# Patient Record
Sex: FEMALE
[De-identification: names, ages, dates, MRNs, and addresses within clinical notes are randomized per-mention and may not be internally consistent; named-entity substitution may affect disease eponyms.]

---

## 2017-05-04 ENCOUNTER — HOSPITAL ENCOUNTER (INPATIENT)
Dept: HOSPITAL 42 - ED | Age: 62
LOS: 4 days | Discharge: HOME | DRG: 202 | End: 2017-05-08
Attending: HOSPITALIST | Admitting: INTERNAL MEDICINE
Payer: MEDICAID

## 2017-05-04 VITALS — BODY MASS INDEX: 27.4 KG/M2

## 2017-05-04 DIAGNOSIS — N39.0: ICD-10-CM

## 2017-05-04 DIAGNOSIS — Z63.72: ICD-10-CM

## 2017-05-04 DIAGNOSIS — F17.210: ICD-10-CM

## 2017-05-04 DIAGNOSIS — K52.9: ICD-10-CM

## 2017-05-04 DIAGNOSIS — E87.6: ICD-10-CM

## 2017-05-04 DIAGNOSIS — K70.11: Primary | ICD-10-CM

## 2017-05-04 DIAGNOSIS — R25.1: ICD-10-CM

## 2017-05-04 DIAGNOSIS — R63.4: ICD-10-CM

## 2017-05-04 DIAGNOSIS — F41.8: ICD-10-CM

## 2017-05-04 DIAGNOSIS — F10.20: ICD-10-CM

## 2017-05-04 DIAGNOSIS — D53.9: ICD-10-CM

## 2017-05-04 DIAGNOSIS — K64.9: ICD-10-CM

## 2017-05-04 LAB
ADD MANUAL DIFF?: NO
ALBUMIN/GLOB SERPL: 0.7 {RATIO} (ref 1.1–1.8)
ALP SERPL-CCNC: 467 U/L (ref 38–133)
ALT SERPL-CCNC: 123 U/L (ref 7–56)
APPEARANCE UR: (no result)
APTT BLD: 27.5 SECONDS (ref 23.7–30.8)
AST SERPL-CCNC: 178 U/L (ref 15–39)
BACTERIA #/AREA URNS HPF: (no result) /[HPF]
BASOPHILS # BLD AUTO: 0.07 K/MM3 (ref 0–2)
BASOPHILS NFR BLD: 0.7 % (ref 0–3)
BILIRUB SERPL-MCNC: 18 MG/DL (ref 0.2–1.3)
BILIRUB UR-MCNC: (no result) MG/DL
BUN SERPL-MCNC: 4 MG/DL (ref 7–21)
CALCIUM SERPL-MCNC: 8.6 MG/DL (ref 8.4–10.5)
CHLORIDE SERPL-SCNC: 100 MMOL/L (ref 98–107)
CO2 SERPL-SCNC: 29 MMOL/L (ref 21–33)
COLOR UR: YELLOW
EOSINOPHIL # BLD: 0.1 10*3/UL (ref 0–0.7)
EOSINOPHIL NFR BLD: 0.5 % (ref 1.5–5)
ERYTHROCYTE [DISTWIDTH] IN BLOOD BY AUTOMATED COUNT: 23.3 % (ref 11.5–14.5)
GLOBULIN SER-MCNC: 3.8 GM/DL
GLUCOSE SERPL-MCNC: 105 MG/DL (ref 70–110)
GLUCOSE UR STRIP-MCNC: NEGATIVE MG/DL
GRANULOCYTES # BLD: 6.53 10*3/UL (ref 1.4–6.5)
GRANULOCYTES NFR BLD: 67.4 % (ref 50–68)
HCT VFR BLD CALC: 30.1 % (ref 36–48)
INR PPP: 1.81 (ref 0.93–1.08)
KETONES UR STRIP-MCNC: NEGATIVE MG/DL
LEUKOCYTE ESTERASE UR-ACNC: (no result) LEU/UL
LIPASE SERPL-CCNC: 160 U/L (ref 23–300)
LYMPHOCYTES # BLD: 1.9 10*3/UL (ref 1.2–3.4)
LYMPHOCYTES NFR BLD AUTO: 19.5 % (ref 22–35)
MCH RBC QN AUTO: 34.8 PG (ref 25–35)
MCHC RBC AUTO-ENTMCNC: 34.2 G/DL (ref 31–37)
MCV RBC AUTO: 101.7 FL (ref 80–105)
MONOCYTES # BLD AUTO: 1.2 10*3/UL (ref 0.1–0.6)
MONOCYTES NFR BLD: 11.9 % (ref 1–6)
PH UR STRIP: 6.5 [PH] (ref 4.7–8)
PLATELET # BLD: 180 10^3/UL (ref 120–450)
PMV BLD AUTO: 9.3 FL (ref 7–11)
POTASSIUM SERPL-SCNC: 2.3 MMOL/L (ref 3.6–5)
PROT SERPL-MCNC: 6.6 G/DL (ref 5.8–8.3)
PROT UR STRIP-MCNC: NEGATIVE MG/DL
RBC # UR STRIP: (no result) /UL
SODIUM SERPL-SCNC: 137 MMOL/L (ref 132–148)
SP GR UR STRIP: 1.01 (ref 1–1.03)
UROBILINOGEN UR STRIP-ACNC: 1 E.U./DL
WBC # BLD AUTO: 9.7 10^3/UL (ref 4.5–11)

## 2017-05-04 NOTE — ED PDOC
Arrival/HPI





- General


Historian: Patient





- History of Present Illness


Time/Duration: 1 week


Context: Home





- General


Chief Complaint: Abdominal Pain


Time Seen by Provider: 17 18:39





- History of Present Illness


Narrative History of Present Illness (Text): 





17 19:00


This 60 yo female with pmh, alcohol abuse, presents to this ED c/o generalized 

swelling, trunk, b/l extremities, abdominal pain, and jaundice x 7 days.


Patient stated she stopped drinking 5 days ago.  Patient also noted rectal 

bleeding, which she describes as bright red blood.  Patient feels very tired, 

decreased urination, and decreased appetite. (Neftali Goyal)





Past Medical History





- Provider Review


Nursing Documentation Reviewed: Yes





- Infectious Disease


Hx of Infectious Diseases: None





- Tetanus Immunization


Tetanus Immunization: Unknown





- Reproductive


Menopause: No





- Cardiac


Hx Cardiac Disorders: No





- Pulmonary


Hx Respiratory Disorders: No





- Neurological


Hx Neurological Disorder: No





- HEENT


Hx HEENT Disorder: No





- Renal


Hx Renal Disorder: No





- Endocrine/Metabolic


Hx Endocrine Disorders: No





- Hematological/Oncological


Hx Blood Disorders: No





- Integumentary


Hx Dermatological Disorder: No





- Musculoskeletal/Rheumatological


Hx Falls: Yes





- Gastrointestinal


Hx Gastrointestinal Disorders: No





- Genitourinary/Gynecological


Hx Genitourinary Disorders: No





- Psychiatric


Hx Depression: Yes


Hx Substance Use: No





- Anesthesia


Hx Anesthesia:  (uto)





- Suicidal Assessment


Feels Threatened In Home Enviroment: No





Family/Social History





- Physician Review


Nursing Documentation Reviewed: Yes


Family/Social History: No Known Family HX


Smoking Status: Never Smoked


Hx Alcohol Use: No


Hx Substance Use: No


Hx Substance Use Treatment: No





Allergies/Home Meds


Allergies/Adverse Reactions: 


Allergies





naproxen Allergy (Verified 16 23:24)


 RASH











Review of Systems





- Review of Systems


Constitutional: Fatigue.  absent: Weight Change, Fevers


Eyes: Normal.  absent: Vision Changes, Photophobia


ENT: Normal


Respiratory: Normal.  absent: SOB, Cough


Cardiovascular: Normal.  absent: Chest Pain, Palpitations


Gastrointestinal: Abdominal Pain, Nausea, Other ((+) rectal bleeding).  absent: 

Diarrhea, Vomiting


Genitourinary Female: Normal.  absent: Dysuria, Frequency, Hematuria


Musculoskeletal: Normal


Skin: Normal.  absent: Rash


Neurological: Normal.  absent: Headache, Dizziness, Focal Weakness, Gait Changes


Endocrine: Normal.  absent: Diaphoresis, Polyuria


Hemo/Lymphatic: Normal


Psychiatric: Normal





Physical Exam


Temperature: Afebrile


Blood Pressure: Normal


Pulse: Regular


Respiratory Rate: Normal


Appearance: Positive for: Comfortable, Ill-Appearing, Unkept


Pain Distress: None


Mental Status: Positive for: Alert and Oriented X 3





- Systems Exam


Head: Present: Atraumatic, Normocephalic


Pupils: Present: PERRL


Extroacular Muscles: Present: EOMI


Conjunctiva: Present: Icteric


Ears: Present: Normal, NORMAL TM, Normal Canal.  No: Erythema, TM Bulging, Fluid

, TM Perf


Mouth: Present: Moist Mucous Membranes


Pharnyx: Present: Normal.  No: ERYTHEMA, EXUDATE, TONSILS ENLARGED


Nose (External): Present: Atraumatic


Neck: Present: Normal Range of Motion.  No: Meningeal Signs


Respiratory/Chest: Present: Clear to Auscultation, Good Air Exchange.  No: 

Respiratory Distress, Accessory Muscle Use, Wheezes, Rales, Retracting, Rhonchi


Cardiovascular: Present: Regular Rate and Rhythm, Normal S1, S2.  No: Murmurs


Abdomen: Present: Normal Bowel Sounds.  No: Tenderness, Distention, Peritoneal 

Signs, Rebound, Guarding


Back: Present: Normal Inspection.  No: CVA Tenderness


Upper Extremity: Present: Normal Inspection, Normal ROM, NORMAL PULSES, 

Neurovascularly Intact, Capillary Refill < 2s.  No: Cyanosis, Edema


Lower Extremity: Present: Normal Inspection, NORMAL PULSES, Normal ROM, 

Neurovascularly Intact, Capillary Refill < 2 s.  No: Edema, CALF TENDERNESS


Neurological: Present: GCS=15, CN II-XII Intact, Speech Normal


Skin: Present: Warm, Dry, Other (Jaundice).  No: Rashes


Psychiatric: Present: Alert, Oriented x 3, Normal Insight, Normal Concentration


Vital Signs











  Temp Pulse Resp BP Pulse Ox


 


 17 03:51   83  18  97/63 L  97


 


 17 00:30  98.2 F  89  20  102/65  98


 


 17 18:03  98.2 F  95 H  18  113/68  95














Medical Decision Making


Re-evaluation Time: 01:06


Reassessment Condition: Re-examined, Improving,but remains with symptoms





- Lab Interpretations


I have reviewed the lab results: Yes


Interpretation: Abnormal lab values





- EKG Interpretation


Interpreted by ED Physician: Yes (NSR @ 83 bpm.  No ST changes)


Type: 12 lead EKG


Comparison: No previous EKG avail.


ED Course and Treatment: 





I was available for consultation during PA evaluation. The chart was reviewed 

by me, and I agree with disposition. The documented history was done by the 

physician extender. The documented physical exam was done by the physician 

extender. The documented procedures were done by the physician extender.  (

Luis Garcia)








17 01:00


I spoke with Dr. French regarding patient symptoms, lab report, imaging result, 

physical exam demonstrates jaundice, and abdominal tenderness.  He agrees with 

plan. (Neftali Goyal)





- Lab Interpretations


Microbiology Results: 


Microbiology Results





17 23:19   Urine,Clean Catch   Urine Culture - Preliminary


                            Gram Negative Ruddy


                            Gram Positive Cocci








Lab Results: 








 17 19:48 





 17 19:48 





 Lab Results





17 22:00: Blood Type Confirm B POSITIVE


17 21:37: Urine Color Yellow, Urine Appearance Slight-cloudy, Urine pH 6.5

, Ur Specific Gravity 1.010, Urine Protein Negative, Urine Glucose (UA) Negative

, Urine Ketones Negative, Urine Blood Trace-intact H, Urine Nitrate Positive H, 

Urine Bilirubin Large H, Urine Urobilinogen 1.0 H, Ur Leukocyte Esterase Trace H

, Urine RBC 1 - 3, Urine WBC 1 - 3, Ur Epithelial Cells 1 - 3, Urine Bacteria 

Many


17 19:48: Ammonia 43 H


17 19:48: Blood Type B POSITIVE, Antibody Screen Negative, BBK History 

Checked No verified bt


17 19:48: Sodium 137, Potassium 2.3 L* D, Chloride 100, Carbon Dioxide 29

, Anion Gap 10, BUN 4 L, Creatinine 0.8, Est GFR (African Amer) > 60, Est GFR (

Non-Af Amer) > 60, Random Glucose 105, Calcium 8.6, Total Bilirubin 18.0 H, AST 

178 H,  H, Alkaline Phosphatase 467 H, NT-Pro-B Natriuret Pep 354, Total 

Protein 6.6, Albumin 2.7 L, Globulin 3.8, Albumin/Globulin Ratio 0.7 L, Lipase 

160


17 19:48: PT 19.5 H, INR 1.81 H, APTT 27.5


17 19:48: WBC 9.7  D, RBC 2.96 L, Hgb 10.3 L, Hct 30.1 L, .7, MCH 

34.8, MCHC 34.2, RDW 23.3 H, Plt Count 180, MPV 9.3, Gran % 67.4, Lymph % (Auto

) 19.5 L, Mono % (Auto) 11.9 H, Eos % (Auto) 0.5 L, Baso % (Auto) 0.7, Gran # 

6.53 H, Lymph # 1.9, Mono # 1.2 H, Eos # 0.1, Baso # 0.07











- RAD Interpretation


Narrative RAD Interpretations (Text): 





17 01:04


CXR:  [facility] Division of Radiology  


 [address] [place] [address]  


 Tel. no. [phone]   


 


 


Patient Name: KAREN RODRIGUEZ            Account #: [id number]  


Pt. Address:  [address] Rec #: [id number]  


                    [place] [address]            Ordering Dr: Neftali Goyal PA-C  


Pt Phone: [phone]                             Order Location: ED  


: 1955 Female Age: 61            Order #: [id number]                   

                   


             Accession #: [id number]  


Reason for exam: abdominal pain   


 


 


 


 


 


 CT Scan  


 


 


ABD   PELVIS IV CONTRAST ONLY                              Exam Date: 17  


 


This imaging exam was performed at [facility]  


EXAM:  


   CT Abdomen and Pelvis With Intravenous Contrast  


 


 CLINICAL HISTORY:  


   61 years old, female; Pain; Abdominal pain; Generalized  


 


 TECHNIQUE:  


   Axial computed tomography images of the abdomen and pelvis with intravenous 

  


 contrast.  This CT exam was performed using one or more of the following dose 

  


 reduction techniques:  automated exposure control, adjustment of the mA and/or

   


 kV according to patient size, and/or use of iterative reconstruction 

technique.  


   Coronal and sagittal reformatted images were created and reviewed.  


 


 CONTRAST:  


   96 mL of OMNI 350 administered intravenously.  


 


 EXAM DATE/TIME:  


   2017 6:55 PM  


 


 COMPARISON:  


   There are no prior studies for comparison.  


 


 FINDINGS:  


   Lower thorax:  Heart size is normal.  There is a small hiatal hernia.  There

   


 is atelectasis and scarring greatest in the left base.  


 


  ABDOMEN:  


   Liver:  There is fatty infiltration of the liver.  The liver is   


 enlarged.There is a 12.5 x 17.1 x 15.6 low attenuation lesion in the right 

lobe   


 of the liver.  


   Gallbladder and bile ducts:  Gallbladder is distended.  There is   


 pericholecystic fluid.  Common bile duct is prominent.  


   Pancreas:  Pancreas is mildly atrophic.  


   Spleen:  unremarkable  


   Adrenals:  unremarkable  


   Kidneys and ureters:  unremarkable  


   Stomach and bowel:  Stomach is almost empty.  Rotation is normal.  There is 

  


 mild duodenal and proximal jejunal wall and fold thickening.  Small bowel is   


 mildly distended with fluid.  There is mild wall and fold thickening 

throughout   


 the small bowel.  There is terminal ileal wall thickening.  Visualized portion

   


 the appendix is unremarkable.  There is ascending and hepatic flexure wall   


 thickening.  There is less extensive transverse and descending colon wall   


 thickening.  There is distal sigmoid and rectal wall thickening.  


   Appendix:  See above.  


 


  PELVIS:  


   Bladder:  Bladder is partially distended.  


   Reproductive:  Uterus and adnexal structures are unremarkable.  


 


  ABDOMEN and PELVIS:  


   Intraperitoneal space:  There is a small amount of fluid in the pelvis.    


 There is minimal fluid in Rios's pouch.  There is pericholecystic fluid.  

  


 There is no free air  


   Bones/joints:  There are degenerative changes in the osseus structures.  


   Soft tissues:  unremarkable  


   Vasculature:  Vascular structures are unremarkable.  


   Lymph nodes:  There is no pathologic adenopathy.  


 


 IMPRESSION:  Enterocolitis; enlarged fatty liver; low attenuation hepatic   


 lesion possibly hemangioma; small amount of ascites in the right upper 

quadrant   


 and right flank including gallbladder fossa  


 


 Additional findings as described above.  


 


                                                            Dictated By:  Tere Diaz MD, MD      


                                                            Dictated Date/Time:

  17  


                                                            Signed By:  Tere Diaz MD  


                                                            Date Signed: 31  


                                                Transcribed By: TAMELA  


                                                            Transcribe Date/Time

: 17  


DONNA/CRISTOFER (Neftali Goyal)


Radiology Orders: 








17 18:55


ABD & PELVIS IV CONTRAST ONLY [CT] Stat 





17 18:56


CHEST PORTABLE [RAD] Stat 














- Medication Orders


Current Medication Orders: 








Folic Acid (Folic Acid)  1 mg PO DAILY [facility]


Ceftriaxone Sodium (Rocephin 1 Gram Ivpb)  1 gm in 100 mls @ 100 mls/hr IVPB 

DAILY [facility]


   PRN Reason: Protocol


   Last Admin: 17 09:46  Dose: 100 mls/hr





Lorazepam (Ativan)  1 mg IVP Q2H PRN; Protocol


   PRN Reason: Anxiety


   Last Admin: 17 23:10  Dose: 1 mg





Multivitamins/Minerals (Therapeutic-M Tab)  1 tab PO DAILY [facility]


   Last Admin: 17 09:46  Dose: 1 tab





Pantoprazole Sodium (Protonix Inj)  40 mg IVP DAILY [facility]


   Last Admin: 17 09:46  Dose: 40 mg





Prednisone (Prednisone Tab)  20 mg PO BID [facility]


   Stop: 17 10:00


Thiamine HCl (Vitamin B1 Tab)  100 mg PO DAILY [facility]





Discontinued Medications





Cyanocobalamin (Vitamin B12 1000 Mcg/Ml Inj)  1,000 mcg IM ONCE ONE


   Stop: 17 08:01


   Last Admin: 17 10:11  Dose: 1,000 mcg





Sodium Chloride (Sodium Chloride 0.9%)  1,000 mls @ 1,000 mls/hr IV .Q1H STA


   Stop: 17 19:50


   Last Admin: 17 19:55  Dose: 1,000 mls/hr





Potassium Chloride (Potassium Chloride 10 Meq/100 Ml)  10 meq in 100 mls @ 100 

mls/hr IVPB Q2H [facility]


   Stop: 17 00:29


   Last Admin: 17 03:23  Dose: 100 mls/hr





Magnesium Sulfate 2 gm/ Sodium (Chloride)  104 mls @ 102 mls/hr IVPB ONCE ONE


   Stop: 17 22:30


   Last Admin: 17 22:57  Dose: 102 mls/hr





Metronidazole (Flagyl)  500 mg in 100 mls @ 100 mls/hr IVPB STAT STA


   PRN Reason: Protocol


   Stop: 17 01:36


   Last Admin: 17 03:09  Dose: 100 mls/hr





Ceftriaxone Sodium (Rocephin 1 Gram Ivpb)  1 gm in 100 mls @ 200 mls/hr IVPB 

STAT STA


   PRN Reason: Protocol


   Stop: 17 01:05


   Last Admin: 17 03:56  Dose: 200 mls/hr





Potassium Chloride 20 meq/ (Sodium Chloride)  1,010 mls @ 100 mls/hr IV .Q10H6M 

[facility]


   Last Admin: 17 06:36  Dose: 100 mls/hr





Sodium Chloride (Sodium Chloride 0.9%)  1,000 mls @ 100 mls/hr IV .Q10H [facility]


   Last Admin: 17 03:07  Dose: 100 mls/hr





Iohexol (Omnipaque 350 100 Ml) Confirm Administered Dose 350 mg .ROUTE .STK-MED 

ONE


   Stop: 17 22:00


Lactulose (Enulose)  20 gm PO ONCE STA


   Stop: 17 02:22


   Last Admin: 17 02:47  Dose: 20 gm





Morphine Sulfate (Morphine)  2 mg IVP STAT STA


   Stop: 17 02:22


   Last Admin: 17 02:36  Dose: 2 mg





Re-Assess: MAR Pain Assessment


 Document     17 03:36  RS  (Rec: 17 09:53  RS  [id number])


     Pain Reassessment


      Is this a pain reassessment?               Yes


     Sleep


      Is patient sleeping during reassessment?   Yes





Ondansetron HCl (Zofran Inj)  4 mg IVP STAT STA


   Stop: 17 18:56


   Last Admin: 17 19:55  Dose: 4 mg





Pantoprazole Sodium (Protonix Inj)  40 mg IVP STAT STA


   Stop: 17 18:57


   Last Admin: 17 19:56  Dose: 40 mg





Potassium Chloride (Potassium Chloride Oral Soln)  60 meq PO STAT STA


   Stop: 17 21:28


   Last Admin: 17 22:57  Dose: 60 meq





Potassium Chloride (Potassium Chloride Oral Soln)  40 meq PO Q4H [facility]


   Stop: 17 15:46


   Last Admin: 17 08:22  Dose: 40 meq





Prednisolone (Prednisolone Oral Soln)  40 mg PO DAILY [facility]


   Last Admin: 17 17:31  Dose: 40 mg





Prednisolone (Prednisolone Oral Soln)  20 mg PO BID [facility]


   Stop: 17 10:00


   Last Admin: 17 10:39  Dose: 20 mg











Disposition/Present on Arrival





- Present on Arrival


Any Indicators Present on Arrival: No


History of DVT/PE: No


History of Uncontrolled Diabetes: No


Urinary Catheter: No


History of Decub. Ulcer: No


History Surgical Site Infection Following: None





- Disposition


Have Diagnosis and Disposition been Completed?: Yes


Disposition Time: 01:16


Patient Plan: Admission





- Disposition


Diagnosis: 


 Rectal bleeding, Abnormal liver enzymes, Enterocolitis, Urinary tract infection

, Ascites, Intractable abdominal pain, Jaundice





Disposition: HOSPITALIZED


Patient Problems: 


 Current Active Problems











Problem Status Onset


 


Abnormal liver enzymes Acute  


 


Ascites Acute  


 


Enterocolitis Acute  


 


Intractable abdominal pain Acute  


 


Jaundice Acute  


 


Rectal bleeding Acute  


 


Urinary tract infection Acute  











Condition: STABLE

## 2017-05-05 LAB
ADD MANUAL DIFF?: NO
ALBUMIN/GLOB SERPL: 0.7 {RATIO} (ref 1.1–1.8)
ALP SERPL-CCNC: 383 U/L (ref 38–133)
ALT SERPL-CCNC: 98 U/L (ref 7–56)
AST SERPL-CCNC: 135 U/L (ref 15–39)
BASOPHILS # BLD AUTO: 0.11 K/MM3 (ref 0–2)
BASOPHILS NFR BLD: 1.2 % (ref 0–3)
BILIRUB SERPL-MCNC: 15.2 MG/DL (ref 0.2–1.3)
BUN SERPL-MCNC: 3 MG/DL (ref 7–21)
CALCIUM SERPL-MCNC: 7.7 MG/DL (ref 8.4–10.5)
CHLORIDE SERPL-SCNC: 109 MMOL/L (ref 98–107)
CO2 SERPL-SCNC: 24 MMOL/L (ref 21–33)
EOSINOPHIL # BLD: 0.1 10*3/UL (ref 0–0.7)
EOSINOPHIL NFR BLD: 0.6 % (ref 1.5–5)
ERYTHROCYTE [DISTWIDTH] IN BLOOD BY AUTOMATED COUNT: 24.2 % (ref 11.5–14.5)
GLOBULIN SER-MCNC: 3 GM/DL
GLUCOSE SERPL-MCNC: 86 MG/DL (ref 70–110)
GRANULOCYTES # BLD: 5.8 10*3/UL (ref 1.4–6.5)
GRANULOCYTES NFR BLD: 60.7 % (ref 50–68)
HCT VFR BLD CALC: 26.6 % (ref 36–48)
LYMPHOCYTES # BLD: 2.3 10*3/UL (ref 1.2–3.4)
LYMPHOCYTES NFR BLD AUTO: 23.8 % (ref 22–35)
MCH RBC QN AUTO: 34.1 PG (ref 25–35)
MCHC RBC AUTO-ENTMCNC: 33.1 G/DL (ref 31–37)
MCV RBC AUTO: 103.1 FL (ref 80–105)
MONOCYTES # BLD AUTO: 1.3 10*3/UL (ref 0.1–0.6)
MONOCYTES NFR BLD: 13.7 % (ref 1–6)
PLATELET # BLD: 201 10^3/UL (ref 120–450)
PMV BLD AUTO: 9.7 FL (ref 7–11)
POTASSIUM SERPL-SCNC: 4.1 MMOL/L (ref 3.6–5)
PROT SERPL-MCNC: 5.3 G/DL (ref 5.8–8.3)
SODIUM SERPL-SCNC: 141 MMOL/L (ref 132–148)
WBC # BLD AUTO: 9.6 10^3/UL (ref 4.5–11)

## 2017-05-05 RX ADMIN — METRONIDAZOLE STA MLS/HR: 500 INJECTION, SOLUTION INTRAVENOUS at 02:36

## 2017-05-05 RX ADMIN — POTASSIUM CHLORIDE SCH MEQ: 1.5 SOLUTION ORAL at 04:28

## 2017-05-05 RX ADMIN — CEFTRIAXONE SCH MLS/HR: 1 INJECTION, SOLUTION INTRAVENOUS at 10:10

## 2017-05-05 RX ADMIN — MULTIPLE VITAMINS W/ MINERALS TAB SCH TAB: TAB at 10:11

## 2017-05-05 RX ADMIN — METRONIDAZOLE STA MLS/HR: 500 INJECTION, SOLUTION INTRAVENOUS at 03:09

## 2017-05-05 RX ADMIN — CEFTRIAXONE STA MLS/HR: 1 INJECTION, SOLUTION INTRAVENOUS at 03:09

## 2017-05-05 RX ADMIN — CEFTRIAXONE STA MLS/HR: 1 INJECTION, SOLUTION INTRAVENOUS at 03:56

## 2017-05-05 RX ADMIN — POTASSIUM CHLORIDE SCH MEQ: 1.5 SOLUTION ORAL at 08:22

## 2017-05-05 NOTE — CON
DATE: 05/05/2017



HISTORY OF PRESENT ILLNESS:  Shortly, the patient is a 61-year-old  female with history of a
lcohol use disorder, history of being admitted to the psychiatric inpatient unit under this writer's 
service for anxiety which took place here in [place] in 12/2016.  The patient stayed in the hospital 
only for 2 days, was discharged under Dr. Fernandez's services.  The patient was admitted this time on th
e medical floor for evaluation of jaundice.  The patient has history of alcohol use disorder.  Psych 
consult was called for evaluation of possible depressive symptoms.  The patient was seen and examined
 today.  The patient said that she stopped drinking about a week ago and all of a sudden she became j
aundiced.  The patient said that is why she came to the hospital.  The patient said that she not foll
ow up with Dr. Fernadnez.  The patient said that she is not on any psychotropic medications at present mo
ment.  The patient has her episodes of depressive mood, but patient adamantly denied thoughts of harm
ing herself or others, denied intent or plan.  The patient denied feeling anxious, denied hearing voi
vera, denied seeing things, denied paranoid ideations.  The patient denied thoughts of harming herself
 or others.



VITAL SIGNS:.  Temperature 98.1, pulse is 86, blood pressure 108/64, respirations 18, oxygen saturati
on is 97.



MEDICATIONS:  Reviewed.  The patient is Rocephin, Ativan 1 mg IV push q. 2 hours as needed for anxiet
y, multivitamins, Protonix and sodium chloride.



LABORATORY DATA:  Reviewed.  Most recent was from today.  Hemoglobin and hematocrit 8.8 and 26.6 resp
ectively.  Coagulation is reviewed.  Chemistry reviewed.  AST and ALT elevated, but trending down.  P
otassium was low at 2.3.  Urinalysis showed blood, nitrite positive, large bilirubin and urobilinogen
 1.0, and leukocyte esterase is trace, bacteria is many.  Serology is negative.



PAST PSYCHIATRIC HISTORY:  Most recently admitted to psychiatric inpatient unit in 1/2016.  The patie
nt reported that she is not on any medications and is not seeing Dr. Fernandez for a while.



MENTAL STATUS EXAMINATION:  The patient appears to be older than her chronological age, appears to be
 very jaundiced.  Intermittent eye contact.  Speech was low volume, underproductive.  Mood described 
as, "I have my moment of depression."  Affect was constricted.  Thought process was coherent and goal
 directed.  Thought content:  The patient denied visual, auditory, tactile hallucinations.  Denied pa
ranoid ideations.  The patient denied thoughts of harming herself or others, denied intent or plan.  
Insight and judgment are fair.  Impulses are well controlled.



IMPRESSION:  Alcohol use disorder.  Rule out substance-induced mood disorder.  The patient has multip
le medical issues.  The patient has jaundice.  The patient has abdominal pain.



PLAN:  Continue current management.  Continue current medications.  The patient has a basic understan
ding of her liver problems, which are related to her chronic alcohol consumption but education patien
t about diagnosis and outcome from this problem.  The patient needs to be educated by the primary car
e team as well as GI team.  Meanwhile, the patient is on benzodiazepines in case if she would have wi
thdrawal symptoms.  The patient is to continue multivitamins, thiamine and folic acid.  The patient w
ill be followed by GI team, primary care team.  The patient has followup appointment, patient may [facility] followup appointment with Dr. Fernandez, her private psychiatrist.  Meanwhile, there is no agitatio
n or aggression.  The patient denied thoughts of harming herself or others.  Denied intent or plan.  
This writer will sign off from this case.  Should you have any questions, give me a call back.



Thank you very much for letting me participate in care of your patient.





__________________________________________

Alba Masters MD







cc:



DD: 05/05/2017 15:30:31  [id number]

TT: 05/05/2017 16:10:08

Confirmation # [id number]

Dictation # [id number]

jo

## 2017-05-05 NOTE — CP.PCM.HP
<LitoRamon - Last Filed: 05/05/17 06:28>





History of Present Illness





- History of Present Illness


History of Present Illness: 





Ramon Morse D.O. PGY-1, Internal Medicine Resident, Night Float Admission





CC: jaundice and abdominal pain for multiple days





61 year old female with a PMH alcoholism who [place] [facility] [place] 5[place]17 with 

complaints of abdominal pain and jaundice for the last few days. Patient states 

that she quit drinking a week ago and was previously drinking a bottle of vodka 

a day but not every day since February. Patient states that she noticed her 

skin started to become yellow, started to get abdominal pain, 6/10 in severity, 

non-radiating, generalized, dull in quality, not aggravated or alleviated by 

anything, and also accompanied by bleeding of her pre-existing hemorrhoids and 

one episode of epistaxis. Patient states that became concerned as she continued 

to get more and more yellow and decided to come in. Denies recent or distant 

travel to [place] or [place].





PMH: as above


PSH: denies


SH: smokes half a pack for multiple years, heavy drinker as above, denies drug 

use, currently unemployed


FH: denies


Meds: denies


Allergies: naproxen





Present on Admission





- Present on Admission


Any Indicators Present on Admission: No





Review of Systems





- Constitutional


Constitutional: absent: Anorexia, Fatigue, Headache





- EENT


Eyes: absent: Blurred Vision, Photophobia


Ears: absent: Decreased Hearing, Ear Discharge, Ear Pain


Nose/Mouth/Throat: absent: Epistaxis, Nasal Congestion, Nasal Discharge





- Cardiovascular


Cardiovascular: absent: Chest Pain, Leg Edema, Pedal Edema





- Respiratory


Respiratory: absent: Cough, Wheezing





- Gastrointestinal


Gastrointestinal: Abdominal Pain, Nausea.  absent: Constipation, Diarrhea, 

Vomiting





- Genitourinary


Genitourinary: absent: Dysuria, Hematuria





- Musculoskeletal


Musculoskeletal: absent: Abnormal Gait, Arthralgias, Atrophy





- Integumentary


Integumentary: Jaundice.  absent: Pruritus, Rash





- Neurological


Neurological: absent: Abnormal Gait, Abnormal Hearing, Tingling, Tremor





Past Patient History





- Infectious Disease


Hx of Infectious Diseases: None





- Tetanus Immunizations


Tetanus Immunization: Unknown





- Past Social History


Smoking Status: Never Smoked





- CARDIAC


Hx Cardiac Disorders: No





- PULMONARY


Hx Respiratory Disorders: No





- NEUROLOGICAL


Hx Neurological Disorder: No





- HEENT


Hx HEENT Problems: No





- RENAL


Hx Chronic Kidney Disease: No





- ENDOCRINE/METABOLIC


Hx Endocrine Disorders: No





- HEMATOLOGICAL/ONCOLOGICAL


Hx Blood Disorders: No





- INTEGUMENTARY


Hx Dermatological Problems: No





- MUSCULOSKELETAL/RHEUMATOLOGICAL


Hx Falls: Yes





- GASTROINTESTINAL


Hx Gastrointestinal Disorders: No





- GENITOURINARY/GYNECOLOGICAL


Hx Genitourinary Disorders: No





- PSYCHIATRIC


Hx Depression: Yes


Hx Substance Use: No





- SURGICAL HISTORY


Hx Surgeries:  (endrometriosis)





- ANESTHESIA


Hx Anesthesia:  (uto)





Meds


Allergies/Adverse Reactions: 


 Allergies











Allergy/AdvReac Type Severity Reaction Status Date / Time


 


naproxen Allergy  RASH Verified 12/19/16 23:24














Physical Exam





- Constitutional


Additional comments: 





well developed, well nourished, pleasant, very yellow female resting in bed in 

NAD





- Head Exam


Head Exam: ATRAUMATIC, NORMOCEPHALIC





- Eye Exam


Eye Exam: EOMI, PERRL, Scleral icterus.  absent: Conjunctival injection





- ENT Exam


ENT Exam: Mucous Membranes Moist, Normal Oropharynx





- Neck Exam


Neck exam: Positive for: Full Rom.  Negative for: Lymphadenopathy





- Respiratory Exam


Respiratory Exam: Clear to Auscultation Bilateral.  absent: Rales, Rhonchi, 

Wheezes





- Cardiovascular Exam


Cardiovascular Exam: RRR, +S1, +S2.  absent: Diastolic murmur, Gallop, Rubs, 

Systolic Murmur





- GI/Abdominal Exam


GI & Abdominal Exam: Normal Bowel Sounds, Soft, Tenderness (mild diffuse).  

absent: Distended





- Rectal Exam


Rectal Exam: Deferred





- Extremities Exam


Extremities exam: Positive for: normal capillary refill, pedal pulses present.  

Negative for: calf tenderness, pedal edema, tenderness





- Back Exam


Back exam: absent: paraspinal tenderness, vertebral tenderness





- Neurological Exam


Neurological exam: Alert, CN II-XII Intact, Oriented x3





- Skin


Skin Exam: Dry, Intact, Warm





Results





- Vital Signs


Recent Vital Signs: 





 Last Vital Signs











Temp  98.2 F   05/05/17 00:30


 


Pulse  83   05/05/17 03:51


 


Resp  18   05/05/17 03:51


 


BP  97/63 L  05/05/17 03:51


 


Pulse Ox  97   05/05/17 03:51














- Labs


Result Diagrams: 


 05/04/17 19:48





 05/04/17 19:48





Assessment & Plan





- Assessment and Plan (Free Text)


Assessment: 





61 year old female with a PMH alcoholism who presents with complaints of 

abdominal pain and jaundice for the last few days.


Plan: 





1. Jaundice with abdominal pain, hyperammonemia, transaminitis, and bleeding


Admitted to reg floor


Consulted GI Dr. Shanks


Ordered direct bili, hepatitis panel, ANAT w/reflex, mitrocondrial and smooth 

muscle ab


Given lactulose once


Abd CT reviewed by myself as well as official read, fatty liver with possible 

hemangioma


Ordered GB and hepatic US


NPO


NS @ 100





2. Hypokalemia


Replenished


Repeat CMP in the AM





3. Macrocytic anemia


Likely 2/2 nutritional deficiencies from alcoholism


Given b12 shot


Started MVI





4. Hx alcoholism


Unable to determine whether patient being honest about abuse


PRN ativan placed





DVT/GI ppx: SCDs/protonix





Patient was seen and examined at bedside and case was discussed at length with 

attending physician.








- Date & Time


Date: 05/05/17


Time: 00:30





<Lexus French - Last Filed: 05/05/17 19:46>





Results





- Vital Signs


Recent Vital Signs: 





 Last Vital Signs











Temp  98.2 F   05/05/17 16:00


 


Pulse  89   05/05/17 16:00


 


Resp  19   05/05/17 16:00


 


BP  119/78   05/05/17 16:00


 


Pulse Ox  96   05/05/17 16:00














- Labs


Result Diagrams: 


 05/05/17 07:30





 05/05/17 07:30


Labs: 





 Laboratory Results - last 24 hr











  05/05/17 05/05/17 05/05/17





  06:30 06:30 07:30


 


WBC   


 


RBC   


 


Hgb   


 


Hct   


 


MCV   


 


MCH   


 


MCHC   


 


RDW   


 


Plt Count   


 


MPV   


 


Gran %   


 


Lymph % (Auto)   


 


Mono % (Auto)   


 


Eos % (Auto)   


 


Baso % (Auto)   


 


Gran #   


 


Lymph #   


 


Mono #   


 


Eos #   


 


Baso #   


 


Sodium    141


 


Potassium    4.1


 


Chloride    109 H


 


Carbon Dioxide    24


 


Anion Gap    12


 


BUN    3 L


 


Creatinine    0.6


 


Est GFR ( Amer)    > 60


 


Est GFR (Non-Af Amer)    > 60


 


Random Glucose    86


 


Calcium    7.7 L


 


Total Bilirubin    15.2 H


 


Direct Bilirubin  13.2 H  


 


AST    135 H


 


ALT    98 H


 


Alkaline Phosphatase    383 H


 


Total Protein    5.3 L


 


Albumin    2.2 L


 


Globulin    3.0


 


Albumin/Globulin Ratio    0.7 L


 


Stool Occult Blood   


 


Hepatitis A IgM Ab   Negative 


 


Hep Bs Antigen   Negative 


 


Hep B Core IgM Ab   Negative 


 


Hepatitis C Antibody   Negative 














  05/05/17 05/05/17





  07:30 16:51


 


WBC  9.6 


 


RBC  2.58 L 


 


Hgb  8.8 L 


 


Hct  26.6 L 


 


MCV  103.1 


 


MCH  34.1 


 


MCHC  33.1 


 


RDW  24.2 H 


 


Plt Count  201 


 


MPV  9.7 


 


Gran %  60.7 


 


Lymph % (Auto)  23.8 


 


Mono % (Auto)  13.7 H 


 


Eos % (Auto)  0.6 L 


 


Baso % (Auto)  1.2 


 


Gran #  5.80 


 


Lymph #  2.3 


 


Mono #  1.3 H 


 


Eos #  0.1 


 


Baso #  0.11 


 


Sodium  


 


Potassium  


 


Chloride  


 


Carbon Dioxide  


 


Anion Gap  


 


BUN  


 


Creatinine  


 


Est GFR ( Amer)  


 


Est GFR (Non-Af Amer)  


 


Random Glucose  


 


Calcium  


 


Total Bilirubin  


 


Direct Bilirubin  


 


AST  


 


ALT  


 


Alkaline Phosphatase  


 


Total Protein  


 


Albumin  


 


Globulin  


 


Albumin/Globulin Ratio  


 


Stool Occult Blood   Negative


 


Hepatitis A IgM Ab  


 


Hep Bs Antigen  


 


Hep B Core IgM Ab  


 


Hepatitis C Antibody  














Attending/Attestation





- Attestation


I have personally seen and examined this patient.: Yes


I have fully participated in the care of the patient.: Yes


I have reviewed all pertinent clinical information: Yes


Notes (Text): 





05/05/17 19:46


Seen with medical resident in the ER.


Agree with history, physical examination, assessment and plan.

## 2017-05-05 NOTE — CON
DATE: 05/05/2017



Seen and examined at the bedside earlier today.



REQUEST FOR CONSULT:  Jaundice.



HISTORY OF PRESENT ILLNESS:  This is a 61-year-old female with a past medical 
history of anxiety and depression and alcoholism, came to the Emergency Room 
with complaints of jaundice and abdominal pain for the past few days.  The 
patient states that she quit drinking 2 weeks ago.  She drinks about a pint of 
vodka on and off since February.  She also complains of noticing bright red 
blood per rectum.  She states she does have history of hemorrhoids.  She states 
she did quit 7 years ago, but secondary to her personal problems she started 
drinking again.  Does complain of nausea.  No hematemesis.  Positive for chills 
and complains of diarrhea.  Denies any recent travel or any antibiotic or 
contributory foods.  Reports about a 35 pound weight loss in the past 5 months 
and loss of appetite.  On admission, the CT scan of abdomen and pelvis was done 
with IV contrast, which shows enlarged liver and a lesion in the right lobe of 
the liver and a distended gallbladder with pericholecystic fluid.  The patient 
states that she had endoscopy and colonoscopy about 5 years ago, does not 
recall any acute findings.



PAST MEDICAL HISTORY:  Alcoholism, depression, and anxiety.



PAST SURGICAL HISTORY:  Had a procedure done for endometriosis.



SOCIAL HISTORY:  Smokes half a pack a day for many years, heavily drinks alcohol
, the patient drinks a pint of vodka on and off.  She stopped drinking 2 weeks 
ago.  Denies any drug use.



FAMILY HISTORY:  Alcoholism in the family.



MEDICATIONS:  She is on Ritalin and Valium.



ALLERGIES:  NAPROXEN.



REVIEW OF SYSTEMS:  Reviewed with positive findings, see HPI.



VITAL SIGNS:  Temperature is 98.1, blood pressure 108/64, pulse rate is 86, 
respirations 18, 97 on room air.



BLOOD WORK:  WBC is 9.6, H and H is 8.8 and 26.6, platelets 201.  PT is 19.5, 
INR is 1.81, PTT 27.5.  Sodium is 141, K is 4.1, BUN is 3, creatinine is 0.6.  
Total bilirubin is 15.2, AST is 135, ALT 98, alkaline phosphatase, , 
albumin 2.2.  Urine shows trace of leukocyte esterase, large bilirubin.  
Hepatitis panel is negative.  CT scan of abdomen and pelvis with IV contrast 
shows enterocolitis, enlarged fatty liver, low attenuation hepatic lesion, 
possibly hemangioma, a small amount of ascites in the right upper quadrant and 
right flank, including gallbladder fossa.  She also had a chest x-ray.  
Gallbladder ultrasound shows no gallstones.  There is mild gallbladder wall 
thickening and pericholecystic fluid.  A sonographic Stringer sign is negative.  
CBD measures 7.0 mm, no stones, no dilatation.  Gallbladder wall thickening and 
edema could be related to systemic disease rather than any gallbladder 
pathology in the absence of gallstones and positive sonographic Stringer sign.



PHYSICAL EXAMINATION:

HEENT:  Sclerae are nonicteric.

NECK:  Supple.

CARDIAC:  S1, S2.

LUNGS:  Decreased breath sounds but good air entry, no rales or wheeze.

ABDOMEN:  With bowel sounds, softly distended with tenderness to mid abdomen.  
No rebound, guarding, or organomegaly.

EXTREMITIES:  Positive bilateral edema, no calf tenderness.

NEUROLOGIC:  Awake, alert, and oriented.  The patient is noted to have some 
mild hand tremors.



ASSESSMENT:  A 61-year-old female with a past medical history of alcoholism, 
came with abdominal pain and jaundice and elevated liver enzymes, likely 
alcoholic hepatitis.  The patient's MELD score is 23 and her discriminate 
factor is 50.6, which shows poor prognosis greater than 32 suggests poor 
prognosis and may benefit from steroid administration.



PLAN:  We will request stool studies.  Monitor H and H. The patient at one 
point may benefit from an elective outpatient colonoscopy.  Possible colitis on 
Rocephin.  Continue GI prophylaxis.  The patient is on Ativan and currently on 
liquid diet and IV fluids.  Monitor electrolytes.



Thank you for this consult and for allowing us to participate in the patient's 
care.  Will make further recommendations based upon patient's clinical course.  
The patient was seen and case discussed with Dr. Shanks.





__________________________________________

Kary LOPEZ







cc:   



DD: 05/05/2017 13:22:19  [id number]

TT: 05/05/2017 14:13:12

Confirmation # [id number]

Dictation # [id number]

jn

MTDD

## 2017-05-05 NOTE — CT
EXAM:

  CT Abdomen and Pelvis With Intravenous Contrast



CLINICAL HISTORY:

  61 years old, female; Pain; Abdominal pain; Generalized



TECHNIQUE:

  Axial computed tomography images of the abdomen and pelvis with intravenous 

contrast.  This CT exam was performed using one or more of the following dose 

reduction techniques:  automated exposure control, adjustment of the mA and/or 

kV according to patient size, and/or use of iterative reconstruction technique.

  Coronal and sagittal reformatted images were created and reviewed.



CONTRAST:

  96 mL of OMNI 350 administered intravenously.



EXAM DATE/TIME:

  5/4/2017 6:55 PM



COMPARISON:

  There are no prior studies for comparison.



FINDINGS:

  Lower thorax:  Heart size is normal.  There is a small hiatal hernia.  There 

is atelectasis and scarring greatest in the left base.



 ABDOMEN:

  Liver:  There is fatty infiltration of the liver.  The liver is 

enlarged.There is a 12.5 x 17.1 x 15.6 low attenuation lesion in the right lobe 

of the liver.

  Gallbladder and bile ducts:  Gallbladder is distended.  There is 

pericholecystic fluid.  Common bile duct is prominent.

  Pancreas:  Pancreas is mildly atrophic.

  Spleen:  unremarkable

  Adrenals:  unremarkable

  Kidneys and ureters:  unremarkable

  Stomach and bowel:  Stomach is almost empty.  Rotation is normal.  There is 

mild duodenal and proximal jejunal wall and fold thickening.  Small bowel is 

mildly distended with fluid.  There is mild wall and fold thickening throughout 

the small bowel.  There is terminal ileal wall thickening.  Visualized portion 

the appendix is unremarkable.  There is ascending and hepatic flexure wall 

thickening.  There is less extensive transverse and descending colon wall 

thickening.  There is distal sigmoid and rectal wall thickening.

  Appendix:  See above.



 PELVIS:

  Bladder:  Bladder is partially distended.

  Reproductive:  Uterus and adnexal structures are unremarkable.



 ABDOMEN and PELVIS:

  Intraperitoneal space:  There is a small amount of fluid in the pelvis.  

There is minimal fluid in Rios's pouch.  There is pericholecystic fluid.  

There is no free air

  Bones/joints:  There are degenerative changes in the osseus structures.

  Soft tissues:  unremarkable

  Vasculature:  Vascular structures are unremarkable.

  Lymph nodes:  There is no pathologic adenopathy.



IMPRESSION:  Enterocolitis; enlarged fatty liver; low attenuation hepatic 

lesion possibly hemangioma; small amount of ascites in the right upper quadrant 

and right flank including gallbladder fossa



Additional findings as described above.

## 2017-05-05 NOTE — CARD
--------------- APPROVED REPORT --------------





EKG Measurement

Heart [id number]

MS [id number]

[id number]

[id number]

[id number]



<Conclusion>

Normal sinus rhythm

Low voltage QRS

Nonspecific T wave abnormality

Abnormal ECG

## 2017-05-05 NOTE — RAD
HISTORY:

admission  



COMPARISON:

12/16/2016. 



FINDINGS:



LUNGS:

The lungs are clear.



PLEURA:

No significant pleural effusion identified, no pneumothorax apparent.



CARDIOVASCULAR:

Normal.



OSSEOUS STRUCTURES:

No significant abnormalities.



VISUALIZED UPPER ABDOMEN:

Normal.



OTHER FINDINGS:

None.



IMPRESSION:

No active pulmonary disease.

## 2017-05-05 NOTE — US
HISTORY:

Jaundice



COMPARISON:

CT abdomen and pelvis from 05/04/2017



TECHNIQUE:

Sonographic evaluation of the right upper quadrant of the abdomen.



FINDINGS:



LIVER:

The reversed is enlarged and measures 20 cm. There is diffuse 

increased echogenicity of the liver parenchyma.  There is a 1.6 x 1.3 

x 1.7 cm cyst in the right hepatic lobe. No solid mass. No 

intrahepatic bile duct dilatation. 



GALLBLADDER:

No gallstones. There is mild gallbladder wall thickening and 

pericholecystic cystic fluid/ edema. The sonographic Stringer's sign is 

negative. 



COMMON BILE DUCT:

Measures 7.0 mm.  No stones. No dilatation.



PANCREAS:

Unremarkable as visualized. No mass. No ductal dilatation.



RIGHT KIDNEY:

Measures 11.4 cm in length. Normal echogenicity. No calculus, mass, 

or hydronephrosis.



AORTA:

No aneurysmal dilatation.



IVC:

Unremarkable.



OTHER FINDINGS:

None .



IMPRESSION:

Mild hepatomegaly.  Diffuse increased echogenicity in the liver may 

reflect hepatic steatosis however parenchymal infectious/ 

inflammatory etiologies cannot be entirely excluded.  Clinical and 

laboratory correlation is advised. .



Gallbladder wall thickening/edema could be related to systemic 

disease rather than gallbladder pathology in the absence of 

gallstones and positive sonographic Stringer's sign.

## 2017-05-06 LAB
ADD MANUAL DIFF?: NO
ALBUMIN/GLOB SERPL: 0.7 {RATIO} (ref 1.1–1.8)
ALP SERPL-CCNC: 328 U/L (ref 38–133)
ALT SERPL-CCNC: 78 U/L (ref 7–56)
AST SERPL-CCNC: 108 U/L (ref 15–39)
BASOPHILS # BLD AUTO: 0.02 K/MM3 (ref 0–2)
BASOPHILS NFR BLD: 0.2 % (ref 0–3)
BILIRUB SERPL-MCNC: 12.4 MG/DL (ref 0.2–1.3)
BUN SERPL-MCNC: 2 MG/DL (ref 7–21)
CALCIUM SERPL-MCNC: 7.7 MG/DL (ref 8.4–10.5)
CHLORIDE SERPL-SCNC: 104 MMOL/L (ref 98–107)
CO2 SERPL-SCNC: 28 MMOL/L (ref 21–33)
EOSINOPHIL # BLD: 0 10*3/UL (ref 0–0.7)
EOSINOPHIL NFR BLD: 0 % (ref 1.5–5)
ERYTHROCYTE [DISTWIDTH] IN BLOOD BY AUTOMATED COUNT: 24.4 % (ref 11.5–14.5)
GLOBULIN SER-MCNC: 3.3 GM/DL
GLUCOSE SERPL-MCNC: 126 MG/DL (ref 70–110)
GRANULOCYTES # BLD: 5.82 10*3/UL (ref 1.4–6.5)
GRANULOCYTES NFR BLD: 67.2 % (ref 50–68)
HCT VFR BLD CALC: 25.2 % (ref 36–48)
INR PPP: 1.56 (ref 0.93–1.08)
LYMPHOCYTES # BLD: 1.8 10*3/UL (ref 1.2–3.4)
LYMPHOCYTES NFR BLD AUTO: 21.1 % (ref 22–35)
MCH RBC QN AUTO: 36.1 PG (ref 25–35)
MCHC RBC AUTO-ENTMCNC: 34.5 G/DL (ref 31–37)
MCV RBC AUTO: 104.6 FL (ref 80–105)
MONOCYTES # BLD AUTO: 1 10*3/UL (ref 0.1–0.6)
MONOCYTES NFR BLD: 11.5 % (ref 1–6)
PLATELET # BLD: 228 10^3/UL (ref 120–450)
PMV BLD AUTO: 9.2 FL (ref 7–11)
POTASSIUM SERPL-SCNC: 4 MMOL/L (ref 3.6–5)
PROT SERPL-MCNC: 5.7 G/DL (ref 5.8–8.3)
SODIUM SERPL-SCNC: 137 MMOL/L (ref 132–148)
WBC # BLD AUTO: 8.7 10^3/UL (ref 4.5–11)

## 2017-05-06 RX ADMIN — PREDNISOLONE SODIUM PHOSPHATE SCH MG: 15 SOLUTION ORAL at 19:26

## 2017-05-06 RX ADMIN — PREDNISOLONE SODIUM PHOSPHATE SCH MG: 15 SOLUTION ORAL at 10:39

## 2017-05-06 RX ADMIN — MULTIPLE VITAMINS W/ MINERALS TAB SCH TAB: TAB at 09:46

## 2017-05-06 RX ADMIN — PREDNISOLONE SODIUM PHOSPHATE SCH: 15 SOLUTION ORAL at 10:40

## 2017-05-06 RX ADMIN — CEFTRIAXONE SCH MLS/HR: 1 INJECTION, SOLUTION INTRAVENOUS at 09:46

## 2017-05-06 NOTE — CP.PCM.PN
<Betty Dodson - Last Filed: 05/06/17 12:53>





Subjective





- Date & Time of Evaluation


Date of Evaluation: 05/06/17


Time of Evaluation: 12:53





- Subjective


Subjective: 


Hospitalist Progress Note 





Patient seen and examined at bedside. There were no acute overnight events. She 

reports her nausea has improved, but still has diarrhea. She denies abdominal 

pain, CP, SOB, n/v/d, numbness/tingling, fever or chills. 





Objective





- Vital Signs/Intake and Output


Vital Signs (last 24 hours): 


 











Temp Pulse Resp BP Pulse Ox


 


 98.4 F   61   17   105/63   100 


 


 05/06/17 11:21  05/06/17 11:21  05/06/17 11:21  05/06/17 11:21  05/06/17 11:21








Intake and Output: 


 











 05/06/17 05/06/17





 06:59 18:59


 


Intake Total 1200 


 


Output Total 500 


 


Balance 700 














- Medications


Medications: 


 Current Medications





Sodium Chloride (Sodium Chloride 0.9%)  1,000 mls @ 100 mls/hr IV .Q10H [facility]


   Last Admin: 05/06/17 03:07 Dose:  100 mls/hr


Ceftriaxone Sodium (Rocephin 1 Gram Ivpb)  1 gm in 100 mls @ 100 mls/hr IVPB 

DAILY [facility]


   PRN Reason: Protocol


   Last Admin: 05/06/17 09:46 Dose:  100 mls/hr


Lorazepam (Ativan)  1 mg IVP Q2H PRN; Protocol


   PRN Reason: Anxiety


   Last Admin: 05/05/17 23:10 Dose:  1 mg


Multivitamins/Minerals (Therapeutic-M Tab)  1 tab PO DAILY [facility]


   Last Admin: 05/06/17 09:46 Dose:  1 tab


Pantoprazole Sodium (Protonix Inj)  40 mg IVP DAILY [facility]


   Last Admin: 05/06/17 09:46 Dose:  40 mg


Prednisone (Prednisone Tab)  20 mg PO BID [facility]


   Stop: 05/08/17 10:00











- Labs


Labs: 


 





 05/06/17 07:53 





 05/06/17 07:53 





 











PT  16.8 Seconds (9.9-11.8)  H  05/06/17  07:53    


 


INR  1.56  (0.93-1.08)  H  05/06/17  07:53    


 


APTT  27.5 Seconds (23.7-30.8)   05/04/17  19:48    














- Constitutional


Appears: No Acute Distress





- Head Exam


Head Exam: ATRAUMATIC, NORMAL INSPECTION, NORMOCEPHALIC





- Eye Exam


Eye Exam: PERRL, Scleral icterus


Pupil Exam: NORMAL ACCOMODATION, PERRL





- ENT Exam


ENT Exam: Mucous Membranes Moist





- Neck Exam


Neck Exam: Full ROM





- Respiratory Exam


Respiratory Exam: Clear to Ausculation Bilateral, NORMAL BREATHING PATTERN.  

absent: Rales, Rhonchi, Wheezes





- Cardiovascular Exam


Cardiovascular Exam: REGULAR RHYTHM, +S1, +S2.  absent: Gallop, Rubs, Murmur





- GI/Abdominal Exam


GI & Abdominal Exam: Soft, Hyperactive Bowel Sounds.  absent: Tenderness, Mass, 

Rebound





- Extremities Exam


Extremities Exam: Normal Inspection.  absent: Calf Tenderness, Pedal Edema





- Neurological Exam


Neurological Exam: Alert, Awake, CN II-XII Intact, Oriented x3





- Psychiatric Exam


Psychiatric exam: Normal Affect, Normal Mood





- Skin


Skin Exam: Dry, Intact, Warm.  absent: Normal Color


Additional comments: 





Jaundice 





Assessment and Plan





- Assessment and Plan (Free Text)


Assessment: 


This is a 61Y F with PMH of alcoholism admitted for jaundice secondary to 

alcoholic hepatitis 


Plan: 


1. Alcoholic Hepatitis 


- NS@100


- Continue to monitor LFTs and bilirubin and coags 


- GI consulted- recs appreciated


- Hep panel negative


- ANAT, mitochondrial and smooth muscle ab pending 


- CT abd/pelvis shower enterocolitis, fatty liver, hepatic lesion possible 

hemangioma and small amount of ascites


- Abd U/S showed mild hepatomegaly, possible hepatic steatosis and gallbladder 

wall edema and thickening most likely secondary to liver disease 


- GI consulted- recs appreciated  


- Continue Multivitamin and presnisone





2. Enterocolitis 


- seen on CT abd/pelvis


- continue Rocephin, Advance diet


- NS@100


- Stool studies pending





3. UTI


- Urine culture showed Gram - orquidea, sensitivities pending


- Continue Rocephin


- Pt asymptomatic 





4. Anemia


- Macrocytic


- Continue to monitor CBC


- Stool occult negative


- will transfuse if Hgb<7


- Continue multivitamin





5. Alcoholism 


- Ativan prn, Thiamine, folic acid, multivitamin


- CIWA protocol


- Psych consulted- recs appreciate





GI ppx: Protonix


DVT ppx: SCDs





Case seen, reviewed and discussed with attending


KACI Dodson PGY1








<Aníbal Vázquez - Last Filed: 05/06/17 15:02>





Objective





- Vital Signs/Intake and Output


Vital Signs (last 24 hours): 


 











Temp Pulse Resp BP Pulse Ox


 


 98.4 F   61   17   105/63   100 


 


 05/06/17 11:21  05/06/17 11:21  05/06/17 11:21  05/06/17 11:21  05/06/17 11:21








Intake and Output: 


 











 05/06/17 05/06/17





 06:59 18:59


 


Intake Total 1200 720


 


Output Total 500 


 


Balance 700 720














- Medications


Medications: 


 Current Medications





Folic Acid (Folic Acid)  1 mg PO DAILY [facility]


Sodium Chloride (Sodium Chloride 0.9%)  1,000 mls @ 100 mls/hr IV .Q10H [facility]


   Last Admin: 05/06/17 03:07 Dose:  100 mls/hr


Ceftriaxone Sodium (Rocephin 1 Gram Ivpb)  1 gm in 100 mls @ 100 mls/hr IVPB 

DAILY [facility]


   PRN Reason: Protocol


   Last Admin: 05/06/17 09:46 Dose:  100 mls/hr


Lorazepam (Ativan)  1 mg IVP Q2H PRN; Protocol


   PRN Reason: Anxiety


   Last Admin: 05/05/17 23:10 Dose:  1 mg


Multivitamins/Minerals (Therapeutic-M Tab)  1 tab PO DAILY [facility]


   Last Admin: 05/06/17 09:46 Dose:  1 tab


Pantoprazole Sodium (Protonix Inj)  40 mg IVP DAILY [facility]


   Last Admin: 05/06/17 09:46 Dose:  40 mg


Prednisone (Prednisone Tab)  20 mg PO BID [facility]


   Stop: 05/08/17 10:00


Thiamine HCl (Vitamin B1 Tab)  100 mg PO DAILY [facility]











- Labs


Labs: 


 





 05/06/17 07:53 





 05/06/17 07:53 





 











PT  16.8 Seconds (9.9-11.8)  H  05/06/17  07:53    


 


INR  1.56  (0.93-1.08)  H  05/06/17  07:53    


 


APTT  27.5 Seconds (23.7-30.8)   05/04/17  19:48    














Attending/Attestation





- Attestation


I have personally seen and examined this patient.: Yes


I have fully participated in the care of the patient.: Yes


I have reviewed all pertinent clinical information, including history, physical 

exam and plan: Yes


Notes (Text): 





05/06/17 14:58





Attending note;





Patient seen and examined with resident.


Patient is a 61-year-old female with a history of chronic alcohol abuse, 

anxiety depression is admitted with acute alcoholic hepatitis.


Currently on Steroids. GI evaluation appreciated.


Psychiatric evaluation appreciated.


Complete alcohol cessation is strongly advised.


Anemia; hemoglobin is stable. Stool for occult blood is negative.


Patient needs to follow-up with [facility] clinic upon discharge. 





The diagnosis and treatment plan discussed with her in detail.

## 2017-05-07 VITALS — OXYGEN SATURATION: 96 %

## 2017-05-07 LAB
ALBUMIN/GLOB SERPL: 0.7 {RATIO} (ref 1.1–1.8)
ALP SERPL-CCNC: 369 U/L (ref 38–133)
ALT SERPL-CCNC: 78 U/L (ref 7–56)
AST SERPL-CCNC: 126 U/L (ref 15–39)
BILIRUB SERPL-MCNC: 11.1 MG/DL (ref 0.2–1.3)
BUN SERPL-MCNC: 5 MG/DL (ref 7–21)
CALCIUM SERPL-MCNC: 8.4 MG/DL (ref 8.4–10.5)
CHLORIDE SERPL-SCNC: 103 MMOL/L (ref 98–107)
CO2 SERPL-SCNC: 26 MMOL/L (ref 21–33)
ERYTHROCYTE [DISTWIDTH] IN BLOOD BY AUTOMATED COUNT: 24.4 % (ref 11.5–14.5)
GLOBULIN SER-MCNC: 3.5 GM/DL
GLUCOSE SERPL-MCNC: 84 MG/DL (ref 70–110)
HCT VFR BLD CALC: 27 % (ref 36–48)
INR PPP: 1.4 (ref 0.93–1.08)
MCH RBC QN AUTO: 35.2 PG (ref 25–35)
MCHC RBC AUTO-ENTMCNC: 33 G/DL (ref 31–37)
MCV RBC AUTO: 106.7 FL (ref 80–105)
PLATELET # BLD: 285 10^3/UL (ref 120–450)
PMV BLD AUTO: 9 FL (ref 7–11)
POTASSIUM SERPL-SCNC: 3.3 MMOL/L (ref 3.6–5)
PROT SERPL-MCNC: 6 G/DL (ref 5.8–8.3)
SODIUM SERPL-SCNC: 137 MMOL/L (ref 132–148)
WBC # BLD AUTO: 10.1 10^3/UL (ref 4.5–11)

## 2017-05-07 RX ADMIN — PREDNISOLONE SODIUM PHOSPHATE SCH MG: 15 SOLUTION ORAL at 10:01

## 2017-05-07 RX ADMIN — PREDNISOLONE SODIUM PHOSPHATE SCH MG: 15 SOLUTION ORAL at 17:08

## 2017-05-07 RX ADMIN — CEFTRIAXONE SCH MLS/HR: 1 INJECTION, SOLUTION INTRAVENOUS at 09:56

## 2017-05-07 RX ADMIN — MULTIPLE VITAMINS W/ MINERALS TAB SCH TAB: TAB at 09:56

## 2017-05-07 NOTE — PN
DATE: 05/07/2017



SUBJECTIVE:  This patient was seen and evaluated earlier, discussed with Dr. Vázquez and also reina kendall's son who was at bedside.



PHYSICAL EXAMINATION:

VITAL SIGNS:  The patient was afebrile, blood pressure is 97/64, respirations 20, pulse is 90, O2 sat
uration 97%.

HEENT:  Atraumatic, jaundiced.  

NECK:  Supple.

HEART:  S1, S2 heard.

LUNGS:  Bilateral air entry present.

ABDOMEN:  Soft.  No mass, no tenderness.

EXTREMITIES:  No tenderness.  No edema.  

NEUROLOGIC:  Alert, oriented.  Moves all the extremities.  

SKIN:  Jaundiced.  



LABORATORY DATA:  Hemoglobin is 8.9, hematocrit 27, WBC is 10.1, platelets 285.  Chemistry shows tota
l bilirubin is 11.1, on the downward trend.  , ALT 78, alkaline phosphatase is 369.  Potassium
 3.3.



IMPRESSION:  This 61-year-old patient with alcoholic hepatitis, on prednisolone 40 mg daily in divide
d doses, has significant improvement in total bilirubin; it has come to 11.1.  LFTs also showing down
newsome trend.  The patient is clinically improving.  



PLAN:  

1.  Discussed with the patient's son at length at the request of the patient regarding the patient's 
condition.  The patient plans to follow up with primary physician and gastroenterologist of her [facility]
e after the time of discharge.  The importance of followup in the GI office been explained because if
 patient, in the case of alcoholic hepatitis, the patient when on steroid needs to be closely monitor
ed.   

2.  The patient's potassium is 3.3, being supplemented.  Would request a serum magnesium level in the
 a.m.  Will continue to closely follow up her care.



Thank you very much for allowing us to participate in the care of the patient.





__________________________________________

Trinity Shanks MD







cc:



DD: 05/07/2017 20:25:04  [id number]

TT: 05/07/2017 20:45:53

Confirmation # [id number]

Dictation # [id number]

mn

## 2017-05-07 NOTE — CP.PCM.PN
<Betty Dodson - Last Filed: 05/07/17 10:46>





Subjective





- Date & Time of Evaluation


Date of Evaluation: 05/07/17


Time of Evaluation: 10:46





- Subjective


Subjective: 


Hospitalist Progress Note





Patient seen and examined at bedside. There were no acute overnight events. 

Patient is sitting up in bed resting comfortably. She denies having any 

abdominal pain, but has diarrhea when she eats. She has had 2 episodes 

yesterday and they were described as watery and yellow. She denies n/v, CP, SOB

, numbness/tingling, fever or chills. 





Objective





- Vital Signs/Intake and Output


Vital Signs (last 24 hours): 


 











Temp Pulse Resp BP Pulse Ox


 


 98.1 F   90   20   97/64 L  97 


 


 05/06/17 16:19  05/06/17 16:19  05/06/17 16:19  05/06/17 16:19  05/06/17 16:19








Intake and Output: 


 











 05/07/17 05/07/17





 06:59 18:59


 


Intake Total 240 


 


Balance 240 














- Medications


Medications: 


 Current Medications





Folic Acid (Folic Acid)  1 mg PO DAILY [facility]


   Last Admin: 05/07/17 09:56 Dose:  1 mg


Ceftriaxone Sodium (Rocephin 1 Gram Ivpb)  1 gm in 100 mls @ 100 mls/hr IVPB 

DAILY [facility]


   PRN Reason: Protocol


   Last Admin: 05/07/17 09:56 Dose:  100 mls/hr


Lorazepam (Ativan)  1 mg IVP Q2H PRN; Protocol


   PRN Reason: Anxiety


   Last Admin: 05/06/17 19:38 Dose:  1 mg


Multivitamins/Minerals (Therapeutic-M Tab)  1 tab PO DAILY [facility]


   Last Admin: 05/07/17 09:56 Dose:  1 tab


Pantoprazole Sodium (Protonix Inj)  40 mg IVP DAILY [facility]


   Last Admin: 05/07/17 09:56 Dose:  40 mg


Prednisolone (Prednisolone Oral Soln)  20 mg PO BID [facility]


   Last Admin: 05/07/17 10:01 Dose:  20 mg


Thiamine HCl (Vitamin B1 Tab)  100 mg PO DAILY [facility]


   Last Admin: 05/07/17 09:56 Dose:  100 mg











- Labs


Labs: 


 





 05/07/17 07:00 





 05/07/17 07:00 





 











PT  15.1 Seconds (9.9-11.8)  H  05/07/17  07:00    


 


INR  1.40  (0.93-1.08)  H  05/07/17  07:00    


 


APTT  27.5 Seconds (23.7-30.8)   05/04/17  19:48    














- Constitutional


Appears: No Acute Distress





- Head Exam


Head Exam: ATRAUMATIC, NORMAL INSPECTION, NORMOCEPHALIC





- Eye Exam


Eye Exam: PERRL, Scleral icterus


Pupil Exam: NORMAL ACCOMODATION, PERRL





- ENT Exam


ENT Exam: Mucous Membranes Moist





- Neck Exam


Neck Exam: Full ROM





- Respiratory Exam


Respiratory Exam: Clear to Ausculation Bilateral, NORMAL BREATHING PATTERN.  

absent: Rales, Rhonchi, Wheezes





- Cardiovascular Exam


Cardiovascular Exam: REGULAR RHYTHM, +S1, +S2.  absent: Gallop, Rubs, Murmur





- GI/Abdominal Exam


GI & Abdominal Exam: Soft, Hyperactive Bowel Sounds.  absent: Rigid, Tenderness

, Mass, Rebound





- Extremities Exam


Extremities Exam: Normal Inspection.  absent: Calf Tenderness, Pedal Edema





- Neurological Exam


Neurological Exam: Alert, Awake, CN II-XII Intact, Oriented x3





- Psychiatric Exam


Psychiatric exam: Normal Affect, Normal Mood





- Skin


Skin Exam: Dry, Intact, Normal Color, Warm





Assessment and Plan





- Assessment and Plan (Free Text)


Assessment: 


This is a 61Y F with PMH of alcoholism admitted for jaundice secondary to 

alcoholic hepatitis.


Plan: 


1. Alcoholic Hepatitis 


- GI consulted- recs appreciated 


- Pt counseled on Alcohol cessation 


- Bili and LFTs trending down, continue to monitor 


- Continue Prednisolone 2-mg BID


- NS@100


- ANAT, mitochondrial and smooth muscle ab pending 





2. Enterocolitis 


- seen on CT abd/pelvis, please see report for full details 


- Diet advanced- tolerating


- GI consulted- recs appreciated


- Stool studies pending


- Continue Rocephin 





3. UTI


- Ux Positive for Group B Beta Hemolytic strep 


- Continue Rocephin





4. Anemia (Macrocytic)


- Hgb stable - continue to monitor 


- Continue Multivitamin and Folate


- Stool occult negative 





5. Alcoholism 


- Ativan prn


- Continue Folic acid, Multivitamin, Thiamine


- [facility] protocol


- Psych consulted- recs appreciate





GI ppx: Protonix


DVT ppx: SCDs





Dispo: Patient will clarify which insurance she will be using tomorrow with 

. She can follow up at [facility] upon d/c as well as PMD.





Case seen, reviewed and discussed with attending


KACI Dodson PGY1





<Aníbal Vázquez - Last Filed: 05/07/17 13:20>





Objective





- Vital Signs/Intake and Output


Vital Signs (last 24 hours): 


 











Temp Pulse Resp BP Pulse Ox


 


 98.3 F   80   18   132/80   95 


 


 05/07/17 11:09  05/07/17 11:09  05/07/17 11:09  05/07/17 11:09  05/07/17 11:09








Intake and Output: 


 











 05/07/17 05/07/17





 06:59 18:59


 


Intake Total 240 


 


Balance 240 














- Medications


Medications: 


 Current Medications





Folic Acid (Folic Acid)  1 mg PO DAILY [facility]


   Last Admin: 05/07/17 09:56 Dose:  1 mg


Ceftriaxone Sodium (Rocephin 1 Gram Ivpb)  1 gm in 100 mls @ 100 mls/hr IVPB 

DAILY [facility]


   PRN Reason: Protocol


   Last Admin: 05/07/17 09:56 Dose:  100 mls/hr


Lorazepam (Ativan)  1 mg IVP Q2H PRN; Protocol


   PRN Reason: Anxiety


   Last Admin: 05/06/17 19:38 Dose:  1 mg


Multivitamins/Minerals (Therapeutic-M Tab)  1 tab PO DAILY [facility]


   Last Admin: 05/07/17 09:56 Dose:  1 tab


Pantoprazole Sodium (Protonix Inj)  40 mg IVP DAILY [facility]


   Last Admin: 05/07/17 09:56 Dose:  40 mg


Prednisolone (Prednisolone Oral Soln)  20 mg PO BID [facility]


   Last Admin: 05/07/17 10:01 Dose:  20 mg


Thiamine HCl (Vitamin B1 Tab)  100 mg PO DAILY [facility]


   Last Admin: 05/07/17 09:56 Dose:  100 mg











- Labs


Labs: 


 





 05/07/17 07:00 





 05/07/17 07:00 





 











PT  15.1 Seconds (9.9-11.8)  H  05/07/17  07:00    


 


INR  1.40  (0.93-1.08)  H  05/07/17  07:00    


 


APTT  27.5 Seconds (23.7-30.8)   05/04/17  19:48    














Attending/Attestation





- Attestation


I have personally seen and examined this patient.: Yes


I have fully participated in the care of the patient.: Yes


I have reviewed all pertinent clinical information, including history, physical 

exam and plan: Yes


Notes (Text): 





05/07/17 13:18





Attending note;





Patient seen and examined with resident.


Patient is a 61-year-old female with a history of chronic alcohol abuse, 

anxiety depression is admitted with acute alcoholic hepatitis.


Currently on Steroids. Responding well to steroids. We will discharge home with 

tapering dose of steroids.


Needs follow-up LFT next week.


 GI evaluation appreciated.


Psychiatric evaluation appreciated.


Complete alcohol cessation is strongly advised.


Anemia; hemoglobin is stable. Stool for occult blood is negative.


Patient needs to follow-up with [facility] clinic upon discharge. 





The diagnosis discussed with patient's son in detail by the bedside with GI Dr. Gomez.





The diagnosis and treatment plan discussed with her in detail.

## 2017-05-08 VITALS
SYSTOLIC BLOOD PRESSURE: 123 MMHG | TEMPERATURE: 98 F | HEART RATE: 64 BPM | RESPIRATION RATE: 18 BRPM | DIASTOLIC BLOOD PRESSURE: 69 MMHG

## 2017-05-08 LAB
ALBUMIN/GLOB SERPL: 0.8 {RATIO} (ref 1.1–1.8)
ALP SERPL-CCNC: 316 U/L (ref 38–133)
ALT SERPL-CCNC: 70 U/L (ref 7–56)
AST SERPL-CCNC: 106 U/L (ref 15–39)
BILIRUB SERPL-MCNC: 8.8 MG/DL (ref 0.2–1.3)
BUN SERPL-MCNC: 7 MG/DL (ref 7–21)
CALCIUM SERPL-MCNC: 8.2 MG/DL (ref 8.4–10.5)
CHLORIDE SERPL-SCNC: 100 MMOL/L (ref 98–107)
CO2 SERPL-SCNC: 29 MMOL/L (ref 21–33)
ERYTHROCYTE [DISTWIDTH] IN BLOOD BY AUTOMATED COUNT: 23.9 % (ref 11.5–14.5)
GLOBULIN SER-MCNC: 3.2 GM/DL
GLUCOSE SERPL-MCNC: 110 MG/DL (ref 70–110)
HCT VFR BLD CALC: 26.8 % (ref 36–48)
INR PPP: 1.33 (ref 0.93–1.08)
MAGNESIUM SERPL-MCNC: 2.1 MG/DL (ref 1.7–2.2)
MCH RBC QN AUTO: 35.7 PG (ref 25–35)
MCHC RBC AUTO-ENTMCNC: 32.1 G/DL (ref 31–37)
MCV RBC AUTO: 111.2 FL (ref 80–105)
PLATELET # BLD: 324 10^3/UL (ref 120–450)
PMV BLD AUTO: 9.4 FL (ref 7–11)
POTASSIUM SERPL-SCNC: 3.6 MMOL/L (ref 3.6–5)
PROT SERPL-MCNC: 5.7 G/DL (ref 5.8–8.3)
SODIUM SERPL-SCNC: 135 MMOL/L (ref 132–148)
WBC # BLD AUTO: 9.5 10^3/UL (ref 4.5–11)

## 2017-05-08 RX ADMIN — CEFTRIAXONE SCH MLS/HR: 1 INJECTION, SOLUTION INTRAVENOUS at 10:38

## 2017-05-08 RX ADMIN — PREDNISOLONE SODIUM PHOSPHATE SCH MG: 15 SOLUTION ORAL at 10:38

## 2017-05-08 RX ADMIN — MULTIPLE VITAMINS W/ MINERALS TAB SCH TAB: TAB at 10:39

## 2017-05-08 NOTE — CP.PCM.DIS
<Edna Loaiza - Last Filed: 05/08/17 15:17>





Provider





- Provider


Date of Admission: 


05/05/17 01:05





Attending physician: 


Rehana Miguel MD





Primary care physician: 


NO PRIMARY CARE PROVIDER





Time Spent in preparation of Discharge (in minutes): 40





Hospital Course





- Lab Results


Lab Results: 


 Micro Results





05/05/17 07:15   Blood   Blood Culture - Preliminary


                            NO GROWTH AFTER 3 DAYS


05/05/17 07:00   Blood   Blood Culture - Preliminary


                            NO GROWTH AFTER 3 DAYS





 Most Recent Lab Values











WBC  9.5 10^3/ul (4.5-11.0)   05/08/17  07:00    


 


RBC  2.41 10^6/uL (3.5-6.1)  L  05/08/17  07:00    


 


Hgb  8.6 gm/dL (12.0-16.0)  L  05/08/17  07:00    


 


Hct  26.8 % (36.0-48.0)  L  05/08/17  07:00    


 


MCV  111.2 fL (80.0-105.0)  H  05/08/17  07:00    


 


MCH  35.7 pg (25.0-35.0)  H  05/08/17  07:00    


 


MCHC  32.1 g/dl (31.0-37.0)   05/08/17  07:00    


 


RDW  23.9 % (11.5-14.5)  H  05/08/17  07:00    


 


Plt Count  324 10^3/uL (120.0-450.0)   05/08/17  07:00    


 


MPV  9.4 fl (7.0-11.0)   05/08/17  07:00    


 


Gran %  67.2 % (50.0-68.0)   05/06/17  07:53    


 


Lymph % (Auto)  21.1 % (22.0-35.0)  L  05/06/17  07:53    


 


Mono % (Auto)  11.5 % (1.0-6.0)  H  05/06/17  07:53    


 


Eos % (Auto)  0.0 % (1.5-5.0)  L  05/06/17  07:53    


 


Baso % (Auto)  0.2 % (0.0-3.0)   05/06/17  07:53    


 


Gran #  5.82  (1.4-6.5)   05/06/17  07:53    


 


Lymph #  1.8  (1.2-3.4)   05/06/17  07:53    


 


Mono #  1.0  (0.1-0.6)  H  05/06/17  07:53    


 


Eos #  0.0  (0.0-0.7)   05/06/17  07:53    


 


Baso #  0.02 K/mm3 (0.0-2.0)   05/06/17  07:53    


 


PT  14.4 Seconds (9.9-11.8)  H  05/08/17  07:00    


 


INR  1.33  (0.93-1.08)  H  05/08/17  07:00    


 


APTT  27.5 Seconds (23.7-30.8)   05/04/17  19:48    


 


Sodium  135 mmol/L (132-148)   05/08/17  07:00    


 


Potassium  3.6 mmol/L (3.6-5.0)   05/08/17  07:00    


 


Chloride  100 mmol/L ()   05/08/17  07:00    


 


Carbon Dioxide  29 mmol/L (21-33)   05/08/17  07:00    


 


Anion Gap  10  (10-20)   05/08/17  07:00    


 


BUN  7 mg/dL (7-21)   05/08/17  07:00    


 


Creatinine  0.7 mg/dL (0.5-1.4)   05/08/17  07:00    


 


Est GFR ( Amer)  > 60   05/08/17  07:00    


 


Est GFR (Non-Af Amer)  > 60   05/08/17  07:00    


 


Random Glucose  110 mg/dL ()   05/08/17  07:00    


 


Calcium  8.2 mg/dL (8.4-10.5)  L  05/08/17  07:00    


 


Magnesium  2.1 mg/dL (1.7-2.2)   05/08/17  07:00    


 


Total Bilirubin  8.8 mg/dL (0.2-1.3)  H  05/08/17  07:00    


 


Direct Bilirubin  13.2 mg/dL (0.0-0.4)  H  05/05/17  06:30    


 


AST  106 U/L (15-39)  H  05/08/17  07:00    


 


ALT  70 U/L (7-56)  H  05/08/17  07:00    


 


Alkaline Phosphatase  316 U/L ()  H  05/08/17  07:00    


 


Ammonia  43 umol/L (9-33)  H  05/04/17  19:48    


 


NT-Pro-B Natriuret Pep  354 pg/mL (0-450)   05/04/17  19:48    


 


Total Protein  5.7 g/dL (5.8-8.3)  L  05/08/17  07:00    


 


Albumin  2.5 g/dL (3.0-4.8)  L  05/08/17  07:00    


 


Globulin  3.2 gm/dL  05/08/17  07:00    


 


Albumin/Globulin Ratio  0.8  (1.1-1.8)  L  05/08/17  07:00    


 


Lipase  160 U/L ()   05/04/17  19:48    


 


Urine Color  Yellow  (YELLOW)   05/04/17  21:37    


 


Urine Appearance  Slight-cloudy  (CLEAR)   05/04/17  21:37    


 


Urine pH  6.5  (4.7-8.0)   05/04/17  21:37    


 


Ur Specific Gravity  1.010  (1.005-1.035)   05/04/17  21:37    


 


Urine Protein  Negative mg/dL (<30 mg/dL)   05/04/17  21:37    


 


Urine Glucose (UA)  Negative mg/dL (NEGATIVE)   05/04/17  21:37    


 


Urine Ketones  Negative mg/dL (NEGATIVE)   05/04/17  21:37    


 


Urine Blood  Trace-intact  (NEGATIVE)  H  05/04/17  21:37    


 


Urine Nitrate  Positive  (NEGATIVE)  H  05/04/17  21:37    


 


Urine Bilirubin  Large  (NEGATIVE)  H  05/04/17  21:37    


 


Urine Urobilinogen  1.0 E.U./dL (<1 E.U./dL)  H  05/04/17  21:37    


 


Ur Leukocyte Esterase  Trace Danny/uL (NEGATIVE)  H  05/04/17  21:37    


 


Urine RBC  1 - 3 /hpf (0-2)   05/04/17  21:37    


 


Urine WBC  1 - 3 /hpf (0-6)   05/04/17  21:37    


 


Ur Epithelial Cells  1 - 3 /hpf (0-5)   05/04/17  21:37    


 


Urine Bacteria  Many  (NEG)   05/04/17  21:37    


 


Stool Occult Blood  Negative  (NEGATIVE)   05/05/17  16:51    


 


Hepatitis A IgM Ab  Negative  (NEGATIVE)   05/05/17  06:30    


 


Hep Bs Antigen  Negative  (NEGATIVE)   05/05/17  06:30    


 


Hep B Core IgM Ab  Negative  (NEGATIVE)   05/05/17  06:30    


 


Hepatitis C Antibody  Negative  (NEGATIVE)   05/05/17  06:30    


 


Blood Type  B POSITIVE   05/04/17  19:48    


 


Blood Type Confirm  B POSITIVE   05/04/17  22:00    


 


Antibody Screen  Negative   05/04/17  19:48    


 


BBK History Checked  No verified bt   05/04/17  19:48    














- Hospital Course


Hospital Course: 


61 year old female with a PMH alcoholism who presents to the ER with complaints 

of abdominal pain and jaundice for few days. Patient states that she quit 

drinking a week ago and was previously drinking a bottle of vodka a day but not 

every day since February. GI was consulted, discriminate factor was 50.6, MELD 

score fo 23. Patient was started on steroids. She was counseled on Alcohol 

cessation. Bilirubin and LFTs were monitored and trended down. Hepatitis panel 

was negative. ANAT, mitochondrial and smooth muscle ab were negative. 

gallbladder ultrasound showed mild hepatmegaly, gallbladder wall thickening in 

the absence of gallstones. Enterocolitis was seen on CT abd/pelvis, patient was 

started on antibiotics. Urine cultures were positive for e coli. Patient's 

blood work showed Macrocytic anemia. Hgb was stable, stool occult was negative. 

Patient was able to tolerate diet. She is doing well and is stable for 

discharge home. Discharge instructions are to follow up with primary care doctor

, Dr. Shepherd in 1 week and follow up with GI doctor, Dr. Severino. She received new 

prescriptions for  macrobid 100mg BID for 7 days, xanax 0.25 mg TID prn, and 

predisone 20 mg BID for 5 days, 10 mg BID for 5 days. Discharge diagnoses are 

alcoholic hepatitis, rectal bleeding, anxiety, hypokalemia.








Discharge Exam





- Head Exam


Head Exam: ATRAUMATIC, NORMAL INSPECTION, NORMOCEPHALIC





- Eye Exam


Eye Exam: EOMI, Scleral icterus





- ENT Exam


ENT Exam: Mucous Membranes Moist





- Respiratory Exam


Respiratory Exam: NORMAL BREATHING PATTERN, UNREMARKABLE.  absent: Decreased 

Breath Sounds, Rales, Rhonchi, Wheezes, Respiratory Distress





- Cardiovascular Exam


Cardiovascular Exam: REGULAR RHYTHM, +S1, +S2.  absent: Tachycardia, Systolic 

Murmur





- Extremities Exam


Extremities exam: normal inspection





- Neurological Exam


Neurological exam: Alert, Oriented x3





- Skin


Skin Exam: Dry, Intact, Warm


Additional comments: 





jaundice 





Discharge Plan





- Discharge Medications


Prescriptions: 


ALPRAZolam [Xanax] 0.25 mg PO TID #15 tab


Nitrofurantoin Macrocrystals [Macrobid] 100 mg PO Q12 #14 cap


Prednisone 10 mg PO BID #10 tablet


Prednisone 20 mg PO BID #10 tab





- Follow Up Plan


Condition: STABLE


Disposition: HOME/ ROUTINE


Instructions:  Rectal Bleeding (DC), Rectal Bleeding (GEN), Acute Abdominal 

Pain (DC), Acute Abdominal Pain (GEN), Jaundice (DC), Jaundice (GEN)


Additional Instructions: 


Patient is stable for discharged to home. 


Discharge instructions: 


- follow up with primary care doctor, Dr. Shepherd in 1 week. 


- Follow up with GI doctor, Dr. Severino 


- pateint will need anemia work up 





New medications prescribed: 


- macrobid 100mg BID for 7 days


- xanax 0.25 mg TID prn


- predisone 20 mg BID for 5 days, 10 mg BID for 5 days  





discharge diagnoses: 


- alcoholic hepatitis


- rectal bleeding 


- anxiety


- hypokalemia  


Referrals: 


PCP,JOSE [Primary Care Provider] - 





<Rehana Miguel - Last Filed: 05/08/17 15:50>





Provider





- Provider


Date of Admission: 


05/05/17 01:05





Attending physician: 


Rehana Miguel MD





Primary care physician: 


JOSE PRIMARY CARE PROVIDER








Hospital Course





- Lab Results


Lab Results: 


 Micro Results





05/05/17 07:15   Blood   Blood Culture - Preliminary


                            NO GROWTH AFTER 3 DAYS


05/05/17 07:00   Blood   Blood Culture - Preliminary


                            NO GROWTH AFTER 3 DAYS





 Most Recent Lab Values











WBC  9.5 10^3/ul (4.5-11.0)   05/08/17  07:00    


 


RBC  2.41 10^6/uL (3.5-6.1)  L  05/08/17  07:00    


 


Hgb  8.6 gm/dL (12.0-16.0)  L  05/08/17  07:00    


 


Hct  26.8 % (36.0-48.0)  L  05/08/17  07:00    


 


MCV  111.2 fL (80.0-105.0)  H  05/08/17  07:00    


 


MCH  35.7 pg (25.0-35.0)  H  05/08/17  07:00    


 


MCHC  32.1 g/dl (31.0-37.0)   05/08/17  07:00    


 


RDW  23.9 % (11.5-14.5)  H  05/08/17  07:00    


 


Plt Count  324 10^3/uL (120.0-450.0)   05/08/17  07:00    


 


MPV  9.4 fl (7.0-11.0)   05/08/17  07:00    


 


Gran %  67.2 % (50.0-68.0)   05/06/17  07:53    


 


Lymph % (Auto)  21.1 % (22.0-35.0)  L  05/06/17  07:53    


 


Mono % (Auto)  11.5 % (1.0-6.0)  H  05/06/17  07:53    


 


Eos % (Auto)  0.0 % (1.5-5.0)  L  05/06/17  07:53    


 


Baso % (Auto)  0.2 % (0.0-3.0)   05/06/17  07:53    


 


Gran #  5.82  (1.4-6.5)   05/06/17  07:53    


 


Lymph #  1.8  (1.2-3.4)   05/06/17  07:53    


 


Mono #  1.0  (0.1-0.6)  H  05/06/17  07:53    


 


Eos #  0.0  (0.0-0.7)   05/06/17  07:53    


 


Baso #  0.02 K/mm3 (0.0-2.0)   05/06/17  07:53    


 


PT  14.4 Seconds (9.9-11.8)  H  05/08/17  07:00    


 


INR  1.33  (0.93-1.08)  H  05/08/17  07:00    


 


APTT  27.5 Seconds (23.7-30.8)   05/04/17  19:48    


 


Sodium  135 mmol/L (132-148)   05/08/17  07:00    


 


Potassium  3.6 mmol/L (3.6-5.0)   05/08/17  07:00    


 


Chloride  100 mmol/L ()   05/08/17  07:00    


 


Carbon Dioxide  29 mmol/L (21-33)   05/08/17  07:00    


 


Anion Gap  10  (10-20)   05/08/17  07:00    


 


BUN  7 mg/dL (7-21)   05/08/17  07:00    


 


Creatinine  0.7 mg/dL (0.5-1.4)   05/08/17  07:00    


 


Est GFR ( Amer)  > 60   05/08/17  07:00    


 


Est GFR (Non-Af Amer)  > 60   05/08/17  07:00    


 


Random Glucose  110 mg/dL ()   05/08/17  07:00    


 


Calcium  8.2 mg/dL (8.4-10.5)  L  05/08/17  07:00    


 


Magnesium  2.1 mg/dL (1.7-2.2)   05/08/17  07:00    


 


Total Bilirubin  8.8 mg/dL (0.2-1.3)  H  05/08/17  07:00    


 


Direct Bilirubin  13.2 mg/dL (0.0-0.4)  H  05/05/17  06:30    


 


AST  106 U/L (15-39)  H  05/08/17  07:00    


 


ALT  70 U/L (7-56)  H  05/08/17  07:00    


 


Alkaline Phosphatase  316 U/L ()  H  05/08/17  07:00    


 


Ammonia  43 umol/L (9-33)  H  05/04/17  19:48    


 


NT-Pro-B Natriuret Pep  354 pg/mL (0-450)   05/04/17  19:48    


 


Total Protein  5.7 g/dL (5.8-8.3)  L  05/08/17  07:00    


 


Albumin  2.5 g/dL (3.0-4.8)  L  05/08/17  07:00    


 


Globulin  3.2 gm/dL  05/08/17  07:00    


 


Albumin/Globulin Ratio  0.8  (1.1-1.8)  L  05/08/17  07:00    


 


Lipase  160 U/L ()   05/04/17  19:48    


 


Urine Color  Yellow  (YELLOW)   05/04/17  21:37    


 


Urine Appearance  Slight-cloudy  (CLEAR)   05/04/17  21:37    


 


Urine pH  6.5  (4.7-8.0)   05/04/17  21:37    


 


Ur Specific Gravity  1.010  (1.005-1.035)   05/04/17  21:37    


 


Urine Protein  Negative mg/dL (<30 mg/dL)   05/04/17  21:37    


 


Urine Glucose (UA)  Negative mg/dL (NEGATIVE)   05/04/17  21:37    


 


Urine Ketones  Negative mg/dL (NEGATIVE)   05/04/17  21:37    


 


Urine Blood  Trace-intact  (NEGATIVE)  H  05/04/17  21:37    


 


Urine Nitrate  Positive  (NEGATIVE)  H  05/04/17  21:37    


 


Urine Bilirubin  Large  (NEGATIVE)  H  05/04/17  21:37    


 


Urine Urobilinogen  1.0 E.U./dL (<1 E.U./dL)  H  05/04/17  21:37    


 


Ur Leukocyte Esterase  Trace Danny/uL (NEGATIVE)  H  05/04/17  21:37    


 


Urine RBC  1 - 3 /hpf (0-2)   05/04/17  21:37    


 


Urine WBC  1 - 3 /hpf (0-6)   05/04/17  21:37    


 


Ur Epithelial Cells  1 - 3 /hpf (0-5)   05/04/17  21:37    


 


Urine Bacteria  Many  (NEG)   05/04/17  21:37    


 


Stool Occult Blood  Negative  (NEGATIVE)   05/05/17  16:51    


 


ANAT Screen  Negative  (Negative)   05/05/17  06:30    


 


ANAT Titer  TEST NOT PERFORMED   05/05/17  06:30    


 


ANAT Titer 2  TEST NOT PERFORMED   05/05/17  06:30    


 


ANAT Pattern  TEST NOT PERFORMED   05/05/17  06:30    


 


ANAT Pattern 2  TEST NOT PERFORMED   05/05/17  06:30    


 


Anti-Mitochondrial Ab  Negative  (Negative)   05/05/17  06:30    


 


Anti-Smooth Muscle Ab  Negative  (Negative)   05/05/17  06:30    


 


Hepatitis A IgM Ab  Negative  (NEGATIVE)   05/05/17  06:30    


 


Hep Bs Antigen  Negative  (NEGATIVE)   05/05/17  06:30    


 


Hep B Core IgM Ab  Negative  (NEGATIVE)   05/05/17  06:30    


 


Hepatitis C Antibody  Negative  (NEGATIVE)   05/05/17  06:30    


 


Blood Type  B POSITIVE   05/04/17  19:48    


 


Blood Type Confirm  B POSITIVE   05/04/17  22:00    


 


Antibody Screen  Negative   05/04/17  19:48    


 


BBK History Checked  No verified bt   05/04/17  19:48    














Attending/Attestation





- Attestation


I have personally seen and examined this patient.: Yes


I have fully participated in the care of the patient.: Yes


I have reviewed all pertinent clinical information, including history, physical 

exam and plan: Yes


Notes (Text): 


I have seen and examined patient with resident. Agree with the above note with 

the following additions/ exceptions: This is 61 year old female with history of 

chronic alcohol abuse, anxiety, depression who got admitted for evaluation of 

abdominal pain and jaundice and found to have acute alcoholic hepatitis. She is 

responding very well to steroids. Patient was explained in detail that she 

needs to follow up with PMD and gastroenterologist. She states that she is 

going to follow up with Dr Shepherd and Dr Gomes. She needs to have repeat LFT's 

within 1 week. Alcohol cessation counselling provided. Patient needs to follow 

up in [facility]. 





Dr Rehana Miguel

## 2017-05-08 NOTE — PN
DATE: 05/08/2017



Seen and examined at the bedside earlier today.  The patient does report feeling better.  No nausea, 
vomiting, or abdominal pain.  She is moving her bowels.  No diarrhea or bleeding per rectum.  Appears
 less jaundiced.



VITAL SIGNS:  Temperature is 98.0, blood pressure 123/69, pulse 64, respirations 18, 96 on room air.



LABORATORY DATA:  WBC is 9.5, H and H are 8.6 and 26.8, platelets of 324.  PT is 14.3, INR is 1.33.  
Chem:  Sodium 135, K is 3.6, BUN 7, creatinine 0.7, mag is 2.1.  Total bilirubin is 8.8, , ALT
 70, alkaline phosphatase is 316.



PHYSICAL EXAMINATION:

HEENT:  Sclerae are less icteric.

NECK:  Supple.

CARDIAC:  S1, S2.

LUNGS:  Clear.

ABDOMEN:  With bowel sounds, soft, nontender.

EXTREMITIES:  No edema.

NEUROLOGIC:  Awake, alert, and oriented, some mild tremors.

SKIN:  Jaundiced.  Looks a little better.



ASSESSMENT:  A 61-year-old female with alcoholic hepatitis with jaundice.  Her liver enzymes are impr
oving.  She also has a history of depression and anxiety.



PLAN:  Continue to monitor liver enzymes.  Discussed with the patient she would benefit from elective
 EGD/colonoscopy in view of bleeding per rectum on initial admission.  The patient states that she is
 going to find a primary care doctor in her plan and a GI in her insurance plan.  Also told patient s
he can follow up in GI clinic.  Avoid alcohol.  The patient will be discharged home today.  She is go
ing to be sent on prednisone 20 mg b.i.d. for 5 days, then 10 mg b.i.d. for 5 days.  She is going to 
follow up with her PCP.  The patient also with urinary tract infection; sent home on Macrobid.  The p
jhon was seen and case discussed with Dr. Shanks.





__________________________________________

Kary Monisha JESSICA







cc:



DD: 05/08/2017 15:06:54  [id number]

TT: 05/08/2017 18:03:37

Confirmation # [id number]

Dictation # [id number]

mn

## 2017-10-19 ENCOUNTER — HOSPITAL ENCOUNTER (INPATIENT)
Dept: HOSPITAL 42 - ED | Age: 62
LOS: 4 days | Discharge: LEFT BEFORE DISCHARGE | DRG: 749 | End: 2017-10-23
Attending: HOSPITALIST | Admitting: HOSPITALIST
Payer: MEDICAID

## 2017-10-19 VITALS — BODY MASS INDEX: 27.4 KG/M2

## 2017-10-19 DIAGNOSIS — R40.2412: ICD-10-CM

## 2017-10-19 DIAGNOSIS — F10.231: Primary | ICD-10-CM

## 2017-10-19 DIAGNOSIS — R00.0: ICD-10-CM

## 2017-10-19 DIAGNOSIS — N80.9: ICD-10-CM

## 2017-10-19 DIAGNOSIS — E83.42: ICD-10-CM

## 2017-10-19 DIAGNOSIS — F41.9: ICD-10-CM

## 2017-10-19 DIAGNOSIS — K70.10: ICD-10-CM
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
## 2017-10-19 DIAGNOSIS — E87.6: ICD-10-CM

## 2017-10-19 DIAGNOSIS — M54.9: ICD-10-CM
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
## 2017-10-19 DIAGNOSIS — F32.89: ICD-10-CM

## 2017-10-19 LAB
ALBUMIN/GLOB SERPL: 1.4 {RATIO} (ref 1.1–1.8)
ALP SERPL-CCNC: 81 U/L (ref 38–126)
ALT SERPL-CCNC: 28 U/L (ref 7–56)
APTT BLD: 26.2 SECONDS (ref 25.1–36.5)
AST SERPL-CCNC: 65 U/L (ref 14–36)
BASOPHILS # BLD AUTO: 0.02 K/MM3 (ref 0–2)
BASOPHILS NFR BLD: 0.2 % (ref 0–3)
BILIRUB SERPL-MCNC: 0.8 MG/DL (ref 0.2–1.3)
BUN SERPL-MCNC: 16 MG/DL (ref 7–21)
CALCIUM SERPL-MCNC: 8.2 MG/DL (ref 8.4–10.5)
CHLORIDE SERPL-SCNC: 110 MMOL/L (ref 98–107)
CO2 SERPL-SCNC: 13 MMOL/L (ref 21–33)
EOSINOPHIL # BLD: 0 10*3/UL (ref 0–0.7)
EOSINOPHIL NFR BLD: 0.1 % (ref 1.5–5)
ERYTHROCYTE [DISTWIDTH] IN BLOOD BY AUTOMATED COUNT: 15.1 % (ref 11.5–14.5)
GLOBULIN SER-MCNC: 3 GM/DL
GLUCOSE SERPL-MCNC: 80 MG/DL (ref 70–110)
GRANULOCYTES # BLD: 6.46 10*3/UL (ref 1.4–6.5)
GRANULOCYTES NFR BLD: 76.5 % (ref 50–68)
HCT VFR BLD CALC: 40.2 % (ref 36–48)
INR PPP: 1.15 (ref 0.93–1.08)
LYMPHOCYTES # BLD: 1.7 10*3/UL (ref 1.2–3.4)
LYMPHOCYTES NFR BLD AUTO: 19.7 % (ref 22–35)
MAGNESIUM SERPL-MCNC: 1.6 MG/DL (ref 1.7–2.2)
MCH RBC QN AUTO: 33.8 PG (ref 25–35)
MCHC RBC AUTO-ENTMCNC: 34.6 G/DL (ref 31–37)
MCV RBC AUTO: 97.8 FL (ref 80–105)
MONOCYTES # BLD AUTO: 0.3 10*3/UL (ref 0.1–0.6)
MONOCYTES NFR BLD: 3.5 % (ref 1–6)
PLATELET # BLD: 274 10^3/UL (ref 120–450)
PMV BLD AUTO: 8.9 FL (ref 7–11)
POTASSIUM SERPL-SCNC: 4.2 MMOL/L (ref 3.6–5)
PROT SERPL-MCNC: 7 G/DL (ref 5.8–8.3)
SODIUM SERPL-SCNC: 145 MMOL/L (ref 132–148)
TROPONIN I SERPL-MCNC: < 0.01 NG/ML
WBC # BLD AUTO: 8.5 10^3/UL (ref 4.5–11)

## 2017-10-19 RX ADMIN — OXYCODONE HYDROCHLORIDE AND ACETAMINOPHEN SCH TAB: 5; 325 TABLET ORAL at 13:09

## 2017-10-19 RX ADMIN — OXYCODONE HYDROCHLORIDE AND ACETAMINOPHEN SCH TAB: 5; 325 TABLET ORAL at 10:42

## 2017-10-19 RX ADMIN — OXYCODONE HYDROCHLORIDE AND ACETAMINOPHEN PRN TAB: 5; 325 TABLET ORAL at 23:17

## 2017-10-19 RX ADMIN — OXYCODONE HYDROCHLORIDE AND ACETAMINOPHEN PRN TAB: 5; 325 TABLET ORAL at 17:40

## 2017-10-19 NOTE — CT
EXAM:

  CT Head Without Intravenous Contrast



EXAM DATE/TIME:

  10/19/2017 4:48 AM



CLINICAL HISTORY:

  62 years old, female; Injury or trauma; Fall; Initial encounter; Concussion / 

head injury



TECHNIQUE:

  Axial computed tomography images of the head/brain without intravenous 

contrast.  All CT scans at this facility use one or more dose reduction 

techniques, viz.: automated exposure control; ma/kV adjustment per patient size 

(including targeted exams where dose is matched to indication; i.e. head); or 

iterative reconstruction technique.



COMPARISON:

  CT - HEAD W/O CONTRAST 2016-12-16 17:35



FINDINGS:

  Brain:  Mild cerebral atrophy.  No hemorrhage.  No significant white matter 

disease.

  Ventricles:  Unremarkable.  No ventriculomegaly.

  Bones/joints:  Unremarkable.  No acute fracture.

  Soft tissues:  Unremarkable.

  Sinuses:  Unremarkable as visualized.  No acute sinusitis.

  Mastoid air cells:  Unremarkable as visualized.  No mastoid effusion.



IMPRESSION:     

  No acute cerebral hemorrhage or edema.

## 2017-10-19 NOTE — CARD
--------------- APPROVED REPORT --------------





EKG Measurement

Heart [id number]

UT [id number]

[id number]

[id number]

[id number]



<Conclusion>

Sinus tachycardia

Nonspecific ST abnormality

Abnormal ECG

## 2017-10-19 NOTE — RAD
PROCEDURE:  Radiographs of the pelvis.



HISTORY:

fall



COMPARISON:

None.



FINDINGS:



BONES:

Pelvic Bones: Unremarkable.



Hips: Grossly unremarkable.



JOINTS:

Sacroiliac Joints: Unremarkable.



Pubic Symphysis: Unremarkable.



OTHER FINDINGS:

None.



IMPRESSION:

Unremarkable radiographs of the pelvis.

## 2017-10-19 NOTE — RAD
HISTORY:

fall  



COMPARISON:

Portable chest 05/04/2017. 



FINDINGS:



LUNGS:

No active pulmonary disease.



PLEURA:

No significant pleural effusion identified, no pneumothorax apparent.



CARDIOVASCULAR:

Normal.



OSSEOUS STRUCTURES:

No significant abnormalities.



VISUALIZED UPPER ABDOMEN:

Normal.



OTHER FINDINGS:

None.



IMPRESSION:

No interval acute cardiopulmonary disease appreciated.

## 2017-10-19 NOTE — RAD
PROCEDURE:  Radiographs of the Lumbar Spine.



HISTORY:

fall







COMPARISON:

No prior.



FINDINGS:



BONES:

Normal alignment. No listhesis. No fracture.



DISC SPACES:

Unremarkable.



OTHER FINDINGS:

None.



IMPRESSION:

Unremarkable radiographs of the lumbar spine.

## 2017-10-19 NOTE — RAD
PROCEDURE:  Left Wrist Radiographs.







HISTORY:

fall



COMPARISON:

None.



FINDINGS:



BONES:

Normal. No fracture. 



JOINTS:

Normal. No dislocation. 



SOFT TISSUES:

Normal. 



OTHER FINDINGS:

None.



IMPRESSION:

Normal left wrist radiographs.

## 2017-10-19 NOTE — ED PDOC
Arrival/HPI





- General


Chief Complaint: Back Pain


Time Seen by Provider: 10/19/17 04:12


Historian: Patient





- History of Present Illness


Narrative History of Present Illness (Text): 


10/19/17 04:35


A 62 year old female, whose past medical history includes alcohol abuse, 

presents to the emergency department complaining of lower back pain and left 

wrist pain s/p fall three days ago. Patient reports she was outside and stepped 

off the curve. Reports to taking Tylenol and lidocaine patches with no relief. 

Admits she last drank alcohol yesterday. Patient notes some palpitations but 

denies any other complaints at this time.





Time/Duration: < week


Symptom Onset: Sudden


Symptom Course: Unchanged


Activities at Onset: Rest


Context: Home





Past Medical History





- Provider Review


Nursing Documentation Reviewed: Yes





- Infectious Disease


Hx of Infectious Diseases: None





- Tetanus Immunization


Tetanus Immunization: Unknown





- Reproductive


Menopause: Yes





- Cardiac


Hx Cardiac Disorders: No





- Pulmonary


Hx Respiratory Disorders: No





- Neurological


Hx Neurological Disorder: No





- HEENT


Hx HEENT Disorder: No





- Renal


Hx Renal Disorder: No





- Endocrine/Metabolic


Hx Endocrine Disorders: No





- Hematological/Oncological


Hx Blood Disorders: No





- Integumentary


Hx Dermatological Disorder: No





- Musculoskeletal/Rheumatological


Hx Falls: Yes


Other/Comment: endometriosis





- Gastrointestinal


Hx Gastrointestinal Disorders: No





- Genitourinary/Gynecological


Hx Genitourinary Disorders: No





- Psychiatric


Hx Depression: Yes


Hx Substance Use: No





- Surgical History


Other/Comment: endo,etriosis x2





- Anesthesia


Hx Anesthesia:  (uto)





- Suicidal Assessment


Feels Threatened In Home Enviroment: No





Family/Social History





- Physician Review


Nursing Documentation Reviewed: Yes


Family/Social History: No Known Family HX


Smoking Status: Never Smoked


Hx Alcohol Use: Yes


Frequency of alcohol use: Socially


Hx Substance Use: No


Hx Substance Use Treatment: No





Allergies/Home Meds


Allergies/Adverse Reactions: 


Allergies





No Known Allergies Allergy (Verified 10/19/17 04:20)


 








Home Medications: 


 Home Meds











 Medication  Instructions  Recorded  Confirmed


 


diaZEpam [Valium] 5 mg PO PCHS PRN 10/19/17 10/19/17














Review of Systems





- Physician Review


All systems were reviewed & negative as marked: Yes





- Review of Systems


Cardiovascular: Palpitations


Musculoskeletal: Back Pain (lower ), Other (left wrist pain)





Physical Exam


Vital Signs Reviewed: Yes


Vital Signs











  Temp Pulse Resp BP Pulse Ox


 


 10/19/17 08:00  98.3 F  125 H  18  123/80  99


 


 10/19/17 07:40   116 H  20  120/69 


 


 10/19/17 05:34   112 H  16   99


 


 10/19/17 04:33  98.0 F  128 H  18  133/88  99











Appearance: Positive for: Well-Appearing, Non-Toxic, Comfortable


Pain Distress: None


Mental Status: Positive for: Alert and Oriented X 3





- Systems Exam


Head: Present: Atraumatic, Normocephalic


Pupils: Present: PERRL


Extroacular Muscles: Present: EOMI


Conjunctiva: Present: Normal


Mouth: Present: Moist Mucous Membranes


Neck: Present: Normal Range of Motion


Respiratory/Chest: Present: Clear to Auscultation, Good Air Exchange.  No: 

Respiratory Distress, Accessory Muscle Use


Cardiovascular: Present: Regular Rate and Rhythm, Normal S1, S2.  No: Murmurs


Abdomen: Present: Normal Bowel Sounds.  No: Tenderness, Distention, Peritoneal 

Signs


Back: Present: Other (lower back mild tenderness)


Upper Extremity: Present: Other (left wrist mild swelling and tenderness).  No: 

Cyanosis


Lower Extremity: Present: Normal Inspection.  No: Edema


Neurological: Present: GCS=15, CN II-XII Intact, Speech Normal


Skin: Present: Warm, Dry, Normal Color.  No: Rashes


Psychiatric: Present: Alert, Oriented x 3, Normal Insight, Normal Concentration





Medical Decision Making


ED Course and Treatment: 


10/19/17 04:33


Impression:


A 62 year old female with lower back pain and left wrist pain s/p fall.





Differential Diagnosis included but are not limited to: left wrist and lower 

back pain r/o fracture





Plan:


-- EKG


-- chest xray


-- Radiology of LS spine


-- Radiology of Pelvis


-- Radiology of left wrist


-- labs


-- Urinalysis


-- IV fluids, Toradol


-- Reassess and disposition





Prior Visits:


Notes and results from previous visits were reviewed. Patient was last seen in 

the emergency department on 5/5/17 for evaluation of generalized swelling, trunk

, bilateral extremities and abdominal pain. 





Progress Notes:


EKG:


Ordered, reviewed, and independently interpreted the EKG.


Rate :  125 BPM


Rhythm : sinus tachycardia


Interpretation : No ST/T wave changes





10/20/17 07:32


pt initially with signficant tremors tachycardia. ativan given with improvment, 

imaging neg. will admit for alcohol w/d





- Lab Interpretations


Lab Results: 








 10/19/17 04:30 





 10/19/17 05:30 





 Lab Results





10/19/17 05:30: Sodium 145, Potassium 4.2, Chloride 110 H, Carbon Dioxide 13 L, 

Anion Gap 26 H, BUN 16, Creatinine 0.8, Est GFR (African Amer) > 60, Est GFR (

Non-Af Amer) > 60, Random Glucose 80, Calcium 8.2 L, Magnesium 1.6 L, Total 

Bilirubin 0.8, AST 65 H, ALT 28, Alkaline Phosphatase 81, Lactate Dehydrogenase 

483, Total Creatine Kinase 66, Troponin I < 0.01, Total Protein 7.0, Albumin 4.1

, Globulin 3.0, Albumin/Globulin Ratio 1.4


10/19/17 04:30: Alcohol, Quantitative 167 H


10/19/17 04:30: PT 12.7 H, INR 1.15 H, APTT 26.2


10/19/17 04:30: WBC 8.5, RBC 4.11, Hgb 13.9, Hct 40.2, MCV 97.8, MCH 33.8, MCHC 

34.6, RDW 15.1 H, Plt Count 274, MPV 8.9, Gran % 76.5 H, Lymph % (Auto) 19.7 L, 

Mono % (Auto) 3.5, Eos % (Auto) 0.1 L, Baso % (Auto) 0.2, Gran # 6.46, Lymph # 

1.7, Mono # 0.3, Eos # 0.0, Baso # 0.02








I have reviewed the lab results: Yes





- RAD Interpretation


Radiology Orders: 








10/19/17 04:23


CHEST PORTABLE [RAD] Stat 


LS SPINE AP/LAT [RAD] Stat 


PELVIS ONE VIEW [RAD] Stat 


WRIST, LEFT 3 VIEWS [RAD] Stat 





10/19/17 04:48


HEAD W/O CONTRAST [CT] Stat 














- EKG Interpretation


Interpreted by ED Physician: Yes


Type: 12 lead EKG





- Medication Orders


Current Medication Orders: 








Folic Acid (Folic Acid)  1 mg PO DAILY [facility]


Folic Acid 1 mg/ Thiamine HCl 100 mg/ Multivitamins/Vitamin C 10 ml/ Dextrose  1

,011.2 mls @ 100 mls/hr IV .Q10H7M [facility]


   Last Admin: 10/20/17 01:00  Dose: 100 mls/hr





eMAR Start Stop


 Document     10/20/17 01:00    (Rec: 10/20/17 02:10  [facility]09)


     Intravenous Solution


      Start Date                                 10/20/17


      Start Time                                 01:00


      End Date                                   10/20/17





Lorazepam (Ativan)  1 mg IVP Q2H PRN; Protocol


   PRN Reason: Anxiety


   Last Admin: 10/20/17 04:45  Dose: 1 mg





IVP Administration


 Document     10/20/17 04:45    (Rec: 10/20/17 04:45  [place] [id number])


     Charges for Administration


      # of IVP Administrations                   1


Behavioural


 Document     10/20/17 04:45    (Rec: 10/20/17 04:45  [place] [id number])


     Maintenance


      Maintenance Dose                           No


     Nonmedicinal


      Nonmedicinal Interventions                 Therapeutic Communication


                                                 Activity


                                                 Give food/fluids


     Behavior


      Behavior for Medication:                   Anxiety


                                                 Continuous crying/screaming/


                                                 yelling


                                                 Continuous pacing/restlessness


                                                 Hallucinations/paranoid/


                                                 delusions/extreme fear


                                                 Insomnia


Re-Assess: Reassess Psych Meds


 Document     10/20/17 05:15    (Rec: 10/20/17 06:43  [place] [id number])


      Reassess Psych Med                         Ineffective-LIP notifed





Lorazepam (Ativan)  2 mg IVP Q6H PREETI


   PRN Reason: Protocol


Multivitamins/Minerals (Therapeutic-M Tab)  1 tab PO 0800 [facility]


Oxycodone/Acetaminophen (Percocet 5/325 Mg Tab)  1 tab PO Q6H PRN


   PRN Reason: Pain, severe (8-10)


   Stop: 10/22/17 08:01


   Last Admin: 10/19/17 23:17  Dose: 1 tab





MAR Pain Assessment


 Document     10/19/17 23:17    (Rec: 10/19/17 23:17  [facility]09)


     Pain Reassessment


      Is this a pain reassessment?               No


     Presence of Pain


      Presence of Pain                           Yes


Re-Assess: MAR Pain Assessment


 Document     10/20/17 00:17    (Rec: 10/20/17 01:59  [id number])


     Pain Reassessment


      Is this a pain reassessment?               Yes


     Presence of Pain


      Presence of Pain                           No





Pantoprazole Sodium (Protonix Ec Tab)  40 mg PO 0600 PREETI


   Last Admin: 10/20/17 05:12  Dose: 40 mg





Thiamine HCl (Vitamin B1 Tab)  100 mg PO DAILY [facility]





Discontinued Medications





Sodium Chloride (Sodium Chloride 0.9%)  1,000 mls @ 999 mls/hr IV .Q1H1M STA


   Stop: 10/19/17 05:24


   Last Admin: 10/19/17 04:41  Dose: 999 mls/hr





eMAR Start Stop


 Document     10/19/17 04:41  SC  (Rec: 10/19/17 04:41  SC  [id number])


     Intravenous Solution


      Start Date                                 10/19/17


      Start Time                                 04:41


      End Date                                   10/19/17


      End time                                   05:41


      Total Infusion Time                        60





Magnesium Sulfate 2 gm/ Sodium (Chloride)  104 mls @ 102 mls/hr IVPB ONCE ONE


   Stop: 10/19/17 08:20


   Last Admin: 10/19/17 07:58  Dose: 102 mls/hr





eMAR Start Stop


 Document     10/19/17 07:58  HENRIKA  (Rec: 10/19/17 07:58  PELON  [id number])


     Intravenous Solution


      Start Date                                 10/19/17


      Start Time                                 07:58





Multivitamins/Vitamin C 10 ml/Thiamine HCl 100 mg/ Folic Acid 1 mg/ Dextrose  1,

011.2 mls @ 1,000 mls/hr IV .Q1H1M ONE


   Stop: 10/19/17 08:21


   Last Admin: 10/19/17 12:59  Dose: 1,000 mls/hr





eMAR Start Stop


 Document     10/19/17 12:59  ML  (Rec: 10/19/17 12:59  ML  [id number])


     Intravenous Solution


      Start Date                                 10/19/17


      Start Time                                 12:59


      End Date                                   10/19/17


      End time                                   23:00


      Total Infusion Time                        601





Ketorolac Tromethamine (Toradol)  30 mg IVP STAT STA


   Stop: 10/19/17 04:25


   Last Admin: 10/19/17 04:42  Dose: 30 mg





MAR Pain Assessment


 Document     10/19/17 04:42  SC  (Rec: 10/19/17 04:42  [id number]


     Pain Reassessment


      Is this a pain reassessment?               No


     Sleep


      Is patient sleeping during reassessment?   No


     Presence of Pain


      Presence of Pain                           Yes


     Pain Scale Used


      Pain Scale Used                            Numeric


     Description


      Intensity of Pain at present               7


IVP Administration


 Document     10/19/17 04:42  SC  (Rec: 10/19/17 04:42  SC  [id number])


     Charges for Administration


      # of IVP Administrations                   1


Re-Assess: MAR Pain Assessment


 Document     10/19/17 05:42  FC  (Rec: 10/20/17 01:59  [address])


     Pain Reassessment


      Is this a pain reassessment?               Yes


     Presence of Pain


      Presence of Pain                           No





Lorazepam (Ativan)  1 mg IVP ONCE ONE


   PRN Reason: Protocol


   Stop: 10/19/17 04:42


   Last Admin: 10/19/17 04:55  Dose: 1 mg





IVP Administration


 Document     10/19/17 04:55  SC  (Rec: 10/19/17 04:55  SC  [id number])


     Charges for Administration


      # of IVP Administrations                   1


Re-Assess: Reassess Psych Meds


 Document     10/19/17 05:25  FC  (Rec: 10/20/17 01:59  [address])


      Reassess Psych Med                         Effective





Lorazepam (Ativan)  1 mg IVP Q6H PREETI


   PRN Reason: Protocol


   Last Admin: 10/20/17 02:12  Dose: 1 mg





IVP Administration


 Document     10/20/17 02:12  FC  (Rec: 10/20/17 02:13  [place] [id number])


     Charges for Administration


      # of IVP Administrations                   1


Behavioural


 Document     10/20/17 02:12  FC  (Rec: 10/20/17 02:13  [place] [id number])


     Maintenance


      Maintenance Dose                           Yes


     Behavior


      Behavior for Medication:                   Anxiety


                                                 Continuous pacing/restlessness


                                                 Insomnia


Re-Assess: Reassess Psych Meds


 Document     10/20/17 02:42  FC  (Rec: 10/20/17 03:54  FC  [address])


      Reassess Psych Med                         Effective





Lorazepam (Ativan)  2 mg IVP ONCE STA


   PRN Reason: Protocol


   Stop: 10/20/17 07:05


Morphine Sulfate (Morphine)  1 mg IVP STAT STA


   Stop: 10/19/17 07:50


   Last Admin: 10/19/17 08:09  Dose: 1 mg





MAR Pain Assessment


 Document     10/19/17 08:09  PELON  (Rec: 10/19/17 08:09  PELON  [id number])


     Pain Reassessment


      Is this a pain reassessment?               No


     Sleep


      Is patient sleeping during reassessment?   Yes


     Pain Scale Used


      Pain Scale Used                            Numeric


     Description


      Description                                Intermittent


      Intensity of Pain at present               5


IVP Administration


 Document     10/19/17 08:09  PELON  (Rec: 10/19/17 08:09  PELON  [id number])


     Charges for Administration


      # of IVP Administrations                   1


Re-Assess: MAR Pain Assessment


 Document     10/19/17 09:09  FC  (Rec: 10/20/17 01:59  FC  [id number])


     Pain Reassessment


      Is this a pain reassessment?               Yes


     Presence of Pain


      Presence of Pain                           No





Oxycodone/Acetaminophen (Percocet 5/325 Mg Tab)  1 tab PO Q6H PREETI


   Stop: 10/22/17 08:01


   Last Admin: 10/19/17 13:09  Dose: 1 tab





MAR Pain Assessment


 Document     10/19/17 13:09  ML  (Rec: 10/19/17 13:09  ML  [id number])


     Pain Reassessment


      Is this a pain reassessment?               No


     Presence of Pain


      Presence of Pain                           Yes


Re-Assess: MAR Pain Assessment


 Document     10/19/17 14:09  ML  (Rec: 10/19/17 14:20  ML  [id number])


     Pain Reassessment


      Is this a pain reassessment?               Yes


     Presence of Pain


      Presence of Pain                           No





Tetanus/Reduced Diphtheria/Acell Pertussis (Boostrix Vaccine Inj)  0.5 ml IM 

.ONCE ONE


   Stop: 10/19/17 05:43


   Last Admin: 10/19/17 06:20  Dose: 0.5 ml





MAR Immunization Data


 Document     10/19/17 06:20  SC  (Rec: 10/19/17 06:20  SC  [id number])


     Immunization Data


      Opt out of sending immunization data to    No


       respository?                              


      Suppress immunization data to other        No


       providers from registry?                  


      Vaccine Eligibility                        Yes


      Vaccine Information Sheet Given            Yes


      Informed Consent Given                     Yes


      Vaccine Lot Number                         [id number]


      Site Given                                 Left Deltoid


      Route                                      Intramuscular


      Immunization Units                         ml














- Scribe Statement


The provider has reviewed the documentation as recorded by the Matthewibcem Parsons





Provider Scribe Attestation:


All medical record entries made by the Scribcem were at my direction and 

personally dictated by me. I have reviewed the chart and agree that the record 

accurately reflects my personal performance of the history, physical exam, 

medical decision making, and the department course for this patient. I have 

also personally directed, reviewed, and agree with the discharge instructions 

and disposition.











Disposition/Present on Arrival





- Present on Arrival


Any Indicators Present on Arrival: No


History of DVT/PE: No


History of Uncontrolled Diabetes: No


Urinary Catheter: No


History of Decub. Ulcer: No


History Surgical Site Infection Following: None





- Disposition


Have Diagnosis and Disposition been Completed?: Yes


Diagnosis: 


 Alcohol withdrawal





Disposition: HOSPITALIZED


Disposition Time: 07:00


Condition: STABLE

## 2017-10-20 LAB
ALBUMIN/GLOB SERPL: 1.3 {RATIO} (ref 1.1–1.8)
ALP SERPL-CCNC: 90 U/L (ref 38–126)
ALT SERPL-CCNC: 24 U/L (ref 7–56)
AST SERPL-CCNC: 53 U/L (ref 14–36)
BILIRUB SERPL-MCNC: 1.3 MG/DL (ref 0.2–1.3)
BUN SERPL-MCNC: 6 MG/DL (ref 7–21)
CALCIUM SERPL-MCNC: 9.2 MG/DL (ref 8.4–10.5)
CHLORIDE SERPL-SCNC: 103 MMOL/L (ref 98–107)
CO2 SERPL-SCNC: 25 MMOL/L (ref 21–33)
ERYTHROCYTE [DISTWIDTH] IN BLOOD BY AUTOMATED COUNT: 15.3 % (ref 11.5–14.5)
GLOBULIN SER-MCNC: 3.2 GM/DL
GLUCOSE SERPL-MCNC: 106 MG/DL (ref 70–110)
HCT VFR BLD CALC: 33.3 % (ref 36–48)
MAGNESIUM SERPL-MCNC: 2.1 MG/DL (ref 1.7–2.2)
MCH RBC QN AUTO: 32.7 PG (ref 25–35)
MCHC RBC AUTO-ENTMCNC: 33.6 G/DL (ref 31–37)
MCV RBC AUTO: 97.4 FL (ref 80–105)
PLATELET # BLD: 164 10^3/UL (ref 120–450)
PMV BLD AUTO: 8.8 FL (ref 7–11)
POTASSIUM SERPL-SCNC: 3.5 MMOL/L (ref 3.6–5)
PROT SERPL-MCNC: 7.3 G/DL (ref 5.8–8.3)
SODIUM SERPL-SCNC: 138 MMOL/L (ref 132–148)
WBC # BLD AUTO: 4.9 10^3/UL (ref 4.5–11)

## 2017-10-20 RX ADMIN — FOLIC ACID SCH MLS/HR: 5 INJECTION, SOLUTION INTRAMUSCULAR; INTRAVENOUS; SUBCUTANEOUS at 01:00

## 2017-10-20 RX ADMIN — MULTIPLE VITAMINS W/ MINERALS TAB SCH TAB: TAB at 10:03

## 2017-10-20 RX ADMIN — OXYCODONE HYDROCHLORIDE AND ACETAMINOPHEN PRN TAB: 5; 325 TABLET ORAL at 21:18

## 2017-10-20 RX ADMIN — OXYCODONE HYDROCHLORIDE AND ACETAMINOPHEN PRN TAB: 5; 325 TABLET ORAL at 10:02

## 2017-10-20 RX ADMIN — PANTOPRAZOLE SODIUM SCH MG: 40 TABLET, DELAYED RELEASE ORAL at 05:12

## 2017-10-20 NOTE — CP.PCM.PN
<Jovany Chong - Last Filed: 10/20/17 11:07>





Subjective





- Date & Time of Evaluation


Date of Evaluation: 10/20/17


Time of Evaluation: 06:55





- Subjective


Subjective: 





IM Progress note: 





Pt seen and examined at bedside. Nurses reported that patient was agitated 

overnight requiring Ativan 1mg prn multiple times. Pt was anxious, agitated, 

getting out of bed, and had tremors. Denies any f/c, cp, palpitations, abd pain

, n/v/d.     





Objective





- Vital Signs/Intake and Output


Vital Signs (last 24 hours): 


 











Temp Pulse Resp BP Pulse Ox


 


 98.8 F   111 H  20   151/91 H  99 


 


 10/20/17 06:00  10/20/17 06:00  10/20/17 06:00  10/20/17 06:00  10/20/17 06:00








Intake and Output: 


 











 10/20/17 10/20/17





 06:59 18:59


 


Intake Total 1360 700


 


Balance 1360 700














- Medications


Medications: 


 Current Medications





Folic Acid (Folic Acid)  1 mg PO DAILY [facility]


   Last Admin: 10/20/17 10:02 Dose:  1 mg


Folic Acid 1 mg/ Thiamine HCl 100 mg/ Multivitamins/Vitamin C 10 ml/ Dextrose  1

,011.2 mls @ 100 mls/hr IV .Q10H7M [facility]


   Last Admin: 10/20/17 01:00 Dose:  100 mls/hr


Lorazepam (Ativan)  2 mg IVP Q6H PREETI


   PRN Reason: Protocol


   Last Admin: 10/20/17 09:52 Dose:  Not Given


Lorazepam (Ativan)  2 mg IVP Q2H PRN; Protocol


   PRN Reason: Anxiety


Multivitamins/Minerals (Therapeutic-M Tab)  1 tab PO 0800 [facility]


   Last Admin: 10/20/17 10:03 Dose:  1 tab


Oxycodone/Acetaminophen (Percocet 5/325 Mg Tab)  1 tab PO Q6H PRN


   PRN Reason: Pain, severe (8-10)


   Stop: 10/22/17 08:01


   Last Admin: 10/20/17 10:02 Dose:  1 tab


Pantoprazole Sodium (Protonix Ec Tab)  40 mg PO 0600 [facility]


   Last Admin: 10/20/17 05:12 Dose:  40 mg


Thiamine HCl (Vitamin B1 Tab)  100 mg PO DAILY [facility]


   Last Admin: 10/20/17 10:03 Dose:  100 mg











- Labs


Labs: 


 





 10/20/17 06:52 





 10/20/17 06:52 





 











PT  12.7 SECONDS (9.4-12.5)  H  10/19/17  04:30    


 


INR  1.15  (0.93-1.08)  H  10/19/17  04:30    


 


APTT  26.2 Seconds (25.1-36.5)   10/19/17  04:30    














- Constitutional


Appears: No Acute Distress





- Head Exam


Head Exam: ATRAUMATIC, NORMOCEPHALIC





- Eye Exam


Eye Exam: EOMI, PERRL





- ENT Exam


ENT Exam: Mucous Membranes Moist





- Respiratory Exam


Respiratory Exam: Clear to Ausculation Bilateral.  absent: Rales, Wheezes





- Cardiovascular Exam


Cardiovascular Exam: Tachycardia, +S1, +S2





- GI/Abdominal Exam


GI & Abdominal Exam: Soft.  absent: Distended, Tenderness





- Extremities Exam


Extremities Exam: absent: Calf Tenderness, Pedal Edema





- Neurological Exam


Neurological Exam: Alert, Awake, Oriented x3





- Psychiatric Exam


Psychiatric exam: Agitated, Anxious





- Skin


Skin Exam: Dry, Intact, Warm





Assessment and Plan





- Assessment and Plan (Free Text)


Assessment: 





62 F with pmh of alcoholic hepatitis, and ETOH abuse presents with L side back 

pain and ETOH withdrawal. 





1. ETOH withdrawal 


- Anxious, agitated, tremors, and tachycardic 


- Ativan increased to 2mg Q6H PREETI and Q2H PRN


- Banana bag 


- CIWA protocol


- Fall and seizure protocol 


- ETOH level of 167


- Head CT showed no acute intracranial pathology 


- EKG showed sinus tach 125bpm unspecific ST wave abnormality 


- Psych consulted for anxiety/ depression and ETOH withdrawals





2. L sided back pain


- Xray of lumbar, hip, and wrist negative 


- Pain control 





3. Hypokalemia 


- K of 3.5 


- Repleted with 40meq of KCl PO x1 





4. Hypomagnesium


- Resolved 





5. GI/DVT ppx 


- Protonix and SCDs 





Case and plan was reviewed and discussed in detail with Dr Vázquez. 





<Aníbal Vázquez - Last Filed: 10/20/17 13:40>





Objective





- Vital Signs/Intake and Output


Vital Signs (last 24 hours): 


 











Temp Pulse Resp BP Pulse Ox


 


 98.8 F   111 H  20   151/91 H  99 


 


 10/20/17 06:00  10/20/17 06:00  10/20/17 06:00  10/20/17 06:00  10/20/17 06:00








Intake and Output: 


 











 10/20/17 10/20/17





 06:59 18:59


 


Intake Total 1360 700


 


Balance 1360 700














- Medications


Medications: 


 Current Medications





Folic Acid (Folic Acid)  1 mg PO DAILY [facility]


   Last Admin: 10/20/17 10:02 Dose:  1 mg


Folic Acid 1 mg/ Thiamine HCl 100 mg/ Multivitamins/Vitamin C 10 ml/ Dextrose  1

,011.2 mls @ 100 mls/hr IV .Q10H7M [facility]


   Last Admin: 10/20/17 01:00 Dose:  100 mls/hr


Lorazepam (Ativan)  2 mg IVP Q6H PREETI


   PRN Reason: Protocol


   Last Admin: 10/20/17 09:52 Dose:  Not Given


Lorazepam (Ativan)  2 mg IVP Q2H PRN; Protocol


   PRN Reason: Anxiety


   Last Admin: 10/20/17 11:06 Dose:  2 mg


Multivitamins/Minerals (Therapeutic-M Tab)  1 tab PO 0800 [facility]


   Last Admin: 10/20/17 10:03 Dose:  1 tab


Oxycodone/Acetaminophen (Percocet 5/325 Mg Tab)  1 tab PO Q6H PRN


   PRN Reason: Pain, severe (8-10)


   Stop: 10/22/17 08:01


   Last Admin: 10/20/17 10:02 Dose:  1 tab


Pantoprazole Sodium (Protonix Ec Tab)  40 mg PO 0600 [facility]


   Last Admin: 10/20/17 05:12 Dose:  40 mg


Thiamine HCl (Vitamin B1 Tab)  100 mg PO DAILY [facility]


   Last Admin: 10/20/17 10:03 Dose:  100 mg











- Labs


Labs: 


 





 10/20/17 06:52 





 10/20/17 06:52 





 











PT  12.7 SECONDS (9.4-12.5)  H  10/19/17  04:30    


 


INR  1.15  (0.93-1.08)  H  10/19/17  04:30    


 


APTT  26.2 Seconds (25.1-36.5)   10/19/17  04:30    














Attending/Attestation





- Attestation


I have personally seen and examined this patient.: Yes


I have fully participated in the care of the patient.: Yes


I have reviewed all pertinent clinical information, including history, physical 

exam and plan: Yes


Notes (Text): 





10/20/17 13:39





attending note;





Patient seen and examined with resident.





Patient is a 62year-old female with a history of alcohol abuse, alcoholic 

hepatitis is admitted with alcohol withdrawal symptoms.


Started on IV banana bag, Ativan.


still agitated, restless and confused.


Fall precaution in place. Case discussed with nursing staff in detail.





Anxiety; psychiatric evaluation requested.








Patient had a fall 3 days ago. Multiple bruises noted. 


wrist x-ray, pelvis and lumbar x-rays negative. CT head is negative.





Complete alcohol cessation is strongly advised.





Anxiety;  Patient follows up with Dr. Fernandez.  psychiatric evaluation requested.





Upon discharge the patient will follow up with PMD DR. Akhtar.

## 2017-10-20 NOTE — CON
IDENTIFYING INFORMATION:  The patient is a 62-year-old  white

female with a long history of alcohol use disorder that includes alcoholic

hepatitis.



HISTORY OF PRESENT ILLNESS:  The patient reports that she fell and she is

experiencing pain on her lower back on the left side.



She reportedly had tripped on a curb and fell on her arm.



Her 2 children are angry with her and she has poor relationship and may not

be of talking with them.  She also has 2 grandchildren.



She is upset that they are angry with her because they themselves drink. 

She is also upset that they are not visiting her.



She reports several prior detoxifications and rehabilitations and is

presently under the care of a local psychiatrist, Dr. Jatinder Fernandez.



She admits to drinking approximately 1 pint of vodka daily.



She is presently being treated with Ativan p.r.n., folic acid, thiamine.



The patient is not presently psychotic, nor homicidal or suicidal, but as

noted is distressed at her family rejection of her for behaviors that she

indicates they themselves are guilty of.



I have offered the patient supportive therapy and the patient will be

continued to be monitored.







__________________________________________

Ramana Fiore MD/ PhD





DD:  10/20/2017 14:11:05

DT:  10/20/2017 14:58:07

Job # [id number]

## 2017-10-21 LAB
ALBUMIN/GLOB SERPL: 1.3 {RATIO} (ref 1.1–1.8)
ALP SERPL-CCNC: 85 U/L (ref 38–126)
ALT SERPL-CCNC: 24 U/L (ref 7–56)
AST SERPL-CCNC: 55 U/L (ref 14–36)
BASOPHILS # BLD AUTO: 0.02 K/MM3 (ref 0–2)
BASOPHILS NFR BLD: 0.4 % (ref 0–3)
BILIRUB SERPL-MCNC: 0.9 MG/DL (ref 0.2–1.3)
BUN SERPL-MCNC: 4 MG/DL (ref 7–21)
CALCIUM SERPL-MCNC: 9.4 MG/DL (ref 8.4–10.5)
CHLORIDE SERPL-SCNC: 104 MMOL/L (ref 98–107)
CO2 SERPL-SCNC: 26 MMOL/L (ref 21–33)
EOSINOPHIL # BLD: 0.2 10*3/UL (ref 0–0.7)
EOSINOPHIL NFR BLD: 3.9 % (ref 1.5–5)
ERYTHROCYTE [DISTWIDTH] IN BLOOD BY AUTOMATED COUNT: 15.3 % (ref 11.5–14.5)
GLOBULIN SER-MCNC: 3.5 GM/DL
GLUCOSE SERPL-MCNC: 110 MG/DL (ref 70–110)
GRANULOCYTES # BLD: 3.22 10*3/UL (ref 1.4–6.5)
GRANULOCYTES NFR BLD: 59.4 % (ref 50–68)
HCT VFR BLD CALC: 36.1 % (ref 36–48)
LYMPHOCYTES # BLD: 1.6 10*3/UL (ref 1.2–3.4)
LYMPHOCYTES NFR BLD AUTO: 28.7 % (ref 22–35)
MCH RBC QN AUTO: 32.4 PG (ref 25–35)
MCHC RBC AUTO-ENTMCNC: 33 G/DL (ref 31–37)
MCV RBC AUTO: 98.4 FL (ref 80–105)
MONOCYTES # BLD AUTO: 0.4 10*3/UL (ref 0.1–0.6)
MONOCYTES NFR BLD: 7.6 % (ref 1–6)
PLATELET # BLD: 171 10^3/UL (ref 120–450)
PMV BLD AUTO: 9.3 FL (ref 7–11)
POTASSIUM SERPL-SCNC: 3.2 MMOL/L (ref 3.6–5)
PROT SERPL-MCNC: 7.9 G/DL (ref 5.8–8.3)
SODIUM SERPL-SCNC: 141 MMOL/L (ref 132–148)
WBC # BLD AUTO: 5.4 10^3/UL (ref 4.5–11)

## 2017-10-21 RX ADMIN — OXYCODONE HYDROCHLORIDE AND ACETAMINOPHEN PRN TAB: 5; 325 TABLET ORAL at 02:18

## 2017-10-21 RX ADMIN — PANTOPRAZOLE SODIUM SCH MG: 40 TABLET, DELAYED RELEASE ORAL at 11:34

## 2017-10-21 RX ADMIN — OXYCODONE HYDROCHLORIDE AND ACETAMINOPHEN PRN TAB: 5; 325 TABLET ORAL at 22:21

## 2017-10-21 RX ADMIN — FOLIC ACID SCH MLS/HR: 5 INJECTION, SOLUTION INTRAMUSCULAR; INTRAVENOUS; SUBCUTANEOUS at 18:28

## 2017-10-21 RX ADMIN — MULTIPLE VITAMINS W/ MINERALS TAB SCH TAB: TAB at 11:34

## 2017-10-21 NOTE — CP.PCM.PN
<Radha Nina - Last Filed: 10/21/17 11:48>





Subjective





- Date & Time of Evaluation


Date of Evaluation: 10/21/17


Time of Evaluation: 11:39





- Subjective


Subjective: 





Hospitalist Service Progress Note:





Patient seen and examined at bedside. Per nursing, no acute events overnight. 

Patient is confused, anxious and agitated, states that she wants to leave 

because she has to take her mother to see the doctor today. Call was placed to 

patients mother, who understands patient needs to be monitored further. Denies 

headache, dizziness, cp, palpitations, sob, urinary symptoms, abdominal pain, 

auditory/visual hallucinations. 





Objective





- Vital Signs/Intake and Output


Vital Signs (last 24 hours): 


 











Temp Pulse Resp BP Pulse Ox


 


 98.1 F   118 H  20   128/76   99 


 


 10/21/17 10:19  10/21/17 10:19  10/21/17 10:19  10/21/17 10:19  10/21/17 10:19











- Medications


Medications: 


 Current Medications





Folic Acid (Folic Acid)  1 mg PO DAILY [facility]


   Last Admin: 10/20/17 10:02 Dose:  1 mg


Folic Acid 1 mg/ Thiamine HCl 100 mg/ Multivitamins/Vitamin C 10 ml/ Dextrose  1

,011.2 mls @ 100 mls/hr IV .Q10H7M [facility]


   Last Admin: 10/20/17 01:00 Dose:  100 mls/hr


Lorazepam (Ativan)  2 mg IVP Q6H PREETI


   PRN Reason: Protocol


   Last Admin: 10/21/17 09:30 Dose:  2 mg


Lorazepam (Ativan)  2 mg IVP Q2H PRN; Protocol


   PRN Reason: Anxiety


   Last Admin: 10/21/17 04:59 Dose:  2 mg


Multivitamins/Minerals (Therapeutic-M Tab)  1 tab PO 0800 [facility]


   Last Admin: 10/20/17 10:03 Dose:  1 tab


Oxycodone/Acetaminophen (Percocet 5/325 Mg Tab)  1 tab PO Q6H PRN


   PRN Reason: Pain, severe (8-10)


   Stop: 10/22/17 08:01


   Last Admin: 10/21/17 02:18 Dose:  1 tab


Pantoprazole Sodium (Protonix Ec Tab)  40 mg PO 0600 [facility]


   Last Admin: 10/20/17 05:12 Dose:  40 mg


Potassium Chloride (K-Dur 20 Meq Er Tab)  40 meq PO ONCE ONE


   Stop: 10/21/17 11:46


Thiamine HCl (Vitamin B1 Tab)  100 mg PO TID PRE[facility]


Ziprasidone (Geodon Inj)  15 mg IM Q12 PRN; Protocol


   PRN Reason: Agitation


   Last Admin: 10/21/17 02:12 Dose:  15 mg











- Labs


Labs: 


 





 10/21/17 07:30 





 10/21/17 07:30 





 











PT  12.7 SECONDS (9.4-12.5)  H  10/19/17  04:30    


 


INR  1.15  (0.93-1.08)  H  10/19/17  04:30    


 


APTT  26.2 Seconds (25.1-36.5)   10/19/17  04:30    














- Constitutional


Appears: No Acute Distress





- Head Exam


Head Exam: ATRAUMATIC, NORMAL INSPECTION





- Eye Exam


Eye Exam: EOMI, Normal appearance


Pupil Exam: NORMAL ACCOMODATION





- ENT Exam


ENT Exam: Mucous Membranes Moist





- Neck Exam


Neck Exam: Full ROM





- Respiratory Exam


Respiratory Exam: Clear to Ausculation Bilateral, NORMAL BREATHING PATTERN.  

absent: Rales, Rhonchi, Wheezes





- Cardiovascular Exam


Cardiovascular Exam: REGULAR RHYTHM, +S1, +S2





- GI/Abdominal Exam


GI & Abdominal Exam: Soft, Normal Bowel Sounds.  absent: Guarding, Rigid, 

Tenderness





- Extremities Exam


Extremities Exam: Normal Inspection





- Back Exam


Back Exam: NORMAL INSPECTION





- Neurological Exam


Neurological Exam: Alert, Awake





- Psychiatric Exam


Psychiatric exam: Normal Affect, Normal Mood





- Skin


Skin Exam: Dry, Normal Color, Warm





Assessment and Plan





- Assessment and Plan (Free Text)


Assessment: 





62 F with pmh of alcoholic hepatitis, and ETOH abuse presents with L side back 

pain and ETOH withdrawal. 





1. Alcohol abuse/ETOH withdrawal 


- Anxious, agitated, tremors


- STAT dose of Geodon 30mg was give; posey vest odered


- Ativan 2mg Q6H PREETI and Q2H PRN


- Geodon 15mg Q12H prn agitation


- Folic acid, thiamine, multivitamins 


- CIWA protocol


- Fall and seizure protocol 


- ETOH level of 167 on admission 


- Head CT showed no acute intracranial pathology 


- EKG showed sinus tach 125bpm unspecific ST wave abnormality 


- Psych consulted for anxiety/ depression and ETOH withdrawals





2. L sided back pain


- Xray of lumbar, hip, and wrist negative 


- Pain control prn





3. Hypokalemia 


- K of 3.2


- Repleted with 40meq of KCl PO x1 





4. Hypomagnesium


- Resolved 





5. GI/DVT ppx 


- Protonix and SCDs 





Case and plan was reviewed and discussed in detail with Dr Vázquez. 





<Aníbal Vázquez - Last Filed: 10/21/17 16:50>





Objective





- Vital Signs/Intake and Output


Vital Signs (last 24 hours): 


 











Temp Pulse Resp BP Pulse Ox


 


 98.1 F   118 H  20   128/76   99 


 


 10/21/17 10:19  10/21/17 10:19  10/21/17 10:19  10/21/17 10:19  10/21/17 10:19








Intake and Output: 


 











 10/21/17 10/21/17





 06:59 18:59


 


Intake Total  600


 


Balance  600














- Medications


Medications: 


 Current Medications





Folic Acid (Folic Acid)  1 mg PO DAILY [facility]


   Last Admin: 10/21/17 11:33 Dose:  1 mg


Folic Acid 1 mg/ Thiamine HCl 100 mg/ Multivitamins/Vitamin C 10 ml/ Dextrose  1

,011.2 mls @ 100 mls/hr IV .Q10H7M [facility]


   Last Admin: 10/20/17 01:00 Dose:  100 mls/hr


Lorazepam (Ativan)  2 mg IVP Q6H PREETI


   PRN Reason: Protocol


   Last Admin: 10/21/17 13:55 Dose:  2 mg


Lorazepam (Ativan)  2 mg IVP Q2H PRN; Protocol


   PRN Reason: Anxiety


   Last Admin: 10/21/17 04:59 Dose:  2 mg


Multivitamins/Minerals (Therapeutic-M Tab)  1 tab PO 0800 [facility]


   Last Admin: 10/21/17 11:34 Dose:  1 tab


Oxycodone/Acetaminophen (Percocet 5/325 Mg Tab)  1 tab PO Q6H PRN


   PRN Reason: Pain, severe (8-10)


   Stop: 10/22/17 08:01


   Last Admin: 10/21/17 02:18 Dose:  1 tab


Pantoprazole Sodium (Protonix Ec Tab)  40 mg PO 0600 [facility]


   Last Admin: 10/21/17 11:34 Dose:  40 mg


Thiamine HCl (Vitamin B1 Tab)  100 mg PO TID [facility]


   Last Admin: 10/21/17 13:01 Dose:  100 mg


Ziprasidone (Geodon Inj)  15 mg IM Q12 PRN; Protocol


   PRN Reason: Agitation


   Last Admin: 10/21/17 02:12 Dose:  15 mg











- Labs


Labs: 


 





 10/21/17 07:30 





 10/21/17 07:30 





 











PT  12.7 SECONDS (9.4-12.5)  H  10/19/17  04:30    


 


INR  1.15  (0.93-1.08)  H  10/19/17  04:30    


 


APTT  26.2 Seconds (25.1-36.5)   10/19/17  04:30    














Attending/Attestation





- Attestation


I have personally seen and examined this patient.: Yes


I have fully participated in the care of the patient.: Yes


I have reviewed all pertinent clinical information, including history, physical 

exam and plan: Yes


Notes (Text): 





10/21/17 16:47





attending note;





Patient seen and examined with resident.





Patient is a 62year-old female with a history of alcohol abuse, alcoholic 

hepatitis is admitted with alcohol withdrawal symptoms.


still agitated, restless and confused.


placed on vest Posey. IM Geodon given.





Anxiety; psychiatric evaluation appreciated.





Complete alcohol cessation is strongly advised.





diagnosis discussed with patient's mother in detail.





 evaluation requested for discharge planning.





Upon discharge the patient will follow up with PMD DR. Akhtar.

## 2017-10-22 LAB
ALBUMIN/GLOB SERPL: 1.3 {RATIO} (ref 1.1–1.8)
ALP SERPL-CCNC: 75 U/L (ref 38–126)
ALT SERPL-CCNC: 20 U/L (ref 7–56)
AST SERPL-CCNC: 46 U/L (ref 14–36)
BASOPHILS # BLD AUTO: 0.02 K/MM3 (ref 0–2)
BASOPHILS NFR BLD: 0.3 % (ref 0–3)
BILIRUB SERPL-MCNC: 0.6 MG/DL (ref 0.2–1.3)
BUN SERPL-MCNC: 6 MG/DL (ref 7–21)
CALCIUM SERPL-MCNC: 9.5 MG/DL (ref 8.4–10.5)
CHLORIDE SERPL-SCNC: 103 MMOL/L (ref 98–107)
CO2 SERPL-SCNC: 26 MMOL/L (ref 21–33)
EOSINOPHIL # BLD: 0.3 10*3/UL (ref 0–0.7)
EOSINOPHIL NFR BLD: 5.4 % (ref 1.5–5)
ERYTHROCYTE [DISTWIDTH] IN BLOOD BY AUTOMATED COUNT: 15.7 % (ref 11.5–14.5)
GLOBULIN SER-MCNC: 3 GM/DL
GLUCOSE SERPL-MCNC: 103 MG/DL (ref 70–110)
GRANULOCYTES # BLD: 3.04 10*3/UL (ref 1.4–6.5)
GRANULOCYTES NFR BLD: 50.3 % (ref 50–68)
HCT VFR BLD CALC: 34.2 % (ref 36–48)
LYMPHOCYTES # BLD: 2.1 10*3/UL (ref 1.2–3.4)
LYMPHOCYTES NFR BLD AUTO: 34.3 % (ref 22–35)
MAGNESIUM SERPL-MCNC: 2 MG/DL (ref 1.7–2.2)
MCH RBC QN AUTO: 32.7 PG (ref 25–35)
MCHC RBC AUTO-ENTMCNC: 32.7 G/DL (ref 31–37)
MCV RBC AUTO: 100 FL (ref 80–105)
MONOCYTES # BLD AUTO: 0.6 10*3/UL (ref 0.1–0.6)
MONOCYTES NFR BLD: 9.7 % (ref 1–6)
PHOSPHATE SERPL-MCNC: 3.1 MG/DL (ref 2.5–4.5)
PLATELET # BLD: 179 10^3/UL (ref 120–450)
PMV BLD AUTO: 9.7 FL (ref 7–11)
POTASSIUM SERPL-SCNC: 3.6 MMOL/L (ref 3.6–5)
PROT SERPL-MCNC: 6.9 G/DL (ref 5.8–8.3)
SODIUM SERPL-SCNC: 137 MMOL/L (ref 132–148)
WBC # BLD AUTO: 6.1 10^3/UL (ref 4.5–11)

## 2017-10-22 RX ADMIN — OXYCODONE HYDROCHLORIDE AND ACETAMINOPHEN PRN TAB: 5; 325 TABLET ORAL at 05:13

## 2017-10-22 RX ADMIN — PANTOPRAZOLE SODIUM SCH MG: 40 TABLET, DELAYED RELEASE ORAL at 05:13

## 2017-10-22 RX ADMIN — MULTIPLE VITAMINS W/ MINERALS TAB SCH TAB: TAB at 09:33

## 2017-10-22 RX ADMIN — FOLIC ACID SCH MLS/HR: 5 INJECTION, SOLUTION INTRAMUSCULAR; INTRAVENOUS; SUBCUTANEOUS at 03:07

## 2017-10-22 NOTE — CP.PCM.PN
<Radha Nina - Last Filed: 10/22/17 11:10>





Subjective





- Date & Time of Evaluation


Date of Evaluation: 10/22/17


Time of Evaluation: 10:55





- Subjective


Subjective: 





Hospitalist Service Progress Note:





Patient seen and examined at bedside. Per nursing no acute events overnight. 

Patient with posey vest on, eating breakfast. States that she feels better 

today. Less anxious and agitated. Denies headaches, dizziness, cp, palpitations

, sob, abdominal pain, urinary symptoms. 





Objective





- Vital Signs/Intake and Output


Vital Signs (last 24 hours): 


 











Temp Pulse Resp BP Pulse Ox


 


 98.4 F   111 H  20   125/29 L  96 


 


 10/22/17 08:27  10/22/17 08:27  10/22/17 08:27  10/22/17 08:27  10/22/17 08:27








Intake and Output: 


 











 10/22/17 10/22/17





 06:59 18:59


 


Intake Total  600


 


Balance  600














- Medications


Medications: 


 Current Medications





Folic Acid (Folic Acid)  [facility]ILY [facility]


   Last Admin: 10/22/17 09:33 Dose:  1 mg


Folic Acid 1 mg/ Thiamine HCl 100 mg/ Multivitamins/Vitamin C 10 ml/ Dextrose  1

,011.2 mls @ 100 mls/hr IV .Q10H7M [facility]


   Last Admin: 10/22/17 03:07 Dose:  100 mls/hr


Lorazepam (Ativan)  2 mg IVP Q6H PREETI


   PRN Reason: Protocol


   Last Admin: 10/22/17 07:12 Dose:  2 mg


Lorazepam (Ativan)  2 mg IVP Q2H PRN; Protocol


   PRN Reason: Anxiety


   Last Admin: 10/21/17 04:59 Dose:  2 mg


Multivitamins/Minerals (Therapeutic-M Tab)  1 tab PO 0800 [facility]


   Last Admin: 10/22/17 09:33 Dose:  1 tab


Pantoprazole Sodium (Protonix Ec Tab)  40 mg PO 0600 [facility]


   Last Admin: 10/22/17 05:13 Dose:  40 mg


Thiamine HCl (Vitamin B1 Tab)  100 mg PO TID [facility]


   Last Admin: 10/22/17 09:34 Dose:  100 mg


Ziprasidone (Geodon Inj)  15 mg IM Q12 PRN; Protocol


   PRN Reason: Agitation


   Last Admin: 10/21/17 18:51 Dose:  15 mg











- Labs


Labs: 


 





 10/22/17 06:00 





 10/22/17 06:00 





 











PT  12.7 SECONDS (9.4-12.5)  H  10/19/17  04:30    


 


INR  1.15  (0.93-1.08)  H  10/19/17  04:30    


 


APTT  26.2 Seconds (25.1-36.5)   10/19/17  04:30    














- Constitutional


Appears: Non-toxic, No Acute Distress





- Head Exam


Head Exam: ATRAUMATIC, NORMAL INSPECTION





- Eye Exam


Eye Exam: EOMI, Normal appearance


Pupil Exam: NORMAL ACCOMODATION





- ENT Exam


ENT Exam: Mucous Membranes Moist





- Neck Exam


Neck Exam: Full ROM





- Respiratory Exam


Respiratory Exam: Clear to Ausculation Bilateral, NORMAL BREATHING PATTERN.  

absent: Rales, Rhonchi, Wheezes





- Cardiovascular Exam


Cardiovascular Exam: REGULAR RHYTHM, +S1, +S2





- GI/Abdominal Exam


GI & Abdominal Exam: Soft.  absent: Guarding, Rigid, Tenderness, Rebound





- Extremities Exam


Extremities Exam: Normal Inspection.  absent: Calf Tenderness





- Back Exam


Back Exam: NORMAL INSPECTION





- Neurological Exam


Neurological Exam: Alert, Awake, Oriented x3





- Psychiatric Exam


Psychiatric exam: Anxious, Normal Affect, Normal Mood





- Skin


Skin Exam: Dry, Normal Color, Warm





Assessment and Plan





- Assessment and Plan (Free Text)


Assessment: 





62 F with pmh of alcoholic hepatitis, and ETOH abuse presents with L side back 

pain and ETOH withdrawal. 





Plan: 





1. Alcohol abuse/ETOH withdrawal 


- Still Anxious, less agitated this morning


- Continue posey vest


- Continue Ativan 2mg Q6H PREETI and Q2H PRN


- Geodon 15mg Q12H prn agitation


- Folic acid, thiamine, multivitamins 


- CIWA protocol; Fall and seizure precautions


- ETOH level of 167 on admission 


- Head CT showed no acute intracranial pathology 


- EKG showed sinus tach 125bpm unspecific ST wave abnormality 


- Psych consulted for anxiety/ depression and ETOH withdrawals





2. L sided back pain


- Xray of lumbar, hip, and wrist negative 


- Pain control prn





3. Transaminitis


- Improving, continue to monitor





4. Hypokalemia 


- Potassium within normal limits


- Continue to monitor





5. Hypomagnesium


- Resolved 





GI/DVT ppx 


- Protonix and SCDs 





<Rangasamy,Ajantha - Last Filed: 10/22/17 12:07>





Objective





- Vital Signs/Intake and Output


Vital Signs (last 24 hours): 


 











Temp Pulse Resp BP Pulse Ox


 


 98.4 F   111 H  20   125/29 L  96 


 


 10/22/17 08:27  10/22/17 08:27  10/22/17 08:27  10/22/17 08:27  10/22/17 08:27








Intake and Output: 


 











 10/22/17 10/22/17





 06:59 18:59


 


Intake Total  600


 


Balance  600














- Medications


Medications: 


 Current Medications





Folic Acid (Folic Acid)  1 mg PO DAILY [facility]


   Last Admin: 10/22/17 09:33 Dose:  1 mg


Folic Acid 1 mg/ Thiamine HCl 100 mg/ Multivitamins/Vitamin C 10 ml/ Dextrose  1

,011.2 mls @ 100 mls/hr IV .Q10H7M [facility]


   Last Admin: 10/22/17 03:07 Dose:  100 mls/hr


Lorazepam (Ativan)  2 mg IVP Q6H PREETI


   PRN Reason: Protocol


   Last Admin: 10/22/17 07:12 Dose:  2 mg


Lorazepam (Ativan)  2 mg IVP Q2H PRN; Protocol


   PRN Reason: Anxiety


   Last Admin: 10/21/17 04:59 Dose:  2 mg


Multivitamins/Minerals (Therapeutic-M Tab)  1 tab PO 0800 [facility]


   Last Admin: 10/22/17 09:33 Dose:  1 tab


Pantoprazole Sodium (Protonix Ec Tab)  40 mg PO 0600 [facility]


   Last Admin: 10/22/17 05:13 Dose:  40 mg


Thiamine HCl (Vitamin B1 Tab)  100 mg PO TID [facility]


   Last Admin: 10/22/17 09:34 Dose:  100 mg


Ziprasidone (Geodon Inj)  15 mg IM Q12 PRN; Protocol


   PRN Reason: Agitation


   Last Admin: 10/21/17 18:51 Dose:  15 mg











- Labs


Labs: 


 





 10/22/17 06:00 





 10/22/17 06:00 





 











PT  12.7 SECONDS (9.4-12.5)  H  10/19/17  04:30    


 


INR  1.15  (0.93-1.08)  H  10/19/17  04:30    


 


APTT  26.2 Seconds (25.1-36.5)   10/19/17  04:30    














Attending/Attestation





- Attestation


I have personally seen and examined this patient.: Yes


I have fully participated in the care of the patient.: Yes


I have reviewed all pertinent clinical information, including history, physical 

exam and plan: Yes


Notes (Text): 





10/22/17 12:06





attending note;





Patient seen and examined with resident.





Patient is a 62year-old female with a history of alcohol abuse, alcoholic 

hepatitis is admitted with alcohol withdrawal symptoms.


still agitated, restless and confused.


placed on vest Posey. continue IV Ativan.


On IV Geodon when necessary.


Anxiety; psychiatric evaluation appreciated.





Complete alcohol cessation is strongly advised. continue IV banana bag with 

multivitamin, thiamine, folic acid.





diagnosis discussed with patient's mother in detail.





 evaluation requested for discharge planning.





Upon discharge the patient will follow up with PMD DR. Akhtar.

## 2017-10-23 VITALS
TEMPERATURE: 98.3 F | HEART RATE: 104 BPM | RESPIRATION RATE: 22 BRPM | SYSTOLIC BLOOD PRESSURE: 129 MMHG | DIASTOLIC BLOOD PRESSURE: 80 MMHG | OXYGEN SATURATION: 99 %

## 2017-10-23 LAB
ALBUMIN/GLOB SERPL: 1.3 {RATIO} (ref 1.1–1.8)
ALP SERPL-CCNC: 70 U/L (ref 38–126)
ALT SERPL-CCNC: 23 U/L (ref 7–56)
AST SERPL-CCNC: 38 U/L (ref 14–36)
BASOPHILS # BLD AUTO: 0.02 K/MM3 (ref 0–2)
BASOPHILS NFR BLD: 0.4 % (ref 0–3)
BILIRUB SERPL-MCNC: 0.7 MG/DL (ref 0.2–1.3)
BUN SERPL-MCNC: 6 MG/DL (ref 7–21)
CALCIUM SERPL-MCNC: 9.6 MG/DL (ref 8.4–10.5)
CHLORIDE SERPL-SCNC: 104 MMOL/L (ref 98–107)
CO2 SERPL-SCNC: 25 MMOL/L (ref 21–33)
EOSINOPHIL # BLD: 0.2 10*3/UL (ref 0–0.7)
EOSINOPHIL NFR BLD: 4.6 % (ref 1.5–5)
ERYTHROCYTE [DISTWIDTH] IN BLOOD BY AUTOMATED COUNT: 15.8 % (ref 11.5–14.5)
GLOBULIN SER-MCNC: 3.2 GM/DL
GLUCOSE SERPL-MCNC: 106 MG/DL (ref 70–110)
GRANULOCYTES # BLD: 2.74 10*3/UL (ref 1.4–6.5)
GRANULOCYTES NFR BLD: 52.8 % (ref 50–68)
HCT VFR BLD CALC: 34.8 % (ref 36–48)
LYMPHOCYTES # BLD: 1.6 10*3/UL (ref 1.2–3.4)
LYMPHOCYTES NFR BLD AUTO: 31.4 % (ref 22–35)
MAGNESIUM SERPL-MCNC: 1.8 MG/DL (ref 1.7–2.2)
MCH RBC QN AUTO: 33 PG (ref 25–35)
MCHC RBC AUTO-ENTMCNC: 33.6 G/DL (ref 31–37)
MCV RBC AUTO: 98 FL (ref 80–105)
MONOCYTES # BLD AUTO: 0.6 10*3/UL (ref 0.1–0.6)
MONOCYTES NFR BLD: 10.8 % (ref 1–6)
PHOSPHATE SERPL-MCNC: 3.5 MG/DL (ref 2.5–4.5)
PLATELET # BLD: 192 10^3/UL (ref 120–450)
PMV BLD AUTO: 9.4 FL (ref 7–11)
POTASSIUM SERPL-SCNC: 3.5 MMOL/L (ref 3.6–5)
PROT SERPL-MCNC: 7.3 G/DL (ref 5.8–8.3)
SODIUM SERPL-SCNC: 139 MMOL/L (ref 132–148)
WBC # BLD AUTO: 5.2 10^3/UL (ref 4.5–11)

## 2017-10-23 RX ADMIN — MULTIPLE VITAMINS W/ MINERALS TAB SCH TAB: TAB at 09:34

## 2017-10-23 RX ADMIN — PANTOPRAZOLE SODIUM SCH MG: 40 TABLET, DELAYED RELEASE ORAL at 07:44

## 2017-10-23 NOTE — CON
DATE:  10/22/2017



HISTORY OF PRESENT ILLNESS:  The patient is a 62-year-old white female with

long history of severe alcohol use disorder, who is being treated on

medical floor for alcohol withdrawal delirium.  Psychiatrist is following

up due to the patient's confusion and agitation on the unit and followup

was requested for the patient today as the patient reported that she would

like to be discharged.  I met with the patient at bedside this morning and

she is alert and oriented x3.  She is aware of current circumstances of her

admission and indicated that she was "feeling better than I have in the

longtime."  The patient indicated that she wanted to be discharged because

her mother lives alone and she is 90 years old and she want to checkup on

her.  The patient indicates that she is not in any discomfort at this time.

She is not hallucinating.  She is not delusional.  She would just wants to

get back "her normal routine."  The patient did not indicate any

significant insight into the dangers of her continued alcohol use.  Though

she did indicate awareness that untreated alcohol withdrawal can make

seizures and death.  The patient denies any history of withdrawal seizures

in the past.



The patient does not appear to be hallucinating and she is responding to

questions appropriately and relevantly and her delirium symptoms seems to

be improving.  She is not suicidal.  She is not homicidal.



PHYSICAL EXAMINATION

VITAL SIGNS:  Vital signs at 8:27 a.m. were temperature 98.4, pulse 111,

blood pressure 125/29, and respirations 20.



LABORATORY DATA:  Labs were reviewed by provider.



MEDICATIONS:  Relevant psychiatric medications include Geodon 50 mg IM q.

12 hours as well as Ativan 2 mg IV q. 6 hours scheduled as well as 2 mg IV

q. 2 hours p.r.n.



IMPRESSION:  Alcohol use disorder severe, alcohol withdrawal delirium.



RECOMMENDATIONS:  At this time psychiatrically, the patient does present as

lucid and able to understand the ramifications of leaving against medical

advice.  She does present with a rational reason for wanting to leave

against medical advice and she wants to follow up on her 90-year-old mother

who lives alone.  The patient is alert and oriented.  She is not suicidal. 

She is not homicidal.  My only concern is that she was clearly delirious

just a few hours ago and notes from yesterday at 4:40 p.m. indicated that

she was yelling, cursing, actively hallucinating, combative, and tried to

get out of restrains hence she required IM Geodon to be given.  While

psychiatrically she seems to be lucid, medically she might still be

delirious and because of that uncertainty this Psychiatrist cannot confirm

capacity to leave against medical advise since the delirium is waxing and

waning.  The patient should have at least 24 hours of lucidity and sustain

an improved orientation and behavior prior to consideration for capacity to

leave AMA.  At this time, it has not been 24 hours and this provider cannot

establish capacity to a certainty.













Psychiatry will continue to follow up with the patient and ensure that her

mental status remains clear and if it does then this provider has no

problem with establishing capacity if the patient remains against

consistently improved and lucid by tomorrow.





__________________________________________

Shola No MD



DD:  10/22/2017 10:31:08

DT:  10/22/2017 11:17:45

Job # [id number]

## 2017-10-23 NOTE — CP.PCM.DIS
<Nathanael Hearn - Last Filed: 10/23/17 13:34>





Provider





- Provider


Date of Admission: 


10/19/17 06:35





Attending physician: 


Rehana Mgiuel MD





Time Spent in preparation of Discharge (in minutes): 45





Hospital Course





- Lab Results


Lab Results: 


 Most Recent Lab Values











WBC  5.2 10^3/ul (4.5-11.0)   10/23/17  06:30    


 


RBC  3.55 10^6/uL (3.5-6.1)   10/23/17  06:30    


 


Hgb  11.7 g/dL (12.0-16.0)  L  10/23/17  06:30    


 


Hct  34.8 % (36.0-48.0)  L  10/23/17  06:30    


 


MCV  98.0 fl (80.0-105.0)   10/23/17  06:30    


 


MCH  33.0 pg (25.0-35.0)   10/23/17  06:30    


 


MCHC  33.6 g/dl (31.0-37.0)   10/23/17  06:30    


 


RDW  15.8 % (11.5-14.5)  H  10/23/17  06:30    


 


Plt Count  192 10^3/uL (120.0-450.0)   10/23/17  06:30    


 


MPV  9.4 fl (7.0-11.0)   10/23/17  06:30    


 


Gran %  52.8 % (50.0-68.0)   10/23/17  06:30    


 


Lymph % (Auto)  31.4 % (22.0-35.0)   10/23/17  06:30    


 


Mono % (Auto)  10.8 % (1.0-6.0)  H  10/23/17  06:30    


 


Eos % (Auto)  4.6 % (1.5-5.0)   10/23/17  06:30    


 


Baso % (Auto)  0.4 % (0.0-3.0)   10/23/17  06:30    


 


Gran #  2.74  (1.4-6.5)   10/23/17  06:30    


 


Lymph #  1.6  (1.2-3.4)   10/23/17  06:30    


 


Mono #  0.6  (0.1-0.6)   10/23/17  06:30    


 


Eos #  0.2  (0.0-0.7)   10/23/17  06:30    


 


Baso #  0.02 K/mm3 (0.0-2.0)   10/23/17  06:30    


 


PT  12.7 SECONDS (9.4-12.5)  H  10/19/17  04:30    


 


INR  1.15  (0.93-1.08)  H  10/19/17  04:30    


 


APTT  26.2 Seconds (25.1-36.5)   10/19/17  04:30    


 


Sodium  139 mmol/L (132-148)   10/23/17  06:30    


 


Potassium  3.5 mmol/L (3.6-5.0)  L  10/23/17  06:30    


 


Chloride  104 mmol/L ()   10/23/17  06:30    


 


Carbon Dioxide  25 mmol/L (21-33)   10/23/17  06:30    


 


Anion Gap  14  (10-20)   10/23/17  06:30    


 


BUN  6 mg/dL (7-21)  L  10/23/17  06:30    


 


Creatinine  0.6 mg/dL (0.7-1.2)  L  10/23/17  06:30    


 


Est GFR ( Amer)  > 60   10/23/17  06:30    


 


Est GFR (Non-Af Amer)  > 60   10/23/17  06:30    


 


Random Glucose  106 mg/dL ()   10/23/17  06:30    


 


Calcium  9.6 mg/dL (8.4-10.5)   10/23/17  06:30    


 


Phosphorus  3.5 mg/dL (2.5-4.5)   10/23/17  06:30    


 


Magnesium  1.8 mg/dL (1.7-2.2)   10/23/17  06:30    


 


Total Bilirubin  0.7 mg/dL (0.2-1.3)   10/23/17  06:30    


 


AST  38 U/L (14-36)  H  10/23/17  06:30    


 


ALT  23 U/L (7-56)   10/23/17  06:30    


 


Alkaline Phosphatase  70 U/L ()   10/23/17  06:30    


 


Lactate Dehydrogenase  483 U/L (333-699)   10/19/17  05:30    


 


Total Creatine Kinase  66 U/L ()   10/19/17  05:30    


 


Troponin I  < 0.01 ng/mL  10/19/17  05:30    


 


Total Protein  7.3 g/dL (5.8-8.3)   10/23/17  06:30    


 


Albumin  4.0 g/dL (3.0-4.8)   10/23/17  06:30    


 


Globulin  3.2 gm/dL  10/23/17  06:30    


 


Albumin/Globulin Ratio  1.3  (1.1-1.8)   10/23/17  06:30    


 


Alcohol, Quantitative  167 mg/dL (0-10)  H  10/19/17  04:30    














- Hospital Course


Hospital Course: 





Ms. Lino is a 63 yo F with pmh of alcoholic hepatitis and ETOH abuse who 

presented with L side back pain and ETOH intoxication. Pt stated that she 

tripped over a curb and fell on her L side and arm. She denied LOC or head 

trauma. Head CT showed no acute intracranial pathology. Xray of lumbar, hip, 

and wrist negative. CIWA protocol was initiated for ETOH withdrawal. 





On floors, patient continued to improve. this morning, the patient offered no 

complaints and stated that she needed to leave due to a doctor's appointment 

that she had to make. it was explained to her that she needed to be re-

evaluated by psych before d/c. Per last psych note from >24hrs ago, the patient 

was improving but needed to be monitored for another day to make sure she was 

still progressing. Her behavior improved and the patient is alert and oriented 

with capacity to make decisions. She signed out AMA against medical advice 

despite discussion with medical team about risks/benefits. 





- Date & Time of H&P


Date of H&P: 10/19/17


Time of H&P: 13:19





Discharge Exam





- Additional Findings


Additional findings: 





- Constitutional


Appears: Well, No Acute Distress





- Head Exam


Head Exam: ATRAUMATIC, NORMAL INSPECTION, NORMOCEPHALIC





- Eye Exam


Eye Exam: EOMI, Normal appearance, PERRL





- ENT Exam


ENT Exam: Mucous Membranes Moist, Normal Exam





- Neck Exam


Neck Exam: Normal Inspection





- Respiratory Exam


Respiratory Exam: Clear to Ausculation Bilateral, NORMAL BREATHING PATTERN.  

absent: Rales, Rhonchi, Wheezes, Respiratory Distress





- Cardiovascular Exam


Cardiovascular Exam: Tachycardia, REGULAR RHYTHM.  absent: Murmur





- GI/Abdominal Exam


GI & Abdominal Exam: Soft, Normal Bowel Sounds.  absent: Bruit, Distended, 

Tenderness





- Extremities Exam


Extremities Exam: Full ROM, Normal Inspection





- Back Exam


Back Exam: NORMAL INSPECTION





- Neurological Exam


Neurological Exam: Alert, Awake, Oriented x3





- Psychiatric Exam


Psychiatric exam: Normal Affect, Normal Mood





- Skin


Skin Exam: Normal Color, Warm





Discharge Plan





- Follow Up Plan


Condition: FAIR


Disposition: AGAINST MEDICAL ADVICE





<Rehana Miguel - Last Filed: 10/23/17 15:06>





Provider





- Provider


Date of Admission: 


10/19/17 06:35





Attending physician: 


Rehana Miguel MD








[facility] Course





- Lab Results


Lab Results: 


 Most Recent Lab Values











WBC  5.2 10^3/ul (4.5-11.0)   10/23/17  06:30    


 


RBC  3.55 10^6/uL (3.5-6.1)   10/23/17  06:30    


 


Hgb  11.7 g/dL (12.0-16.0)  L  10/23/17  06:30    


 


Hct  34.8 % (36.0-48.0)  L  10/23/17  06:30    


 


MCV  98.0 fl (80.0-105.0)   10/23/17  06:30    


 


MCH  33.0 pg (25.0-35.0)   10/23/17  06:30    


 


MCHC  33.6 g/dl (31.0-37.0)   10/23/17  06:30    


 


RDW  15.8 % (11.5-14.5)  H  10/23/17  06:30    


 


Plt Count  192 10^3/uL (120.0-450.0)   10/23/17  06:30    


 


MPV  9.4 fl (7.0-11.0)   10/23/17  06:30    


 


Gran %  52.8 % (50.0-68.0)   10/23/17  06:30    


 


Lymph % (Auto)  31.4 % (22.0-35.0)   10/23/17  06:30    


 


Mono % (Auto)  10.8 % (1.0-6.0)  H  10/23/17  06:30    


 


Eos % (Auto)  4.6 % (1.5-5.0)   10/23/17  06:30    


 


Baso % (Auto)  0.4 % (0.0-3.0)   10/23/17  06:30    


 


Gran #  2.74  (1.4-6.5)   10/23/17  06:30    


 


Lymph #  1.6  (1.2-3.4)   10/23/17  06:30    


 


Mono #  0.6  (0.1-0.6)   10/23/17  06:30    


 


Eos #  0.2  (0.0-0.7)   10/23/17  06:30    


 


Baso #  0.02 K/mm3 (0.0-2.0)   10/23/17  06:30    


 


PT  12.7 SECONDS (9.4-12.5)  H  10/19/17  04:30    


 


INR  1.15  (0.93-1.08)  H  10/19/17  04:30    


 


APTT  26.2 Seconds (25.1-36.5)   10/19/17  04:30    


 


Sodium  139 mmol/L (132-148)   10/23/17  06:30    


 


Potassium  3.5 mmol/L (3.6-5.0)  L  10/23/17  06:30    


 


Chloride  104 mmol/L ()   10/23/17  06:30    


 


Carbon Dioxide  25 mmol/L (21-33)   10/23/17  06:30    


 


Anion Gap  14  (10-20)   10/23/17  06:30    


 


BUN  6 mg/dL (7-21)  L  10/23/17  06:30    


 


Creatinine  0.6 mg/dL (0.7-1.2)  L  10/23/17  06:30    


 


Est GFR ( Amer)  > 60   10/23/17  06:30    


 


Est GFR (Non-Af Amer)  > 60   10/23/17  06:30    


 


Random Glucose  106 mg/dL ()   10/23/17  06:30    


 


Calcium  9.6 mg/dL (8.4-10.5)   10/23/17  06:30    


 


Phosphorus  3.5 mg/dL (2.5-4.5)   10/23/17  06:30    


 


Magnesium  1.8 mg/dL (1.7-2.2)   10/23/17  06:30    


 


Total Bilirubin  0.7 mg/dL (0.2-1.3)   10/23/17  06:30    


 


AST  38 U/L (14-36)  H  10/23/17  06:30    


 


ALT  23 U/L (7-56)   10/23/17  06:30    


 


Alkaline Phosphatase  70 U/L ()   10/23/17  06:30    


 


Lactate Dehydrogenase  483 U/L (333-699)   10/19/17  05:30    


 


Total Creatine Kinase  66 U/L ()   10/19/17  05:30    


 


Troponin I  < 0.01 ng/mL  10/19/17  05:30    


 


Total Protein  7.3 g/dL (5.8-8.3)   10/23/17  06:30    


 


Albumin  4.0 g/dL (3.0-4.8)   10/23/17  06:30    


 


Globulin  3.2 gm/dL  10/23/17  06:30    


 


Albumin/Globulin Ratio  1.3  (1.1-1.8)   10/23/17  06:30    


 


Alcohol, Quantitative  167 mg/dL (0-10)  H  10/19/17  04:30    














Attending/Attestation





- Attestation


I have personally seen and examined this patient.: Yes


I have fully participated in the care of the patient.: Yes


I have reviewed all pertinent clinical information, including history, physical 

exam and plan: Yes


Notes (Text): 








I have seen and examined the patient at bedside. Agree with the above note with 

the following additions/ exceptions: Briefly this is 62 year old female with a 

history of alcohol abuse, alcoholic hepatitis who was admitted with alcohol 

withdrawal symptoms. Patient appears very calm and is alert, oriented x3. Vest 

Coffey has been off. Patient is anxiously requesting to leave today and states 

that she has a list of things to do today. Patient lives with her mom who is 

sitting at the bedside. Patient has an appointment today with psychiatrist Dr Fernandez which she does not want to miss. Patient was explained that its better 

for her to stay as she was given total of 10 mg of ativan today and this should 

be tapered slowly. Alcohol cessation counselling was also provided. Also 

discussed with Dr Fiore who agreed that patient can leave AMA. Patient 

understands all the risks of leaving AMA. Upon discharge the patient will 

follow up with PMD DR. Akhtar.


Dr Rehana Miguel

## 2017-10-23 NOTE — CP.PCM.PN
Addendum entered and electronically signed by Nathanael Hearn DO  10/23/17 13:33

: 





patient signed out AMA despite being told that she needed to wait for psych eval





Original Note:








<Nathanael Hearn - Last Filed: 10/23/17 13:33>





Subjective





- Date & Time of Evaluation


Date of Evaluation: 10/23/17


Time of Evaluation: 09:45





- Subjective


Subjective: 





patient was seen and examined at bedside. denies n/v/d/constipation, fevers/

chills, chest pain, shortness of breath, withdrawal symptoms. pt states that 

she was agitated over the weekend because she wanted to leave to make sure her 

mother was alright at home. patient continues to state that she would like to 

leave because she has doctor's appointments that she cannot miss because of 

insurance issues because she doesn't have meds at home. 





Objective





- Vital Signs/Intake and Output


Vital Signs (last 24 hours): 


 











Temp Pulse Resp BP Pulse Ox


 


 98.3 F   104 H  22   129/80   99 


 


 10/23/17 08:51  10/23/17 08:51  10/23/17 08:51  10/23/17 08:51  10/23/17 08:51











- Medications


Medications: 


 Current Medications





Folic Acid (Folic Acid)  1 mg PO DAILY [facility]


   Last Admin: 10/23/17 09:34 Dose:  1 mg


Ibuprofen (Motrin Tab)  600 mg PO Q6H PRN


   PRN Reason: Pain, moderate (4-7)


Lorazepam (Ativan)  2 mg IVP Q6H PREETI


   PRN Reason: Protocol


   Last Admin: 10/23/17 07:22 Dose:  2 mg


Lorazepam (Ativan)  2 mg IVP Q2H PRN; Protocol


   PRN Reason: Anxiety


   Last Admin: 10/21/17 04:59 Dose:  2 mg


Multivitamins/Minerals (Therapeutic-M Tab)  1 tab PO 0800 [facility]


   Last Admin: 10/23/17 09:34 Dose:  1 tab


Oxycodone/Acetaminophen (Percocet 5/325 Mg Tab)  1 tab PO Q6H PRN


   PRN Reason: Pain, severe (8-10)


   Stop: 10/25/17 15:23


   Last Admin: 10/22/17 15:32 Dose:  1 tab


Pantoprazole Sodium (Protonix Ec Tab)  40 mg PO 0600 [facility]


   Last Admin: 10/23/17 07:44 Dose:  40 mg


Thiamine HCl (Vitamin B1 Tab)  100 mg PO TID PREETI


   Last Admin: 10/23/17 09:34 Dose:  100 mg


Ziprasidone (Geodon Inj)  15 mg IM Q12 PRN; Protocol


   PRN Reason: Agitation


   Last Admin: 10/21/17 18:51 Dose:  15 mg











- Labs


Labs: 


 





 10/23/17 06:30 





 10/23/17 06:30 





 











PT  12.7 SECONDS (9.4-12.5)  H  10/19/17  04:30    


 


INR  1.15  (0.93-1.08)  H  10/19/17  04:30    


 


APTT  26.2 Seconds (25.1-36.5)   10/19/17  04:30    














- Constitutional


Appears: Well, No Acute Distress





- Head Exam


Head Exam: ATRAUMATIC, NORMAL INSPECTION, NORMOCEPHALIC





- Eye Exam


Eye Exam: EOMI, Normal appearance, PERRL





- ENT Exam


ENT Exam: Mucous Membranes Moist, Normal Exam





- Neck Exam


Neck Exam: Normal Inspection





- Respiratory Exam


Respiratory Exam: Clear to Ausculation Bilateral, NORMAL BREATHING PATTERN.  

absent: Rales, Rhonchi, Wheezes, Respiratory Distress





- Cardiovascular Exam


Cardiovascular Exam: Tachycardia, REGULAR RHYTHM.  absent: Murmur





- GI/Abdominal Exam


GI & Abdominal Exam: Soft, Normal Bowel Sounds.  absent: Bruit, Distended, 

Tenderness





- Extremities Exam


Extremities Exam: Full ROM, Normal Inspection





- Back Exam


Back Exam: NORMAL INSPECTION





- Neurological Exam


Neurological Exam: Alert, Awake, Oriented x3





- Psychiatric Exam


Psychiatric exam: Normal Affect, Normal Mood





- Skin


Skin Exam: Normal Color, Warm





Assessment and Plan





- Assessment and Plan (Free Text)


Assessment: 





63yo F with pmh of alcoholic hepatitis and ETOH abuse presents with L side back 

pain and ETOH withdrawal


Plan: 





1. Alcohol abuse/ETOH withdrawal 


- less agitate this morning


- d/c posey vest


- decrease Ativan PREETI due to PRNs not being given


- d/c Geodon PRN because not needed


- Folic acid, thiamine, multivitamins 


- [facility] protocol; Fall and seizure precautions


- Psych consulted for anxiety/ depression and ETOH withdrawals





2. L sided back pain


- Xray of lumbar, hip, and wrist negative 


- Pain control prn





3. Transaminitis


- Improving, continue to monitor





4. Hypokalemia 


- Potassium within normal limits


- Continue to monitor





5. Hypomagnesium


- Resolved 





GI/DVT ppx 


Protonix/SCDs 





Diet: HHD





Patient was seen, examined and discussed with attending, Dr. Lamont Hearn PGY1





<Rehana Miguel - Last Filed: 10/23/17 15:03>





Objective





- Vital Signs/Intake and Output


Vital Signs (last 24 hours): 


 











Temp Pulse Resp BP Pulse Ox


 


 98.3 F   104 H  22   129/80   99 


 


 10/23/17 08:51  10/23/17 08:51  10/23/17 08:51  10/23/17 08:51  10/23/17 08:51











- Labs


Labs: 


 





 10/23/17 06:30 





 10/23/17 06:30 





 











PT  12.7 SECONDS (9.4-12.5)  H  10/19/17  04:30    


 


INR  1.15  (0.93-1.08)  H  10/19/17  04:30    


 


APTT  26.2 Seconds (25.1-36.5)   10/19/17  04:30    














Attending/Attestation





- Attestation


I have personally seen and examined this patient.: Yes


I have fully participated in the care of the patient.: Yes


I have reviewed all pertinent clinical information, including history, physical 

exam and plan: Yes


Notes (Text): 


I have seen and examined the patient at bedside. Agree with the above note with 

the following additions/ exceptions: Briefly this is 62 year old female with a 

history of alcohol abuse, alcoholic hepatitis who was admitted with alcohol 

withdrawal symptoms. Patient appears very calm and is alert, oriented x3. Vest 

Pk has been off. Patient is anxiously requesting to leave today and states 

that she has a list of things to do today. Patient lives with her mom who is 

sitting at the bedside. Patient has an appointment today with psychiatrist Dr Fernandez which she does not want to miss. Patient was explained that its better 

for her to stay as she was given total of 10 mg of ativan today and this should 

be tapered slowly. Alcohol cessation counselling was also provided. Also 

discussed with Dr Fiore who agreed that patient can leave AMA. Patient 

understands all the risks of leaving AMA. Upon discharge the patient will 

follow up with PMD DR. Akhtar.


Dr Rehana Miguel

## 2018-01-24 ENCOUNTER — HOSPITAL ENCOUNTER (INPATIENT)
Dept: HOSPITAL 42 - ED | Age: 63
LOS: 3 days | Discharge: HOME | DRG: 897 | End: 2018-01-27
Attending: INTERNAL MEDICINE | Admitting: INTERNAL MEDICINE
Payer: MEDICAID

## 2018-01-24 VITALS — BODY MASS INDEX: 22.3 KG/M2

## 2018-01-24 DIAGNOSIS — F10.231: Primary | ICD-10-CM

## 2018-01-24 DIAGNOSIS — F41.9: ICD-10-CM

## 2018-01-24 DIAGNOSIS — F19.14: ICD-10-CM

## 2018-01-24 DIAGNOSIS — F32.89: ICD-10-CM

## 2018-01-24 DIAGNOSIS — R40.2412: ICD-10-CM

## 2018-01-24 LAB
ALBUMIN SERPL-MCNC: 4.7 G/DL (ref 3–4.8)
ALBUMIN SERPL-MCNC: 4.9 G/DL (ref 3–4.8)
ALBUMIN/GLOB SERPL: 1.2 {RATIO} (ref 1.1–1.8)
ALBUMIN/GLOB SERPL: 1.3 {RATIO} (ref 1.1–1.8)
ALT SERPL-CCNC: 28 U/L (ref 7–56)
ALT SERPL-CCNC: 31 U/L (ref 7–56)
APTT BLD: 24.6 SECONDS (ref 25.1–36.5)
AST SERPL-CCNC: 84 U/L (ref 14–36)
AST SERPL-CCNC: 97 U/L (ref 14–36)
BASOPHILS # BLD AUTO: 0.06 K/MM3 (ref 0–2)
BASOPHILS NFR BLD: 0.8 % (ref 0–3)
BUN SERPL-MCNC: 13 MG/DL (ref 7–21)
BUN SERPL-MCNC: 14 MG/DL (ref 7–21)
CALCIUM SERPL-MCNC: 10.3 MG/DL (ref 8.4–10.5)
CALCIUM SERPL-MCNC: 10.3 MG/DL (ref 8.4–10.5)
EOSINOPHIL # BLD: 0.1 10*3/UL (ref 0–0.7)
EOSINOPHIL NFR BLD: 1.3 % (ref 1.5–5)
ERYTHROCYTE [DISTWIDTH] IN BLOOD BY AUTOMATED COUNT: 21.3 % (ref 11.5–14.5)
ERYTHROCYTE [DISTWIDTH] IN BLOOD BY AUTOMATED COUNT: 21.4 % (ref 11.5–14.5)
GFR NON-AFRICAN AMERICAN: > 60
GFR NON-AFRICAN AMERICAN: > 60
GRANULOCYTES # BLD: 4.58 10*3/UL (ref 1.4–6.5)
GRANULOCYTES NFR BLD: 60.7 % (ref 50–68)
HGB BLD-MCNC: 12.9 G/DL (ref 12–16)
HGB BLD-MCNC: 13.1 G/DL (ref 12–16)
INR PPP: 1.03 (ref 0.93–1.08)
LYMPHOCYTES # BLD: 2.2 10*3/UL (ref 1.2–3.4)
LYMPHOCYTES NFR BLD AUTO: 28.8 % (ref 22–35)
MCH RBC QN AUTO: 34.5 PG (ref 25–35)
MCH RBC QN AUTO: 34.9 PG (ref 25–35)
MCHC RBC AUTO-ENTMCNC: 34.6 G/DL (ref 31–37)
MCHC RBC AUTO-ENTMCNC: 34.9 G/DL (ref 31–37)
MCV RBC AUTO: 100 FL (ref 80–105)
MCV RBC AUTO: 99.7 FL (ref 80–105)
MONOCYTES # BLD AUTO: 0.6 10*3/UL (ref 0.1–0.6)
MONOCYTES NFR BLD: 8.4 % (ref 1–6)
PLATELET # BLD: 179 10^3/UL (ref 120–450)
PLATELET # BLD: 181 10^3/UL (ref 120–450)
PMV BLD AUTO: 8.8 FL (ref 7–11)
PMV BLD AUTO: 9.1 FL (ref 7–11)
PROTHROMBIN TIME: 11.7 SECONDS (ref 9.4–12.5)
RBC # BLD AUTO: 3.74 10^6/UL (ref 3.5–6.1)
RBC # BLD AUTO: 3.75 10^6/UL (ref 3.5–6.1)
TROPONIN I SERPL-MCNC: < 0.01 NG/ML
TROPONIN I SERPL-MCNC: < 0.01 NG/ML
WBC # BLD AUTO: 6.5 10^3/UL (ref 4.5–11)
WBC # BLD AUTO: 7.5 10^3/UL (ref 4.5–11)

## 2018-01-24 RX ADMIN — MULTIPLE VITAMINS W/ MINERALS TAB SCH TAB: TAB at 10:51

## 2018-01-24 NOTE — CT
EXAM:

  CT Head Without Intravenous  Contrast



CLINICAL HISTORY:

  62 years old, female; Signs and symptoms; Numbness / parasthesia; Additional 

info: Left arm numbness



TECHNIQUE:

  Axial computed tomography images of the head/brain without intravenous 

contrast.  All CT scans at this facility use one or more dose reduction 

techniques, viz.: automated exposure control; ma/kV adjustment per patient size 

(including targeted exams where dose is matched to indication; i.e. head); or 

iterative reconstruction technique.  346 images are submitted.

  Coronal and sagittal reformatted images were created and reviewed.

  Axial reformatted images were created and reviewed.



COMPARISON:

  CT - HEAD W/O CONTRAST 2017-10-19 05:51



FINDINGS:

  Brain:  Cerebral and cerebellar volume loss.  Patchy hypodensity is seen in 

the periventricular and subcortical white matter.  No hemorrhage.

  Ventricles:  Unremarkable.  No ventriculomegaly.

  Bones/joints:  Unremarkable.  No acute fracture.

  Soft tissues:  Unremarkable.

  Sinuses:  Unremarkable.  No acute sinusitis.

  Mastoid air cells:  Unremarkable.  No mastoid effusion.



IMPRESSION:     

  No evidence of an acute intracranial hemorrhage, midline shift or mass effect 

is identified.

## 2018-01-24 NOTE — ED PDOC
Arrival/HPI





- General


Chief Complaint: Weakness/Neurological Deficit


Time Seen by Provider: 01/24/18 00:32


Historian: Patient





- History of Present Illness


Narrative History of Present Illness (Text): 


01/24/18 00:45


Zuleyma Lino is a 62 year old female, whose past medical history includes 

alcohol abuse, alcoholic hepatitis, and depression, who presents to the 

Emergency department complaining of tremors and left arm numbness for the past 

4 days. Patient states she drinks alcohol regularly. Patient also reports she 

feel very nervous. Patient denies any fever, chills, chest pain, shortness of 

breath, nausea, vomiting, diarrhea, urinary symptoms, back pain, neck pain, 

headache, dizziness, or any other complaints.








PMD: [facility]


Time/Duration: < week (4 days)


Symptom Onset: Gradual


Symptom Course: Unchanged


Activities at Onset: Light


Context: Home





Past Medical History





- Provider Review


Nursing Documentation Reviewed: Yes





- Infectious Disease


Hx of Infectious Diseases: None





- Tetanus Immunization


Tetanus Immunization: Unknown





- Cardiac


Hx Cardiac Disorders: No





- Pulmonary


Hx Respiratory Disorders: No





- Neurological


Hx Neurological Disorder: No





- HEENT


Hx HEENT Disorder: No





- Renal


Hx Renal Disorder: No





- Endocrine/Metabolic


Hx Endocrine Disorders: No





- Hematological/Oncological


Hx Blood Disorders: No





- Integumentary


Hx Dermatological Disorder: No





- Musculoskeletal/Rheumatological


Hx Falls: Yes


Other/Comment: endometriosis





- Gastrointestinal


Hx Gastrointestinal Disorders: No





- Genitourinary/Gynecological


Hx Genitourinary Disorders: No





- Psychiatric


Hx Depression: Yes


Hx Substance Use: No





- Surgical History


Other/Comment: endo,etriosis x2





- Anesthesia


Hx Anesthesia:  (uto)





- Suicidal Assessment


Feels Threatened In Home Enviroment: No





Family/Social History





- Physician Review


Nursing Documentation Reviewed: Yes


Family/Social History: Unknown Family HX


Smoking Status: Never Smoked


Hx Alcohol Use: Yes


Hx Substance Use: No


Hx Substance Use Treatment: No





Allergies/Home Meds


Allergies/Adverse Reactions: 


Allergies





No Known Allergies Allergy (Verified 01/24/18 00:35)


 








Home Medications: 


 Home Meds











 Medication  Instructions  Recorded  Confirmed


 


No Known Home Med  01/24/18 01/24/18














Review of Systems





- Physician Review


All systems were reviewed & negative as marked: Yes





- Review of Systems


Constitutional: Normal.  absent: Fevers


Eyes: Normal


ENT: Normal


Respiratory: Normal.  absent: SOB, Cough


Cardiovascular: Normal.  absent: Chest Pain


Gastrointestinal: Normal.  absent: Abdominal Pain, Diarrhea, Nausea, Vomiting


Genitourinary Female: Normal.  absent: Dysuria, Frequency, Hematuria, Urine 

Output Changes


Musculoskeletal: Normal


Skin: Normal


Neurological: Other (+tremors, +left arm numbness).  absent: Headache, Dizziness


Endocrine: Normal


Hemo/Lymphatic: Normal


Psychiatric: Normal





Physical Exam


Vital Signs Reviewed: Yes


Vital Signs











  Temp Pulse Resp BP Pulse Ox


 


 01/24/18 02:23  98.6 F  95 H  16  133/87  94 L


 


 01/24/18 00:33  98.5 F  97 H  18  144/85  97











Temperature: Afebrile


Blood Pressure: Normal


Pulse: Regular


Respiratory Rate: Normal


Appearance: Positive for: Well-Appearing, Non-Toxic, Comfortable


Pain Distress: None


Mental Status: Positive for: Alert and Oriented X 3





- Systems Exam


Head: Present: Atraumatic, Normocephalic


Pupils: Present: PERRL


Extroacular Muscles: Present: EOMI


Conjunctiva: Present: Normal


Mouth: Present: Moist Mucous Membranes


Neck: Present: Normal Range of Motion.  No: Meningeal Signs, MIDLINE TENDERNESS

, Paraspinal Tenderness


Respiratory/Chest: Present: Clear to Auscultation, Good Air Exchange.  No: 

Respiratory Distress, Accessory Muscle Use


Cardiovascular: Present: Regular Rate and Rhythm, Normal S1, S2.  No: Murmurs


Abdomen: Present: Normal Bowel Sounds.  No: Tenderness, Distention, Peritoneal 

Signs


Back: Present: Normal Inspection.  No: CVA Tenderness, Midline Tenderness, 

Paraspinal Tenderness


Upper Extremity: Present: Normal Inspection, Normal ROM, NORMAL PULSES, 

Neurovascularly Intact, Capillary Refill < 2s.  No: Cyanosis, Edema, Tenderness

, Swelling, Erythema, Temperature Abnormalties, Deformity


Lower Extremity: Present: Normal Inspection.  No: Edema


Neurological: Present: GCS=15, CN II-XII Intact, Speech Normal, Motor Func 

Grossly Intact, Normal Sensory Function, Normal Cerebellar Funct, Memory Normal


Skin: Present: Warm, Dry, Normal Color.  No: Rashes


Psychiatric: Present: Alert, Oriented x 3, Normal Insight, Normal Concentration





Medical Decision Making


ED Course and Treatment: 


01/24/18 00:45


Impression:


62 year old female complaining of tremors and left arm numbness x4 days.





Differential Diagnosis included but are not limited to:  alcohol withdrawal





Plan:


-- CT Head w/o contrast


-- EKG


-- Chest X-ray


-- Labs, cardiac enzymes, alcohol level


-- Reassess and disposition





Prior Visits:


Notes and results from previous visits were reviewed. 


On 10/19/2017, pt was seen in the Emergency department for lower back pain, 

left wrist pain, and palptations, Pt was admitted to the hospital for further 

evaluation of alcohol withdrawal.





Progress Notes:


01/24/18 01:14


Reviewed EKG, NSR at 95 bpm. No ST-segment elevations or depressions, no T-wave 

inversions, normal intervals.





01/24/18 02:20


Chest X-ray reviewed, shows no acute processes.





01/24/18 02:57


Case discussed with Dr. Rodas and medical resident, who are aware and agree 

with plan. Pt will be admitted to Telemetry for alcohol withdrawal under the 

hospitalist service. 


01/24/18 03:18





CT Head:


Brain: Cerebral and cerebellar volume loss. Patchy hypodensity is seen in the 

periventricular and


subcortical white matter. No hemorrhage.


Ventricles: Unremarkable. No ventriculomegaly.


Bones/joints: Unremarkable. No acute fracture.


Soft tissues: Unremarkable.


Sinuses: Unremarkable. No acute sinusitis.


Mastoid air cells: Unremarkable. No mastoid effusion.


IMPRESSION:


No evidence of an acute intracranial hemorrhage, midline shift or mass effect 

is identified.








- Lab Interpretations


Lab Results: 








 01/24/18 01:10 





 01/24/18 01:10 





 Lab Results





01/24/18 01:40: Alcohol, Quantitative 30 H


01/24/18 01:10: WBC 6.5  D, RBC 3.74, Hgb 12.9, Hct 37.3, MCV 99.7, MCH 34.5, 

MCHC 34.6, RDW 21.3 H, Plt Count 179, MPV 8.8


01/24/18 01:10: Sodium 139, Potassium 4.3, Chloride 104, Carbon Dioxide 18 L, 

Anion Gap 21 H, BUN 13, Creatinine 0.5 L, Est GFR ( Amer) > 60, Est GFR (

Non-Af Amer) > 60, Random Glucose 89, Calcium 10.3, Total Bilirubin 0.7, AST 84 

H D, ALT 31, Alkaline Phosphatase 91, Lactate Dehydrogenase 606, Total Creatine 

Kinase 76, Troponin I < 0.01, Total Protein 8.3, Albumin 4.7, Globulin 3.6, 

Albumin/Globulin Ratio 1.3








I have reviewed the lab results: Yes





- RAD Interpretation


Radiology Orders: 








01/24/18 00:48


CHEST PORTABLE [RAD] Stat 





01/24/18 00:49


HEAD W/O CONTRAST [CT] Stat 











: ED Physician, Radiologist





- EKG Interpretation


Interpreted by ED Physician: Yes


Type: 12 lead EKG





- Medication Orders


Current Medication Orders: 








Folic Acid 1 mg/ Thiamine HCl 100 mg/ Multivitamins/Vitamin C 10 ml/ Dextrose  1

,011.2 mls @ 100 mls/hr IV .Q10H7M PREETI


   Last Admin: 01/24/18 03:05  Dose: 100 mls/hr





eMAR Start Stop


 Document     01/24/18 03:05  AB  (Rec: 01/24/18 03:05  AB  [facility]-[id number])


     Intravenous Solution


      Start Date                                 01/24/18


      Start Time                                 03:05








Discontinued Medications





Chlordiazepoxide (Librium)  50 mg PO STAT STA


   Stop: 01/24/18 02:22


   Last Admin: 01/24/18 02:45  Dose: 50 mg











NIHSS Scale ([place])


Time Performed: 00:35





- How Severe is the Stoke


  ** Baseline


Level of Consciousness: 0=Alert


LOC to Questions: 0=Both comments correct


LOC to commands: 0=Obeys both correctly


Best Gaze: 0=Normal


Visual: 0=No visual loss


Facial: 0=Normal


Motor Arm - Left: 0=No drift


Motor Arm - Right: 0=No drift


Motor Leg - Left: 0=No drift


Motor Leg - Right: 0=No drift


Limb Ataxia: 0=Absent


Sensory: 0=Normal


Best Language: 0=No aphasia


Dysarthia: 0=Normal articulation


Extinction & Inattention (Neglect): 0=Normal, no object


Score: 0


Risk Level: No Stroke Risk





- Scribe Statement


The provider has reviewed the documentation as recorded by the Scribcem Benson





Provider Scribe Attestation:


All medical record entries made by the Scribe were at my direction and 

personally dictated by me. I have reviewed the chart and agree that the record 

accurately reflects my personal performance of the history, physical exam, 

medical decision making, and the department course for this patient. I have 

also personally directed, reviewed, and agree with the discharge instructions 

and disposition.








Disposition/Present on Arrival





- Present on Arrival


Any Indicators Present on Arrival: No


History of DVT/PE: No


History of Uncontrolled Diabetes: No


Urinary Catheter: No


History of Decub. Ulcer: No


History Surgical Site Infection Following: None





- Disposition


Have Diagnosis and Disposition been Completed?: Yes


Diagnosis: 


 Alcohol withdrawal syndrome





Disposition: HOSPITALIZED


Disposition Time: 03:16


Patient Plan: Admission


Patient Problems: 


 Current Active Problems











Problem Status Onset


 


Alcohol withdrawal syndrome Acute  











Condition: STABLE

## 2018-01-24 NOTE — CARD
--------------- APPROVED REPORT --------------





EKG Measurement

Heart [id number]

AL [id number]

[id number]

[id number]

[id number]



<Conclusion>

Normal sinus rhythm

Normal ECG

## 2018-01-24 NOTE — RAD
HISTORY:

medical clearance  



COMPARISON:

Comparison chest 10/19/2017 



FINDINGS:



LUNGS:

No active pulmonary disease.



PLEURA:

No significant pleural effusion identified, no pneumothorax apparent.



CARDIOVASCULAR:

Normal.



OSSEOUS STRUCTURES:

No significant abnormalities.



VISUALIZED UPPER ABDOMEN:

Normal.



OTHER FINDINGS:

None.



IMPRESSION:

No active disease.

## 2018-01-25 LAB
ALBUMIN SERPL-MCNC: 4.3 G/DL (ref 3–4.8)
ALBUMIN/GLOB SERPL: 1.3 {RATIO} (ref 1.1–1.8)
ALT SERPL-CCNC: 30 U/L (ref 7–56)
AST SERPL-CCNC: 58 U/L (ref 14–36)
BASOPHILS # BLD AUTO: 0.04 K/MM3 (ref 0–2)
BASOPHILS NFR BLD: 0.6 % (ref 0–3)
BUN SERPL-MCNC: 10 MG/DL (ref 7–21)
CALCIUM SERPL-MCNC: 9.8 MG/DL (ref 8.4–10.5)
EOSINOPHIL # BLD: 0.1 10*3/UL (ref 0–0.7)
EOSINOPHIL NFR BLD: 1 % (ref 1.5–5)
ERYTHROCYTE [DISTWIDTH] IN BLOOD BY AUTOMATED COUNT: 20.4 % (ref 11.5–14.5)
GFR NON-AFRICAN AMERICAN: > 60
GRANULOCYTES # BLD: 4.27 10*3/UL (ref 1.4–6.5)
GRANULOCYTES NFR BLD: 60.9 % (ref 50–68)
HGB BLD-MCNC: 12.9 G/DL (ref 12–16)
LYMPHOCYTES # BLD: 1.9 10*3/UL (ref 1.2–3.4)
LYMPHOCYTES NFR BLD AUTO: 27.6 % (ref 22–35)
MCH RBC QN AUTO: 35.2 PG (ref 25–35)
MCHC RBC AUTO-ENTMCNC: 33.8 G/DL (ref 31–37)
MCV RBC AUTO: 104.4 FL (ref 80–105)
MONOCYTES # BLD AUTO: 0.7 10*3/UL (ref 0.1–0.6)
MONOCYTES NFR BLD: 9.9 % (ref 1–6)
PLATELET # BLD: 186 10^3/UL (ref 120–450)
PMV BLD AUTO: 9.8 FL (ref 7–11)
RBC # BLD AUTO: 3.66 10^6/UL (ref 3.5–6.1)
WBC # BLD AUTO: 7 10^3/UL (ref 4.5–11)

## 2018-01-25 RX ADMIN — MULTIPLE VITAMINS W/ MINERALS TAB SCH TAB: TAB at 08:15

## 2018-01-25 RX ADMIN — PANTOPRAZOLE SODIUM SCH MG: 40 TABLET, DELAYED RELEASE ORAL at 08:15

## 2018-01-25 NOTE — CP.PCM.PN
<Thomas Arzate - Last Filed: 01/25/18 17:53>





Subjective





- Date & Time of Evaluation


Date of Evaluation: 01/25/18


Time of Evaluation: 06:00





- Subjective


Subjective: 


Patient seen and examined bedside. Patient had tremors overnight was given 

ativan. Also had abdominal pain that resolved. She stated she slept well no 

complaints at this time.








Objective





- Vital Signs/Intake and Output


Vital Signs (last 24 hours): 


 











Temp Pulse Resp BP Pulse Ox


 


 97.4 F L  78   18   144/96 H  95 


 


 01/25/18 12:00  01/25/18 12:00  01/25/18 12:00  01/25/18 12:00  01/25/18 05:00








Intake and Output: 


 











 01/25/18 01/25/18





 06:59 18:59


 


Intake Total 1300 


 


Balance 1300 














- Medications


Medications: 


 Current Medications





Folic Acid (Folic Acid)  1 mg PO DAILY [facility]


   Last Admin: 01/25/18 10:04 Dose:  1 mg


Heparin Sodium (Porcine) (Heparin)  5,000 units SC Q12 PREETI


   PRN Reason: Protocol


   Last Admin: 01/25/18 10:03 Dose:  5,000 units


Sodium Chloride (Sodium Chloride 0.9%)  1,000 mls @ 100 mls/hr IV .Q10H PREETI


   Last Admin: 01/25/18 13:11 Dose:  100 mls/hr


Lorazepam (Ativan)  1 mg IVP Q1H PRN; Protocol


   PRN Reason: Symptoms of alcohol withdrawl


   Last Admin: 01/25/18 16:35 Dose:  1 mg


Lorazepam (Ativan)  0.5 mg IVP Q8H PREETI


   PRN Reason: Protocol


   Last Admin: 01/25/18 13:07 Dose:  0.5 mg


Multivitamins/Minerals (Therapeutic-M Tab)  1 tab PO 0800 [facility]


   Last Admin: 01/25/18 08:15 Dose:  1 tab


Ondansetron HCl (Zofran Inj)  4 mg IVP Q6H PRN


   PRN Reason: Nausea/Vomiting


   Last Admin: 01/25/18 16:36 Dose:  4 mg


Pantoprazole Sodium (Protonix Ec Tab)  40 mg PO ACB [facility]


   Last Admin: 01/25/18 08:15 Dose:  40 mg


Thiamine HCl (Vitamin B1 Tab)  100 mg PO DAILY [facility]


   Last Admin: 01/25/18 10:04 Dose:  100 mg











- Labs


Labs: 


 





 01/25/18 05:10 





 01/25/18 05:10 





 











PT  11.7 SECONDS (9.4-12.5)   01/24/18  04:54    


 


INR  1.03  (0.93-1.08)   01/24/18  04:54    


 


APTT  29.5 Seconds (25.1-36.5)   01/24/18  18:10    














- Constitutional


Appears: Non-toxic, No Acute Distress





- Head Exam


Head Exam: ATRAUMATIC, NORMAL INSPECTION, NORMOCEPHALIC





- Eye Exam


Eye Exam: Normal appearance





- ENT Exam


ENT Exam: Mucous Membranes Moist





- Respiratory Exam


Respiratory Exam: Clear to Ausculation Bilateral, NORMAL BREATHING PATTERN





- Cardiovascular Exam


Cardiovascular Exam: REGULAR RHYTHM, +S1, +S2





- Extremities Exam


Additional comments: 


slight tremors in arms 








- Neurological Exam


Neurological Exam: Alert, Awake, Oriented x3





- Psychiatric Exam


Psychiatric exam: Anxious





Assessment and Plan





- Assessment and Plan (Free Text)


Assessment: 


Patient is a 62 year old female with a past medical history significant for 

alcohol abuse who presents to [facility] ED with complaints of alcohol withdrawal due 

to having a relapse.





Plan: 


Alcohol Withdrawal


-CIWA


-Ativan 0.5 mg q8h PREETI


-Ativan 1 mg q1h PRN


-Consider librium or geodon if above does not suffice


-Aspiration,Seizure, High risk fall precautions


-Psychiatry consult


-Multivitamin,Thiamine,Folic acid


-CT head negative for any acute changes 


-continue to monitor for signs of withdrawal 





Complaint of left arm numbness


-Troponins ordered x3, negative 


-EKG reviewed; NSR





Depression


-Psychiatry consulted





DVT/GI prophylaxis: Heparin/Protonix











<Abbey Paz - Last Filed: 01/27/18 13:41>





Objective





- Vital Signs/Intake and Output


Vital Signs (last 24 hours): 


 











Temp Pulse Resp BP Pulse Ox


 


 98.3 F   98 H  18   136/82   97 


 


 01/26/18 04:59  01/26/18 04:59  01/26/18 04:59  01/26/18 04:59  01/26/18 04:59











- Labs


Labs: 


 





 01/27/18 06:30 





 01/27/18 06:30 





 











PT  11.7 SECONDS (9.4-12.5)   01/24/18  04:54    


 


INR  1.03  (0.93-1.08)   01/24/18  04:54    


 


APTT  29.5 Seconds (25.1-36.5)   01/24/18  18:10    














Attending/Attestation





- Attestation


I have personally seen and examined this patient.: Yes


I have fully participated in the care of the patient.: Yes


I have reviewed all pertinent clinical information, including history, physical 

exam and plan: Yes


Notes (Text): 





01/27/18 13:37


Patient was seen and examined with medical resident. Agreed with assessment and 

plan.





62 year old female  with PMH of  significant for alcohol abuse was admitted 

with  alcohol withdrawal .





Continue CIWA Protocoal.





Issue of ongoing alcohol abuse was discussed in detail with patient.





Management plan was discussed in detail with patient. Education was provided.

## 2018-01-26 VITALS
DIASTOLIC BLOOD PRESSURE: 82 MMHG | TEMPERATURE: 98.3 F | SYSTOLIC BLOOD PRESSURE: 136 MMHG | OXYGEN SATURATION: 97 % | RESPIRATION RATE: 18 BRPM

## 2018-01-26 VITALS — HEART RATE: 98 BPM

## 2018-01-26 LAB
ALBUMIN SERPL-MCNC: 4.5 G/DL (ref 3–4.8)
ALBUMIN/GLOB SERPL: 1.3 {RATIO} (ref 1.1–1.8)
ALT SERPL-CCNC: 28 U/L (ref 7–56)
AST SERPL-CCNC: 60 U/L (ref 14–36)
BASOPHILS # BLD AUTO: 0.03 K/MM3 (ref 0–2)
BASOPHILS NFR BLD: 0.4 % (ref 0–3)
BUN SERPL-MCNC: 7 MG/DL (ref 7–21)
CALCIUM SERPL-MCNC: 9.6 MG/DL (ref 8.4–10.5)
EOSINOPHIL # BLD: 0.1 10*3/UL (ref 0–0.7)
EOSINOPHIL NFR BLD: 1 % (ref 1.5–5)
ERYTHROCYTE [DISTWIDTH] IN BLOOD BY AUTOMATED COUNT: 20.2 % (ref 11.5–14.5)
GFR NON-AFRICAN AMERICAN: > 60
GRANULOCYTES # BLD: 4.98 10*3/UL (ref 1.4–6.5)
GRANULOCYTES NFR BLD: 65.1 % (ref 50–68)
HGB BLD-MCNC: 11.6 G/DL (ref 12–16)
LYMPHOCYTES # BLD: 1.8 10*3/UL (ref 1.2–3.4)
LYMPHOCYTES NFR BLD AUTO: 24.1 % (ref 22–35)
MCH RBC QN AUTO: 34.2 PG (ref 25–35)
MCHC RBC AUTO-ENTMCNC: 32.4 G/DL (ref 31–37)
MCV RBC AUTO: 105.6 FL (ref 80–105)
MONOCYTES # BLD AUTO: 0.7 10*3/UL (ref 0.1–0.6)
MONOCYTES NFR BLD: 9.4 % (ref 1–6)
PLATELET # BLD: 166 10^3/UL (ref 120–450)
PMV BLD AUTO: 8.9 FL (ref 7–11)
RBC # BLD AUTO: 3.39 10^6/UL (ref 3.5–6.1)
WBC # BLD AUTO: 7.7 10^3/UL (ref 4.5–11)

## 2018-01-26 RX ADMIN — MULTIPLE VITAMINS W/ MINERALS TAB SCH TAB: TAB at 08:02

## 2018-01-26 RX ADMIN — PANTOPRAZOLE SODIUM SCH MG: 40 TABLET, DELAYED RELEASE ORAL at 08:02

## 2018-01-26 NOTE — CON
DATE:  01/25/2018



She is being seen today for an evaluation.



PRESENTATION:  The patient is a 63-year-old  female who was seen

at bedside.  She was admitted to [facility] on 01/24/2018.  She

presented to the Emergency Room complaining of tremors and left arm

numbness for the past four days.  She drinks alcohol regularly and

complains of anxiety.  Consult was called with concerns about withdrawal

and depression.  The patient indicates that her present problems started

after she ran out of medication from her psychiatrist Dr. Fernandez some months

ago.  She did not have money to visit him and buy the prescriptions.  She

has been on Ritalin and Valium, but has been off of these for at least 4

months at this point in time.  She indicates, when she does not have her

medications, she starts to drink, so she has been drinking for

approximately that amount of time.  The patient has been in the hospital

several times for alcohol withdrawal, shaking.  She is shaky today.  She

lives alone 3.5 room apartment.  She has applied for disability.  She

indicates she has a lot of family stress and she is full care of her

90-year-old mother who is in a senior citizens home.  The patient was

psychiatrically hospitalized one time here at [facility] for

two days on 12/19/2016.  She indicates that she hated it and would never

want to return.  She has been in several detoxes; [facility] program once.  Indicates that she can go for periods without

drinking, but then once she drinks, she really drinks for a long period of

time.  Medically, she indicates she is healthy.  Her PMD is a doctor from

the [facility].  In terms of alcohol, she started drinking

alcohol when she was 17 years old.  She did not drink while she was

pregnant with either of her pregnancies.  She indicates it was not as much

of a problem when her children were small.  When she get stressed or

without her meds, she does start to drink.  Prior to admission, she was

drinking vodka a half to one pint daily.  She was vomiting daily as well. 

The patient denies any past or current legal history.  She denies use of

any other substances other than alcohol.  The patient's psychiatric family

history is significant for her mother being depressed.  The patient grew up

in [place], [place].  Her mom was a single parent.  Her dad did not

spend much time with the family and abandoned them early on.  She is the

eldest of three siblings.  She talks to her one sister occasionally, but

never to her brother.  She indicates, she had a very difficult childhood. 

She did well in school.  However, they were forced to live in very poor

places, sometimes there were mice or cockroaches and it really was very

difficult according to the patient.  Her mom worked in a hospital, but

there was never enough money and sometimes not enough food.  The patient

was unable to have friends over due to the condition of the house, but she

was allowed to go out and socialize.  She dropped out her sophomore year

due to pregnancy and had her son and he and his wife have one grandson. 

She also has a daughter and three grandchildren from her.  She worked in a

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  She has had a

lot of different jobs and always tried to work to make ends meet.  She does

not have any social support at all.  She was encouraged to try to make some

as her isolation is adding to her difficulties with her alcoholism.



PHYSICAL EXAMINATION:

VITAL SIGNS:  The patient's current vital signs include a temperature of

98.2, pulse rate of 83, blood pressure of 135/79, respiratory rate of 18,

and an O2 sat of 95%.



As stated earlier she continues shaky.  However, there are no distortions

in her sensorium.  Her EKG was noted to be normal.



MENTAL STATUS EXAM:  The patient is alert and oriented x3.  Her eye contact

is good.  Her behavior is pleasant and cooperative.  Her speech rate and

volume are within normal limits.  Mood is blunted.  Affect is constricted. 

Thoughts are goal directed.  She denies being suicidal or homicidal. 

Denies the presence of hallucinations, delusions, or paranoia.  Her

concentration and focus are impaired.  Her memory both short and long term

is good.  Her appetite is poor due to ongoing stomach distress from her

alcoholism and her sleep is not normalized as well.



DIAGNOSTIC IMPRESSION:  Depressive disorder, unspecified; alcohol use

disorder severe, chronic ongoing.



PLAN:  The patient denies being suicidal or homicidal, appears in no

eminent danger of hurting herself or others.  She does not want to attend a

rehab.  She is being detoxed while she is in the hospital at this point and

she is comfortable, but her hands are quite shaky.  The patient indicates

that her insurance currently has lapsed and she is in the process of

getting it reestablished but that will be factor for her in following up. 

She was given outpatient referrals for clinics in the [facility] as well

as Dr. Curly Dickinson.  The patient additionally was urged to attend AA, in

particular woman's group as she needs to engage in order to make change.  I

additionally, encouraged her to attend churches.  The patient had

verbalized the desire to do this as churches can be a great source of

support as well.  The patient is in some degree of denial regarding her

alcoholism.  However, she does appear to make the connection between

loneliness, isolation and drinking; and she does at this time appear

willing to try to make the necessary changes.  She has been given adequate

referrals.  She does not want any rehabilitation for her alcoholism.  We

will sign off on this patient.



Thank you for the consult.





__________________________________________

VICTOR HUGO High





DD:  01/25/2018 13:01:07

DT:  01/25/2018 19:42:06

Job # [id number]

## 2018-01-26 NOTE — CP.PCM.PN
<Thomas Arzate - Last Filed: 01/26/18 14:12>





Subjective





- Date & Time of Evaluation


Date of Evaluation: 01/26/18


Time of Evaluation: 06:00





- Subjective


Subjective: 


Patient seen and evaluated bedside. patient wanted to go home and was 

questioning why the nurse had told her she was confused. Code grey was called 

because patient was confused and tried to leave around 5 am. Patient denied any 

complaints. 








Objective





- Vital Signs/Intake and Output


Vital Signs (last 24 hours): 


 











Temp Pulse Resp BP Pulse Ox


 


 98.3 F   98 H  18   136/82   97 


 


 01/26/18 04:59  01/26/18 04:59  01/26/18 04:59  01/26/18 04:59  01/26/18 04:59








Intake and Output: 


 











 01/26/18 01/26/18





 06:59 18:59


 


Intake Total 300 


 


Balance 300 














- Medications


Medications: 


 Current Medications





Folic Acid (Folic Acid)  1 mg PO DAILY [facility]


   Last Admin: 01/26/18 09:59 Dose:  1 mg


Heparin Sodium (Porcine) (Heparin)  5,000 units SC Q12 [facility]


   PRN Reason: Protocol


   Last Admin: 01/26/18 09:59 Dose:  5,000 units


Sodium Chloride (Sodium Chloride 0.9%)  1,000 mls @ 100 mls/hr IV .Q10H [facility]


   Last Admin: 01/26/18 02:14 Dose:  Not Given


Multivitamins/Minerals (Therapeutic-M Tab)  1 tab PO 0800 [facility]


   Last Admin: 01/26/18 08:02 Dose:  1 tab


Ondansetron HCl (Zofran Inj)  4 mg IVP Q6H PRN


   PRN Reason: Nausea/Vomiting


   Last Admin: 01/25/18 16:36 Dose:  4 mg


Pantoprazole Sodium (Protonix Ec Tab)  40 mg PO ACB [facility]


   Last Admin: 01/26/18 08:02 Dose:  40 mg


Thiamine HCl (Vitamin B1 Tab)  100 mg PO DAILY [facility]


   Last Admin: 01/26/18 09:59 Dose:  100 mg











- Labs


Labs: 


 





 01/26/18 06:45 





 01/26/18 06:45 





 











PT  11.7 SECONDS (9.4-12.5)   01/24/18  04:54    


 


INR  1.03  (0.93-1.08)   01/24/18  04:54    


 


APTT  29.5 Seconds (25.1-36.5)   01/24/18  18:10    














- Constitutional


Appears: Non-toxic, No Acute Distress





- Head Exam


Head Exam: ATRAUMATIC, NORMAL INSPECTION, NORMOCEPHALIC





- Eye Exam


Eye Exam: Normal appearance





- ENT Exam


ENT Exam: Mucous Membranes Moist





- Respiratory Exam


Respiratory Exam: Clear to Ausculation Bilateral, NORMAL BREATHING PATTERN





- Cardiovascular Exam


Cardiovascular Exam: REGULAR RHYTHM





- GI/Abdominal Exam


GI & Abdominal Exam: Soft





- Neurological Exam


Neurological Exam: Alert, Awake, Oriented x3





Assessment and Plan





- Assessment and Plan (Free Text)


Plan: 


Alcohol Withdrawal


-CIWA


-Ativan DC


-Aspiration,Seizure, High risk fall precautions


-Psychiatry consult once again 


-Multivitamin,Thiamine,Folic acid


-CT head negative for any acute changes 


-continue to monitor for signs of withdrawal 





Complaint of left arm numbness


-Troponins ordered x3, negative 


-EKG reviewed; NSR





Depression


-Psychiatry consulted





DVT/GI prophylaxis: Heparin/Protonix








<Abbey Paz - Last Filed: 01/27/18 13:47>





Objective





- Vital Signs/Intake and Output


Vital Signs (last 24 hours): 


 











Temp Pulse Resp BP Pulse Ox


 


 98.3 F   98 H  18   136/82   97 


 


 01/26/18 04:59  01/26/18 04:59  01/26/18 04:59  01/26/18 04:59  01/26/18 04:59











- Labs


Labs: 


 





 01/27/18 06:30 





 01/27/18 06:30 





 











PT  11.7 SECONDS (9.4-12.5)   01/24/18  04:54    


 


INR  1.03  (0.93-1.08)   01/24/18  04:54    


 


APTT  29.5 Seconds (25.1-36.5)   01/24/18  18:10    














Attending/Attestation





- Attestation


I have personally seen and examined this patient.: Yes


I have fully participated in the care of the patient.: Yes


I have reviewed all pertinent clinical information, including history, physical 

exam and plan: Yes


Notes (Text): 





01/27/18 13:42


Patient was seen and examined with medical resident. Agreed with assessment and 

plan.





Patient was found to be confused early morning, but alert,awake and oriented 

now.


There are no sign of alcohol withdrawal.


We will discontinue benzodiazepam.Case was discussed with Psychiatry.Patient 

was started on Hydroxyzine.


We will monitor patient overnight, if remain stable , can be discharged home.





Management plan was discussed in detail with patient. Education was provided.

## 2018-01-26 NOTE — CP.PCM.PCO
Addendum


Addendum: 





01/26/18 15:37


 contacted this writer, let her know that pt was very confused on ativan

, code Grey was called at am


meds discussed


pt might benefit from vistaril 25mg po tid prn for anxiety


stat dose was given


psychiatry team signed off yesterday


pt was found to be not depressed, denied thoughts of harming self or others, 

pose no imminent danger to self or others


if pt will tolerate meds well pt could be discharged, follow up information 

provided already, pt was not wiling to go to rehab, not willing to have 

treatment for anxiety.





will endorse to  to follow up

## 2018-01-27 LAB
ALBUMIN SERPL-MCNC: 4.3 G/DL (ref 3–4.8)
ALBUMIN/GLOB SERPL: 1.2 {RATIO} (ref 1.1–1.8)
ALT SERPL-CCNC: 37 U/L (ref 7–56)
AST SERPL-CCNC: 57 U/L (ref 14–36)
BASOPHILS # BLD AUTO: 0.03 K/MM3 (ref 0–2)
BASOPHILS NFR BLD: 0.4 % (ref 0–3)
BUN SERPL-MCNC: 6 MG/DL (ref 7–21)
CALCIUM SERPL-MCNC: 9.6 MG/DL (ref 8.4–10.5)
EOSINOPHIL # BLD: 0.2 10*3/UL (ref 0–0.7)
EOSINOPHIL NFR BLD: 2.5 % (ref 1.5–5)
ERYTHROCYTE [DISTWIDTH] IN BLOOD BY AUTOMATED COUNT: 19.5 % (ref 11.5–14.5)
GFR NON-AFRICAN AMERICAN: > 60
GRANULOCYTES # BLD: 4.95 10*3/UL (ref 1.4–6.5)
GRANULOCYTES NFR BLD: 58.6 % (ref 50–68)
HGB BLD-MCNC: 11.8 G/DL (ref 12–16)
LYMPHOCYTES # BLD: 2.4 10*3/UL (ref 1.2–3.4)
LYMPHOCYTES NFR BLD AUTO: 28.7 % (ref 22–35)
MCH RBC QN AUTO: 34.5 PG (ref 25–35)
MCHC RBC AUTO-ENTMCNC: 32.8 G/DL (ref 31–37)
MCV RBC AUTO: 105.3 FL (ref 80–105)
MONOCYTES # BLD AUTO: 0.8 10*3/UL (ref 0.1–0.6)
MONOCYTES NFR BLD: 9.8 % (ref 1–6)
PLATELET # BLD: 161 10^3/UL (ref 120–450)
PMV BLD AUTO: 9 FL (ref 7–11)
RBC # BLD AUTO: 3.42 10^6/UL (ref 3.5–6.1)
WBC # BLD AUTO: 8.4 10^3/UL (ref 4.5–11)

## 2018-01-27 RX ADMIN — MULTIPLE VITAMINS W/ MINERALS TAB SCH TAB: TAB at 08:45

## 2018-01-27 RX ADMIN — PANTOPRAZOLE SODIUM SCH MG: 40 TABLET, DELAYED RELEASE ORAL at 06:56

## 2018-01-29 NOTE — CON
DATE:  01/27/2018



HISTORY OF PRESENT ILLNESS:  The patient is a 62-year-old  female

with a history of chronic severe alcohol use disorder as well as depressive

disorder and likely substance abuse mood disorder.  She has been seen by

Psychiatry because of confusion and anxiety as well as history of

depression.  Psychiatry initially signed off on the patient, however, the

patient became yesterday confused, anxious, disoriented, and loud demanding

to go home yesterday.  Code gray was called as a result and psychiatry was

to be consulted.  I reviewed Dr. Masters's note as well as [facility]'s notes as well, and met with the patient at bedside.  The patient

is appearing to be much more oriented this morning.  Her focus is good. 

Her eye contact is good.  She is aware that she is at [facility], it is 01/2018.  The patient reports that her anxiety is up and

down.  She feels stressed because of multiple stressors in her life such as

_____ she has no job, her car is not working, financial problems are an

issue, feels overwhelmed at times, however, does not want to die, does not

have any thoughts of committing suicide.  Denies any thoughts of harming

anybody else.  The patient is aware that she has alcohol problem and this

is not a good coping skill for her current stressors and does not want to

go to rehab.  She does not appear to be hallucinating.  Her responses are

_____ and relevant to questioning.  Paranoia or delusions were not elicited

during my interview with her this morning.  Her insight and judgment

appears to be fair, and she is in good control at this time.



Vital signs reviewed as well as labs.



RELEVANT PSYCHIATRIC MEDICATIONS:  Include _____ 25 mg p.o. q.8 p.r.n.

anxiety, which the patient received one dose last night and one dose this

morning.  Ativan was discontinued as this can _____.



IMPRESSION:  Alcohol use disorder, severe; likely substance induced-mood

disorder, depressive disorder, not otherwise specified; rule out major

depressive disorder, mild to moderate _____.



_____ delirium secondary to alcohol withdrawal.  She does not want

psychiatric intervention at this time, requiring inpatient psychiatric

stabilization.  She is not actively suicidal or homicidal.  She is not

actively hallucinating, but _____ stable, however, medically she cannot

rule out that the patient will have another _____ this evening and the

possibility of ongoing delirium and instability in this respect.  She has

been monitored carefully by medicine.  Psychiatry will sign off again. 

Please reconsult if there are any acute changes in the patient's

presentation.







__________________________________________

Shola No MD





DD:  01/27/2018 10:58:53

DT:  01/27/2018 11:55:56

Job # [id number]

## 2018-04-19 ENCOUNTER — HOSPITAL ENCOUNTER (OUTPATIENT)
Dept: HOSPITAL 42 - ED | Age: 63
Setting detail: OBSERVATION
LOS: 1 days | Discharge: HOME | End: 2018-04-20
Attending: INTERNAL MEDICINE | Admitting: INTERNAL MEDICINE
Payer: MEDICAID

## 2018-04-19 VITALS — BODY MASS INDEX: 28.3 KG/M2

## 2018-04-19 DIAGNOSIS — K70.10: ICD-10-CM

## 2018-04-19 DIAGNOSIS — Z91.14: ICD-10-CM

## 2018-04-19 DIAGNOSIS — F31.81: ICD-10-CM

## 2018-04-19 DIAGNOSIS — M54.12: Primary | ICD-10-CM

## 2018-04-19 DIAGNOSIS — F41.9: ICD-10-CM

## 2018-04-19 DIAGNOSIS — F10.239: ICD-10-CM

## 2018-04-19 DIAGNOSIS — M79.602: ICD-10-CM

## 2018-04-19 LAB
ALBUMIN SERPL-MCNC: 4.8 G/DL (ref 3–4.8)
ALBUMIN/GLOB SERPL: 1.3 {RATIO} (ref 1.1–1.8)
ALT SERPL-CCNC: 33 U/L (ref 7–56)
AST SERPL-CCNC: 50 U/L (ref 14–36)
BASOPHILS # BLD AUTO: 0.04 K/MM3 (ref 0–2)
BASOPHILS NFR BLD: 0.5 % (ref 0–3)
BUN SERPL-MCNC: 13 MG/DL (ref 7–21)
CALCIUM SERPL-MCNC: 10 MG/DL (ref 8.4–10.5)
EOSINOPHIL # BLD: 0.1 10*3/UL (ref 0–0.7)
EOSINOPHIL NFR BLD: 0.9 % (ref 1.5–5)
ERYTHROCYTE [DISTWIDTH] IN BLOOD BY AUTOMATED COUNT: 15.2 % (ref 11.5–14.5)
FOLATE SERPL-MCNC: 8.8 NG/ML
GFR NON-AFRICAN AMERICAN: > 60
GRANULOCYTES # BLD: 5.5 10*3/UL (ref 1.4–6.5)
GRANULOCYTES NFR BLD: 71.9 % (ref 50–68)
HGB BLD-MCNC: 14.3 G/DL (ref 12–16)
LYMPHOCYTES # BLD: 1.6 10*3/UL (ref 1.2–3.4)
LYMPHOCYTES NFR BLD AUTO: 20.9 % (ref 22–35)
MCH RBC QN AUTO: 31.8 PG (ref 25–35)
MCHC RBC AUTO-ENTMCNC: 34.2 G/DL (ref 31–37)
MCV RBC AUTO: 92.9 FL (ref 80–105)
MONOCYTES # BLD AUTO: 0.4 10*3/UL (ref 0.1–0.6)
MONOCYTES NFR BLD: 5.8 % (ref 1–6)
PLATELET # BLD: 271 10^3/UL (ref 120–450)
PMV BLD AUTO: 9 FL (ref 7–11)
RBC # BLD AUTO: 4.5 10^6/UL (ref 3.5–6.1)
T4 FREE SERPL-MCNC: 0.84 NG/DL (ref 0.78–2.19)
TROPONIN I SERPL-MCNC: < 0.01 NG/ML
TROPONIN I SERPL-MCNC: < 0.01 NG/ML
VIT B12 SERPL-MCNC: 389 PG/ML (ref 239–931)
WBC # BLD AUTO: 7.7 10^3/UL (ref 4.5–11)

## 2018-04-19 PROCEDURE — 80320 DRUG SCREEN QUANTALCOHOLS: CPT

## 2018-04-19 PROCEDURE — 72125 CT NECK SPINE W/O DYE: CPT

## 2018-04-19 PROCEDURE — 84439 ASSAY OF FREE THYROXINE: CPT

## 2018-04-19 PROCEDURE — 85610 PROTHROMBIN TIME: CPT

## 2018-04-19 PROCEDURE — 84100 ASSAY OF PHOSPHORUS: CPT

## 2018-04-19 PROCEDURE — 82550 ASSAY OF CK (CPK): CPT

## 2018-04-19 PROCEDURE — 80353 DRUG SCREENING COCAINE: CPT

## 2018-04-19 PROCEDURE — 97530 THERAPEUTIC ACTIVITIES: CPT

## 2018-04-19 PROCEDURE — 80053 COMPREHEN METABOLIC PANEL: CPT

## 2018-04-19 PROCEDURE — 83735 ASSAY OF MAGNESIUM: CPT

## 2018-04-19 PROCEDURE — 96372 THER/PROPH/DIAG INJ SC/IM: CPT

## 2018-04-19 PROCEDURE — 80358 DRUG SCREENING METHADONE: CPT

## 2018-04-19 PROCEDURE — 97162 PT EVAL MOD COMPLEX 30 MIN: CPT

## 2018-04-19 PROCEDURE — 71046 X-RAY EXAM CHEST 2 VIEWS: CPT

## 2018-04-19 PROCEDURE — 93005 ELECTROCARDIOGRAM TRACING: CPT

## 2018-04-19 PROCEDURE — 96374 THER/PROPH/DIAG INJ IV PUSH: CPT

## 2018-04-19 PROCEDURE — 80346 BENZODIAZEPINES1-12: CPT

## 2018-04-19 PROCEDURE — 82607 VITAMIN B-12: CPT

## 2018-04-19 PROCEDURE — 82746 ASSAY OF FOLIC ACID SERUM: CPT

## 2018-04-19 PROCEDURE — 84443 ASSAY THYROID STIM HORMONE: CPT

## 2018-04-19 PROCEDURE — 85651 RBC SED RATE NONAUTOMATED: CPT

## 2018-04-19 PROCEDURE — 97116 GAIT TRAINING THERAPY: CPT

## 2018-04-19 PROCEDURE — 96376 TX/PRO/DX INJ SAME DRUG ADON: CPT

## 2018-04-19 PROCEDURE — 86140 C-REACTIVE PROTEIN: CPT

## 2018-04-19 PROCEDURE — 85025 COMPLETE CBC W/AUTO DIFF WBC: CPT

## 2018-04-19 PROCEDURE — 80324 DRUG SCREEN AMPHETAMINES 1/2: CPT

## 2018-04-19 PROCEDURE — 83615 LACTATE (LD) (LDH) ENZYME: CPT

## 2018-04-19 PROCEDURE — 99285 EMERGENCY DEPT VISIT HI MDM: CPT

## 2018-04-19 PROCEDURE — 84484 ASSAY OF TROPONIN QUANT: CPT

## 2018-04-19 PROCEDURE — 36415 COLL VENOUS BLD VENIPUNCTURE: CPT

## 2018-04-19 PROCEDURE — 80345 DRUG SCREENING BARBITURATES: CPT

## 2018-04-19 PROCEDURE — 96375 TX/PRO/DX INJ NEW DRUG ADDON: CPT

## 2018-04-19 PROCEDURE — 80361 OPIATES 1 OR MORE: CPT

## 2018-04-19 PROCEDURE — 83992 ASSAY FOR PHENCYCLIDINE: CPT

## 2018-04-19 PROCEDURE — 72141 MRI NECK SPINE W/O DYE: CPT

## 2018-04-19 PROCEDURE — 80349 CANNABINOIDS NATURAL: CPT

## 2018-04-19 NOTE — CP.PCM.HP
<Nelson Dykes - Last Filed: 04/19/18 14:27>





History of Present Illness





- History of Present Illness


History of Present Illness: 





CC: Left neck/arm pain, anxiety





HPI: Patient is a 62 year old female with past medical history of alcohol abuse

, endometriosis, chronic left sided neck pain and paraesthesias and history of 

anxiety who presents with 2 month history of left sided neck pain and 

progressively worsening left upper extremity parasthesia. Patient indicates 

that she has had progressively worsening numbness of her left upper extremity 

with recent development of numbness in her left hand at her pinky, ring and 

middle finger. Patient is unable to determine timeline for development of 

symptoms. She indicates her feeling of her left arm is blunted. Patient reports 

she has been prescribed meloxicam and Tylenol in the outpatient setting without 

resolution of her symptoms. Patient reports moving her arm does not help with 

the symptoms. Patient reports palpitations this am due to anxiety. She denies 

chest pain, abdominal pain, cough, nausea, vomiting, fever, chills. She reports 

being able to preform ADL without assistance. 





Past medical history: alcohol abuse, endometriosis, alcoholic hepatitis   


Past surgical history: laparoscopic surgery for endometriosis x 3


Medications:Valium


Allergies: NKDA 


Social history :Tobacco: Denies, ETOH: note review shows 1 pint of vodka daily 

normally, reports last drink 1/2 pint of vodka yesterday, ID: Denies


- Patient curretly lives alone


PMD: No longer seeing Ash Shepherd, was scheduled to see new physician Hermilo Andino today 





Present on Admission





- Present on Admission


Any Indicators Present on Admission: No





Review of Systems





- Review of Systems


All systems: reviewed and no additional remarkable complaints except (as 

mentioned in HPI)





Past Patient History





- Infectious Disease


Hx of Infectious Diseases: None





- Tetanus Immunizations


Tetanus Immunization: Unknown





- Past Social History


Smoking Status: Never Smoked


Alcohol: < 2 Drinks/Day


Home Situation {Lives}: Alone





- CARDIAC


Hx Cardiac Disorders: No





- PULMONARY


Hx Respiratory Disorders: No





- NEUROLOGICAL


Hx Neurological Disorder: No





- HEENT


Hx HEENT Problems: No





- RENAL


Hx Chronic Kidney Disease: No





- ENDOCRINE/METABOLIC


Hx Endocrine Disorders: No





- HEMATOLOGICAL/ONCOLOGICAL


Hx Blood Disorders: No





- INTEGUMENTARY


Hx Dermatological Problems: Yes (multiple bruising from fall)





- MUSCULOSKELETAL/RHEUMATOLOGICAL


Hx Musculoskeletal Disorders: Yes (lumbar radiculotpathy,)


Hx Falls: Yes


Other/Comment: endometriosis





- GASTROINTESTINAL


Hx Gastrointestinal Disorders: Yes (alcoholic liver)


Other/Comment: h/o rectal bleed





- GENITOURINARY/GYNECOLOGICAL


Hx Genitourinary Disorders: No


Hx Urinary Tract Infection: Yes





- PSYCHIATRIC


Hx Psychophysiologic Disorder: Yes


Hx Bipolar Disorder: Yes


Hx Depression: Yes


Hx Substance Use: No





- SURGICAL HISTORY


Other/Comment: endometriosis x2





- ANESTHESIA


Hx Anesthesia: No


Hx Anesthesia Reactions: No


Hx Malignant Hyperthermia: No





Meds


Allergies/Adverse Reactions: 


 Allergies











Allergy/AdvReac Type Severity Reaction Status Date / Time


 


No Known Allergies Allergy   Verified 04/19/18 11:38














Physical Exam





- Constitutional


Appears: Older Than Stated Age





- Head Exam


Head Exam: ATRAUMATIC, NORMAL INSPECTION, NORMOCEPHALIC





- Eye Exam


Eye Exam: EOMI, PERRL





- Neck Exam


Neck exam: Positive for: Tenderness (left sided, C3-C7)





- Respiratory Exam


Respiratory Exam: Clear to Auscultation Bilateral, NORMAL BREATHING PATTERN.  

absent: Wheezes





- Cardiovascular Exam


Cardiovascular Exam: REGULAR RHYTHM, +S1, +S2





- GI/Abdominal Exam


GI & Abdominal Exam: Normal Bowel Sounds, Soft.  absent: Firm, Guarding





- Extremities Exam


Extremities exam: Positive for: normal inspection.  Negative for: calf 

tenderness, pedal edema





- Neurological Exam


Neurological exam: Alert, Oriented x3


Additional comments: 





Motor and sensory grossly intact


Strength intact


Apple to follow commands, moves all four extremities past midline 





- Psychiatric Exam


Psychiatric exam: Anxious





- Skin


Skin Exam: Dry, Warm





Results





- Vital Signs


Recent Vital Signs: 





 Last Vital Signs











Temp  98 F   04/19/18 10:55


 


Pulse  88   04/19/18 10:55


 


Resp  18   04/19/18 10:55


 


BP  136/81   04/19/18 10:55


 


Pulse Ox  99   04/19/18 10:55














- Labs


Result Diagrams: 


 04/19/18 08:00





 04/19/18 08:00





Assessment & Plan





- Assessment and Plan (Free Text)


Assessment: 





62 F with pmh of alcoholic hepatitis and ETOH abuse who presents with L sided 

neck pain with radiation into left upper extremiity and arm paraesthesias. 

Patient is noted to have heightened anxiety on admission. Patient will be 

admitted for further medical evaluation and management.  








Plan: 





Left Upper extremity numbness/weakness


Details: 


- Head CT: 


- Cervical spine CT: 


Plan: 


- Neck MRI, f/u results


- Consider neurosurgical consultation


- PT eval and treat


- Gabapentin





ETOH withdrawal


Details: 


- History of prior alcohol withdrawal admissions


- Last drink yesterday 1/2 pint of vodka


- Reports drinks every day


Plan:


- CIWA


- Asp/Seizure/fall precautions


- Ativan 1mg Q6H


- Consider adding more ativan or librium for increased sxs


- Monior clinically





Anxiety


Details: 


- History of anxiety


- Previous psych admissions


- Moderate to severe anxiety at time of interview


Plan:


- Psych consulted for severe anxiety





DVT ppx: Lovenox


GI ppx: Pepcid





Case and plan discussed with attending





- Date & Time


Date: 04/19/18


Time: 14:21





<Abbey Paz - Last Filed: 04/20/18 16:13>





Results





- Vital Signs


Recent Vital Signs: 





 Last Vital Signs











Temp  98 F   04/20/18 12:00


 


Pulse  101 H  04/20/18 14:00


 


Resp  20   04/20/18 12:00


 


BP  109/62   04/20/18 12:00


 


Pulse Ox  97   04/20/18 05:52














- Labs


Result Diagrams: 


 04/20/18 05:30





 04/20/18 05:30


Labs: 





 Laboratory Results - last 24 hr











  04/19/18 04/19/18 04/20/18





  13:20 20:02 05:30


 


WBC    6.4


 


RBC    4.25


 


Hgb    13.3


 


Hct    39.4


 


MCV    92.7


 


MCH    31.3


 


MCHC    33.8


 


RDW    15.4 H


 


Plt Count    244


 


MPV    9.1


 


Gran %    51.9


 


Lymph % (Auto)    36.4 H


 


Mono % (Auto)    6.8 H


 


Eos % (Auto)    4.4


 


Baso % (Auto)    0.5


 


Gran #    3.31


 


Lymph # (Auto)    2.3


 


Mono # (Auto)    0.4


 


Eos # (Auto)    0.3


 


Baso # (Auto)    0.03


 


PT   


 


INR   


 


Sodium   


 


Potassium   


 


Chloride   


 


Carbon Dioxide   


 


Anion Gap   


 


BUN   


 


Creatinine   


 


Est GFR ( Amer)   


 


Est GFR (Non-Af Amer)   


 


Random Glucose   


 


Calcium   


 


Total Bilirubin   


 


AST   


 


ALT   


 


Alkaline Phosphatase   


 


Troponin I   < 0.01 


 


Total Protein   


 


Albumin   


 


Globulin   


 


Albumin/Globulin Ratio   


 


Vitamin B12  389  


 


Folate  8.8  


 


Urine Opiates Screen   


 


Urine Methadone Screen   


 


Ur Barbiturates Screen   


 


Ur Phencyclidine Scrn   


 


Ur Amphetamines Screen   


 


U Benzodiazepines Scrn   


 


U Oth Cocaine Metabols   


 


U Cannabinoids Screen   














  04/20/18 04/20/18 04/20/18





  05:30 05:30 07:42


 


WBC   


 


RBC   


 


Hgb   


 


Hct   


 


MCV   


 


MCH   


 


MCHC   


 


RDW   


 


Plt Count   


 


MPV   


 


Gran %   


 


Lymph % (Auto)   


 


Mono % (Auto)   


 


Eos % (Auto)   


 


Baso % (Auto)   


 


Gran #   


 


Lymph # (Auto)   


 


Mono # (Auto)   


 


Eos # (Auto)   


 


Baso # (Auto)   


 


PT  12.6 H  


 


INR  1.09 H  


 


Sodium   141 


 


Potassium   4.2 


 


Chloride   106 


 


Carbon Dioxide   22 


 


Anion Gap   17 


 


BUN   17 


 


Creatinine   0.7 


 


Est GFR ( Amer)   > 60 


 


Est GFR (Non-Af Amer)   > 60 


 


Random Glucose   100 


 


Calcium   9.7 


 


Total Bilirubin   1.0 


 


AST   36  D 


 


ALT   23 


 


Alkaline Phosphatase   80 


 


Troponin I   


 


Total Protein   7.6 


 


Albumin   4.2 


 


Globulin   3.3 


 


Albumin/Globulin Ratio   1.3 


 


Vitamin B12   


 


Folate   


 


Urine Opiates Screen    Positive H


 


Urine Methadone Screen    Negative


 


Ur Barbiturates Screen    Negative


 


Ur Phencyclidine Scrn    Negative


 


Ur Amphetamines Screen    Negative


 


U Benzodiazepines Scrn    Positive


 


U Oth Cocaine Metabols    Negative


 


U Cannabinoids Screen    Negative














Attending/Attestation





- Attestation


I have personally seen and examined this patient.: Yes


I have fully participated in the care of the patient.: Yes


I have reviewed all pertinent clinical information: Yes


Notes (Text): 





04/20/18 16:08


Medical record note made by the resident after discussion with my direction and 

input after the patient was personally seen and examined by me. I have reviewed 

the chart and agree that the record accurately reflects by personal performance 

of the history, physical exam, data review, and medical decision-making, in the 

course for the patient. I have also personally directed the plan of care.





62 year old female  with PMH of  significant for alcohol abuse, anxiety, 

chronic smoking and non compliance with medication is admitted with history of 

worsening neck pain associated with parethesis of left arm getting worse over 

few weeks.CT scan of neck showed degenrative disease and mild c4-5 

stenosis.Patient has normal power in both upper  and lower extremities.She is 

giving history of palpitation and is very anxious.She has ran out of her 

hydroxyzine.She will be admitted to hospital , will monitor for alcohol 

withdrawal.We will start patient on Gabapentin and will get MRI of neck.We will 

also get serial troponin.


 





Management plan was discussed in detail with patient. Education was provided.

## 2018-04-19 NOTE — CT
PROCEDURE:  CT Cervical Spine without contrast



HISTORY:

atrumatic L neck/arm pain x 2 months; numb/tinglin



COMPARISON:

Cervical spine series 40309.



TECHNIQUE:

Axial computed tomography images were obtained of the cervical spine 

without the use of intravenous contrast. Coronal and sagittal 

reformatted images were created and reviewed.



Radiation dose: 



Total exam DLP = 489.61 mGy-cm.



This CT exam was performed using one or more of the following dose 

reduction techniques: Automated exposure control, adjustment of the 

mA and/or kV according to patient size, and/or use of iterative 

reconstruction technique.



FINDINGS:



VERTEBRAE:

Normal alignment appreciate without spondylolisthesis or definitive 

interval fracture throughout. Multilevel cervical spondylosis appears 

slightly increased sparing only C2-3. Diffuse facet joint arthropathy 

is appreciated throughout the cervical spine. C1 to degenerative 

changes are identified with the craniocervical junction intact. The 

upper thoracic spine is limited in degenerative change as imaged, 

also without obvious fracture. No destructive bony lesion appreciable.



DISCS/SPINAL CANAL/NEURAL FORAMINA:

There borderline bilateral C3-4 neural foraminal stenoses on 

degenerative basis with central canal widely patent. Mild-to-moderate 

left and borderline right degenerative neural foraminal stenosis seen 

at C4-5. A disc osteophyte complex inverse the ventral thecal sac 

with borderline central stenosis present.



At C5-6 an additional disc osteophyte complex is appreciated 

resulting in a borderline central stenosis. Mild degenerative neural 

foraminal stenoses are appreciated bilaterally.



Mild bilateral degenerative neural foraminal stenoses are identified 

at C6-7 with central canal widely patent and C7-T1 is unremarkable. 



PARASPINAL SOFT TISSUES:

Unremarkable. 



OTHER FINDINGS:

None.



IMPRESSION:

1. No interval fracture or spondylolisthesis identified. 



2. Multilevel degenerative spondylosis and facet arthropathy is 

appreciated without severe central stenosis. Borderline central 

stenoses are degenerative at C4-5 and C5-6.  Multilevel 

mild-to-moderate neural foraminal stenoses are also noted on a 

degenerative basis as per above.

## 2018-04-19 NOTE — ED PDOC
Arrival/HPI





- General


Chief Complaint: Palpitations


Time Seen by Provider: 04/19/18 07:30


Historian: Patient, EMS





- History of Present Illness


Narrative History of Present Illness (Text): 





04/19/18 07:40


pt p/w + left arm/neck/shoulder region pain persistently over the last 2months; 

pt states her left 3 digits on her left hand are now numb/tingling; pt states 

no trauma; pt states looking/turning neck to the left causes more pain; pt 

states pain is unbearable >15/10; pt states she has not slept in > 1 week due 

to her pain; pt had seen her PCP twice and without any relief; pt was 

prescribed maloxicam? by her PCP (DR FITZGERALD) but it has not helped; pt states 

over the last 2 days she has had palpitations, and worsening left sided chest 

pain with mild sob; pt became concern the pain maybe related to her heart and 

she decided to call 911 to be evaluated in the ED; pt states no fever/chills/

sweats, no abd pain, no n/v, no gross/focal weakness, no urinary/bowel changes, 

no fall/trauma/sick contact, no travel


pt is here for further eval


pt's without other complaints





PCP: currently switching to [facility], appoint at 830am


pt is right hand dominate


pt last drink was 2 days ago (0.5 pint of vodka)


pt lives alone











Time/Duration: > month, Other (2 days of left chest pain)


Symptom Onset: Gradual


Symptom Course: Unchanged, Worsening


Quality: Pressure, Throbbing


Severity Level: 10, Severe


Activities at Onset: Rest


Context: Exertion, Home





Past Medical History





- Provider Review


Nursing Documentation Reviewed: Yes





- Travel History


Have you recently traveled outside [place] w/in the past 3 mons?: No





- Past History


Past History: No Previous





- Infectious Disease


Hx of Infectious Diseases: None





- Tetanus Immunization


Tetanus Immunization: Unknown





- Reproductive


Menopause: Yes


Currently Pregnant: No





- Cardiac


Hx Cardiac Disorders: No





- Pulmonary


Hx Respiratory Disorders: No





- Neurological


Hx Neurological Disorder: No





- HEENT


Hx HEENT Disorder: No





- Renal


Hx Renal Disorder: No





- Endocrine/Metabolic


Hx Endocrine Disorders: No





- Hematological/Oncological


Hx Blood Disorders: No





- Integumentary


Hx Dermatological Disorder: Yes (multiple bruising from fall)





- Musculoskeletal/Rheumatological


Hx Musculoskeletal Disorders: Yes (lumbar radiculotpathy,)


Hx Falls: Yes


Other/Comment: endometriosis





- Gastrointestinal


Hx Gastrointestinal Disorders: Yes (alcoholic liver)


Other/Comment: h/o rectal bleed





- Genitourinary/Gynecological


Hx Genitourinary Disorders: No


Hx Urinary Tract Infection: Yes





- Psychiatric


Hx Psychophysiologic Disorder: Yes


Hx Bipolar Disorder: Yes


Hx Depression: Yes


Hx Substance Use: No





- Surgical History


Other/Comment: endometriosis x2





- Anesthesia


Hx Anesthesia: No


Hx Anesthesia Reactions: No


Hx Malignant Hyperthermia: No





- Suicidal Assessment


Feels Threatened In Home Enviroment: No





Family/Social History





- Physician Review


Nursing Documentation Reviewed: Yes


Family/Social History: No Known Family HX


Smoking Status: Never Smoked


Hx Alcohol Use: Yes (binge drinks. last drank 1-23-17)


Hx Substance Use: No


Hx Substance Use Treatment: No





Allergies/Home Meds


Allergies/Adverse Reactions: 


Allergies





No Known Allergies Allergy (Verified 01/24/18 11:58)


 








Home Medications: 


 Home Meds











 Medication  Instructions  Recorded  Confirmed


 


diaZEpam [Valium] 5 mg PO HS PRN 04/19/18 04/19/18














Review of Systems





- Review of Systems


Constitutional: Normal


Eyes: Normal


ENT: Normal


Respiratory: SOB


Cardiovascular: Chest Pain, Palpitations


Gastrointestinal: Normal


Genitourinary Female: Normal


Musculoskeletal: Neck Pain, Other (left arm pain/numbness/tingling)


Skin: Normal


Neurological: Normal, Other (left sided neck pain)


Endocrine: Normal


Hemo/Lymphatic: Normal


Psychiatric: Normal





Physical Exam


Vital Signs Reviewed: Yes


Vital Signs











  Temp Pulse Resp BP Pulse Ox


 


 04/19/18 06:11  98.2 F  107 H  18  152/77 H  100











Temperature: Afebrile


Blood Pressure: Hypertensive


Pulse: Tachycardic


Respiratory Rate: Normal


Appearance: Positive for: Well-Appearing, Non-Toxic, Uncomfortable, Other (

uncomfortable, resting in bed, alert/awake, GCS = 15, oriented x 3, cooperative

, moderate distress due to pain)


Pain Distress: Moderate


Mental Status: Positive for: Alert and Oriented X 3





- Systems Exam


Head: Present: Atraumatic, Normocephalic


Pupils: Present: PERRL, Other (visual field intact b/l, no nystagmus, no 

photophobia, sclera anicteric)


Extroacular Muscles: Present: EOMI


Conjunctiva: Present: Normal


Ears: Present: Normal


Mouth: Present: Moist Mucous Membranes, Other (fair dentitions, no drooling/

stridor, no exudate/lesions, uvula/tongue are midline)


Pharnyx: Present: Normal


Nose (External): Present: Atraumatic


Nose (Internal): Present: Normal Inspection


Neck: Present: Trachea Midline, Other (decr ROM with extension; turning to left 

causes pain; no midline tenderness, no nuchal rigidity, no meningeal signs, no 

step off).  No: Meningeal Signs, MIDLINE TENDERNESS


Respiratory/Chest: Present: Clear to Auscultation, Good Air Exchange, Other (

CTA b/l, no w/r/r, no accessory muscle use noted, no tachypenia).  No: 

Respiratory Distress, Accessory Muscle Use


Cardiovascular: Present: Regular Rate and Rhythm, Normal S1, S2.  No: Murmurs, 

Tachycardic


Abdomen: Present: Normal Bowel Sounds, Other (well nourished female, no focal 

tenderness, no hernandez's sign, no mcburney's point tenderness, no masses/rebound/

guarding/rigidity)


Back: Present: Normal Inspection.  No: CVA Tenderness, Midline Tenderness


Upper Extremity: Present: Normal Inspection, NORMAL PULSES, Neurovascularly 

Intact, Capillary Refill < 2s, Other (decr ROM to left arm due to left arm pain

, strength 5/5 grossly intact in all limbs, neurovasc intact b/l, no gross 

deformities noted; +2/2 reflex b/l).  No: Edema


Lower Extremity: Present: Normal Inspection, NORMAL PULSES, Normal ROM, 

Neurovascularly Intact, Capillary Refill < 2 s


Neurological: Present: GCS=15, CN II-XII Intact, Speech Normal, Other (CNII-XII 

WNL, no facial asymmetries, no slurr speech, oriented x 3, NIH stroke scale ~ 0)


Skin: Present: Warm, Normal Color, Other (cap refill < 1sec, no ulcerations, no 

petechiae, no rashes)


Psychiatric: Present: Alert, Oriented x 3





Medical Decision Making


ED Course and Treatment: 





04/19/18 07:51


Impression: left arm/neck painl; left chest pain


i have consider all the differential diagnosis regarding pt's chief medical 

complaints/clinical findings, including but are not limited to: likely 

worsening cervical radiculopathy; r/o ACS





A/P: left arm/neck pain, left chest pain


- labs


- iv


- xray


- ct


- acs eval


- supportive care


- observe/reevaluation





04/19/18 10:43


pt felt improvement initially, pain is down to 8/10, but now pain is coming back


pt states her chest pain is improved, no more chest pain currently





vital signs improved





04/19/18 10:53


paging hospitalists





04/19/18 11:02


hospitalists contacted, made aware, agrees with admission/tele/observation





04/19/18 11:24


pt is made aware of her medical results


agrees with admission/observation


Re-evaluation Time: 10:30


Reassessment Condition: Re-examined, Improving,but remains with symptoms





- Lab Interpretations


Lab Results: 








 04/19/18 08:00 





 04/19/18 08:00 





 Lab Results





04/19/18 08:00: Sodium 142, Potassium 4.4, Chloride 104, Carbon Dioxide 25, 

Anion Gap 17, BUN 13, Creatinine 0.7, Est GFR (African Amer) > 60, Est GFR (Non-

Af Amer) > 60, Random Glucose 106, Calcium 10.0, Magnesium 2.1, Total Bilirubin 

0.6, AST 50 H, ALT 33, Alkaline Phosphatase 101, Lactate Dehydrogenase 544, 

Total Creatine Kinase 48, Troponin I < 0.01, Total Protein 8.5 H, Albumin 4.8, 

Globulin 3.7, Albumin/Globulin Ratio 1.3


04/19/18 08:00: WBC 7.7, RBC 4.50, Hgb 14.3  D, Hct 41.8, MCV 92.9  D, MCH 31.8

, MCHC 34.2, RDW 15.2 H, Plt Count 271, MPV 9.0, Gran % 71.9 H, Lymph % (Auto) 

20.9 L, Mono % (Auto) 5.8, Eos % (Auto) 0.9 L, Baso % (Auto) 0.5, Gran # 5.50, 

Lymph # (Auto) 1.6, Mono # (Auto) 0.4, Eos # (Auto) 0.1, Baso # (Auto) 0.04, 

ESR 12








I have reviewed the lab results: Yes


Interpretation: All labs normal





- RAD Interpretation


Narrative RAD Interpretations (Text): 





04/19/18 10:00


Cervical Spine CT: Creator : Cameron Santana MD


COMPARISON: Cervical spine series [id number].


FINDINGS:


VERTEBRAE: Normal alignment appreciate without spondylolisthesis or definitive 

interval fracture throughout. Multilevel cervical spondylosis appears slightly 

increased sparing only C2-3. Diffuse facet joint arthropathy is appreciated 

throughout the cervical spine. C1 to degenerative changes are identified with 

the craniocervical junction intact. The upper thoracic spine is limited in 

degenerative change as imaged, also without obvious fracture. No destructive 

bony lesion appreciable.


DISCS/SPINAL CANAL/NEURAL FORAMINA: There borderline bilateral C3-4 neural 

foraminal stenoses on degenerative basis with central canal widely patent. Mild-

to-moderate left and borderline right degenerative neural foraminal stenosis 

seen at C4-5. A disc osteophyte complex inverse the ventral thecal sac with 

borderline central stenosis present. At C5-6 an additional disc osteophyte 

complex is appreciated resulting in a borderline central stenosis. Mild 

degenerative neural foraminal stenoses are appreciated bilaterally. Mild 

bilateral degenerative neural foraminal stenoses are identified at C6-7 with 

central canal widely patent and C7-T1 is unremarkable. 


PARASPINAL SOFT TISSUES: Unremarkable. 


OTHER FINDINGS: None.


IMPRESSION:


1. No interval fracture or spondylolisthesis identified. 


2. Multilevel degenerative spondylosis and facet arthropathy is appreciated 

without severe central stenosis. Borderline central stenoses are degenerative 

at C4-5 and C5-6.  Multilevel mild-to-moderate neural foraminal stenoses are 

also noted on a degenerative basis as per above.








04/19/18 10:00


Chest X-ray: Creator : Sharmin Vasquez MD


COMPARISON: 01/24/2018.


FINDINGS:


LINES AND TUBES: None. 


LUNG AND PLEURA: The lungs are well inflated and clear. 


HEART AND MEDIASTINUM: The heart is not enlarged. The hilar and mediastinal 

contours are within normal limits.


SKELETAL STRUCTURES: The bony structures are within normal limits for the 

patient's age.


VISUALIZED UPPER ABDOMEN: Normal.


OTHER FINDINGS: None.


IMPRESSION: No active pulmonary disease.

















Radiology Orders: 








04/19/18 07:42


CHEST TWO VIEWS (PA/LAT) [RAD] Stat 





04/19/18 07:43


CERVICAL SPINE W/O CONTRAST [CT] Stat 











: Radiologist





- EKG Interpretation


EKG Interpretation (Text): 





04/19/18 07:57


NSR at 95 bpm, normal axis, no ectopy, diffuse low voltage inf leads, inverted 

T in leads III, V1-2, no st changes, BORDERLINE EKG; unchanged compare with old 

ekg 1/2018


Interpreted by ED Physician: Yes


Type: 12 lead EKG


Comparison: Similar to previous EKG





- Medication Orders


Current Medication Orders: 








Gabapentin (Neurontin)  300 mg PO STAT PREETI


   PRN Reason: Protocol


   Last Admin: 04/19/18 08:07  Dose: 300 mg





Sodium Chloride (Sodium Chloride 0.9%)  1,000 mls @ 100 mls/hr IV .Q10H PREETI


   Last Admin: 04/19/18 08:05  Dose: 100 mls/hr





eMAR Start Stop


 Document     04/19/18 08:05  PELON  (Rec: 04/19/18 08:05  [id number]


     Intravenous Solution


      Start Date                                 04/19/18


      Start Time                                 08:05








Discontinued Medications





Aspirin (Aspirin)  325 mg PO STAT STA


   Stop: 04/19/18 07:43


   Last Admin: 04/19/18 08:07  Dose: 325 mg





Diazepam (Valium)  5 mg PO ONCE ONE


   PRN Reason: Protocol


   Stop: 04/19/18 07:45


   Last Admin: 04/19/18 08:07  Dose: 5 mg





Ketorolac Tromethamine (Toradol)  15 mg IVP STAT STA


   Stop: 04/19/18 07:45


   Last Admin: 04/19/18 08:06  Dose: 15 mg





MAR Pain Assessment


 Document     04/19/18 08:06  PELON  (Rec: 04/19/18 08:06  [id number]


     Pain Reassessment


      Is this a pain reassessment?               No


     Sleep


      Is patient sleeping during reassessment?   No


     Presence of Pain


      Presence of Pain                           Yes


IVP Administration


 Document     04/19/18 08:06  PELON  (Rec: 04/19/18 08:06  [facility]  [id number])


     Charges for Administration


      # of IVP Administrations                   1





Morphine Sulfate (Morphine)  4 mg IVP STAT STA


   Stop: 04/19/18 07:43


   Last Admin: 04/19/18 08:05  Dose: 4 mg





MAR Pain Assessment


 Document     04/19/18 08:05  PELON  (Rec: 04/19/18 08:06  [facility]  [id number])


     Pain Reassessment


      Is this a pain reassessment?               No


     Sleep


      Is patient sleeping during reassessment?   No


     Presence of Pain


      Presence of Pain                           Yes


     Description


      Description                                Intermittent


      Intensity of Pain at present               7


IVP Administration


 Document     04/19/18 08:05  PELON  (Rec: 04/19/18 08:06  PELON  [id number])


     Charges for Administration


      # of IVP Administrations                   1





Morphine Sulfate (Morphine)  4 mg IVP STAT STA


   Stop: 04/19/18 10:38











Disposition/Present on Arrival





- Present on Arrival


Any Indicators Present on Arrival: No


History of DVT/PE: No


History of Uncontrolled Diabetes: No


Urinary Catheter: No


History of Decub. Ulcer: No


History Surgical Site Infection Following: None





- Disposition


Have Diagnosis and Disposition been Completed?: Yes


Diagnosis: 


 Chest pain with low risk of acute coronary syndrome, Cervical radiculopathy, 

Left arm pain, Elevated blood pressure reading





Disposition: HOSPITALIZED


Disposition Time: 11:05


Patient Plan: Admission, Observation


Patient Problems: 


 Current Active Problems











Problem Status Onset


 


Cervical radiculopathy Acute  


 


Chest pain with low risk of acute coronary syndrome Acute  


 


Left arm pain Acute  











Condition: STABLE


Discharge Instructions (ExitCare):  Chest Pain (ED), Hypertension (ED)


Referrals: 


[facility] Rosina Worthy, [Primary Care Provider] - Follow up with primary


Forms:  [employer] (English)

## 2018-04-19 NOTE — RAD
HISTORY:



COMPARISON:

01/24/2018.



TECHNIQUE:

Chest PA and lateral



FINDINGS:



LINES AND TUBES:

None. 



LUNG AND PLEURA:

The lungs are well inflated and clear. 



HEART AND MEDIASTINUM:

The heart is not enlarged. The hilar and mediastinal contours are 

within normal limits.



SKELETAL STRUCTURES:

The bony structures are within normal limits for the patient's age.



VISUALIZED UPPER ABDOMEN:

Normal.



OTHER FINDINGS:

None.



IMPRESSION:

No active pulmonary disease.

## 2018-04-19 NOTE — CARD
--------------- APPROVED REPORT --------------





EKG Measurement

Heart [id number]

[id number]

[id number]

[id number]

[id number]



<Conclusion>

Normal sinus rhythm

Normal ECG

## 2018-04-20 VITALS — SYSTOLIC BLOOD PRESSURE: 109 MMHG | DIASTOLIC BLOOD PRESSURE: 62 MMHG | TEMPERATURE: 98 F

## 2018-04-20 VITALS — HEART RATE: 101 BPM

## 2018-04-20 VITALS — RESPIRATION RATE: 20 BRPM | OXYGEN SATURATION: 97 %

## 2018-04-20 LAB
ALBUMIN SERPL-MCNC: 4.2 G/DL (ref 3–4.8)
ALBUMIN/GLOB SERPL: 1.3 {RATIO} (ref 1.1–1.8)
ALT SERPL-CCNC: 23 U/L (ref 7–56)
AST SERPL-CCNC: 36 U/L (ref 14–36)
BASOPHILS # BLD AUTO: 0.03 K/MM3 (ref 0–2)
BASOPHILS NFR BLD: 0.5 % (ref 0–3)
BUN SERPL-MCNC: 17 MG/DL (ref 7–21)
CALCIUM SERPL-MCNC: 9.7 MG/DL (ref 8.4–10.5)
EOSINOPHIL # BLD: 0.3 10*3/UL (ref 0–0.7)
EOSINOPHIL NFR BLD: 4.4 % (ref 1.5–5)
ERYTHROCYTE [DISTWIDTH] IN BLOOD BY AUTOMATED COUNT: 15.4 % (ref 11.5–14.5)
GFR NON-AFRICAN AMERICAN: > 60
GRANULOCYTES # BLD: 3.31 10*3/UL (ref 1.4–6.5)
GRANULOCYTES NFR BLD: 51.9 % (ref 50–68)
HGB BLD-MCNC: 13.3 G/DL (ref 12–16)
INR PPP: 1.09 (ref 0.93–1.08)
LYMPHOCYTES # BLD: 2.3 10*3/UL (ref 1.2–3.4)
LYMPHOCYTES NFR BLD AUTO: 36.4 % (ref 22–35)
MCH RBC QN AUTO: 31.3 PG (ref 25–35)
MCHC RBC AUTO-ENTMCNC: 33.8 G/DL (ref 31–37)
MCV RBC AUTO: 92.7 FL (ref 80–105)
MONOCYTES # BLD AUTO: 0.4 10*3/UL (ref 0.1–0.6)
MONOCYTES NFR BLD: 6.8 % (ref 1–6)
PLATELET # BLD: 244 10^3/UL (ref 120–450)
PMV BLD AUTO: 9.1 FL (ref 7–11)
PROTHROMBIN TIME: 12.6 SECONDS (ref 9.4–12.5)
RBC # BLD AUTO: 4.25 10^6/UL (ref 3.5–6.1)
WBC # BLD AUTO: 6.4 10^3/UL (ref 4.5–11)

## 2018-04-20 NOTE — CP.PCM.DIS
<Earnest Dykesophe - Last Filed: 04/20/18 14:54>





Provider





- Provider


Date of Admission: 


04/19/18 11:25





Attending physician: 


Abbey Paz MD





Primary care physician: 


Abbey Paz MD





Consults: 





Psych: Dr. Masters


Time Spent in preparation of Discharge (in minutes): 25





Diagnosis





- Discharge Diagnosis


(1) Cervical radiculopathy


Status: Chronic   





(2) Left arm pain


Status: Chronic   





(3) Alcohol withdrawal syndrome


Status: Acute   





Hospital Course





- Lab Results


Lab Results: 


 Most Recent Lab Values











WBC  6.4 10^3/ul (4.5-11.0)   04/20/18  05:30    


 


RBC  4.25 10^6/uL (3.5-6.1)   04/20/18  05:30    


 


Hgb  13.3 g/dL (12.0-16.0)   04/20/18  05:30    


 


Hct  39.4 % (36.0-48.0)   04/20/18  05:30    


 


MCV  92.7 fl (80.0-105.0)   04/20/18  05:30    


 


MCH  31.3 pg (25.0-35.0)   04/20/18  05:30    


 


MCHC  33.8 g/dl (31.0-37.0)   04/20/18  05:30    


 


RDW  15.4 % (11.5-14.5)  H  04/20/18  05:30    


 


Plt Count  244 10^3/uL (120.0-450.0)   04/20/18  05:30    


 


MPV  9.1 fl (7.0-11.0)   04/20/18  05:30    


 


Gran %  51.9 % (50.0-68.0)   04/20/18  05:30    


 


Lymph % (Auto)  36.4 % (22.0-35.0)  H  04/20/18  05:30    


 


Mono % (Auto)  6.8 % (1.0-6.0)  H  04/20/18  05:30    


 


Eos % (Auto)  4.4 % (1.5-5.0)   04/20/18  05:30    


 


Baso % (Auto)  0.5 % (0.0-3.0)   04/20/18  05:30    


 


Gran #  3.31  (1.4-6.5)   04/20/18  05:30    


 


Lymph # (Auto)  2.3  (1.2-3.4)   04/20/18  05:30    


 


Mono # (Auto)  0.4  (0.1-0.6)   04/20/18  05:30    


 


Eos # (Auto)  0.3  (0.0-0.7)   04/20/18  05:30    


 


Baso # (Auto)  0.03 K/mm3 (0.0-2.0)   04/20/18  05:30    


 


ESR  12 mm/hr (0.0-20.0)   04/19/18  08:00    


 


PT  12.6 SECONDS (9.4-12.5)  H  04/20/18  05:30    


 


INR  1.09  (0.93-1.08)  H  04/20/18  05:30    


 


Sodium  141 mmol/L (132-148)   04/20/18  05:30    


 


Potassium  4.2 mmol/L (3.6-5.0)   04/20/18  05:30    


 


Chloride  106 mmol/L ()   04/20/18  05:30    


 


Carbon Dioxide  22 mmol/L (21-33)   04/20/18  05:30    


 


Anion Gap  17  (10-20)   04/20/18  05:30    


 


BUN  17 mg/dL (7-21)   04/20/18  05:30    


 


Creatinine  0.7 mg/dl (0.7-1.2)   04/20/18  05:30    


 


Est GFR ( Amer)  > 60   04/20/18  05:30    


 


Est GFR (Non-Af Amer)  > 60   04/20/18  05:30    


 


Random Glucose  100 mg/dL ()   04/20/18  05:30    


 


Calcium  9.7 mg/dL (8.4-10.5)   04/20/18  05:30    


 


Phosphorus  3.1 mg/dL (2.5-4.5)   04/19/18  13:20    


 


Magnesium  1.9 mg/dL (1.7-2.2)   04/19/18  13:20    


 


Total Bilirubin  1.0 mg/dL (0.2-1.3)   04/20/18  05:30    


 


AST  36 U/L (14-36)  D 04/20/18  05:30    


 


ALT  23 U/L (7-56)   04/20/18  05:30    


 


Alkaline Phosphatase  80 U/L ()   04/20/18  05:30    


 


Lactate Dehydrogenase  544 U/L (333-699)   04/19/18  08:00    


 


Total Creatine Kinase  48 U/L ()   04/19/18  08:00    


 


Troponin I  < 0.01 ng/mL  04/19/18  20:02    


 


C-React Prot High Sens  0.81 mg/L (1.00-3.00)  L  04/19/18  08:00    


 


Total Protein  7.6 g/dL (5.8-8.3)   04/20/18  05:30    


 


Albumin  4.2 g/dL (3.0-4.8)   04/20/18  05:30    


 


Globulin  3.3 gm/dL  04/20/18  05:30    


 


Albumin/Globulin Ratio  1.3  (1.1-1.8)   04/20/18  05:30    


 


Vitamin B12  389 pg/mL (239-931)   04/19/18  13:20    


 


Folate  8.8 ng/mL  04/19/18  13:20    


 


Free T4  0.84 ng/dL (0.78-2.19)   04/19/18  13:20    


 


TSH 3rd Generation  2.59 mIU/mL (0.46-4.68)   04/19/18  13:20    


 


Urine Opiates Screen  Positive  (NEGATIVE)  H  04/20/18  07:42    


 


Urine Methadone Screen  Negative  (NEGATIVE)   04/20/18  07:42    


 


Ur Barbiturates Screen  Negative  (NEGATIVE)   04/20/18  07:42    


 


Ur Phencyclidine Scrn  Negative  (NEGATIVE)   04/20/18  07:42    


 


Ur Amphetamines Screen  Negative  (NEGATIVE)   04/20/18  07:42    


 


U Benzodiazepines Scrn  Positive  (NEGATIVE)   04/20/18  07:42    


 


U Oth Cocaine Metabols  Negative  (NEGATIVE)   04/20/18  07:42    


 


U Cannabinoids Screen  Negative  (NEGATIVE)   04/20/18  07:42    


 


Alcohol, Quantitative  < 10 mg/dL (0-10)   04/19/18  08:00    














- Hospital Course


Hospital Course: 





Patient is a 62 year old female with past medical history of alcohol abuse, 

endometriosis, chronic left sided neck pain and paraesthesias and history of 

anxiety who presented with 2 month history of left sided neck pain and 

progressively worsening left upper extremity parasthesia. Patient also 

presented with anxiety related to medical problems in the setting of chronic 

history. Patient had CT scan of neck showing mild cervical stenosis. Patient 

was admitted to the hospital and evaluated with MRI of her neck which showed 

mild central spinal stenosis. Patient given 5 day supply of gabapentin and 

vistiril with instructions to follow up outpatient with PMD.  Dishcharge 

insturctions, medication reconciliation and appropriate return for symptoms 

were discussed and patient was agreeable and in understanding. At time of 

discharge patient was hemodynamically stable and neurologically stable. 





- Date & Time of H&P


Date of H&P: 04/19/18


Time of H&P: 13:42





Discharge Exam





- Head Exam


Head Exam: ATRAUMATIC, NORMAL INSPECTION, NORMOCEPHALIC





- Eye Exam


Eye Exam: EOMI, PERRL





- Neck Exam


Additional comments: 





limited range of motion turning to left





- Respiratory Exam


Respiratory Exam: Clear to PA & Lateral, UNREMARKABLE.  absent: Wheezes





- Cardiovascular Exam


Cardiovascular Exam: REGULAR RHYTHM, +S1, +S2





- GI/Abdominal Exam


GI & Abdominal Exam: Normal Bowel Sounds, Soft.  absent: Tenderness





- Extremities Exam


Extremities exam: normal inspection





- Back Exam


Back exam: NORMAL INSPECTION





- Neurological Exam


Neurological exam: Alert, Normal Gait, Oriented x3





- Psychiatric Exam


Psychiatric exam: Anxious, Normal Mood





- Skin


Skin Exam: Dry, Warm





Discharge Plan





- Discharge Medications


Prescriptions: 


Gabapentin [Neurontin] 100 mg PO BID 7 Days #14 cap


Gabapentin [Neurontin] 300 mg PO HS 7 Days  cap


hydrOXYzine Pamoate [Vistaril] 25 mg PO Q8 PRN #15 cap


 PRN Reason: Anxiety





- Follow Up Plan


Condition: STABLE


Disposition: HOME/ ROUTINE


Instructions:  Heart Healthy Diet, Chest Pain (DC), Palpitations (DC)


Additional Instructions: 


Follow up with primary care physician in 1 week. 


Take medications as prescribed to you. 


Referrals: 


Abbey Paz MD [Primary Care Provider] - 





<Abbey Paz - Last Filed: 04/20/18 16:20>





Provider





- Provider


Date of Admission: 


04/19/18 11:25





Attending physician: 


Abbey Paz MD





Primary care physician: 


Abbey Paz MD








Hospital Course





- Lab Results


Lab Results: 


 Most Recent Lab Values











WBC  6.4 10^3/ul (4.5-11.0)   04/20/18  05:30    


 


RBC  4.25 10^6/uL (3.5-6.1)   04/20/18  05:30    


 


Hgb  13.3 g/dL (12.0-16.0)   04/20/18  05:30    


 


Hct  39.4 % (36.0-48.0)   04/20/18  05:30    


 


MCV  92.7 fl (80.0-105.0)   04/20/18  05:30    


 


MCH  31.3 pg (25.0-35.0)   04/20/18  05:30    


 


MCHC  33.8 g/dl (31.0-37.0)   04/20/18  05:30    


 


RDW  15.4 % (11.5-14.5)  H  04/20/18  05:30    


 


Plt Count  244 10^3/uL (120.0-450.0)   04/20/18  05:30    


 


MPV  9.1 fl (7.0-11.0)   04/20/18  05:30    


 


Gran %  51.9 % (50.0-68.0)   04/20/18  05:30    


 


Lymph % (Auto)  36.4 % (22.0-35.0)  H  04/20/18  05:30    


 


Mono % (Auto)  6.8 % (1.0-6.0)  H  04/20/18  05:30    


 


Eos % (Auto)  4.4 % (1.5-5.0)   04/20/18  05:30    


 


Baso % (Auto)  0.5 % (0.0-3.0)   04/20/18  05:30    


 


Gran #  3.31  (1.4-6.5)   04/20/18  05:30    


 


Lymph # (Auto)  2.3  (1.2-3.4)   04/20/18  05:30    


 


Mono # (Auto)  0.4  (0.1-0.6)   04/20/18  05:30    


 


Eos # (Auto)  0.3  (0.0-0.7)   04/20/18  05:30    


 


Baso # (Auto)  0.03 K/mm3 (0.0-2.0)   04/20/18  05:30    


 


ESR  12 mm/hr (0.0-20.0)   04/19/18  08:00    


 


PT  12.6 SECONDS (9.4-12.5)  H  04/20/18  05:30    


 


INR  1.09  (0.93-1.08)  H  04/20/18  05:30    


 


Sodium  141 mmol/L (132-148)   04/20/18  05:30    


 


Potassium  4.2 mmol/L (3.6-5.0)   04/20/18  05:30    


 


Chloride  106 mmol/L ()   04/20/18  05:30    


 


Carbon Dioxide  22 mmol/L (21-33)   04/20/18  05:30    


 


Anion Gap  17  (10-20)   04/20/18  05:30    


 


BUN  17 mg/dL (7-21)   04/20/18  05:30    


 


Creatinine  0.7 mg/dl (0.7-1.2)   04/20/18  05:30    


 


Est GFR ( Amer)  > 60   04/20/18  05:30    


 


Est GFR (Non-Af Amer)  > 60   04/20/18  05:30    


 


Random Glucose  100 mg/dL ()   04/20/18  05:30    


 


Calcium  9.7 mg/dL (8.4-10.5)   04/20/18  05:30    


 


Phosphorus  3.1 mg/dL (2.5-4.5)   04/19/18  13:20    


 


Magnesium  1.9 mg/dL (1.7-2.2)   04/19/18  13:20    


 


Total Bilirubin  1.0 mg/dL (0.2-1.3)   04/20/18  05:30    


 


AST  36 U/L (14-36)  D 04/20/18  05:30    


 


ALT  23 U/L (7-56)   04/20/18  05:30    


 


Alkaline Phosphatase  80 U/L ()   04/20/18  05:30    


 


Lactate Dehydrogenase  544 U/L (333-699)   04/19/18  08:00    


 


Total Creatine Kinase  48 U/L ()   04/19/18  08:00    


 


Troponin I  < 0.01 ng/mL  04/19/18  20:02    


 


C-React Prot High Sens  0.81 mg/L (1.00-3.00)  L  04/19/18  08:00    


 


Total Protein  7.6 g/dL (5.8-8.3)   04/20/18  05:30    


 


Albumin  4.2 g/dL (3.0-4.8)   04/20/18  05:30    


 


Globulin  3.3 gm/dL  04/20/18  05:30    


 


Albumin/Globulin Ratio  1.3  (1.1-1.8)   04/20/18  05:30    


 


Vitamin B12  389 pg/mL (239-931)   04/19/18  13:20    


 


Folate  8.8 ng/mL  04/19/18  13:20    


 


Free T4  0.84 ng/dL (0.78-2.19)   04/19/18  13:20    


 


TSH 3rd Generation  2.59 mIU/mL (0.46-4.68)   04/19/18  13:20    


 


Urine Opiates Screen  Positive  (NEGATIVE)  H  04/20/18  07:42    


 


Urine Methadone Screen  Negative  (NEGATIVE)   04/20/18  07:42    


 


Ur Barbiturates Screen  Negative  (NEGATIVE)   04/20/18  07:42    


 


Ur Phencyclidine Scrn  Negative  (NEGATIVE)   04/20/18  07:42    


 


Ur Amphetamines Screen  Negative  (NEGATIVE)   04/20/18  07:42    


 


U Benzodiazepines Scrn  Positive  (NEGATIVE)   04/20/18  07:42    


 


U Oth Cocaine Metabols  Negative  (NEGATIVE)   04/20/18  07:42    


 


U Cannabinoids Screen  Negative  (NEGATIVE)   04/20/18  07:42    


 


Alcohol, Quantitative  < 10 mg/dL (0-10)   04/19/18  08:00    














Attending/Attestation





- Attestation


I have personally seen and examined this patient.: Yes


I have fully participated in the care of the patient.: Yes


I have reviewed all pertinent clinical information, including history, physical 

exam and plan: Yes


Notes (Text): 





04/20/18 16:15


Medical record note made by the resident after discussion with my direction and 

input after the patient was personally seen and examined by me. I have reviewed 

the chart and agree that the record accurately reflects by personal performance 

of the history, physical exam, data review, and medical decision-making, in the 

course for the patient. I have also personally directed the plan of care.





62 year old female  with PMH of  significant for alcohol abuse, anxiety, 

chronic smoking and non compliance with medication was admitted with history of 

worsening neck pain associated with parethesis of left arm getting worse over 

few weeks.CT scan of neck showed degenerative disease and mild c4-5 

stenosis.Patient has normal power in both upper  and lower extremities.MRI of 

neck showed degenerative spine disease and mild s4-5 stenosis.Her symptoms are 

better with Gabapentin.She is ambulatory and does not has any focal 

deficit.There is no sign of alcohol withdrawal.Telemetry was unremarkable.


She has been advised to stop drinking.She is planning to follow up with 

 next week.She will also follow up with Neurology as out patient.





The need of compliance with medication and follow up with PCP and Neurology was 

discussed in detail.





 





Management plan was discussed in detail with patient. Education was provided.

## 2018-04-20 NOTE — MRI
PROCEDURE:  MR CERVICAL SPINE WITHOUT CONTRAST



HISTORY:

Left radiculopathy 



COMPARISON:

None available. 



TECHNIQUE:

Multiecho multiplanar sequences were performed through the cervical 

spine without the use of intravenous contrast.



FINDINGS:

There is normal alignment of the cervical vertebral bodies.  There is 

normal cervical lordosis.  There is no acute fracture or 

spondylolisthesis. The craniocervical junction is normal.  There is 

mild degenerative osteoarthrosis at the atlantoaxial joint.  There 

are mild degenerative endplate marrow changes at C5-6 and C6-7.  

Otherwise, bone marrow signal is within normal limits. There is a 

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 



The cervical cord is normal in contour, caliber and has normal 

intrinsic signal. 



There is mild fatty atrophy of the paraspinous muscles. No 

prevertebral soft tissue thickening. 



C2-C3:

No disc herniation, spinal canal stenosis or neural foraminal 

narrowing. 



C3-C4:

Central disc extrusion with approximately 10 mm extrudes in of disc 

fragment which indents the ventral thecal sac with mild spinal canal 

stenosis. Also noted is superimposed left foraminal disc protrusion 

which likely impinges on the exiting C4 nerve root. There is mild 

asymmetric uncovertebral joint hypertrophy and mild bilateral facet 

arthropathy which contributes to mild right and moderate left neural 

foraminal narrowing 



C4-C5:

Broad-based disc osteophyte complex and mild spinal canal stenosis.  

Mild bilateral facet arthropathy contribute to moderate right and 

severe left neural foraminal narrowing. 



C5-C6:

Disc osteophyte complex with central disc protrusion indents the 

ventral thecal sac with mild spinal canal stenosis. Mild bilateral 

facet arthropathy contribute to moderate right and severe left neural 

foraminal narrowing 



C6-C7:

Central disc protrusion indents the thecal sac with mild spinal canal 

stenosis.  Mild bilateral facet arthropathy with mild neural 

foraminal narrowing, worse on the left. 



C7-T1:

No  disc herniation, spinal canal stenosis or neural foraminal 

narrowing. 



OTHER FINDINGS:

None. 



IMPRESSION:

1. Multilevel degenerative disc disease superimposed on a 

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 

worse at C3-4 with a central disc extrusion and mild spinal canal 

stenosis as well as left foraminal disc protrusion with impingement 

on the exiting left C4 nerve root.



2. Additional comments as described above.

## 2018-04-23 NOTE — CON
DATE:  04/20/2018



HISTORY OF PRESENT ILLNESS:  Patient is a  62-year-old white female

with a psychiatric history notable for severe alcohol use disorder,

anxiety, bipolar II disorder, one psychiatric medication for one day in

12/2016, no suicide attempts, and currently psychiatric treatment with Dr. Fernandez, and being prescribed Valium at bedtime, who presented to the ER and

was subsequently medically hospitalized with a 2-month history of

left-sided neck pain and progressively worsening left upper extremity

paresthesia.  Psychiatry was called because of patient's psychiatric

history and to evaluate for anxiety.



I reviewed recent notes and met with patient at bedside.  Patient is quite

familiar to me from my followup consultation with her on 01/27/2018.  At

this time, patient reports that she is not feeling well due to her medical

issues.  She admits that she has been drinking approximately 2 large beers

daily, her last drink was a couple of days ago.  Patient has a history of

binge drinking and she can go many days without drinking; however, when she

does drink, she drinks quite excessively.  Presently, she reports chronic

stressors of her finances, inability to get her car because she has about

$500 of back parking tickets that she cannot pay off, and a stressful

relationship with her son and her daughter as well as her drinking problem.

Patient is not suicidal.  She is hopeful about the future.  Patient reports

that presently regarding her anxiety, she feels that Ativan is actually

working much better than the Valium prescribed by Dr. Fernandez and she does

not want to change this medication.  Sleep is still up and down and

consistent with her prior evaluations.  Presently, affect shows some range

and some reactivity though it is constricted.  Patient's thought process is

coherent, and she is alert and oriented x3.  She is well aware of the

current circumstances.  Patient denies having any hallucination and she

denies having any complex _____ and she does not have any thoughts to harm

others.  She came _____ fair control on the unit, compliant with

medications and recommendations.  Patient reports that she is future

oriented and plans to follow up with Dr. Fernandez as well as her primary care

doctor, Dr. Peralta when she is medically stabilized.  The patient is not

interested in psychiatric inpatient stabilization, indicates that she had

negative experience in her prior hospitalization in 12/2016.



Vital signs and laboratory work were reviewed by this provider.  Opioid

screen was positive and BAL was less than 10 on 04/19/2017, which was

yesterday.



MEDICATIONS:  Relative psychiatric medications include Ativan 1 mg every 6

p.r.n., which patient received one dose this morning and one dose yesterday

evening.



PSYCHIATRIC HISTORY:  As noted, patient is in treatment with Dr. Fernandez. 

Last followup less than a month ago.  She prescribed Valium only at night

and she does not take it every single day to help her sleep.  This has

stopped working recently, and presently patient feels Ativan is much more

effective.  Patient also reports having a history of taking Ritalin;

however, this was stopped as they can exacerbate current medical issues. 

Patient reports no history of suicide attempts, and one hospitalization at

[facility] from 12/20/2016 to 12/21/2016, and she was

discharged _____ at that time, with a diagnosis of bipolar II disorder and

severe alcohol use disorder.



SOCIAL HISTORY:  Patient was born and raised in [place].  She was

; however, she is  at this time.  She lives by herself.  She

is not employed.  She has two children, a son and a daughter, and 4

grandchildren.  However, she has some discord with them recently and feels

they have not been appreciative or involved in her life as much as she

likes them to be.  Patient denies having any drug problems; however, has a

history of drinking excessively, specifically binge drinking and has been

drinking since she _____ rehab in the past.  Denies any history of

withdrawal seizures.  Patient denies any drug use.



IMPRESSION:  Severe alcohol use disorder, likely substance-induced mood

disorder and substance-induced anxiety disorder, rule out generalized

anxiety disorder.



Would continue Ativan 1 mg every 6 p.r.n. as this is helping with her

anxiety.  Patient should continue to follow with Dr. Fernandez for psychiatric

management when she is medically stabilized.  Patient is not interested in

psychiatric hospitalization and she does not meet criteria for involuntary

transfer.  It does appear that most of her issues are related to her

alcoholism and patient would benefit from rehab; however, she is not

interested in this option at this time.  Psychiatry will sign off at this

time.  Medical team should monitor the patient's vitals in case she has

been minimizing her alcohol use prior to admission and ensure that patient

does not go through alcohol withdrawal as she has previous medical

admissions to this hospital.





__________________________________________

Shola No MD



DD:  04/20/2018 11:26:26

DT:  04/20/2018 14:25:26

Job # [id number]

## 2018-06-29 ENCOUNTER — HOSPITAL ENCOUNTER (EMERGENCY)
Dept: HOSPITAL 42 - ED | Age: 63
Discharge: HOME | End: 2018-06-29
Payer: MEDICAID

## 2018-06-29 VITALS — DIASTOLIC BLOOD PRESSURE: 89 MMHG | SYSTOLIC BLOOD PRESSURE: 139 MMHG | HEART RATE: 88 BPM

## 2018-06-29 VITALS — BODY MASS INDEX: 23.1 KG/M2

## 2018-06-29 VITALS — OXYGEN SATURATION: 100 % | RESPIRATION RATE: 18 BRPM | TEMPERATURE: 98 F

## 2018-06-29 DIAGNOSIS — S00.83XA: ICD-10-CM

## 2018-06-29 DIAGNOSIS — M54.12: Primary | ICD-10-CM
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
## 2018-06-29 NOTE — CT
PROCEDURE:  CT Cervical Spine without contrast



HISTORY:

chronic neck pain



COMPARISON:

None available.



TECHNIQUE:

Axial computed tomography images were obtained of the cervical spine 

without the use of intravenous contrast. Coronal and sagittal 

reformatted images were created and reviewed.



Radiation dose: 



Total exam DLP =  mGy-cm.



This CT exam was performed using one or more of the following dose 

reduction techniques: Automated exposure control, adjustment of the 

mA and/or kV according to patient size, and/or use of iterative 

reconstruction technique.



FINDINGS:



VERTEBRAE:

No fracture. Normal alignment. No destructive bony lesion.



DISCS/SPINAL CANAL/NEURAL FORAMINA:

C3-4 in diffuse disc bulge moderate spinal stenosis.  Disc space 

narrowing endplate spurs at of tubal facet arthropathy. C4-5 through 

C6-7 mild space narrowing, and spondylosis. 



PARASPINAL SOFT TISSUES:

Unremarkable. 



OTHER FINDINGS:

None.



IMPRESSION:

No fracture.

## 2018-06-29 NOTE — ED PDOC
Arrival/HPI





- General


Chief Complaint: Back Pain


Time Seen by Provider: 06/29/18 00:27


Historian: Patient





- History of Present Illness


Narrative History of Present Illness (Text): 





06/29/18 02:37


Patient is a 62 year old female whose past medical history includes chronic 

neck pain and anxiety, who presents to the Emergency department complaining of 

worsening chronic neck pain.  Patient has a long standing history of neck pain 

and has had an MRI in the past which she states showed multiple degenerative 

disc disease. She also mentions having radiculopathy.  Patient reports that her 

neck pain is radiating down her left upper extremity. She denies any new 

weakness, incontinence, or difficulty when walking. She denies any recent fall 

or injury to her neck, but admits that she did have a "box falling on face" and 

has bruising on the left side of her face. Patient denies taking anticoagulants 

and has a long history of anxiety.  She alleges having an allergy to Motrin and 

NSAIDs.  Of note she has chronic sensory deficits in hands but denies any 

weakness.





Symptom Onset: Other (chronic)


Symptom Course: Worsening


Context: Home





Past Medical History





- Provider Review


Nursing Documentation Reviewed: Yes





- Past History


Past History: No Previous





- Infectious Disease


Hx of Infectious Diseases: None





- Tetanus Immunization


Tetanus Immunization: Unknown





- Cardiac


Hx Cardiac Disorders: No





- Pulmonary


Hx Respiratory Disorders: No





- Neurological


Hx Neurological Disorder: No





- HEENT


Hx HEENT Disorder: No





- Renal


Hx Renal Disorder: No





- Endocrine/Metabolic


Hx Endocrine Disorders: No





- Hematological/Oncological


Hx Blood Disorders: No





- Integumentary


Hx Dermatological Disorder: Yes (multiple bruising from fall)





- Musculoskeletal/Rheumatological


Hx Musculoskeletal Disorders: Yes (lumbar radiculotpathy,)


Hx Falls: Yes


Other/Comment: endometriosis





- Gastrointestinal


Hx Gastrointestinal Disorders: Yes (alcoholic liver)


Other/Comment: h/o rectal bleed





- Genitourinary/Gynecological


Hx Genitourinary Disorders: No


Hx Urinary Tract Infection: Yes





- Psychiatric


Hx Psychophysiologic Disorder: Yes


Hx Bipolar Disorder: Yes


Hx Depression: Yes


Hx Substance Use: No





- Surgical History


Other/Comment: endometriosis x2





- Anesthesia


Hx Anesthesia: No


Hx Anesthesia Reactions: No


Hx Malignant Hyperthermia: No





- Suicidal Assessment


Feels Threatened In Home Enviroment: No





Family/Social History





- Physician Review


Nursing Documentation Reviewed: Yes


Family/Social History: No Known Family HX


Smoking Status: Never Smoked


Hx Alcohol Use: Yes (etoh abuse,Last drink was 2 d ago 2 glasses of vodka.Binge 

drinks.)


Frequency of alcohol use: Socially


Hx Substance Use: No


Hx Substance Use Treatment: No





Allergies/Home Meds


Allergies/Adverse Reactions: 


Allergies





No Known Allergies Allergy (Verified 04/19/18 11:38)


 








Home Medications: 


 Home Meds











 Medication  Instructions  Recorded  Confirmed


 


LORazepam [Ativan] 1 mg PO PRN PRN 06/29/18 06/29/18


 


Methylphenidate HCl [Ritalin] 20 mg PO TID PRN 06/29/18 06/29/18














Review of Systems





- Physician Review


All systems were reviewed & negative as marked: Yes





- Review of Systems


Genitourinary Female: absent: Other (incontinence)


Musculoskeletal: Neck Pain


Neurological: absent: Focal Weakness





Physical Exam


Vital Signs Reviewed: Yes


Vital Signs











  Temp Pulse Resp BP Pulse Ox


 


 06/29/18 04:03   88  18  139/89  100


 


 06/29/18 02:51  98 F  90  18  145/83  100


 


 06/29/18 00:18  97.9 F  97 H  19  151/89 H  97











Temperature: Afebrile


Blood Pressure: Hypertensive


Pulse: Regular


Respiratory Rate: Normal


Appearance: Positive for: Well-Appearing


Mental Status: Positive for: Alert and Oriented X 3





- Systems Exam


Head: Present: Atraumatic, Normocephalic


Pupils: Present: PERRL


Extroacular Muscles: Present: EOMI


Conjunctiva: Present: Normal


Mouth: Present: Moist Mucous Membranes


Neck: Present: Normal Range of Motion, Paraspinal Tenderness (diffuse tenderness

)


Respiratory/Chest: Present: Clear to Auscultation, Good Air Exchange.  No: 

Respiratory Distress, Accessory Muscle Use


Cardiovascular: Present: Regular Rate and Rhythm, Normal S1, S2.  No: Murmurs


Abdomen: No: Tenderness, Distention, Peritoneal Signs


Back: Present: Normal Inspection


Upper Extremity: Present: Normal Inspection.  No: Cyanosis, Edema


Lower Extremity: Present: Normal Inspection.  No: Edema


Neurological: Present: GCS=15, CN II-XII Intact, Speech Normal, Other (Good 

 strength in both hands; no focal or neurological deficit)


Skin: Present: Diaphoretic, Other (Ecchymosis on left orbit and chin).  No: 

Rashes


Psychiatric: Present: Alert, Oriented x 3, Normal Insight, Normal Concentration

, Anxious





Medical Decision Making


ED Course and Treatment: 


06/29/18 02:37


Impression:


Patient is a 62 year old female who presents to the Emergency department 

complaining of worsening chronic neck pain.





Differential Diagnosis included but are not limited to:  Orbital fracture vs. 

cervical strain vs. malingering vs. Radiculopathy





Plan:


--Cervical spine CT without contrast


--Head CT without contrast


--Maxillofacial CT without contrast


--Valium


--Percocet


-- Reassess and disposition





Prior Visits:


Notes and results from previous visits were reviewed. 


Progress Notes:





06/29/18


CT Cervical Spine Without Intravenous Contrast:


COMPARISON:


CT - CERVICAL SPINE W/O CONTRAST 2018-04-19 08:40


FINDINGS:


There is no evidence of acute fracture.


There is no evidence of malalignment or dislocation.


C3-4 diffuse disc bulge results in moderate spinal canal stenosis. Disc space 

narrowing, endplate


spurs, uncovertebral hypertrophy and facet arthropathy.


C4-5 to C6-7: Mild disc space narrowing, endplate spurs, uncovertebral 

hypertrophy and facet


arthropathy. The facet joints are intact.


The pharyngeal, hypopharyngeal, and laryngeal structures are unremarkable.


There is no evidence of lymphadenopathy.


The visualized portions of the lung apices are normal.


IMPRESSION:


No acute fracture or dislocation. Cervical spondylosis.


Dictated and Authenticated by: Heide Singleton MD





CT Maxillofacial Without Intravenous Contrast:


COMPARISON:


CT - MAXILLOFACIAL W/O CONTRAST 2016-12-16 17:39


FINDINGS:


Bones/joints: No acute fracture.


Soft tissues: Unremarkable.


Orbits: Left periorbital soft tissue swelling. No orbital floor fracture.


Sinuses: Unremarkable. No air-fluid levels.


IMPRESSION:


No fracture or dislocation.


Dictated and Authenticated by: Heide Singleton MD








CT Head Without Intravenous Contrast:


COMPARISON:


CT - HEAD W/O CONTRAST 2018-01-24 02:35


FINDINGS:


Brain: There is mild diffuse cerebral atrophy present, consistent with this 

patient's age. There is mild


diffuse heterogeneity of the white matter attenuation, consistent with chronic 

white matter ischemic


changes. No hemorrhage.


Ventricles: The ventricular system demonstrates mild diffuse compensatory 

enlargement.


Bones/joints: Unremarkable. No acute fracture.


Soft tissues: Unremarkable.


Sinuses: Unremarkable as visualized. No acute sinusitis.


Mastoid air cells: Unremarkable as visualized. No mastoid effusion.


IMPRESSION:


Age-related atrophy and chronic white matter ischemic changes, with no evidence 

of an acute intracranial abnormality.


Dictated and Authenticated by: Heide Singleton MD





06/29/18 03:51


Reevaluation:


On reevaluation the patient feels better and is in no acute distress. I have 

discussed the results and plan with the patient, who expresses understanding. 

Patient given the opportunity to ask question, all questions were answered and 

there is agreement with the plan to discharge the patient home.  Patient is 

stable for discharge. Patient was instructed to follow up with physician/clinic 

in 1-2 days or return if symptoms persist/worsen or new concerning symptoms 

arise.


 











- RAD Interpretation


Radiology Orders: 








06/29/18 00:54


CERVICAL SPINE W/O CONTRAST [CT] Stat 


HEAD W/O CONTRAST [CT] Stat 


MAXILLOFACIAL W/O CONTRAST [CT] Stat 











: Radiologist





- Medication Orders


Current Medication Orders: 











Discontinued Medications





Diazepam (Valium)  5 mg PO ONCE ONE


   PRN Reason: Protocol


   Stop: 06/29/18 01:09


   Last Admin: 06/29/18 01:13  Dose: 5 mg





Oxycodone/Acetaminophen (Percocet 5/325 Mg Tab)  1 tab PO STAT STA


   Stop: 06/29/18 01:09


   Last Admin: 06/29/18 01:12  Dose: 1 tab





MAR Pain Assessment


 Document     06/29/18 01:12  LA  (Rec: 06/29/18 01:13  MOUNIKA BRAND-PC)


     Pain Reassessment


      Is this a pain reassessment?               No


     Sleep


      Is patient sleeping during reassessment?   No


     Presence of Pain


      Presence of Pain                           Yes


     Pain Scale Used


      Pain Scale Used                            Numeric


     Location


      Left, Right or Bilateral                   Left


      Pain Location Body Site                    Neck


                                                 Arm


     Description


      Description                                Constant


      Intensity of Pain at present               10


      Pain Behavior                              Guarding


Re-Assess: MAR Pain Assessment


 Document     06/29/18 02:12  LA  (Rec: 06/29/18 02:53  MOUNIKA BRAND-PC)


     Pain Reassessment


      Is this a pain reassessment?               Yes


     Sleep


      Is patient sleeping during reassessment?   No


     Presence of Pain


      Presence of Pain                           Yes


     Pain Scale Used


      Pain Scale Used                            Numeric


     Location


      Left, Right or Bilateral                   Left


      Pain Location Body Site                    Neck


                                                 Arm


     Description


      Intensity of Pain at present               5





Oxycodone/Acetaminophen (Percocet 5/325 Mg Tab)  1 tab PO STAT STA


   Stop: 06/29/18 03:38


   Last Admin: 06/29/18 03:57  Dose: 1 tab





MAR Pain Assessment


 Document     06/29/18 03:57  RD  (Rec: 06/29/18 03:57  RD  [id number])


     Pain Reassessment


      Is this a pain reassessment?               No


     Sleep


      Is patient sleeping during reassessment?   No


     Presence of Pain


      Presence of Pain                           Yes














- Scribe Statement


The provider has reviewed the documentation as recorded by the Scribe


Nathanael Luu


Provider Scribe Attestation:


All medical record entries made by the Scribe were at my direction and 

personally dictated by me. I have reviewed the chart and agree that the record 

accurately reflects my personal performance of the history, physical exam, 

medical decision making, and the department course for this patient. I have 

also personally directed, reviewed, and agree with the discharge instructions 

and disposition. 





Disposition/Present on Arrival





- Present on Arrival


Any Indicators Present on Arrival: No


History of DVT/PE: No


History of Uncontrolled Diabetes: No


Urinary Catheter: No


History of Decub. Ulcer: No


History Surgical Site Infection Following: None





- Disposition


Have Diagnosis and Disposition been Completed?: Yes


Diagnosis: 


 Cervical radiculopathy, Facial contusion





Disposition: HOME/ ROUTINE


Disposition Time: 03:56


Patient Plan: Discharge


Condition: IMPROVED


Discharge Instructions (ExitCare):  [place], Radiculopathy


Referrals: 


Debby Adam NP [Primary Care Provider] - Follow up with primary


Forms:  [employer] (English)

## 2018-06-29 NOTE — CT
PROCEDURE:  CT MAXILLOFACIAL BONES WITHOUT CONTRAST



HISTORY:

trauma



COMPARISON:

None



TECHNIQUE:

Contiguous axial CT  images of the maxillofacial bones were obtained. 

Coronal and sagittal reformats were generated.



Radiation dose:



Total exam DLP =  mGy-cm.



This CT exam was performed using one or more of the following dose 

reduction techniques: Automated exposure control, adjustment of the 

mA and/or kV according to patient size, and/or use of iterative 

reconstruction technique.



FINDINGS:



NASAL BONES:

Unremarkable.



ORBITS:

Left periorbital soft tissue swelling.



PARANASAL SINUSES/ MASTOIDS:

Clear.



MAXILLA:

Unremarkable.



MANDIBLE/ TEMPOROMANDIBULAR JOINTS:

Unremarkable.



SKULL BASE:

Unremarkable.



TEMPORAL BONES:

Middle ears and mastoid grossly unremarkable.



OTHER FINDINGS:

None.



IMPRESSION:

No fracture.

## 2018-09-13 ENCOUNTER — HOSPITAL ENCOUNTER (EMERGENCY)
Dept: HOSPITAL 42 - ED | Age: 63
Discharge: HOME | End: 2018-09-13
Payer: MEDICAID

## 2018-09-13 VITALS — HEART RATE: 90 BPM | OXYGEN SATURATION: 98 %

## 2018-09-13 VITALS — SYSTOLIC BLOOD PRESSURE: 123 MMHG | RESPIRATION RATE: 18 BRPM | DIASTOLIC BLOOD PRESSURE: 84 MMHG

## 2018-09-13 VITALS — TEMPERATURE: 98.3 F

## 2018-09-13 VITALS — BODY MASS INDEX: 23.9 KG/M2
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
## 2018-09-13 DIAGNOSIS — T78.49XA: Primary | ICD-10-CM

## 2018-09-13 DIAGNOSIS — M54.2: ICD-10-CM

## 2018-09-13 PROCEDURE — 96374 THER/PROPH/DIAG INJ IV PUSH: CPT

## 2018-09-13 PROCEDURE — 99283 EMERGENCY DEPT VISIT LOW MDM: CPT

## 2018-09-13 PROCEDURE — 93005 ELECTROCARDIOGRAM TRACING: CPT

## 2018-09-13 PROCEDURE — 96375 TX/PRO/DX INJ NEW DRUG ADDON: CPT

## 2018-09-13 NOTE — ED PDOC
Arrival/HPI





- General


Chief Complaint: Allergic Reaction


Time Seen by Provider: 09/13/18 04:54


Historian: Patient





- History of Present Illness


Narrative History of Present Illness (Text): 


09/13/18 05:03


Zuleyma Lino is a 63 year old female, whose past medical history includes 

chronic left-sided neck pain secondary to degenerative disc disease, alcohol 

abuse, anxiety, and alcohol hepatitis, who presents to the Emergency department 

brought in by EMS complaining of possible allergic reaction tonight. Patient 

states she has been experiencing chronic left sided neck/shoulder pain tonight 

and was unable to sleep so she took t tablet of Flexeril. Patient states she 

then developed diffuse itching 15-20 minutes prior to arrival. Patient notes 

when she took Flexeril previously, her "tongue felt funny." Patient denies any 

wheezing, shortness of breath, nausea, vomiting, headache, dizziness, facial/

lip swelling, or any other complaints.


Symptom Onset: Gradual


Symptom Course: Unchanged


Activities at Onset: Light


Context: Home





Past Medical History





- Provider Review


Nursing Documentation Reviewed: Yes





- Past History


Past History: No Previous





- Infectious Disease


Hx of Infectious Diseases: None





- Tetanus Immunization


Tetanus Immunization: Unknown





- Cardiac


Hx Cardiac Disorders: No





- Pulmonary


Hx Respiratory Disorders: No





- Neurological


Hx Neurological Disorder: No





- HEENT


Hx HEENT Disorder: No





- Renal


Hx Renal Disorder: No





- Endocrine/Metabolic


Hx Endocrine Disorders: No





- Hematological/Oncological


Hx Blood Disorders: No





- Integumentary


Hx Dermatological Disorder: Yes (multiple bruising from fall)





- Musculoskeletal/Rheumatological


Hx Musculoskeletal Disorders: Yes (lumbar radiculotpathy,)


Hx Falls: Yes


Hx Spinal Stenosis: Yes


Other/Comment: endometriosis





- Gastrointestinal


Hx Gastrointestinal Disorders: Yes (alcoholic liver)


Other/Comment: h/o rectal bleed





- Genitourinary/Gynecological


Hx Genitourinary Disorders: No


Hx Urinary Tract Infection: Yes





- Psychiatric


Hx Psychophysiologic Disorder: Yes


Hx Bipolar Disorder: Yes


Hx Depression: Yes


Hx Substance Use: No





- Surgical History


Other/Comment: endometriosis x2





- Anesthesia


Hx Anesthesia: No


Hx Anesthesia Reactions: No


Hx Malignant Hyperthermia: No





- Suicidal Assessment


Feels Threatened In Home Enviroment: No





Family/Social History





- Physician Review


Nursing Documentation Reviewed: Yes


Family/Social History: Unknown Family HX


Smoking Status: Never Smoked


Hx Alcohol Use: Yes (etoh abuse,Last drink was 2 d ago 2 glasses of vodka.Binge 

drinks.)


Hx Substance Use: No


Hx Substance Use Treatment: No





Allergies/Home Meds


Allergies/Adverse Reactions: 


Allergies





ibuprofen Allergy (Verified 09/13/18 04:47)


 SHORTNESS OF BREATH











Review of Systems





- Physician Review


All systems were reviewed & negative as marked: Yes





- Review of Systems


Constitutional: Normal.  absent: Fevers


Eyes: Normal


ENT: Normal


Respiratory: Normal.  absent: SOB, Cough


Cardiovascular: Normal.  absent: Chest Pain


Gastrointestinal: Normal.  absent: Abdominal Pain, Diarrhea, Nausea, Vomiting


Genitourinary Female: Normal.  absent: Dysuria, Frequency, Hematuria, Urine 

Output Changes


Musculoskeletal: Neck Pain


Skin: Pruritis


Neurological: Normal.  absent: Headache, Dizziness


Endocrine: Normal


Hemo/Lymphatic: Normal


Psychiatric: Normal





Physical Exam


Vital Signs Reviewed: Yes


Vital Signs











  Temp Pulse Resp BP Pulse Ox


 


 09/13/18 04:59  98.3 F    


 


 09/13/18 04:50   127 H  18  123/84  97











Temperature: Afebrile


Blood Pressure: Normal


Pulse: Tachycardic


Respiratory Rate: Normal


Appearance: Positive for: Well-Appearing, Non-Toxic, Comfortable


Pain Distress: None


Mental Status: Positive for: Alert and Oriented X 3





- Systems Exam


Head: Present: Atraumatic, Normocephalic


Pupils: Present: PERRL


Extroacular Muscles: Present: EOMI


Conjunctiva: Present: Normal


Ears: Present: Normal, NORMAL TM, Normal Canal.  No: Erythema, TM Bulging, Fluid

, TM Perf


Mouth: Present: Moist Mucous Membranes


Pharnyx: Present: Normal.  No: ERYTHEMA, EXUDATE, TONSILS ENLARGED, 

Peritonsilar Swelling, Uvular Deviation, Muffled/Hoarse Voice, Strider, Soft 

Palate/Uvular Edema


Nose (External): Present: Atraumatic


Nose (Internal): Present: Normal Inspection


Neck: Present: Normal Range of Motion.  No: Meningeal Signs, MIDLINE TENDERNESS

, Paraspinal Tenderness


Respiratory/Chest: Present: Clear to Auscultation, Good Air Exchange.  No: 

Respiratory Distress, Accessory Muscle Use


Cardiovascular: Present: Regular Rate and Rhythm, Normal S1, S2.  No: Murmurs


Abdomen: No: Tenderness, Distention, Peritoneal Signs


Upper Extremity: Present: Normal Inspection.  No: Cyanosis, Edema


Lower Extremity: Present: Normal Inspection.  No: Edema


Neurological: Present: GCS=15, CN II-XII Intact, Speech Normal


Skin: Present: Warm, Dry, Normal Color.  No: Rashes


Psychiatric: Present: Alert, Oriented x 3, Normal Insight, Normal Concentration





Medical Decision Making


ED Course and Treatment: 


09/13/18 05:03


Impression:


63 year old female complaining of an allergic reaction.





Plan:


-- Benadryl


-- Pepcid


-- Solu-medrol


-- Reassess and disposition





Prior Visits:


Notes and results from previous visits were reviewed. 


On 06/29/18, pt was seen in the Emergency department for neck pain. Pt was 

discharged home.





Progress Notes:


Reviewed EKG, sinus tachycardia at 115 bpm. Non-specific ST/T wave changes.








- EKG Interpretation


Interpreted by ED Physician: Yes


Type: 12 lead EKG





- Medication Orders


Current Medication Orders: 








Oxycodone/Acetaminophen (Percocet 5/325 Mg Tab)  1 tab PO STAT STA


   Stop: 09/13/18 06:32





Discontinued Medications





Diphenhydramine HCl (Benadryl)  25 mg IVP STAT STA


   Stop: 09/13/18 05:01


   Last Admin: 09/13/18 05:13  Dose: 25 mg





IVP Administration


 Document     09/13/18 05:13  SS  (Rec: 09/13/18 05:13  SS  [id number])


     Charges for Administration


      # of IVP Administrations                   1





Famotidine (Pepcid)  20 mg IVP STAT STA


   Stop: 09/13/18 05:01


   Last Admin: 09/13/18 05:13  Dose: 20 mg





IVP Administration


 Document     09/13/18 05:13  SS  (Rec: 09/13/18 05:13  SS  [id number])


     Charges for Administration


      # of IVP Administrations                   1





Methylprednisolone (Solu-Medrol)  125 mg IVP STAT STA


   Stop: 09/13/18 05:01


   Last Admin: 09/13/18 05:13  Dose: 125 mg





IVP Administration


 Document     09/13/18 05:13  SS  (Rec: 09/13/18 05:13  SS  [id number])


     Charges for Administration


      # of IVP Administrations                   1














- Scribe Statement


The provider has reviewed the documentation as recorded by the Brenna Benson





Provider Scribe Attestation:


All medical record entries made by the Scribe were at my direction and 

personally dictated by me. I have reviewed the chart and agree that the record 

accurately reflects my personal performance of the history, physical exam, 

medical decision making, and the department course for this patient. I have 

also personally directed, reviewed, and agree with the discharge instructions 

and disposition.








Disposition/Present on Arrival





- Present on Arrival


Any Indicators Present on Arrival: No


History of DVT/PE: No


History of Uncontrolled Diabetes: No


Urinary Catheter: No


History of Decub. Ulcer: No


History Surgical Site Infection Following: None





- Disposition


Have Diagnosis and Disposition been Completed?: Yes


Diagnosis: 


 Allergic reaction





Disposition: HOME/ ROUTINE


Disposition Time: 06:32


Condition: FAIR


Discharge Instructions (ExitCare):  Drug Allergy


Prescriptions: 


predniSONE [predniSONE Tab] 20 mg PO TID #15 tab


hydrOXYzine Pamoate [Vistaril] 25 mg PO QID #12 cap


Referrals: 


Pooja Stock MD [Primary Care Provider] - Follow up with primary


Forms:  [facility] (English)

## 2018-09-13 NOTE — CARD
--------------- APPROVED REPORT --------------





Date of service: 09/13/2018



EKG Measurement

Heart [id number]

MS [id number]

[id number]

[id number]

[id number]



<Conclusion>

Sinus tachycardia

Low voltage QRS

Borderline ECG

## 2018-10-16 ENCOUNTER — HOSPITAL ENCOUNTER (EMERGENCY)
Dept: HOSPITAL 42 - ED | Age: 63
Discharge: HOME | End: 2018-10-16
Payer: MEDICAID

## 2018-10-16 VITALS — HEART RATE: 68 BPM | SYSTOLIC BLOOD PRESSURE: 124 MMHG | OXYGEN SATURATION: 100 % | DIASTOLIC BLOOD PRESSURE: 82 MMHG

## 2018-10-16 VITALS — RESPIRATION RATE: 18 BRPM

## 2018-10-16 VITALS — BODY MASS INDEX: 23.9 KG/M2

## 2018-10-16 VITALS — TEMPERATURE: 98.7 F

## 2018-10-16 DIAGNOSIS — M54.12: Primary | ICD-10-CM
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
## 2018-10-28 ENCOUNTER — HOSPITAL ENCOUNTER (INPATIENT)
Dept: HOSPITAL 42 - ED | Age: 63
LOS: 3 days | Discharge: HOME | DRG: 430 | End: 2018-10-31
Attending: PSYCHIATRY & NEUROLOGY | Admitting: PSYCHIATRY & NEUROLOGY
Payer: MEDICAID

## 2018-10-28 VITALS — BODY MASS INDEX: 23.3 KG/M2

## 2018-10-28 VITALS — RESPIRATION RATE: 20 BRPM

## 2018-10-28 VITALS — OXYGEN SATURATION: 97 %

## 2018-10-28 DIAGNOSIS — F15.959: ICD-10-CM

## 2018-10-28 DIAGNOSIS — F11.20: ICD-10-CM

## 2018-10-28 DIAGNOSIS — F10.10: ICD-10-CM

## 2018-10-28 DIAGNOSIS — F41.9: ICD-10-CM

## 2018-10-28 DIAGNOSIS — F20.9: ICD-10-CM

## 2018-10-28 DIAGNOSIS — F31.9: Primary | ICD-10-CM

## 2018-10-28 DIAGNOSIS — Z87.440: ICD-10-CM

## 2018-10-28 LAB
ALBUMIN SERPL-MCNC: 4.5 G/DL (ref 3–4.8)
ALBUMIN/GLOB SERPL: 1.2 {RATIO} (ref 1.1–1.8)
ALT SERPL-CCNC: 17 U/L (ref 7–56)
APAP SERPL-MCNC: < 10 UG/ML (ref 10–20)
APPEARANCE UR: CLEAR
AST SERPL-CCNC: 57 U/L (ref 14–36)
BACTERIA #/AREA URNS HPF: (no result) /[HPF]
BASOPHILS # BLD AUTO: 0.02 K/MM3 (ref 0–2)
BASOPHILS NFR BLD: 0.2 % (ref 0–3)
BILIRUB UR-MCNC: (no result) MG/DL
BUN SERPL-MCNC: 28 MG/DL (ref 7–21)
CALCIUM SERPL-MCNC: 10.1 MG/DL (ref 8.4–10.5)
COLOR UR: (no result)
EOSINOPHIL # BLD: 0.1 10*3/UL (ref 0–0.7)
EOSINOPHIL NFR BLD: 0.8 % (ref 1.5–5)
ERYTHROCYTE [DISTWIDTH] IN BLOOD BY AUTOMATED COUNT: 17 % (ref 11.5–14.5)
GFR NON-AFRICAN AMERICAN: > 60
GLUCOSE UR STRIP-MCNC: NEGATIVE MG/DL
GRANULOCYTES # BLD: 7.52 10*3/UL (ref 1.4–6.5)
GRANULOCYTES NFR BLD: 78.3 % (ref 50–68)
HGB BLD-MCNC: 12.4 G/DL (ref 12–16)
LEUKOCYTE ESTERASE UR-ACNC: NEGATIVE LEU/UL
LYMPHOCYTES # BLD: 1.4 10*3/UL (ref 1.2–3.4)
LYMPHOCYTES NFR BLD AUTO: 14.3 % (ref 22–35)
MCH RBC QN AUTO: 31.6 PG (ref 25–35)
MCHC RBC AUTO-ENTMCNC: 33.5 G/DL (ref 31–37)
MCV RBC AUTO: 94.1 FL (ref 80–105)
MONOCYTES # BLD AUTO: 0.6 10*3/UL (ref 0.1–0.6)
MONOCYTES NFR BLD: 6.4 % (ref 1–6)
PH UR STRIP: 6 [PH] (ref 4.7–8)
PLATELET # BLD: 226 10^3/UL (ref 120–450)
PMV BLD AUTO: 9.2 FL (ref 7–11)
PROT UR STRIP-MCNC: >=300 MG/DL
RBC # BLD AUTO: 3.93 10^6/UL (ref 3.5–6.1)
RBC # UR STRIP: (no result) /UL
SALICYLATES SERPL-MCNC: < 1 MG/DL (ref 2–20)
SP GR UR STRIP: >= 1.03 (ref 1–1.03)
T4 FREE SERPL-MCNC: 1.17 NG/DL (ref 0.78–2.19)
UROBILINOGEN UR STRIP-ACNC: 1 E.U./DL
WBC # BLD AUTO: 9.6 10^3/UL (ref 4.5–11)

## 2018-10-28 NOTE — PCM.BM
Addendum entered and electronically signed by Gretchen Chairez LSW  10/31/18 

15:21: 








Treatment Plan Review





- Problem


  ** Visual Hallucinations


Time Initiated: 11:59





  ** Auditory Hallucinations


Time Initiated: 11:59





  ** Altered Thought Process


Time Initiated: 11:59





  ** Medication Nonadherence


Time Initiated: 12:00





Original Note:








<Kvng Jeffries - Last Filed: 10/28/18 11:59>





Treatment Plan Problems





- Problems identified on initial assessmt


  ** Visual Hallucinations


Date Initiated: 10/28/18


Time Initiated: 11:59


Assessment reference: NA


Status: Active





  ** Auditory Hallucinations


Date Initiated: 10/28/18


Time Initiated: 11:59


Assessment reference: NA


Status: Active





  ** Altered Thought Process


Date Initiated: 10/28/18


Time Initiated: 11:59


Assessment reference: NA


Status: Active





  ** Medication Nonadherence


Date Initiated: 10/28/18


Time Initiated: 12:00


Assessment reference: NA


Status: Active





Treatment assets and liabiliti


Patient Assests: cooperative, resourceful, self-reliant, ADL independent, 

physically healthy, negotiates basic needs, financial stabiity, cognitively 

intact


Patient Liabilities: poor support system





- Milieu Protocol


Maintain good personal hygiene: daily Encourage regular showers, daily Remind 

patient to perform daily oral care, daily Assist patient to perform ADL's


Conduct patient checks and document Observation sheet: Q15 minutes


Maintain personal safety: every shift Educate patient to report safety concerns 

to staff, every shift Monitor environment for contraband/sharps


Medication safety: Monitor for expected outcome, potential side effects: every 

shift, Assess barriers to learning: every shift, Assess readiness for medication

education: every shift





Discharge/Continuing Care





- Education Needs


Education Needs: Patient Medication, Patient Diagnosis/Disease Process, Patient 

Coping Skills, Patient Community resources, Patient Activities of Daily Living, 

Patient Health Practices/Safety, Patient Aftercare Safety Plan





- Discharge


Discharge Criteria: Tolerates medication w/o severe side effects, Free of 

paranoid thoughts, Normal sleep pattern, Ability to care for self, Reduction of 

target symptoms





<Alba Masters - Last Filed: 10/29/18 08:20>





- Diagnosis


(1) Mood disorder


Status: Acute   


Interventions: 





10/29/18 08:21


Psychoeducation


Psychopharmacology/adjustment of medications as needed/ monitoring possible side

effects


Evaluate pt on daily basis


Compliance with medications and follow up appointments


Suicide and homicide risk assessment and prevention


Relapse prevention


Reduction of symptoms


Improve functional status


Family involvement


As outpatient: cognitive behavioral therapy








(2) Psychosis


Status: Acute   


Interventions: 





10/29/18 08:20


Psychoeducation/psychotherapy


Psychopharmacology/adjustment of medications as needed/ monitoring possible side

effects


Evaluate pt on daily basis


Compliance with medications and follow up appointments


Long acting medication if pt is noncompliant with pill form


Suicide and homicide risk assessment and prevention, coping strategies, safety 

plan


Relapse prevention


Reduction of symptoms


Improve functional status


Possible assertive community treatment


Cognitive behavioral therapy


Family involvement


Possible social skill training as outpatient








(3) Alcohol use disorder


Status: Acute   


Interventions: 





10/29/18 08:21


Monitoring withdrawal symptoms


Medical detoxification


Pharmacotherapy for alcohol/benzos/opioid dependence 


Maintaining sobriety


Relapse prevention


Possible rehabilitation


Motivational interviewing


12-step programs: AA meetings








<Мария Hoffman - Last Filed: 10/29/18 15:33>

## 2018-10-28 NOTE — ED PDOC
Physical Exam


Vital Signs Reviewed: Yes





Vital Signs











  Temp Pulse Resp BP Pulse Ox


 


 10/28/18 06:46  98.9 F  91 H  16  108/65  96


 


 10/28/18 03:00  98.7 F  82  19  132/62  100











Temperature: Afebrile


Blood Pressure: Normal


Pulse: Regular


Respiratory Rate: Normal


Appearance: Positive for: Well-Appearing, Non-Toxic, Comfortable


Pain Distress: None


Mental Status: Positive for: Alert and Oriented X 3





Medical Decision Making


ED Course and Treatment: 


10/28/18 07:00


Patient endorsed to me by Dr. Alvares. Currently pending psychiatric screening.





10/28/18 08:45


Patient wants to voluntarily be admitted.











- Lab Interpretations


Lab Results: 











                                 10/28/18 02:20 





                                 10/28/18 02:20 





                                   Lab Results





10/28/18 02:20: Urine Color Dark yellow, Urine Appearance Clear, Urine pH 6.0, 

Ur Specific Gravity >= 1.030, Urine Protein >=300 H, Urine Glucose (UA) 

Negative, Urine Ketones >=80, Urine Blood Moderate H, Urine Nitrate Negative, 

Urine Bilirubin Large H, Urine Urobilinogen 1.0 H, Ur Leukocyte Esterase 

Negative, Urine RBC 5 - 10, Urine WBC 10 - 15, Ur Epithelial Cells 1 - 3, Urine 

Bacteria Mod


10/28/18 02:20: Salicylates < 1 L, Acetaminophen < 10.0 L


10/28/18 02:20: Urine Opiates Screen Negative, Urine Methadone Screen Negative, 

Ur Barbiturates Screen Negative, Ur Phencyclidine Scrn Negative, Ur Amphetamines

Screen Negative, U Benzodiazepines Scrn Positive H, U Oth Cocaine Metabols 

Negative, U Cannabinoids Screen Negative


10/28/18 02:20: Free T4 1.17, TSH 3rd Generation 1.86, Alcohol, Quantitative < 

10


10/28/18 02:20: Sodium 136, Potassium 3.5 L, Chloride 99, Carbon Dioxide 24, 

Anion Gap 17, BUN 28 H, Creatinine 0.9, Est GFR (African Amer) > 60, Est GFR 

(Non-Af Amer) > 60, Random Glucose 113 H, Calcium 10.1, Total Bilirubin 1.6 H, 

AST 57 H D, ALT 17, Alkaline Phosphatase 84, Total Protein 8.2, Albumin 4.5, 

Globulin 3.7, Albumin/Globulin Ratio 1.2


10/28/18 02:20: WBC 9.6  D, RBC 3.93, Hgb 12.4, Hct 37.0, MCV 94.1, MCH 31.6, 

MCHC 33.5, RDW 17.0 H, Plt Count 226, MPV 9.2, Gran % 78.3 H, Lymph % (Auto) 

14.3 L, Mono % (Auto) 6.4 H, Eos % (Auto) 0.8 L, Baso % (Auto) 0.2, Gran # 7.52 

H, Lymph # (Auto) 1.4, Mono # (Auto) 0.6, Eos # (Auto) 0.1, Baso # (Auto) 0.02











- RAD Interpretation


Radiology Orders: 











10/28/18 01:59


CHEST PORTABLE [RAD] Stat 














- Scribe Statement


The provider has reviewed the documentation as recorded by the Matthewibcem Patton





Provider Scribe Attestation:


All medical record entries made by the Scribe were at my direction and persona

lly dictated by me. I have reviewed the chart and agree that the record 

accurately reflects my personal performance of the history, physical exam, 

medical decision making, and the department course for this patient. I have also

 personally directed, reviewed, and agree with the discharge instructions and 

disposition.








Disposition/Present on Arrival





- Present on Arrival


Any Indicators Present on Arrival: No


History of DVT/PE: No


History of Uncontrolled Diabetes: No


Urinary Catheter: No


History of Decub. Ulcer: No


History Surgical Site Infection Following: None





- Disposition


Have Diagnosis and Disposition been Completed?: Yes


Diagnosis: 


 Schizophrenia





Disposition: HOSPITALIZED


Disposition Time: 10:00


Condition: STABLE

## 2018-10-28 NOTE — CARD
--------------- APPROVED REPORT --------------





Date of service: 10/28/2018



EKG Measurement

Heart [id number]

RI [id number]

[id number]

[id number]

[id number]



<Conclusion>

Sinus tachycardia

RVCD

NSSTW changes

Prolonged QTc
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
## 2018-10-28 NOTE — RAD
Date of service: 



10/28/2018



HISTORY:

 psych r/o 



COMPARISON:

04/19/2018 



FINDINGS:



LUNGS:

The lungs are well inflated and clear.



PLEURA:

No pleural effusions or pneumothorax. 



CARDIOVASCULAR:

The heart is normal in size.  No aortic atherosclerotic calcification 

present. 



OSSEOUS STRUCTURES:

Within normal limits for the patient's age.



VISUALIZED UPPER ABDOMEN:

Normal.



OTHER FINDINGS:

None.



IMPRESSION:

No active pulmonary disease.

## 2018-10-29 LAB
ALBUMIN SERPL-MCNC: 4.3 G/DL (ref 3–4.8)
ALBUMIN/GLOB SERPL: 1.2 {RATIO} (ref 1.1–1.8)
ALT SERPL-CCNC: 16 U/L (ref 7–56)
AST SERPL-CCNC: 45 U/L (ref 14–36)
BASOPHILS # BLD AUTO: 0.03 K/MM3 (ref 0–2)
BASOPHILS NFR BLD: 0.4 % (ref 0–3)
BUN SERPL-MCNC: 20 MG/DL (ref 7–21)
CALCIUM SERPL-MCNC: 9.9 MG/DL (ref 8.4–10.5)
EOSINOPHIL # BLD: 0.1 10*3/UL (ref 0–0.7)
EOSINOPHIL NFR BLD: 1.9 % (ref 1.5–5)
ERYTHROCYTE [DISTWIDTH] IN BLOOD BY AUTOMATED COUNT: 17.1 % (ref 11.5–14.5)
GFR NON-AFRICAN AMERICAN: > 60
GRANULOCYTES # BLD: 4.98 10*3/UL (ref 1.4–6.5)
GRANULOCYTES NFR BLD: 69.1 % (ref 50–68)
HDLC SERPL-MCNC: 136 MG/DL (ref 29–60)
HGB BLD-MCNC: 13.9 G/DL (ref 12–16)
LDLC SERPL-MCNC: 125 MG/DL (ref 0–129)
LYMPHOCYTES # BLD: 1.5 10*3/UL (ref 1.2–3.4)
LYMPHOCYTES NFR BLD AUTO: 20.2 % (ref 22–35)
MCH RBC QN AUTO: 31.7 PG (ref 25–35)
MCHC RBC AUTO-ENTMCNC: 33 G/DL (ref 31–37)
MCV RBC AUTO: 96.1 FL (ref 80–105)
MONOCYTES # BLD AUTO: 0.6 10*3/UL (ref 0.1–0.6)
MONOCYTES NFR BLD: 8.4 % (ref 1–6)
PLATELET # BLD: 202 10^3/UL (ref 120–450)
PMV BLD AUTO: 8.7 FL (ref 7–11)
RBC # BLD AUTO: 4.38 10^6/UL (ref 3.5–6.1)
WBC # BLD AUTO: 7.2 10^3/UL (ref 4.5–11)
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
## 2018-10-30 RX ADMIN — BENZOCAINE PRN GM: 0.2 CREAM TOPICAL at 21:06

## 2018-10-30 NOTE — PCM.PYCHPN
Psychiatric Progress Note





- Psychiatric Progress Note


Patient seen today, length of contact: 30min


Patient Chief Complaint: 





"I feel better, I don't think that people are breaking into my house, but I know

that my son and my sister both against me"


Problems Identified/Issues Discussed: 


Suicide/ homicide prevention, past psychiatric h/o, current psychiatric 

symptoms, medical problems, risk/benefits and alternatives of medications, 

medications compliance, coping strategies, substance abuse h/o, relapse 

prevention, importance of follow up with psychiatrist and therapist, discharge 

plan. 





Medical Problems: 





see HPI


Diagnostic Results: 














                                 10/29/18 07:00 





                                 10/29/18 07:00 





                                   Lab Results





10/29/18 07:00: RPR Nonreactive


10/29/18 07:00: WBC 7.2  D, RBC 4.38, Hgb 13.9, Hct 42.1, MCV 96.1, MCH 31.7, M

CHC 33.0, RDW 17.1 H, Plt Count 202, MPV 8.7, Gran % 69.1 H, Lymph % (Auto) 20.2

L, Mono % (Auto) 8.4 H, Eos % (Auto) 1.9, Baso % (Auto) 0.4, Gran # 4.98, Lymph 

# (Auto) 1.5, Mono # (Auto) 0.6, Eos # (Auto) 0.1, Baso # (Auto) 0.03


10/29/18 07:00: Hemoglobin A1c 5.2


10/29/18 07:00: Sodium 140, Potassium 3.8, Chloride 102, Carbon Dioxide 28, 

Anion Gap 14, BUN 20, Creatinine 0.8, Est GFR (African Amer) > 60, Est GFR (Non-

Af Amer) > 60, Random Glucose 118 H, Calcium 9.9, Total Bilirubin 1.2, AST 45 H 

D, ALT 16, Alkaline Phosphatase 86, Total Protein 7.8, Albumin 4.3, Globulin 

3.5, Albumin/Globulin Ratio 1.2, Triglycerides 82, Cholesterol 276 H, LDL 

Cholesterol Direct 125, HDL Cholesterol 136 H


10/28/18 02:20: Urine Color Dark yellow, Urine Appearance Clear, Urine pH 6.0, 

Ur Specific Gravity >= 1.030, Urine Protein >=300 H, Urine Glucose (UA) 

Negative, Urine Ketones >=80, Urine Blood Moderate H, Urine Nitrate Negative, 

Urine Bilirubin Large H, Urine Urobilinogen 1.0 H, Ur Leukocyte Esterase 

Negative, Urine RBC 5 - 10, Urine WBC 10 - 15, Ur Epithelial Cells 1 - 3, Urine 

Bacteria Mod


10/28/18 02:20: Salicylates < 1 L, Acetaminophen < 10.0 L


10/28/18 02:20: Urine Opiates Screen Negative, Urine Methadone Screen Negative, 

Ur Barbiturates Screen Negative, Ur Phencyclidine Scrn Negative, Ur Amphetamines

Screen Negative, U Benzodiazepines Scrn Positive H, U Oth Cocaine Metabols 

Negative, U Cannabinoids Screen Negative


10/28/18 02:20: Free T4 1.17, TSH 3rd Generation 1.86, Alcohol, Quantitative < 

10


10/28/18 02:20: Sodium 136, Potassium 3.5 L, Chloride 99, Carbon Dioxide 24, 

Anion Gap 17, BUN 28 H, Creatinine 0.9, Est GFR (African Amer) > 60, Est GFR 

(Non-Af Amer) > 60, Random Glucose 113 H, Calcium 10.1, Total Bilirubin 1.6 H, 

AST 57 H D, ALT 17, Alkaline Phosphatase 84, Total Protein 8.2, Albumin 4.5, 

Globulin 3.7, Albumin/Globulin Ratio 1.2


10/28/18 02:20: WBC 9.6  D, RBC 3.93, Hgb 12.4, Hct 37.0, MCV 94.1, MCH 31.6, 

MCHC 33.5, RDW 17.0 H, Plt Count 226, MPV 9.2, Gran % 78.3 H, Lymph % (Auto) 

14.3 L, Mono % (Auto) 6.4 H, Eos % (Auto) 0.8 L, Baso % (Auto) 0.2, Gran # 7.52 

H, Lymph # (Auto) 1.4, Mono # (Auto) 0.6, Eos # (Auto) 0.1, Baso # (Auto) 0.02








Vital Signs











  Temp Pulse Resp BP Pulse Ox


 


 10/30/18 07:19  97.6 F  81  20  85/57 L 


 


 10/29/18 15:18   101 H   89/53 L 


 


 10/29/18 06:59  97.6 F  90  20  93/63 L 


 


 10/28/18 11:00    20  


 


 10/28/18 10:30  98.7 F  93 H  20  105/68  97


 


 10/28/18 10:25  98.2 F  94 H  18  108/75  100


 


 10/28/18 07:50   88  18  110/65  99


 


 10/28/18 06:46  98.9 F  91 H  16  108/65  96


 


 10/28/18 03:00  98.7 F  82  19  132/62  100











DSM 5 Symptoms Update: 


Shortly, pt is 64yo  Female with reported h/o depression, most likely 

pt has h/o bipolar spectrum disorder, h/o At least 2 psychiatric admissions here

in [place] "long time ago" as well as in 2016, h/o alcohol use disorder, h/o 

pain killers addiction, h/o detoxes and rehabs in the past, was seen by 

as outpatient therapy, denied h/o suicidal attempts. patient was admitted to the

psychiatric inpatient unit for evaluation and stabilization of psychotic 

symptoms, patient's symptoms to have visual hallucinations like people are 

breaking into her apartment, "people are harassing me, he female and 3 male 

broken into my apartment, I don't know how they got in, I called police, but 

they sneaked out, police said that they cannot do anything because people were 

not there", patient obviously presented to be disorganized, psychotic, requires 

further hospitalization and stabilization to the psychiatric inpatient unit.





Patient was seen next to the nursing station, presented with  some improvement 

of her psychotic symptoms.





Patient reported that she does not feel that people are breaking into her house,

patient said that she was not feeling well because she was sleep deprived by 

taking care of her mother who was in the hospital, patient reported that she 

feels better, at the same time patient presented to be paranoid towards her 

sister as well as son. 





As per his staff report patient is mildly disorganized but no agitation or 

aggression, patient is compliant with the medications, visible in the unit.





so far patient tolerated medications well, no side effects observed or reported,

aims 0, no EPS.





patient does not have any  alcohol withdrawals, we'll start tapering down 

Ativan.





impression:


Psychosis NOS


stimulant induced psychosis


Delirium


Bipolar disorder as per history


alcohol abuse as per history


Medication Change: Yes (ativan decreased)


Medical Record Reviewed: Yes


Consults ordered or reviewed: 





sidneyient was seenby medical team and the emergency room, was cleared





Mental Status Examination





- Cognitive Function


Orientation: Person


Memory: Impaired


Attention: Poor


Concentration: Poor


Association: Loose


Fund of Knowledge: WNL





- Mood


Mood: Depressed





- Affect


Affect: Flat





- Formal Thought Process


Formal Thought Process: Hallucinations (some improvement), Delusions (some 

improvement), Paranoia (some improvement), Loosening of associations, 

Circumstantial





- Suicidal Ideation


Suicidal Ideation: No





- Homicidal Ideation


Homicidal Ideation: No





Goal/Treatment Plan





- Goal/Treatment Plan


Need for Continued Stay: Remain at risks for inpatient hospitalization, Severe 

depression anxiety, Discharge may exacerbated symptoms, Severe functional 

impairment


Progress Toward Problem(s) and Goals/Treatment Plan: 


Milieu/structure/supportive therapy 


Medical consult 


meds confirmed


d/c stimulants


mental illness she Ativan was given because this writer is not sure if patient 

was drinking consistently or not


Multivitamins, folic acid, thiamine


Stimulants will be discontinued


Risperdal was started 0.5 mg at the nighttime as well as twice a day for 

psychosis


Neurontin  300mg po tid for mood stabilization as well as anxiety


Patient sister Joan [phone] called and left this writer a message, but 

pt refused to give consent for collaterals


as well as pt's son


 consultation for discharge plan and social issues 


Follow up on labs 


Will monitor closely 


Pt was educated about risk/benefits and alternatives of medications, coping 

strategies (safety plan, suicide prevention), relapse prevention, importance of 

follow up with psychiatrist and therapist, stay away from drugs/alcohol/smoking





Estimated Date of D/C: 11/02/18

## 2018-10-31 VITALS — SYSTOLIC BLOOD PRESSURE: 96 MMHG | HEART RATE: 82 BPM | TEMPERATURE: 97.5 F | DIASTOLIC BLOOD PRESSURE: 62 MMHG

## 2018-10-31 RX ADMIN — BENZOCAINE PRN GM: 0.2 CREAM TOPICAL at 09:24

## 2018-10-31 NOTE — [id number].PYCHDC
Mental Status Examination





- Mental Status Examination


Orientation: Person, Place, Situation, Time


Memory: Intact


Mood: Neutral


Affect: Broad (Mood-congruent)


Speech: Appropriate


Attention: WNL


Concentration: WNL


Association: WNL


Fund of Knowledge: WNL


Formal Thought Process: No Impairment


Description of patient's judgement and insight: 


Pt has improved insight into mental and medical illness, pt was compliant with 

medications and unit rules and regulations, pt was going to groups, was calm, 

cooperative, socially appropriate, no behavioral incidents, no agitation, no 

aggression.








Psychotic Thoughts and Behaviors: 





Pt denied v/a/t hallucinations, denied paranoid ideations, pt does not appear to

be psychotic, and thought process is goal directed. 





Suicidal Ideation: No


Current Homicidal Ideation?: No


Plan: 





pt adamantly denied thoughts of harming self or others denied intent or plan.








Discharge Summary





- Discharge Note


Reason for Hospitalization: 


patient was admitted to psychiatric inpatient unit for evaluation and 

stabilization of psychotic symptoms, inability to function, please see 

assessment for more detailed information.


Psychiatric History (includes Medical, Family, Personal Hx): history of mental 

illness, depression, alcohol abuse in the past


Laboratory Data: 














                                 10/29/18 07:00 





                                 10/29/18 07:00 





                                   Lab Results





10/29/18 07:00: RPR Nonreactive


10/29/18 07:00: WBC 7.2  D, RBC 4.38, Hgb 13.9, Hct 42.1, MCV 96.1, MCH 31.7, 

MCHC 33.0, RDW 17.1 H, Plt Count 202, MPV 8.7, Gran % 69.1 H, Lymph % (Auto) 

20.2 L, Mono % (Auto) 8.4 H, Eos % (Auto) 1.9, Baso % (Auto) 0.4, Gran # 4.98, 

Lymph # (Auto) 1.5, Mono # (Auto) 0.6, Eos # (Auto) 0.1, Baso # (Auto) 0.03


10/29/18 07:00: Hemoglobin A1c 5.2


10/29/18 07:00: Sodium 140, Potassium 3.8, Chloride 102, Carbon Dioxide 28, 

Anion Gap 14, BUN 20, Creatinine 0.8, Est GFR (African Amer) > 60, Est GFR (Non-

Af Amer) > 60, Random Glucose 118 H, Calcium 9.9, Total Bilirubin 1.2, AST 45 H 

D, ALT 16, Alkaline Phosphatase 86, Total Protein 7.8, Albumin 4.3, Globulin 

3.5, Albumin/Globulin Ratio 1.2, Triglycerides 82, Cholesterol 276 H, LDL 

Cholesterol Direct 125, HDL Cholesterol 136 H


10/28/18 02:20: Urine Color Dark yellow, Urine Appearance Clear, Urine pH 6.0, 

Ur Specific Gravity >= 1.030, Urine Protein >=300 H, Urine Glucose (UA) 

Negative, Urine Ketones >=80, Urine Blood Moderate H, Urine Nitrate Negative, 

Urine Bilirubin Large H, Urine Urobilinogen 1.0 H, Ur Leukocyte Esterase N

egative, Urine RBC 5 - 10, Urine WBC 10 - 15, Ur Epithelial Cells 1 - 3, Urine 

Bacteria Mod


10/28/18 02:20: Salicylates < 1 L, Acetaminophen < 10.0 L


10/28/18 02:20: Urine Opiates Screen Negative, Urine Methadone Screen Negative, 

Ur Barbiturates Screen Negative, Ur Phencyclidine Scrn Negative, Ur Amphetamines

Screen Negative, U Benzodiazepines Scrn Positive H, U Oth Cocaine Metabols 

Negative, U Cannabinoids Screen Negative


10/28/18 02:20: Free T4 1.17, TSH 3rd Generation 1.86, Alcohol, Quantitative < 

10


10/28/18 02:20: Sodium 136, Potassium 3.5 L, Chloride 99, Carbon Dioxide 24, 

Anion Gap 17, BUN 28 H, Creatinine 0.9, Est GFR (African Amer) > 60, Est GFR 

(Non-Af Amer) > 60, Random Glucose 113 H, Calcium 10.1, Total Bilirubin 1.6 H, 

AST 57 H D, ALT 17, Alkaline Phosphatase 84, Total Protein 8.2, Albumin 4.5, 

Globulin 3.7, Albumin/Globulin Ratio 1.2


10/28/18 02:20: WBC 9.6  D, RBC 3.93, Hgb 12.4, Hct 37.0, MCV 94.1, MCH 31.6, 

MCHC 33.5, RDW 17.0 H, Plt Count 226, MPV 9.2, Gran % 78.3 H, Lymph % (Auto) 

14.3 L, Mono % (Auto) 6.4 H, Eos % (Auto) 0.8 L, Baso % (Auto) 0.2, Gran # 7.52 

H, Lymph # (Auto) 1.4, Mono # (Auto) 0.6, Eos # (Auto) 0.1, Baso # (Auto) 0.02








Vital Signs











  Temp Pulse Resp BP Pulse Ox


 


 10/31/18 07:14  97.5 F L  82  20  96/62 L 


 


 10/30/18 16:00   94 H   118/83 


 


 10/30/18 07:19  97.6 F  81  20  85/57 L 


 


 10/29/18 15:18   101 H   89/53 L 


 


 10/29/18 06:59  97.6 F  90  20  93/63 L 


 


 10/28/18 11:00    20  


 


 10/28/18 10:30  98.7 F  93 H  20  105/68  97


 


 10/28/18 10:25  98.2 F  94 H  18  108/75  100


 


 10/28/18 07:50   88  18  110/65  99


 


 10/28/18 06:46  98.9 F  91 H  16  108/65  96


 


 10/28/18 03:00  98.7 F  82  19  132/62  100











Consultations:: List each consultation separately and include:  1. Reason for 

request.  2. Findings.  3. Follow-up


Consultations: 





patient was seen by medical team and the emergency room, was cleared


Summary of Hospital Course include:: 1. Description of specific treatment plan 

utilized for patients during their course of treatmen.  2. Summarize the time-

course for resolution of acute symptoms and/or regressed behaviors.  3. Describe

issues identified and worked on during hospitalization.  4. Describe medication 

utilized.  5. Describe medical problems identified and treated.  6. Reassessment

of suicide risk


Summary of Hospital Course: 


Shortly, pt is 62yo  Female with reported h/o depression, most likely 

pt has h/o bipolar spectrum disorder, h/o At least 2 psychiatric admissions here

in [place] "long time ago" as well as in 2016, h/o alcohol use disorder, h/o 

pain killers addiction, h/o detoxes and rehabs in the past, was seen by 

as outpatient therapy, denied h/o suicidal attempts. patient was admitted to the

psychiatric inpatient unit for evaluation and stabilization of psychotic 

symptoms, patient's symptoms to have visual hallucinations like people are 

breaking into her apartment, "people are harassing me, he female and 3 male 

broken into my apartment, I don't know how they got in, I called police, but 

they sneaked out, police said that they cannot do anything because people were 

not there", patient obviously presented to be disorganized, psychotic, required 

further hospitalization and stabilization to the psychiatric inpatient unit.





please see admission note for more detailed information.














                                 10/29/18 07:00 





                                 10/29/18 07:00 





                                   Lab Results





10/29/18 07:00: WBC 7.2  D, RBC 4.38, Hgb 13.9, Hct 42.1, MCV 96.1, MCH 31.7, 

MCHC 33.0, RDW 17.1 H, Plt Count 202, MPV 8.7, Gran % 69.1 H, Lymph % (Auto) 

20.2 L, Mono % (Auto) 8.4 H, Eos % (Auto) 1.9, Baso % (Auto) 0.4, Gran # 4.98, 

Lymph # (Auto) 1.5, Mono # (Auto) 0.6, Eos # (Auto) 0.1, Baso # (Auto) 0.03


10/29/18 07:00: Hemoglobin A1c 5.2


10/29/18 07:00: Sodium 140, Potassium 3.8, Chloride 102, Carbon Dioxide 28, 

Anion Gap 14, BUN 20, Creatinine 0.8, Est GFR (African Amer) > 60, Est GFR (Non-

Af Amer) > 60, Random Glucose 118 H, Calcium 9.9, Total Bilirubin 1.2, AST 45 H 

D, ALT 16, Alkaline Phosphatase 86, Total Protein 7.8, Albumin 4.3, Globulin 

3.5, Albumin/Globulin Ratio 1.2, Triglycerides 82, Cholesterol 276 H, LDL 

Cholesterol Direct 125, HDL Cholesterol 136 H


10/28/18 02:20: Urine Color Dark yellow, Urine Appearance Clear, Urine pH 6.0, 

Ur Specific Gravity >= 1.030, Urine Protein >=300 H, Urine Glucose (UA) 

Negative, Urine Ketones >=80, Urine Blood Moderate H, Urine Nitrate Negative, 

Urine Bilirubin Large H, Urine Urobilinogen 1.0 H, Ur Leukocyte Esterase 

Negative, Urine RBC 5 - 10, Urine WBC 10 - 15, Ur Epithelial Cells 1 - 3, Urine 

Bacteria Mod


10/28/18 02:20: Salicylates < 1 L, Acetaminophen < 10.0 L


10/28/18 02:20: Urine Opiates Screen Negative, Urine Methadone Screen Negative, 

Ur Barbiturates Screen Negative, Ur Phencyclidine Scrn Negative, Ur Amphetamines

Screen Negative, U Benzodiazepines Scrn Positive H, U Oth Cocaine Metabols 

Negative, U Cannabinoids Screen Negative


10/28/18 02:20: Free T4 1.17, TSH 3rd Generation 1.86, Alcohol, Quantitative < 

10


10/28/18 02:20: Sodium 136, Potassium 3.5 L, Chloride 99, Carbon Dioxide 24, 

Anion Gap 17, BUN 28 H, Creatinine 0.9, Est GFR (African Amer) > 60, Est GFR 

(Non-Af Amer) > 60, Random Glucose 113 H, Calcium 10.1, Total Bilirubin 1.6 H, 

AST 57 H D, ALT 17, Alkaline Phosphatase 84, Total Protein 8.2, Albumin 4.5, 

Globulin 3.7, Albumin/Globulin Ratio 1.2


10/28/18 02:20: WBC 9.6  D, RBC 3.93, Hgb 12.4, Hct 37.0, MCV 94.1, MCH 31.6, 

MCHC 33.5, RDW 17.0 H, Plt Count 226, MPV 9.2, Gran % 78.3 H, Lymph % (Auto) 

14.3 L, Mono % (Auto) 6.4 H, Eos % (Auto) 0.8 L, Baso % (Auto) 0.2, Gran # 7.52 

H, Lymph # (Auto) 1.4, Mono # (Auto) 0.6, Eos # (Auto) 0.1, Baso # (Auto) 0.02








Vital Signs











  Temp Pulse Resp BP Pulse Ox


 


 10/29/18 15:18   101 H   89/53 L 


 


 10/29/18 06:59  97.6 F  90  20  93/63 L 


 


 10/28/18 11:00    20  


 


 10/28/18 10:30  98.7 F  93 H  20  105/68  97


 


 10/28/18 10:25  98.2 F  94 H  18  108/75  100


 


 10/28/18 07:50   88  18  110/65  99


 


 10/28/18 06:46  98.9 F  91 H  16  108/65  96


 


 10/28/18 03:00  98.7 F  82  19  132/62  100








within the past 3 days patient improved significantly, psychosis subsided, 

patient was calm and corporative, socially appropriate, sleep improved, as per 

patient combination of sleep deprivation as well as being on stimulants made her

have hallucinations.





Patient was seen today at the treatment team meeting, patient presented very 

well, hygiene March improved, patient is not psychotic, patient thought process 

is coherent and goal-directed, patient reported that she has appointment with 

her primary care physician at the [facility], patient reported that

she was scheduled for injection for her chronic neck pain.





Patient was stabilized on the following medication:


Risperdal 1 mg twice a day for psychosis and mood stabilization


gabapentin 300 mg 3 times a day for more stabilization and anxiety


Multivitamins, thiamine, folic acid





patient was advised not to take any stimulants, patient agreed.





Over the course of this hospitalization pt was attending groups, pt also had 

medication management, had therapeutic milieu. 





patient did not give permission to talk to her sister or her son.








Overall pt improved significantly, pt's affect became brighter, pt was less 

depressed, has realistic future oriented plans, pt also does not appear to be 

psychotic, or anxious, pt was socially appropriate, no behavioral issues, pts 

insight improved as well and soon pt deemed to be ready for discharge. 





At the time of the discharge pt denied been depressed, denied thoughts of 

harming self or others, denied psychotic symptoms, and pt does not appeared to 

be psychotic, denied been anxious, pt is not in  imminent danger to self or 

others, pt will be f/u with  at [facility], information about 

follow up appointment, time and address provided to the pt, it is patient 

responsibility to follow up with outpatient clinic, PMD as well as specialists 

(see SW note for more detailed information).


In case pt will need to obtain results of studies pending at discharge pt was 

provided with contact information of Psychiatric Inpatient unit [phone] as

well as Medical Record Department [phone].


Naltrexone treatment not indicated at this time, pt denied drinking. 


pt denied smoking


pt was provided with prescriptions for all of medications (please see medication

reconciliation form)


Pt was educated about safety plan in case of worsening of  symptoms or in case 

of suicidal or homicidal ideation call 911 or go to the nearest ER, also was 

educated to take meds as prescribed and stay away from drugs, pt verbalized 

understanding.








- Diagnosis


(1) Mood disorder


Status: Chronic   Priority: Medium   





(2) Psychosis


Status: Acute   Priority: High   Comment: it was related to the sleep 

depravation, stimulants 


   





(3) Alcohol use disorder


Status: Suspected   





- Final Diagnosis (DSM 5)


Condition upon Discharge: IMPROVED


Disposition: HOME/ ROUTINE


Follow-up Treatment Plan: 


At the time of the discharge pt denied been depressed, denied thoughts of 

harming self or others, denied psychotic symptoms, and pt does not appeared to 

be psychotic, denied been anxious, pt is not in  imminent danger to self or 

others, pt will be f/u with  at [facility], information about 

follow up appointment, time and address provided to the pt, it is patient 

responsibility to follow up with outpatient clinic, PMD as well as specialists 

(see SW note for more detailed information).


In case pt will need to obtain results of studies pending at discharge pt was 

provided with contact information of Psychiatric Inpatient unit [phone] as

well as Medical Record Department [phone].


Naltrexone treatment not indicated at this time, pt denied drinking. 


pt denied smoking


pt was provided with prescriptions for all of medications (please see medication

reconciliation form)


Pt was educated about safety plan in case of worsening of  symptoms or in case 

of suicidal or homicidal ideation call 911 or go to the nearest ER, also was 

educated to take meds as prescribed and stay away from drugs, pt verbalized 

understanding.








Prescriptions/Medication Reconciliation: 


Folic Acid 1 mg PO DAILY #14 tab


Gabapentin [Neurontin] 300 mg PO TID #45 cap


risperiDONE [RisperDAL Tab] 1 mg PO AMHS #30 tab


Thiamine [Vitamin B1 Tab] 100 mg PO DAILY #14 tab





- Smoking Cessation


Smoking Cessation Medication prescribed: No


Reason for not providing: patient denied smoking





- Antipsychotic Medications


Pt discharged on 2 or more routine antipsychotic medications: No

## 2018-12-11 ENCOUNTER — HOSPITAL ENCOUNTER (INPATIENT)
Dept: HOSPITAL 42 - ED | Age: 63
LOS: 10 days | Discharge: PSYCHIATRIC OR SUBSTANCE USE FACILITY | DRG: 750 | End: 2018-12-21
Attending: FAMILY MEDICINE | Admitting: INTERNAL MEDICINE
Payer: MEDICAID

## 2018-12-11 VITALS — BODY MASS INDEX: 23.3 KG/M2

## 2018-12-11 DIAGNOSIS — I95.2: ICD-10-CM

## 2018-12-11 DIAGNOSIS — M48.8X2: ICD-10-CM
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
## 2018-12-11 DIAGNOSIS — M53.3: ICD-10-CM

## 2018-12-11 DIAGNOSIS — F10.231: Primary | ICD-10-CM

## 2018-12-11 DIAGNOSIS — K59.00: ICD-10-CM

## 2018-12-11 DIAGNOSIS — E87.6: ICD-10-CM

## 2018-12-11 DIAGNOSIS — F31.9: ICD-10-CM

## 2018-12-11 DIAGNOSIS — Z78.1: ICD-10-CM

## 2018-12-11 DIAGNOSIS — R29.6: ICD-10-CM

## 2018-12-11 DIAGNOSIS — M47.897: ICD-10-CM

## 2018-12-11 DIAGNOSIS — T42.75XA: ICD-10-CM

## 2018-12-11 DIAGNOSIS — R45.851: ICD-10-CM

## 2018-12-11 DIAGNOSIS — Z91.19: ICD-10-CM

## 2018-12-11 DIAGNOSIS — F41.9: ICD-10-CM

## 2018-12-11 DIAGNOSIS — N80.9: ICD-10-CM

## 2018-12-11 LAB
ALBUMIN SERPL-MCNC: 3.9 G/DL (ref 3–4.8)
ALBUMIN SERPL-MCNC: 4.3 G/DL (ref 3–4.8)
ALBUMIN/GLOB SERPL: 1.1 {RATIO} (ref 1.1–1.8)
ALBUMIN/GLOB SERPL: 1.2 {RATIO} (ref 1.1–1.8)
ALT SERPL-CCNC: 34 U/L (ref 7–56)
ALT SERPL-CCNC: 40 U/L (ref 7–56)
APPEARANCE UR: CLEAR
APTT BLD: 25.5 SECONDS (ref 25.1–36.5)
AST SERPL-CCNC: 79 U/L (ref 14–36)
AST SERPL-CCNC: 90 U/L (ref 14–36)
BACTERIA #/AREA URNS HPF: (no result) /[HPF]
BASOPHILS # BLD AUTO: 0.01 K/MM3 (ref 0–2)
BASOPHILS # BLD AUTO: 0.02 K/MM3 (ref 0–2)
BASOPHILS NFR BLD: 0.1 % (ref 0–3)
BASOPHILS NFR BLD: 0.4 % (ref 0–3)
BILIRUB UR-MCNC: NEGATIVE MG/DL
BUN SERPL-MCNC: 12 MG/DL (ref 7–21)
BUN SERPL-MCNC: 15 MG/DL (ref 7–21)
CALCIUM SERPL-MCNC: 10.2 MG/DL (ref 8.4–10.5)
CALCIUM SERPL-MCNC: 9.2 MG/DL (ref 8.4–10.5)
COLOR UR: YELLOW
EOSINOPHIL # BLD: 0.1 10*3/UL (ref 0–0.7)
EOSINOPHIL # BLD: 0.1 10*3/UL (ref 0–0.7)
EOSINOPHIL NFR BLD: 0.9 % (ref 1.5–5)
EOSINOPHIL NFR BLD: 1.8 % (ref 1.5–5)
ERYTHROCYTE [DISTWIDTH] IN BLOOD BY AUTOMATED COUNT: 15.6 % (ref 11.5–14.5)
ERYTHROCYTE [DISTWIDTH] IN BLOOD BY AUTOMATED COUNT: 15.7 % (ref 11.5–14.5)
GFR NON-AFRICAN AMERICAN: > 60
GFR NON-AFRICAN AMERICAN: > 60
GLUCOSE UR STRIP-MCNC: NEGATIVE MG/DL
GRANULOCYTES # BLD: 3.67 10*3/UL (ref 1.4–6.5)
GRANULOCYTES # BLD: 5.2 10*3/UL (ref 1.4–6.5)
GRANULOCYTES NFR BLD: 64.5 % (ref 50–68)
GRANULOCYTES NFR BLD: 70.1 % (ref 50–68)
HGB BLD-MCNC: 13.1 G/DL (ref 12–16)
HGB BLD-MCNC: 13.7 G/DL (ref 12–16)
INR PPP: 1.02
LEUKOCYTE ESTERASE UR-ACNC: NEGATIVE LEU/UL
LYMPHOCYTES # BLD: 1.6 10*3/UL (ref 1.2–3.4)
LYMPHOCYTES # BLD: 1.8 10*3/UL (ref 1.2–3.4)
LYMPHOCYTES NFR BLD AUTO: 23.7 % (ref 22–35)
LYMPHOCYTES NFR BLD AUTO: 27.8 % (ref 22–35)
MCH RBC QN AUTO: 31.2 PG (ref 25–35)
MCH RBC QN AUTO: 31.6 PG (ref 25–35)
MCHC RBC AUTO-ENTMCNC: 33.2 G/DL (ref 31–37)
MCHC RBC AUTO-ENTMCNC: 33.7 G/DL (ref 31–37)
MCV RBC AUTO: 93.8 FL (ref 80–105)
MCV RBC AUTO: 94 FL (ref 80–105)
MONOCYTES # BLD AUTO: 0.3 10*3/UL (ref 0.1–0.6)
MONOCYTES # BLD AUTO: 0.4 10*3/UL (ref 0.1–0.6)
MONOCYTES NFR BLD: 5.2 % (ref 1–6)
MONOCYTES NFR BLD: 5.5 % (ref 1–6)
PH UR STRIP: 6.5 [PH] (ref 4.7–8)
PLATELET # BLD: 118 10^3/UL (ref 120–450)
PLATELET # BLD: 141 10^3/UL (ref 120–450)
PMV BLD AUTO: 9.2 FL (ref 7–11)
PMV BLD AUTO: 9.6 FL (ref 7–11)
PROT UR STRIP-MCNC: (no result) MG/DL
PROTHROMBIN TIME: 11.6 SECONDS (ref 9.4–12.5)
RBC # BLD AUTO: 4.2 10^6/UL (ref 3.5–6.1)
RBC # BLD AUTO: 4.33 10^6/UL (ref 3.5–6.1)
RBC # UR STRIP: (no result) /UL
RBC #/AREA URNS HPF: (no result) /HPF (ref 0–2)
SP GR UR STRIP: 1.01 (ref 1–1.03)
TROPONIN I SERPL-MCNC: < 0.01 NG/ML
UROBILINOGEN UR STRIP-ACNC: 0.2 E.U./DL
WBC # BLD AUTO: 5.7 10^3/UL (ref 4.5–11)
WBC # BLD AUTO: 7.4 10^3/UL (ref 4.5–11)
WBC #/AREA URNS HPF: (no result) /HPF (ref 0–6)

## 2018-12-11 NOTE — ED PDOC
Arrival/HPI





- General


Historian: Patient





- History of Present Illness


Narrative History of Present Illness (Text): 





12/11/18 01:48


63-year-old female with a history of alcohol abuse presents today with facial 

pain headache and back pain status post fall. Patient states last night she fell

hitting her head/face on a cabinet. Patient states she found herself on the 

floor on her back. She believes she may have lost consciousness. She denies 

chest pain or shortness of breath at present time. She  denies neck pain. 

Patient denies abdominal pain. No nausea or vomiting. Patient's complaining of 

severe lower back pain and right-sided flank pain. She denies numbness weakness 

or tingling in the lower extremities. Patient denies bladder or bowel 

incontinence. Patient admits to drinking alcohol last night and states she has 

not had any alcohol since. Patient states she thought the pain would go away but

it seems to be worsening. Patient states the pain is worse with ambulation and 

the back. Patient states she is concerned that her nose may be broken. Patient 

unsure of her last tetanus shot. No other complaints


Time/Duration: Other (last night)





<Mirtha Curiel - Last Filed: 12/11/18 01:48>





<Yoan Alvares - Last Filed: 12/11/18 05:48>





- General


Chief Complaint: Trauma


Time Seen by Provider: 12/11/18 00:14





Past Medical History





- Provider Review


Nursing Documentation Reviewed: Yes





- Travel History


Have you recently traveled outside [place] w/in the past 3 mons?: No





- Past History


Past History: No Previous





- Infectious Disease


Hx of Infectious Diseases: None





- Tetanus Immunization


Tetanus Immunization: Unknown





- Cardiac


Hx Cardiac Disorders: No





- Pulmonary


Hx Respiratory Disorders: No





- Neurological


Hx Neurological Disorder: No





- HEENT


Hx HEENT Disorder: No





- Renal


Hx Renal Disorder: No





- Endocrine/Metabolic


Hx Endocrine Disorders: No





- Hematological/Oncological


Hx Blood Disorders: No





- Integumentary


Hx Dermatological Disorder: Yes (multiple bruising from fall)





- Musculoskeletal/Rheumatological


Hx Musculoskeletal Disorders: Yes (lumbar radiculotpathy,)


Hx Falls: Yes


Hx Spinal Stenosis: Yes


Other/Comment: endometriosis





- Gastrointestinal


Hx Gastrointestinal Disorders: Yes (alcoholic liver)


Other/Comment: h/o rectal bleed





- Genitourinary/Gynecological


Hx Genitourinary Disorders: No


Hx Urinary Tract Infection: Yes





- Psychiatric


Hx Psychophysiologic Disorder: Yes


Hx Bipolar Disorder: Yes


Hx Depression: Yes


Hx Substance Use: No





- Surgical History


Other/Comment: endometriosis x2





- Anesthesia


Hx Anesthesia: No


Hx Anesthesia Reactions: No


Hx Malignant Hyperthermia: No





- Suicidal Assessment


Feels Threatened In Home Enviroment: No





<Mirtha Curiel - Last Filed: 12/11/18 01:48>





Family/Social History





- Physician Review


Nursing Documentation Reviewed: Yes


Family/Social History: Unknown Family HX


Smoking Status: Never Smoked


Hx Alcohol Use: No


Frequency of alcohol use: Few days per week


Hx Substance Use: No


Hx Substance Use Treatment: No





<Mirtha Curiel - Last Filed: 12/11/18 01:48>





Allergies/Home Meds





<Mirtha Curiel - Last Filed: 12/11/18 01:48>





<Yoan Alvares - Last Filed: 12/11/18 05:48>


Allergies/Adverse Reactions: 


Allergies





cyclobenzaprine [From Flexeril] Allergy (Verified 10/28/18 23:15)


   ANAPHYLAXIS


ibuprofen Allergy (Verified 10/28/18 23:15)


   SHORTNESS OF BREATH











Review of Systems





- Review of Systems


Constitutional: absent: Fatigue, Fevers


Eyes: absent: Vision Changes, Photophobia, Eye Pain


ENT: Epistaxis, Sinus Congestion.  absent: Sore Throat


Respiratory: absent: SOB, Cough


Cardiovascular: absent: Chest Pain, Palpitations


Gastrointestinal: absent: Abdominal Pain, Constipation, Diarrhea, Nausea, 

Vomiting


Genitourinary Female: absent: Dysuria, Frequency, Hematuria


Musculoskeletal: Back Pain.  absent: Arthralgias, Neck Pain


Skin: absent: Rash, Pruritis


Neurological: Headache.  absent: Dizziness





<Mirtha Curiel - Last Filed: 12/11/18 01:48>





Physical Exam


Vital Signs Reviewed: Yes





Vital Signs











  Temp Pulse Resp BP Pulse Ox


 


 12/11/18 01:44   89  16  116/76  97


 


 12/11/18 00:26  97.5 F L  99 H  18  116/78  97











Temperature: Afebrile


Blood Pressure: Normal


Pulse: Regular


Respiratory Rate: Normal


Appearance: Positive for: Well-Appearing, Non-Toxic, Comfortable


Pain Distress: None


Mental Status: Positive for: Alert and Oriented X 3





- Systems Exam


Head: Present: Ecchymosis (+ ecchymosis noted to inferior orbit bilaterally; + 

tenderness to left and right inferior orbit.  abrasion noted to bridge of nose. 

)


Pupils: Present: PERRL


Extroacular Muscles: Present: EOMI


Conjunctiva: Present: Normal


Ears: Present: Normal, NORMAL TM


Mouth: Present: Moist Mucous Membranes, Normal Teeth


Pharnyx: Present: Normal


Nose (External): Present: Abrasion


Nose (Internal): Present: Clear Mucous.  No: No Active Bleeding, Septal Devia

tion, Septal Hematoma, Epistaxis


Neck: Present: Normal Range of Motion.  No: MIDLINE TENDERNESS, Paraspinal 

Tenderness


Respiratory/Chest: Present: Clear to Auscultation, Good Air Exchange.  No: 

Respiratory Distress, Accessory Muscle Use, Tender to Palpation


Cardiovascular: Present: Regular Rate and Rhythm, Normal S1, S2.  No: Murmurs


Abdomen: No: Tenderness, Distention, Rebound, Guarding


Back: Present: Midline Tenderness, Paraspinal Tenderness.  No: Normal Inspection

(+ ecchymosis noted to right flank), CVA Tenderness, Pain with Leg Raise


Upper Extremity: Present: Normal ROM


Lower Extremity: Present: Normal ROM


Neurological: Present: GCS=15, Speech Normal, Motor Func Grossly Intact, Normal 

Sensory Function


Skin: Present: Warm, Dry, Normal Color.  No: Rashes


Psychiatric: Present: Alert, Oriented x 3





<Mirtha Curiel - Last Filed: 12/11/18 01:48>





Vital Signs











  Temp Pulse Resp BP Pulse Ox


 


 12/11/18 01:44   89  16  116/76  97


 


 12/11/18 00:26  97.5 F L  99 H  18  116/78  97














<Yoan Alvares - Last Filed: 12/11/18 05:48>





Medical Decision Making


ED Course and Treatment: 





12/11/18 01:54


63yr old female with headache, facial pain, back pain s/p fall.  hx of etoh 

abuse. 





pt with slight tremors; slightly tachycardic; will give ativan IVP for possible 

etoh withdrawal as patient states last drink was last night. 








cbc wnl


cmp; elevated lfts


trop: 


etoh; 98





head ct


maxillofacial ct


abd/pelvis CT with IV contrast. 








12/11/18 01:55


case signed out to dr. alvares pending labs/ct/xray/urine and re-evaluation














- Lab Interpretations


Lab Results: 











                                 12/11/18 01:15 





                                 12/11/18 01:15 





                                   Lab Results





12/11/18 01:15: Alcohol, Quantitative 98 H


12/11/18 01:15: WBC 7.4, RBC 4.33, Hgb 13.7, Hct 40.7, MCV 94.0, MCH 31.6, MCHC 

33.7, RDW 15.6 H, Plt Count 141, MPV 9.6, Gran % 70.1 H, Lymph % (Auto) 23.7, 

Mono % (Auto) 5.2, Eos % (Auto) 0.9 L, Baso % (Auto) 0.1, Gran # 5.20, Lymph # 

(Auto) 1.8, Mono # (Auto) 0.4, Eos # (Auto) 0.1, Baso # (Auto) 0.01


12/11/18 01:15: Sodium 142, Potassium 3.2 L, Chloride 101, Carbon Dioxide 28, 

Anion Gap 15, BUN 15, Creatinine 0.7, Est GFR (African Amer) > 60, Est GFR (Non-

Af Amer) > 60, Random Glucose 100, Calcium 10.2, Total Bilirubin 0.5, AST 90 H D

, ALT 34, Alkaline Phosphatase 152 H D, Lactate Dehydrogenase 500, Total 

Creatine Kinase 61, Troponin I < 0.01, Total Protein 8.0, Albumin 4.3, Globulin 

3.7, Albumin/Globulin Ratio 1.1


12/11/18 01:15: PT 11.6, INR 1.02, APTT 25.5











- RAD Interpretation


Radiology Orders: 











12/11/18 01:03


HEAD W/O CONTRAST [CT] Stat 


MAXILLOFACIAL W/O CONTRAST [CT] Stat 





12/11/18 01:05


ABD & PELVIS IV CONTRAST ONLY [CT] Stat 


CERVICAL SPINE W/O CONTRAST [CT] Stat 














- Medication Orders


Current Medication Orders: 











Multivitamins/Vitamin C 10 ml/Thiamine HCl 100 mg/ Folic Acid 1 mg/ Sodium 

Chloride  1,011.2 mls @ 100 mls/hr IV .Q10H7M ONE


   Stop: 12/11/18 11:24





Discontinued Medications





Lorazepam (Ativan)  0.5 mg IVP ONCE ONE; Protocol


   Stop: 12/11/18 01:19


   Last Admin: 12/11/18 01:42  Dose: 0.5 mg





IVP Administration


 Document     12/11/18 01:42  JOL  (Rec: 12/11/18 01:42  JOL  [id number])


     Charges for Administration


      # of IVP Administrations                   1





Tetanus/Reduced Diphtheria/Acell Pertussis (Boostrix Vaccine Inj)  0.5 ml IM 

.ONCE ONE


   Stop: 12/11/18 01:08











<Mirtha Curiel - Last Filed: 12/11/18 01:48>


ED Course and Treatment: 








12/11/18 04:56


Spoke with Dr. French who accepts patient for admission for alcohol withdrawal.








- Lab Interpretations


Lab Results: 











                                 12/11/18 01:15 





                                 12/11/18 01:15 





                                   Lab Results





12/11/18 03:45: Urine Color Yellow, Urine Appearance Clear, Urine pH 6.5, Ur Sp

ecific Gravity 1.010, Urine Protein Trace H, Urine Glucose (UA) Negative, Urine 

Ketones 15 H, Urine Blood Trace-intact H, Urine Nitrate Negative, Urine 

Bilirubin Negative, Urine Urobilinogen 0.2, Ur Leukocyte Esterase Negative, 

Urine RBC 0 - 2, Urine WBC 0 - 2, Ur Epithelial Cells 1 - 3, Urine Bacteria None


12/11/18 01:15: Alcohol, Quantitative 98 H


12/11/18 01:15: WBC 7.4, RBC 4.33, Hgb 13.7, Hct 40.7, MCV 94.0, MCH 31.6, MCHC 

33.7, RDW 15.6 H, Plt Count 141, MPV 9.6, Gran % 70.1 H, Lymph % (Auto) 23.7, 

Mono % (Auto) 5.2, Eos % (Auto) 0.9 L, Baso % (Auto) 0.1, Gran # 5.20, Lymph # 

(Auto) 1.8, Mono # (Auto) 0.4, Eos # (Auto) 0.1, Baso # (Auto) 0.01


12/11/18 01:15: Sodium 142, Potassium 3.2 L, Chloride 101, Carbon Dioxide 28, 

Anion Gap 15, BUN 15, Creatinine 0.7, Est GFR (African Amer) > 60, Est GFR (Non-

Af Amer) > 60, Random Glucose 100, Calcium 10.2, Total Bilirubin 0.5, AST 90 H D

, ALT 34, Alkaline Phosphatase 152 H D, Lactate Dehydrogenase 500, Total 

Creatine Kinase 61, Troponin I < 0.01, Total Protein 8.0, Albumin 4.3, Globulin 

3.7, Albumin/Globulin Ratio 1.1


12/11/18 01:15: PT 11.6, INR 1.02, APTT 25.5











- RAD Interpretation


Narrative RAD Interpretations (Text): 





12/11/18 05:46


CT scan of the head.


CLINICAL HISTORY: Fall.


TECHNIQUE: Multiple axial CT images were obtained through the brain without IV 

contrast material.


COMPARISON: 6/29/18.


COMMENTS: 


There is normal configuration of sella turcica. There are no intra or extra-

axial collections. There is no mass effect or midline shift. There is no 

evidence of hematoma formation. No hydrocephalus is present. The ventricles are 

symmetrical. No abnormal calcifications are present. 


There is diffuse age-appropriate cerebellar and cerebral atrophy with propo

rtionally dilated ventricles and cortical sulci. 


There are bilateral periventricular and subcortical white matter hypolucencies 

compatible with mild chronic microvascular disease.


Otherwise, no significant focal abnormalities are seen either in the posterior 

fossa or supratentorial compartment.


IMPRESSION:


1. Age-appropriate cerebellar and cerebral atrophy.


2. Mild chronic microvascular disease.


3. No evidence of acute intracranial pathology. 





CT scan of the facial bones.


Indication: Fall, facial injury, nasal pain, left sided jaw


Technique: Axial CT scan images without contrast. Reformatted coronal and 

sagittal images.


Comparison: 6/29/2018.


Findings:


Normal bilateral orbital contents.  Normal bilateral medial and inferior orbital

 walls.  Normal bilateral maxillary bones.  Normal bilateral maxillary sinuses.


Normal bilateral frontozygomatic arches.  Normal bilateral zygomatic temporal 

arches.


Normal nasal bones.  Normal anterior nasal spine.


Anterior facial soft tissue contusions. 


There is no demonstrated fracture.


Normal visualized frontal, ethmoidal and sphenoid sinuses.


Impression:


No CT evidence of acute bone pathology.


Anterior facial soft tissue contusions.





CT scan of the cervical spine without contrast.


Indication: Trauma. Pain.


Technique: Axial CT scan images without contrast. Reformatted coronal and 

sagittal images.


Comparison: 6/29/2018.


Findings:


There are diffuse spondylotic changes. Findings are demonstrated by disc space 

narrowing, osteophyte formation and degenerative endplate changes. Facet joint 

arthropathy is noted. No fracture or dislocation is seen. No aggressive bone 

lesion is noted.


Impression:


Spondylosis.


Multilevel facet joint arthropathy.


No acute bone pathology.





CT SCAN OF THE ABDOMEN AND PELVIS WITH CONTRAST.


CLINICAL HISTORY: Right flank pain, ecchymosis.


TECHNIQUE: Multiple axial and coronal CT images were obtained through the 

abdomen and pelvis after administration of intravenous contrast material.


COMPARISON: 5/4/17.


COMMENTS:


2.7 cm right hepatic lobe simple cyst.


The liver is of decreased attenuation without mass or defect. There is no intra 

or extrahepatic biliary ductal dilatation. The spleen is normal. The gallbladder

 is within normal limits. The pancreas is of normal contour and attenuation 

characteristics. There is no evidence of adrenal mass.


Both kidneys demonstrate prompt and equal nephrograms. The kidneys are normal in

 size, shape and configuration. There is no evidence of renal or ureteral mass. 

No renal or ureteral calculi are identified. There is no hydroureter or 

hydronephrosis.


No evidence for appendicitis. There is no bowel wall thickening. No evidence for

 small or large bowel obstruction. There is no evidence of abdominal ascites or 

lymphadenopathy.


There is no evidence of intrinsic or extrinsic bladder mass. There is no pelvic 

ascites or lymphadenopathy.


Uncomplicated colonic diverticulosis.


Images of the lung bases show no evidence of pleural or parenchymal mass. There 

are no pleural effusions. The bony structures are free of lytic or blastic 

lesions.


IMPRESSION: 


No evidence of acute traumatic abdominal or pelvic pathology.





Radiology Orders: 











12/11/18 01:03


HEAD W/O CONTRAST [CT] Stat 


MAXILLOFACIAL W/O CONTRAST [CT] Stat 





12/11/18 01:05


ABD & PELVIS IV CONTRAST ONLY [CT] Stat 


CERVICAL SPINE W/O CONTRAST [CT] Stat 





12/11/18 01:55


CHEST ONE VIEW [RAD] Stat 











: Radiologist





- Medication Orders


Current Medication Orders: 











Multivitamins/Vitamin C 10 ml/Thiamine HCl 100 mg/ Folic Acid 1 mg/ Sodium 

Chloride  1,011.2 mls @ 100 mls/hr IV .Q10H7M ONE


   Stop: 12/11/18 11:24


   Last Admin: 12/11/18 02:06  Dose: 100 mls/hr





eMAR Start Stop


 Document     12/11/18 02:06  JOL  (Rec: 12/11/18 02:06  JOL  [id number])


     Intravenous Solution


      Start Date                                 12/11/18


      Start Time                                 02:06








Discontinued Medications





Lorazepam (Ativan)  0.5 mg IVP ONCE ONE; Protocol


   Stop: 12/11/18 01:19


   Last Admin: 12/11/18 01:42  Dose: 0.5 mg





IVP Administration


 Document     12/11/18 01:42  JOL  (Rec: 12/11/18 01:42  JOL  [id number])


     Charges for Administration


      # of IVP Administrations                   1





Lorazepam (Ativan)  2 mg IVP ONCE ONE; Protocol


   Stop: 12/11/18 03:49


   Last Admin: 12/11/18 04:03  Dose: 2 mg





IVP Administration


 Document     12/11/18 04:03  JOL  (Rec: 12/11/18 04:03  JOL  [id number])


     Charges for Administration


      # of IVP Administrations                   1





Tetanus/Reduced Diphtheria/Acell Pertussis (Boostrix Vaccine Inj)  0.5 ml IM 

.ONCE ONE


   Stop: 12/11/18 01:08











<Yoan Alvares - Last Filed: 12/11/18 05:48>





Disposition/Present on Arrival





- Present on Arrival


History of DVT/PE: No


History of Uncontrolled Diabetes: No


Urinary Catheter: No


History of Decub. Ulcer: No


History Surgical Site Infection Following: None





<Mirtha Curiel - Last Filed: 12/11/18 01:48>





- Present on Arrival


Any Indicators Present on Arrival: No





- Disposition


Have Diagnosis and Disposition been Completed?: Yes


Disposition Time: 04:40


Patient Plan: Admission





<Yoan Alvares - Last Filed: 12/11/18 05:48>





- Disposition


Diagnosis: 


 Alcohol withdrawal





Disposition: HOSPITALIZED


Condition: GUARDED

## 2018-12-11 NOTE — CP.PCM.HP
<Juan Wade - Last Filed: 12/11/18 06:53>





History of Present Illness





- History of Present Illness


History of Present Illness: 





Juan Wade Internal Medicine Resident- H&P on Behalf of Hospitalist Team


Subjective:


CC: Fall, Low back/Flank pain, 


HPI:


Patient is a 62 year old female with past medical history of alcohol abuse, 

endometriosis, chronic left sided neck pain, anxiety who presents to the 

emergency room for evaluation and treatment of a fall and low back/flank pain. 

Patient states that she was trying to move her television closer to her seat 

when the entire unit fell upon her. Admits to losing consciousness, trauma to 

the head, and waking up after an unknown amount of time. Admits to waking up and

experiencing a second mechanical fall in which she landed upon her right side. 

Denies head trauma and loss of consciousness during this second event. Denies 

associated dizziness, headache, visual/auditory changes, palpitations, and 

sudden weakness prior to the falls. Admits to experiencing 8/10 low back pain 

after the first fall. Pain is characterized as being dull in nature and 

nonradiating. Also admits to constipation, with last bowel movement 2 days ago. 

Further denies fever, chills, chest pain, shortness of breath, nausea, vomiting,

diarrhea, and urinary symptoms. 





12 Point ROS Negative Except as Indicated in HPI





Past medical history: alcohol abuse, endometriosis, alcoholic hepatitis   


Past surgical history: laparoscopic surgery for endometriosis x 3


Allergies: NKDA 


Social history: Admits to ETOH use- last drink was yesterday evening 1 pint of 

vodka, states she drinks 1 pint of vodka several times per year, denies tobacco 

use, denies illicit drug use


Medications: please see MAR


PMD: No longer seeing Ash Shepherd, was scheduled to see new physician Hermilo Andino today 





Physical Examination:


- Constitutional


Appears: Older Than Stated Age





- Head Exam


Head Exam: bilateral infraorbital ecchymosis 





- Eye Exam


Eye Exam: EOMI, PERRL





- Neck Exam


Neck exam: FROM, nontender to tough mid-spine/paraspinal, no step off sign





- Respiratory Exam


Respiratory Exam: Clear to Auscultation Bilateral, NORMAL BREATHING PATTERN.  

absent: Wheezes





- Cardiovascular Exam


Cardiovascular Exam: REGULAR RHYTHM, +S1, +S2





- GI/Abdominal Exam


GI & Abdominal Exam: Normal Bowel Sounds, Soft.  absent: Firm, Guarding





- Back Exam


Back Exam: No midline or paraspinal tenderness, no CVA tenderness bilaterally





- Extremities Exam


Extremities exam: Positive for: normal inspection.  Negative for: calf 

tenderness, pedal edema





- Neurological Exam


Neurological exam: Alert, Oriented x 3, answers questions appropriately, follows

commands, moves extremities past midline, CN II-XII intact bilaterally, muscle 

strength 5/5 throughout, sensation intact to touch throughout, no step off sign,

no mid-spine or paraspinal tenderness





- Psychiatric Exam


Psychiatric exam: Anxious





- Skin


Skin Exam: Dry, Warm, bilateral infraorbital ecchymosis. Ecchymosis on right low

back 








Assessment and Plan:


Patient is a 62 year old female with past medical history of alcohol abuse, 

endometriosis, chronic left sided neck pain, anxiety who was admitted to the 

emergency room for evaluation and treatment of a fall and low back/flank pain





Multiple Fall


- likely from ETOH abuse


- r/o cardiac source EKG ordered- to be completed at time of admission, 

troponins less than 0.01  x 1


- 12/11/18 Head CT without contrast- age appropriate cerebellar and cerebral 

atrophy, mild chronic microvascular disease, no acute intracranial pathology


- 12/11/18 Maxill[address] CT without contrast- no acute bone pathology, anterior 

facial soft tissue contusions


- 12/11/2018 Cervical Spine CT- no acute bone pathology, spondylosis, multilevel

facet joint arthropathy


- high risk fall precautions


- PT evaluation upon conclusion of ETOH withdrawl 





Back/Flank Pain


- 12/11/18 Abdomen/Pelvis CT IV Contrast- official read pending at time of 

admission 


- 12/11/18 Lumbrosacral xray- ordered and pending at time of admission





ETOH Withdrawl


- CIWA


- high risk fall precautions


- ativan 2 q6 jose


- ativan 1 q1 prn sxs of ETOH withdrawl


- supplement with thiamine, folate, and multivitamin


- IVF 1/2 NS @ 100cc/hr





Prophylaxis


- DVT- SCDs


- GI- not indicated





Patient case discussed with and plan approved by attending physician, Dr. French.













Present on Admission





- Present on Admission


Any Indicators Present on Admission: No





Past Patient History





- Infectious Disease


Hx of Infectious Diseases: None





- Tetanus Immunizations


Tetanus Immunization: Unknown





- Past Social History


Smoking Status: Never Smoked





- CARDIAC


Hx Cardiac Disorders: No





- PULMONARY


Hx Respiratory Disorders: No





- NEUROLOGICAL


Hx Neurological Disorder: No





- HEENT


Hx HEENT Problems: No





- RENAL


Hx Chronic Kidney Disease: No





- ENDOCRINE/METABOLIC


Hx Endocrine Disorders: No





- HEMATOLOGICAL/ONCOLOGICAL


Hx Blood Disorders: No





- INTEGUMENTARY


Hx Dermatological Problems: Yes (multiple bruising from fall)





- MUSCULOSKELETAL/RHEUMATOLOGICAL


Hx Musculoskeletal Disorders: Yes (lumbar radiculotpathy,)


Hx Falls: Yes


Hx Spinal Stenosis: Yes


Other/Comment: endometriosis





- GASTROINTESTINAL


Hx Gastrointestinal Disorders: Yes (alcoholic liver)


Other/Comment: h/o rectal bleed





- GENITOURINARY/GYNECOLOGICAL


Hx Genitourinary Disorders: No


Hx Urinary Tract Infection: Yes





- PSYCHIATRIC


Hx Psychophysiologic Disorder: Yes


Hx Bipolar Disorder: Yes


Hx Depression: Yes


Hx Substance Use: No





- SURGICAL HISTORY


Other/Comment: endometriosis x2





- ANESTHESIA


Hx Anesthesia: No


Hx Anesthesia Reactions: No


Hx Malignant Hyperthermia: No





Meds


Allergies/Adverse Reactions: 


                                    Allergies











Allergy/AdvReac Type Severity Reaction Status Date / Time


 


cyclobenzaprine Allergy  ANAPHYLAXIS Verified 10/28/18 23:15





[From Flexeril]     


 


ibuprofen Allergy  SHORTNESS Verified 10/28/18 23:15





   OF BREATH  














Results





- Vital Signs


Recent Vital Signs: 





                                Last Vital Signs











Temp  97.5 F L  12/11/18 00:26


 


Pulse  89   12/11/18 01:44


 


Resp  16   12/11/18 01:44


 


BP  116/76   12/11/18 01:44


 


Pulse Ox  97   12/11/18 01:44














- Labs


Result Diagrams: 


                                 12/11/18 05:40





                                 12/11/18 05:40


Labs: 





                         Laboratory Results - last 24 hr











  12/11/18 12/11/18 12/11/18





  01:15 01:15 01:15


 


WBC    7.4


 


RBC    4.33


 


Hgb    13.7


 


Hct    40.7


 


MCV    94.0


 


MCH    31.6


 


MCHC    33.7


 


RDW    15.6 H


 


Plt Count    141


 


MPV    9.6


 


Gran %    70.1 H


 


Lymph % (Auto)    23.7


 


Mono % (Auto)    5.2


 


Eos % (Auto)    0.9 L


 


Baso % (Auto)    0.1


 


Gran #    5.20


 


Lymph # (Auto)    1.8


 


Mono # (Auto)    0.4


 


Eos # (Auto)    0.1


 


Baso # (Auto)    0.01


 


PT  11.6  


 


INR  1.02  


 


APTT  25.5  


 


Sodium   142 


 


Potassium   3.2 L 


 


Chloride   101 


 


Carbon Dioxide   28 


 


Anion Gap   15 


 


BUN   15 


 


Creatinine   0.7 


 


Est GFR ( Amer)   > 60 


 


Est GFR (Non-Af Amer)   > 60 


 


Random Glucose   100 


 


Calcium   10.2 


 


Total Bilirubin   0.5 


 


AST   90 H D 


 


ALT   34 


 


Alkaline Phosphatase   152 H D 


 


Lactate Dehydrogenase   500 


 


Total Creatine Kinase   61 


 


Troponin I   < 0.01 


 


Total Protein   8.0 


 


Albumin   4.3 


 


Globulin   3.7 


 


Albumin/Globulin Ratio   1.1 


 


Urine Color   


 


Urine Appearance   


 


Urine pH   


 


Ur Specific Gravity   


 


Urine Protein   


 


Urine Glucose (UA)   


 


Urine Ketones   


 


Urine Blood   


 


Urine Nitrate   


 


Urine Bilirubin   


 


Urine Urobilinogen   


 


Ur Leukocyte Esterase   


 


Urine RBC   


 


Urine WBC   


 


Ur Epithelial Cells   


 


Urine Bacteria   


 


Alcohol, Quantitative   














  12/11/18 12/11/18





  01:15 03:45


 


WBC  


 


RBC  


 


Hgb  


 


Hct  


 


MCV  


 


MCH  


 


MCHC  


 


RDW  


 


Plt Count  


 


MPV  


 


Gran %  


 


Lymph % (Auto)  


 


Mono % (Auto)  


 


Eos % (Auto)  


 


Baso % (Auto)  


 


Gran #  


 


Lymph # (Auto)  


 


Mono # (Auto)  


 


Eos # (Auto)  


 


Baso # (Auto)  


 


PT  


 


INR  


 


APTT  


 


Sodium  


 


Potassium  


 


Chloride  


 


Carbon Dioxide  


 


Anion Gap  


 


BUN  


 


Creatinine  


 


Est GFR ( Amer)  


 


Est GFR (Non-Af Amer)  


 


Random Glucose  


 


Calcium  


 


Total Bilirubin  


 


AST  


 


ALT  


 


Alkaline Phosphatase  


 


Lactate Dehydrogenase  


 


Total Creatine Kinase  


 


Troponin I  


 


Total Protein  


 


Albumin  


 


Globulin  


 


Albumin/Globulin Ratio  


 


Urine Color   Yellow


 


Urine Appearance   Clear


 


Urine pH   6.5


 


Ur Specific Gravity   1.010


 


Urine Protein   Trace H


 


Urine Glucose (UA)   Negative


 


Urine Ketones   15 H


 


Urine Blood   Trace-intact H


 


Urine Nitrate   Negative


 


Urine Bilirubin   Negative


 


Urine Urobilinogen   0.2


 


Ur Leukocyte Esterase   Negative


 


Urine RBC   0 - 2


 


Urine WBC   0 - 2


 


Ur Epithelial Cells   1 - 3


 


Urine Bacteria   None


 


Alcohol, Quantitative  98 H 














<Lexus French - Last Filed: 12/14/18 02:05>





Results





- Vital Signs


Recent Vital Signs: 





                                Last Vital Signs











Temp  97.1 F L  12/14/18 00:01


 


Pulse  105 H  12/14/18 00:01


 


Resp  20   12/14/18 00:01


 


BP  106/73   12/14/18 00:01


 


Pulse Ox  95   12/14/18 00:01














- Labs


Result Diagrams: 


                                 12/13/18 06:15





                                 12/13/18 06:15


Labs: 





                         Laboratory Results - last 24 hr











  12/13/18 12/13/18





  06:15 06:15


 


WBC  7.8  D 


 


RBC  4.31 


 


Hgb  13.4 


 


Hct  40.8 


 


MCV  94.7 


 


MCH  31.1 


 


MCHC  32.8 


 


RDW  15.4 H 


 


Plt Count  114 L 


 


MPV  9.6 


 


Gran %  68.9 H 


 


Lymph % (Auto)  21.4 L 


 


Mono % (Auto)  8.3 H 


 


Eos % (Auto)  1.3 L 


 


Baso % (Auto)  0.1 


 


Gran #  5.37 


 


Lymph # (Auto)  1.7 


 


Mono # (Auto)  0.7 H 


 


Eos # (Auto)  0.1 


 


Baso # (Auto)  0.01 


 


Sodium   137


 


Potassium   3.4 L


 


Chloride   103


 


Carbon Dioxide   25


 


Anion Gap   12


 


BUN   9


 


Creatinine   0.6 L


 


Est GFR ( Amer)   > 60


 


Est GFR (Non-Af Amer)   > 60


 


Random Glucose   125 H


 


Calcium   10.2


 


Total Bilirubin   0.7


 


AST   48 H


 


ALT   28


 


Alkaline Phosphatase   103


 


Total Protein   7.2


 


Albumin   3.8


 


Globulin   3.4


 


Albumin/Globulin Ratio   1.1














Attending/Attestation





- Attestation


I have personally seen and examined this patient.: Yes


I have fully participated in the care of the patient.: Yes


I have reviewed all pertinent clinical information: Yes

## 2018-12-11 NOTE — CT
Date of service: 



12/11/2018



PROCEDURE:  CT Abdomen and Pelvis with contrast



HISTORY:

right flank pain, fall, ecchymosis to R flank



COMPARISON:

05/04/2017.  CT abdomen and pelvis



TECHNIQUE:

Intravenous contrast dose: 100 cc Omnipaque 350.



Radiation dose:



Total exam DLP = 472.04 mGy-cm.



This CT exam was performed using one or more of the following dose 

reduction techniques: Automated exposure control, adjustment of the 

mA and/or kV according to patient size, and/or use of iterative 

reconstruction technique.



FINDINGS:



LOWER THORAX:

Unremarkable. 



LIVER:

Solitary well-circumscribed mass right hepatic lobe 1.8 x 1.9 cm.  No 

significant interval change.  No new masses identified.  Underlying 

hepatic steatosis. 



GALLBLADDER AND BILE DUCTS:

Unremarkable. 



PANCREAS:

Unremarkable. No gross lesion or ductal dilatation.



SPLEEN:

Unremarkable. 



ADRENALS:

Unremarkable. No mass. 



KIDNEYS AND URETERS:

Unremarkable. No hydronephrosis. No solid mass. 



VASCULATURE:

Unremarkable. No aortic aneurysm. No atherosclerotic calcification or 

mural plaque present.



BOWEL:

Unremarkable. No obstruction. No gross mural thickening. 



APPENDIX:

Normal appendix. 



PERITONEUM:

Unremarkable. No free fluid. No free air. 



LYMPH NODES:

Unremarkable. No enlarged lymph nodes. 



BLADDER:

Unremarkable. 



REPRODUCTIVE:

Unremarkable. 



BONES:

No acute fracture. 



OTHER FINDINGS:

None.



IMPRESSION:

No acute findings related to/ accounting  for the clinical 

presentation.



Additional benign and/or incidental findings described above.



No significant interval change compared to the prior examination(s).



________________________________________________



Concordant results (preliminary interpretation) provided by [facility].



Procedure Completed: 03:21



Preliminary Report: Dictated and Authenticated: 05:15.



Final Interpretation: 10:55.

## 2018-12-11 NOTE — RAD
Date of service: 



12/11/2018



PROCEDURE:  Radiographs of the Lumbar Spine.



HISTORY:

back pain







COMPARISON:

No prior.



FINDINGS:



BONES:

Normal alignment. No listhesis. No fracture.



DISC SPACES:

Mild multilevel mid lumbar spondylosis.  No destructive bony lesion 

appreciable.



OTHER FINDINGS:

Incidental note is made of excreted iodinated contrast material into 

the urinary bladder and bilateral ureters.



IMPRESSION:

No fracture or spondylolisthesis.  Multilevel spondylosis is mild.

## 2018-12-11 NOTE — CT
Date of service: 



12/11/2018



PROCEDURE:  CT HEAD WITHOUT CONTRAST.



HISTORY:

fall/ head injury



COMPARISON:

06/29/2018



TECHNIQUE:

Axial computed tomography images were obtained through the head/brain 

without intravenous contrast.  



Supplemental Coronal and Sagittal projections created and reviewed.



Radiation dose:



Total exam DLP = 753.13 mGy-cm.



This CT exam was performed using one or more of the following dose 

reduction techniques: Automated exposure control, adjustment of the 

mA and/or kV according to patient size, and/or use of iterative 

reconstruction technique.



FINDINGS:



HEMORRHAGE:

No intracranial hemorrhage. 



BRAIN:

No mass effect or edema.  No atrophy or chronic microvascular 

ischemic changes.



VENTRICLES:

Unremarkable. No hydrocephalus. 



CALVARIUM:

Unremarkable.



PARANASAL SINUSES:

Unremarkable as visualized. No significant inflammatory changes.



MASTOID AIR CELLS:

Unremarkable as visualized. No inflammatory changes.



OTHER FINDINGS:

None.



IMPRESSION:

No acute intracranial abnormalities. No significant findings to 

account for the clinical presentation. No significant interval change 

compared to the prior examination(s).



________________________________________________



Concordant results (preliminary interpretation) provided by [facility].



Procedure Completed: 02:33.



Preliminary Report: Dictated and Authenticated: 03:59.



Final Interpretation: 09:28.

## 2018-12-11 NOTE — CT
Date of service: 



12/11/2018



PROCEDURE:  CT MAXILLOFACIAL BONES WITHOUT CONTRAST



HISTORY:

fall, facial injury. nasal pain, left sided jaw



COMPARISON:

None available.



TECHNIQUE:

Contiguous axial CT  images of the maxillofacial bones were obtained. 

Coronal and sagittal reformats were generated.



Radiation dose:



Total exam DLP = 745.23 mGy-cm.



This CT exam was performed using one or more of the following dose 

reduction techniques: Automated exposure control, adjustment of the 

mA and/or kV according to patient size, and/or use of iterative 

reconstruction technique.



FINDINGS:



NASAL BONES:

Unremarkable.



ORBITS:

Unremarkable.



PARANASAL SINUSES/ MASTOIDS:

Clear.



MAXILLA:

Unremarkable.



MANDIBLE/ TEMPOROMANDIBULAR JOINTS:

Unremarkable.



SKULL BASE:

Unremarkable.



TEMPORAL BONES:

Middle ears and mastoid grossly unremarkable.



OTHER FINDINGS:

Soft tissue swelling about the nose and orbits.



IMPRESSION:

Soft tissue swelling without acute articular or osseous abnormality.



________________________________________________



Concordant results (preliminary interpretation) provided by [facility].



Procedure Completed: 02:36.



Preliminary Report: Dictated and Authenticated: 04:10.



Final Interpretation: 10:46.

## 2018-12-11 NOTE — CT
Date of service: 



12/11/2018



PROCEDURE:  CT Cervical Spine without contrast



HISTORY:

neck pain



COMPARISON:

06/29/2018 CT cervical spine.



TECHNIQUE:

Axial computed tomography images were obtained of the cervical spine 

without the use of intravenous contrast. Coronal and sagittal 

reformatted images were created and reviewed.



Radiation dose:



Total exam DLP = 368.17 mGy-cm.



This CT exam was performed using one or more of the following dose 

reduction techniques: Automated exposure control, adjustment of the 

mA and/or kV according to patient size, and/or use of iterative 

reconstruction technique.



FINDINGS:



VERTEBRAE:

No fracture. Normal alignment. No destructive bony lesion.



DISCS/SPINAL CANAL/NEURAL FORAMINA:

No significant central canal stenosis.  Proliferative degenerative 

changes C4-5, C5-6, C6-7. this has resulted in extension into the 

neural foramen on the left at C4-5 and C5-6 and on the right 

primarily at C5-6. 



PARASPINAL SOFT TISSUES:

Unremarkable. 



OTHER FINDINGS:

None.



IMPRESSION:

Multilevel degenerative changes.



No acute findings.



________________________________________________



Concordant results (preliminary interpretation) provided by [facility].



Procedure Completed: 02:38.



Preliminary Report: Dictated and Authenticated: 04:01.



Final Interpretation: 10:50. December 11, 2018

## 2018-12-11 NOTE — CARD
--------------- APPROVED REPORT --------------





Date of service: 12/11/2018



EKG Measurement

Heart [id number]

UT [id number]

[id number]

[id number]

[id number]



<Conclusion>

Normal sinus rhythm

Normal ECG

## 2018-12-11 NOTE — RAD
Date of service: 



12/11/2018



HISTORY:

 fall 



COMPARISON:

None available.



FINDINGS:



LUNGS:

No active pulmonary disease.



PLEURA:

No significant pleural effusion identified, no pneumothorax apparent.



CARDIOVASCULAR:

No aortic atherosclerotic calcification present.



 Normal cardiac size. No pulmonary vascular congestion. 



OSSEOUS STRUCTURES:

No significant abnormalities.



VISUALIZED UPPER ABDOMEN:

Normal.



OTHER FINDINGS:

None.



IMPRESSION:

No interval acute cardiopulmonary disease appreciated.

## 2018-12-12 LAB
ALBUMIN SERPL-MCNC: 4 G/DL (ref 3–4.8)
ALBUMIN/GLOB SERPL: 1.1 {RATIO} (ref 1.1–1.8)
ALT SERPL-CCNC: 30 U/L (ref 7–56)
AST SERPL-CCNC: 59 U/L (ref 14–36)
BASOPHILS # BLD AUTO: 0.02 K/MM3 (ref 0–2)
BASOPHILS NFR BLD: 0.4 % (ref 0–3)
BUN SERPL-MCNC: 7 MG/DL (ref 7–21)
CALCIUM SERPL-MCNC: 10 MG/DL (ref 8.4–10.5)
EOSINOPHIL # BLD: 0.1 10*3/UL (ref 0–0.7)
EOSINOPHIL NFR BLD: 1.3 % (ref 1.5–5)
ERYTHROCYTE [DISTWIDTH] IN BLOOD BY AUTOMATED COUNT: 15.5 % (ref 11.5–14.5)
GFR NON-AFRICAN AMERICAN: > 60
GRANULOCYTES # BLD: 3.07 10*3/UL (ref 1.4–6.5)
GRANULOCYTES NFR BLD: 57.5 % (ref 50–68)
HGB BLD-MCNC: 13.1 G/DL (ref 12–16)
LYMPHOCYTES # BLD: 1.8 10*3/UL (ref 1.2–3.4)
LYMPHOCYTES NFR BLD AUTO: 33.1 % (ref 22–35)
MCH RBC QN AUTO: 31.3 PG (ref 25–35)
MCHC RBC AUTO-ENTMCNC: 33.3 G/DL (ref 31–37)
MCV RBC AUTO: 93.8 FL (ref 80–105)
MONOCYTES # BLD AUTO: 0.4 10*3/UL (ref 0.1–0.6)
MONOCYTES NFR BLD: 7.7 % (ref 1–6)
PLATELET # BLD: 117 10^3/UL (ref 120–450)
PMV BLD AUTO: 9.8 FL (ref 7–11)
RBC # BLD AUTO: 4.19 10^6/UL (ref 3.5–6.1)
WBC # BLD AUTO: 5.3 10^3/UL (ref 4.5–11)

## 2018-12-12 NOTE — CP.PCM.PN
<Janna Carmonaah - Last Filed: 12/12/18 15:58>





Subjective





- Date & Time of Evaluation


Date of Evaluation: 12/12/18


Time of Evaluation: 09:15





- Subjective


Subjective: 


Internal medicine progress note for Dr. Vázquez





Patient seen and examined this am at bedside.  Patient was agitated overnight 

require multiple doses of ativan and geodon.  Patient is currently sleeping and 

refuses to engage in interview.  Upon reassessment later in the day patient is 

awake but uncooperative refusing to conduct interview.  Explained to patient the

results of her imaging being negative and the need for continued treatment for 

ETOH withdrawal.   








Objective





- Vital Signs/Intake and Output


Vital Signs (last 24 hours): 


                                        











Temp Pulse Resp BP Pulse Ox


 


 97.7 F   88   20   131/67   96 


 


 12/12/18 08:23  12/12/18 08:23  12/12/18 08:23  12/12/18 08:23  12/12/18 08:23








Intake and Output: 


                                        











 12/12/18 12/12/18





 06:59 18:59


 


Output Total 600 


 


Balance -600 














- Medications


Medications: 


                               Current Medications





Folic Acid (Folic Acid)  1 mg PO DAILY [facility]


Sodium Chloride (Sodium Chloride 0.45%)  1,000 mls @ 100 mls/hr IV .Q10H [facility]


   Last Admin: 12/11/18 20:40 Dose:  Not Given


Lorazepam (Ativan)  1 mg IVP Q1 PRN; Protocol


   PRN Reason: Symptoms of alcohol withdrawl


   Last Admin: 12/12/18 05:40 Dose:  1 mg


Lorazepam (Ativan)  2 mg IVP Q4 [facility]; Protocol


   Last Admin: 12/12/18 08:24 Dose:  2 mg


Multivitamins (Thera Tab)  1 tab PO 0800 [facility]


   Last Admin: 12/12/18 08:25 Dose:  1 tab


Thiamine HCl (Vitamin B1 Tab)  100 mg PO DAILY [facility]











- Labs


Labs: 


                                        





                                 12/12/18 05:45 





                                 12/12/18 05:45 





                                        











PT  11.6 SECONDS (9.4-12.5)   12/11/18  01:15    


 


INR  1.02   12/11/18  01:15    


 


APTT  25.5 Seconds (25.1-36.5)   12/11/18  01:15    














- Constitutional


Appears: Well, Non-toxic, No Acute Distress





- Head Exam


Head Exam: ATRAUMATIC, NORMOCEPHALIC





- Eye Exam


Eye Exam: EOMI





- ENT Exam


ENT Exam: Mucous Membranes Moist





- Respiratory Exam


Respiratory Exam: NORMAL BREATHING PATTERN





- Cardiovascular Exam


Cardiovascular Exam: REGULAR RHYTHM





- GI/Abdominal Exam


GI & Abdominal Exam: Soft.  absent: Distended, Guarding, Tenderness





- Neurological Exam


Neurological Exam: Alert, Awake, Oriented x3





- Psychiatric Exam


Psychiatric exam: Agitated, Anxious





- Skin


Skin Exam: Dry, Intact, Normal Color, Warm





Assessment and Plan





- Assessment and Plan (Free Text)


Assessment: 


63 yr old female with hx of ETOh abuse s/p fall and currently in ETOH withdrawal





Plan: 





Multiple Fall


- likely from ETOH abuse


- EKG normal sinus rythm QTc slightly prolonged at 469 no arryhtmias, troponins 

less than 0.01  x 1


- 12/11/18 Head CT without contrast- age appropriate cerebellar and cerebral 

atrophy, mild chronic microvascular disease, no acute intracranial pathology


- 12/11/18 Maxillofacial CT without contrast- no acute bone pathology, anterior 

facial soft tissue contusions


- 12/11/2018 Cervical Spine CT- no acute bone pathology, spondylosis, multilevel

facet joint arthropathy


- high risk fall precautions


- PT evaluation upon conclusion of ETOH withdrawal 





Back/Flank Pain


- resolved patient not complaining of pain at this time


- 12/11/18 Abdomen/Pelvis CT IV Contrast- no abnormalities


- 12/11/18 Lumbrosacral xray- no abnormalities


- likely musculoskeletal 2/2 to fall





ETOH Withdrawl


- CIWA approximately 12 today, continue CIWA evaluations


- high risk fall precautions


- ativan 2 q4 [place]


- ativan 1 q1 prn sxs of ETOH withdrawal


- Librium 25 Q8 [facility]


- supplement with thiamine, folate, and multivitamin





Diet: Regular Diet





Prophylaxis


- DVT


- SCDs


- GI: not indicated





Discussed seen and examined with Dr. Gurpreet Carmona, PGY 1





<Aníbal Vázquez - Last Filed: 12/16/18 15:00>





Objective





- Vital Signs/Intake and Output


Vital Signs (last 24 hours): 


                                        











Temp Pulse Resp BP Pulse Ox


 


 97.9 F   80   20   90/60 L  94 L


 


 12/16/18 08:16  12/16/18 14:00  12/16/18 08:16  12/16/18 08:16  12/16/18 08:16








Intake and Output: 


                                        











 12/16/18 12/16/18





 06:59 18:59


 


Intake Total 360 


 


Balance 360 














- Medications


Medications: 


                               Current Medications





Benzonatate (Tessalon Perles)  100 mg PO ONCE PRN


   PRN Reason: Cough


   Last Admin: 12/15/18 00:25 Dose:  100 mg


Folic Acid (Folic Acid)  1 mg PO DAILY [facility]


   Last Admin: 12/16/18 10:53 Dose:  Not Given


Sodium Chloride (Sodium Chloride 0.45%)  1,000 mls @ 100 mls/hr IV .Q10H [facility]


   Last Admin: 12/11/18 20:40 Dose:  Not Given


Sodium Chloride (Sodium Chloride 0.9%)  1,000 mls @ 100 mls/hr IV .Q10H [facility]


   Last Admin: 12/16/18 09:21 Dose:  100 mls/hr


Lorazepam (Ativan)  2 mg IVP Q8 [facility]; Protocol


   Last Admin: 12/16/18 13:36 Dose:  2 mg


Lorazepam (Ativan)  2 mg IVP Q6H [facility]; Protocol


   PRN Reason: Anxiety


Multivitamins/Minerals (Therapeutic-M Tab)  1 tab PO DAILY [facility]


   Last Admin: 12/16/18 10:54 Dose:  Not Given


Pantoprazole Sodium (Protonix Ec Tab)  40 mg PO 0600 [facility]


Phenol/Menthol (Phenaseptic 1.4% Throat Spray)  0 ml MT Q2H PRN


   PRN Reason: Sore Throat


   Last Admin: 12/16/18 14:06 Dose:  1 spr


Risperidone (Risperdal Tab)  1 mg PO BID [facility]; Protocol


   Last Admin: 12/16/18 10:53 Dose:  Not Given


Risperidone (Risperdal Tab)  2 mg PO HS [facility]; Protocol


   Last Admin: 12/15/18 21:39 Dose:  2 mg


Thiamine HCl (Vitamin B1 Tab)  100 mg PO DAILY [facility]


   Last Admin: 12/16/18 10:54 Dose:  Not Given


Ziprasidone (Geodon Inj)  10 mg IM DAILY [facility]; Protocol


   PRN Reason: Agitation


   Last Admin: 12/16/18 01:10 Dose:  10 mg











- Labs


Labs: 


                                        





                                 12/16/18 06:30 





                                 12/16/18 06:30 





                                        











PT  11.6 SECONDS (9.4-12.5)   12/11/18  01:15    


 


INR  1.02   12/11/18  01:15    


 


APTT  25.5 Seconds (25.1-36.5)   12/11/18  01:15    














Attending/Attestation





- Attestation


I have personally seen and examined this patient.: Yes


I have fully participated in the care of the patient.: Yes


I have reviewed all pertinent clinical information, including history, physical 

exam and plan: Yes


Notes (Text): 





12/16/18 14:55





attending note;





Patient seen and examined with resident.


Patient is confused and agitated at times.


Not in any acute distress.





Patient  is a 63-year-old  female with past medical history of alcohol abuse, 

endometriosis, chronic left sided neck pain, anxiety who presents to the 

emergency room for evaluation and treatment of a fall and low back/flank pain.


Head CT without contrast- age appropriate cerebellar and cerebral atrophy, mild 

chronic microvascular disease, no acute intracranial pathology


 Maxillofacial CT without contrast- no acute bone pathology, anterior facial 

soft tissue contusions


 Cervical Spine CT- no acute bone pathology, spondylosis, multilevel facet joint

arthropathy





Acute alcohol withdrawal; continue to monitor closely with CIWA protocol.


IV Ativan ordered.


Continue by mouth Librium.


IM Geodon as needed.





Anxiety; psychiatric evaluation requested.





Monitor closely.





Upon discharge the patient will follow-up with PMD Dr. Akhtar.





Prognosis is poor secondary to continuous alcohol abuse, psychiatric disorder 

and noncompliance with follow-up.
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
## 2018-12-13 LAB
ALBUMIN SERPL-MCNC: 3.8 G/DL (ref 3–4.8)
ALBUMIN/GLOB SERPL: 1.1 {RATIO} (ref 1.1–1.8)
ALT SERPL-CCNC: 28 U/L (ref 7–56)
AST SERPL-CCNC: 48 U/L (ref 14–36)
BASOPHILS # BLD AUTO: 0.01 K/MM3 (ref 0–2)
BASOPHILS NFR BLD: 0.1 % (ref 0–3)
BUN SERPL-MCNC: 9 MG/DL (ref 7–21)
CALCIUM SERPL-MCNC: 10.2 MG/DL (ref 8.4–10.5)
EOSINOPHIL # BLD: 0.1 10*3/UL (ref 0–0.7)
EOSINOPHIL NFR BLD: 1.3 % (ref 1.5–5)
ERYTHROCYTE [DISTWIDTH] IN BLOOD BY AUTOMATED COUNT: 15.4 % (ref 11.5–14.5)
GFR NON-AFRICAN AMERICAN: > 60
GRANULOCYTES # BLD: 5.37 10*3/UL (ref 1.4–6.5)
GRANULOCYTES NFR BLD: 68.9 % (ref 50–68)
HGB BLD-MCNC: 13.4 G/DL (ref 12–16)
LYMPHOCYTES # BLD: 1.7 10*3/UL (ref 1.2–3.4)
LYMPHOCYTES NFR BLD AUTO: 21.4 % (ref 22–35)
MCH RBC QN AUTO: 31.1 PG (ref 25–35)
MCHC RBC AUTO-ENTMCNC: 32.8 G/DL (ref 31–37)
MCV RBC AUTO: 94.7 FL (ref 80–105)
MONOCYTES # BLD AUTO: 0.7 10*3/UL (ref 0.1–0.6)
MONOCYTES NFR BLD: 8.3 % (ref 1–6)
PLATELET # BLD: 114 10^3/UL (ref 120–450)
PMV BLD AUTO: 9.6 FL (ref 7–11)
RBC # BLD AUTO: 4.31 10^6/UL (ref 3.5–6.1)
WBC # BLD AUTO: 7.8 10^3/UL (ref 4.5–11)

## 2018-12-13 RX ADMIN — MULTIPLE VITAMINS W/ MINERALS TAB SCH TAB: TAB at 09:09

## 2018-12-13 NOTE — CP.PCM.PN
<CarmonaStephanie moe - Last Filed: 12/13/18 21:09>





Subjective





- Date & Time of Evaluation


Date of Evaluation: 12/13/18


Time of Evaluation: 09:45





- Subjective


Subjective: 





internal medicine progress note Dr. Vázquez





Patient seen and examined this am at bedside.  Patient continued to have 

episodes of severe agitation overnight requiring geodon and restraints.  Patient

continues to complain of tremors and irritability.  She becomes tearful then 

angry during interview and asks to be left alone.  ROS unobtainable.  





Objective





- Vital Signs/Intake and Output


Vital Signs (last 24 hours): 


                                        











Temp Pulse Resp BP Pulse Ox


 


 97.3 F L  103 H  20   129/87   94 L


 


 12/13/18 09:11  12/13/18 09:11  12/13/18 09:11  12/13/18 09:11  12/13/18 09:11








Intake and Output: 


                                        











 12/13/18 12/13/18





 06:59 18:59


 


Intake Total 360 


 


Balance 360 














- Medications


Medications: 


                               Current Medications





Chlordiazepoxide (Librium)  50 mg PO Q8 [facility]; Protocol


   Last Admin: 12/13/18 13:26 Dose:  50 mg


Folic Acid (Folic Acid)  1 mg PO DAILY [facility]


   Last Admin: 12/13/18 09:08 Dose:  1 mg


Sodium Chloride (Sodium Chloride 0.45%)  1,000 mls @ 100 mls/hr IV .Q10H [facility]


   Last Admin: 12/11/18 20:40 Dose:  Not Given


Potassium Chloride (Potassium Chloride 20 Meq/100 Ml)  20 meq in 100 mls @ 50 

mls/hr IVPB Q2H [facility]


   Stop: 12/13/18 17:14


Lorazepam (Ativan)  2 mg IVP Q4 [facility]; Protocol


   Last Admin: 12/13/18 13:26 Dose:  2 mg


Lorazepam (Ativan)  2 mg IVP Q4 PRN; Protocol


   PRN Reason: Agitation


   Last Admin: 12/13/18 11:59 Dose:  2 mg


Multivitamins/Minerals (Therapeutic-M Tab)  1 tab PO DAILY [facility]


   Last Admin: 12/13/18 09:09 Dose:  1 tab


Risperidone (Risperdal Tab)  1 mg PO BID [facility]; Protocol


   Last Admin: 12/13/18 09:08 Dose:  1 mg


Thiamine HCl (Vitamin B1 Tab)  100 mg PO DAILY [facility]


   Last Admin: 12/13/18 09:08 Dose:  100 mg











- Labs


Labs: 


                                        





                                 12/13/18 06:15 





                                 12/13/18 06:15 





                                        











PT  11.6 SECONDS (9.4-12.5)   12/11/18  01:15    


 


INR  1.02   12/11/18  01:15    


 


APTT  25.5 Seconds (25.1-36.5)   12/11/18  01:15    














- Constitutional


Appears: Non-toxic, No Acute Distress, Unkempt, Chronically Ill





- Eye Exam


Eye Exam: EOMI


Additional comments: 





bilateral infraorbital ecchymosis





- ENT Exam


ENT Exam: Mucous Membranes Moist





- Respiratory Exam


Respiratory Exam: NORMAL BREATHING PATTERN





- Cardiovascular Exam


Cardiovascular Exam: REGULAR RHYTHM





- GI/Abdominal Exam


GI & Abdominal Exam: Soft.  absent: Distended, Guarding, Tenderness





- Extremities Exam


Extremities Exam: absent: Calf Tenderness, Pedal Edema





- Neurological Exam


Neurological Exam: Alert, Awake, Oriented x3


Additional comments: 





tremulous outstretched hands





- Skin


Skin Exam: Dry, Intact, Normal Color, Warm





Assessment and Plan





- Assessment and Plan (Free Text)


Assessment: 





63 yr old female with hx of ETOh abuse s/p fall and currently in ETOH withdrawal





Plan: 





Multiple Fall


- likely from ETOH abuse


- EKG normal sinus rythm QTc slightly prolonged at 469 no arrhythmias, troponins

less than 0.01  x 1


- 12/11/18 Head CT without contrast- age appropriate cerebellar and cerebral 

atrophy, mild chronic microvascular disease, no acute intracranial pathology


- 12/11/18 Maxillofacial CT without contrast- no acute bone pathology, anterior 

facial soft tissue contusions


- 12/11/2018 Cervical Spine CT- no acute bone pathology, spondylosis, multilevel

facet joint arthropathy


- high risk fall precautions


- PT evaluation upon conclusion of ETOH withdrawal 





Back/Flank Pain


- resolved patient not complaining of pain at this time


- 12/11/18 Abdomen/Pelvis CT IV Contrast- no abnormalities


- 12/11/18 Lumbrosacral xray- no abnormalities


- likely musculoskeletal 2/2 to fall





ETOH Withdrawl


- CIWA approximately 12 today, continue CIWA evaluations


- high risk fall precautions


- ativan 2 q4 [facility]


- ativan 2 q4 prn sxs of ETOH withdrawal


- Librium 50 Q8 [place]


- supplement with thiamine, folate, and multivitamin





Diet: Regular Diet





Prophylaxis


- DVT: SCDs


- GI: not indicated





Discussed seen and examined with Dr. Gurpreet Carmona, PGY 1








<Aníbal Vázquez - Last Filed: 12/16/18 15:01>





Objective





- Vital Signs/Intake and Output


Vital Signs (last 24 hours): 


                                        











Temp Pulse Resp BP Pulse Ox


 


 97.9 F   80   20   90/60 L  94 L


 


 12/16/18 08:16  12/16/18 14:00  12/16/18 08:16  12/16/18 08:16  12/16/18 08:16








Intake and Output: 


                                        











 12/16/18 12/16/18





 06:59 18:59


 


Intake Total 360 


 


Balance 360 














- Medications


Medications: 


                               Current Medications





Benzonatate (Tessalon Perles)  100 mg PO ONCE PRN


   PRN Reason: Cough


   Last Admin: 12/15/18 00:25 Dose:  100 mg


Folic Acid (Folic Acid)  1 mg PO DAILY [facility]


   Last Admin: 12/16/18 10:53 Dose:  Not Given


Sodium Chloride (Sodium Chloride 0.45%)  1,000 mls @ 100 mls/hr IV .Q10H [facility]


   Last Admin: 12/11/18 20:40 Dose:  Not Given


Sodium Chloride (Sodium Chloride 0.9%)  1,000 mls @ 100 mls/hr IV .Q10H [facility]


   Last Admin: 12/16/18 09:21 Dose:  100 mls/hr


Lorazepam (Ativan)  2 mg IVP Q8 [facility]; Protocol


   Last Admin: 12/16/18 13:36 Dose:  2 mg


Lorazepam (Ativan)  2 mg IVP Q6H [facility]; Protocol


   PRN Reason: Anxiety


Multivitamins/Minerals (Therapeutic-M Tab)  1 tab PO DAILY [facility]


   Last Admin: 12/16/18 10:54 Dose:  Not Given


Pantoprazole Sodium (Protonix Ec Tab)  40 mg PO 0600 [facility]


Phenol/Menthol (Phenaseptic 1.4% Throat Spray)  0 ml MT Q2H PRN


   PRN Reason: Sore Throat


   Last Admin: 12/16/18 14:06 Dose:  1 spr


Risperidone (Risperdal Tab)  1 mg PO BID [facility]; Protocol


   Last Admin: 12/16/18 10:53 Dose:  Not Given


Risperidone (Risperdal Tab)  2 mg PO HS [facility]; Protocol


   Last Admin: 12/15/18 21:39 Dose:  2 mg


Thiamine HCl (Vitamin B1 Tab)  100 mg PO DAILY [facility]


   Last Admin: 12/16/18 10:54 Dose:  Not Given


Ziprasidone (Geodon Inj)  10 mg IM DAILY [facility]; Protocol


   PRN Reason: Agitation


   Last Admin: 12/16/18 01:10 Dose:  10 mg











- Labs


Labs: 


                                        





                                 12/16/18 06:30 





                                 12/16/18 06:30 





                                        











PT  11.6 SECONDS (9.4-12.5)   12/11/18  01:15    


 


INR  1.02   12/11/18  01:15    


 


APTT  25.5 Seconds (25.1-36.5)   12/11/18  01:15    














Attending/Attestation





- Attestation


I have personally seen and examined this patient.: Yes


I have fully participated in the care of the patient.: Yes


I have reviewed all pertinent clinical information, including history, physical 

exam and plan: Yes


Notes (Text): 





12/16/18 15:01








attending note;





Patient seen and examined with resident.


Patient is confused and agitated at times.


still with alcohol withdrawal symptoms.





Patient  is a 63-year-old  female with past medical history of alcohol abuse, 

endometriosis, chronic left sided neck pain, anxiety who presents to the 

emergency room for evaluation and treatment of a fall and low back/flank pain.


Head CT without contrast- age appropriate cerebellar and cerebral atrophy, mild 

chronic microvascular disease, no acute intracranial pathology


 Maxillofacial CT without contrast- no acute bone pathology, anterior facial 

soft tissue contusions


 Cervical Spine CT- no acute bone pathology, spondylosis, multilevel facet joint

arthropathy


CT abdomen and pelvis is negative for any acute process.





Acute alcohol withdrawal; continue to monitor closely with CIWA protocol.


IV Ativan ordered.


Continue by mouth Librium.


IM Geodon as needed.





Anxiety; psychiatric evaluation appreciated.


Continue one to one. Restraints as needed.





Monitor closely.





Upon discharge the patient will follow-up with PMD Dr. Akhtar.





Prognosis is poor secondary to continuous alcohol abuse, psychiatric disorder 

and noncompliance with follow-up.

## 2018-12-13 NOTE — PN
DATE:  12/13/2018



SUBJECTIVE:  In short, the patient is a 63-year-old  female with

long history of mental illness as well as alcohol use disorder.  Presently,

the patient is on medical side for evaluation of alcohol withdrawal

delirium.  Psych consult was involved because of the patient's agitation,

aggression and because the patient has history of mental illness.  The

patient presented the same way today, restless, has periods of agitation

and confusion.  The patient is convinced that her boyfriend is having an

affair.  The patient had impression that she received a phone call, which

is not true.  The patient said that her boyfriend is  and had an

affair with another female as well as with her.  The patient is disruptive

to the unit, aggressive, needs constant redirection, and medication

management.



OBJECTIVE:

VITAL SIGNS:  Seems to be stable, but the patient is tachycardic, blood

pressure is better controlled.

MENTAL STATUS EXAMINATION:  This writer described above, the patient is

agitated, restless.  No eye contact.  Speech is incoherent and loud.  Mood

described as that my boyfriend is cheating on me.  Thought process seems to

be circumstantial, tangential and disorganized.  Thought content, the

patient appears to be responding to internal stimuli, disorganization as

well as paranoia.  Insight and judgment seems to be very limited. 

Impulses are unpredictable.



MEDICATIONS:  The patient is on following medications; Librium 50 every 8

hours scheduled, Ativan 2 mg IV push every 4 hours scheduled, Ativan 2 mg

IV push every 4 hours p.r.n. for agitation, multivitamins, potassium,

Risperdal 1 mg twice a day and this writer will add 1mg at the

nighttime for psychosis.  The patient is on sodium chloride, thiamine as

well as Geodon.



LABORATORY DATA:  Reviewed.  Coagulation reviewed.  Chemistry reviewed. 

Urinalysis reviewed.  Toxicology reviewed.



IMPRESSION:  Alcohol withdrawal delirium, alcohol use disorder, and history

of mental illness.



PLAN:  Continue multivitamins, thiamine, and folic acid.  Risperdal was

increased.  Continue Librium.  We will follow up and advise accordingly.



Thank you very much.







__________________________________________

Alba Masters MD





DD:  12/13/2018 16:42:00

DT:  12/13/2018 16:43:55

[facility] # [id number]

[email or web address]

## 2018-12-14 LAB
ALBUMIN SERPL-MCNC: 3.8 G/DL (ref 3–4.8)
ALBUMIN/GLOB SERPL: 1.1 {RATIO} (ref 1.1–1.8)
ALT SERPL-CCNC: 27 U/L (ref 7–56)
AST SERPL-CCNC: 52 U/L (ref 14–36)
BASOPHILS # BLD AUTO: 0.01 K/MM3 (ref 0–2)
BASOPHILS NFR BLD: 0.1 % (ref 0–3)
BUN SERPL-MCNC: 11 MG/DL (ref 7–21)
CALCIUM SERPL-MCNC: 9.9 MG/DL (ref 8.4–10.5)
EOSINOPHIL # BLD: 0.1 10*3/UL (ref 0–0.7)
EOSINOPHIL NFR BLD: 1.2 % (ref 1.5–5)
ERYTHROCYTE [DISTWIDTH] IN BLOOD BY AUTOMATED COUNT: 15.7 % (ref 11.5–14.5)
GFR NON-AFRICAN AMERICAN: > 60
GRANULOCYTES # BLD: 6.44 10*3/UL (ref 1.4–6.5)
GRANULOCYTES NFR BLD: 73.1 % (ref 50–68)
HGB BLD-MCNC: 13.6 G/DL (ref 12–16)
LYMPHOCYTES # BLD: 1.2 10*3/UL (ref 1.2–3.4)
LYMPHOCYTES NFR BLD AUTO: 13.9 % (ref 22–35)
MCH RBC QN AUTO: 30.9 PG (ref 25–35)
MCHC RBC AUTO-ENTMCNC: 32.3 G/DL (ref 31–37)
MCV RBC AUTO: 95.7 FL (ref 80–105)
MONOCYTES # BLD AUTO: 1 10*3/UL (ref 0.1–0.6)
MONOCYTES NFR BLD: 11.7 % (ref 1–6)
PLATELET # BLD: 127 10^3/UL (ref 120–450)
PMV BLD AUTO: 9.8 FL (ref 7–11)
RBC # BLD AUTO: 4.4 10^6/UL (ref 3.5–6.1)
WBC # BLD AUTO: 8.8 10^3/UL (ref 4.5–11)

## 2018-12-14 RX ADMIN — MULTIPLE VITAMINS W/ MINERALS TAB SCH TAB: TAB at 14:31

## 2018-12-14 NOTE — CP.PCM.PN
<Janna Carmonaah - Last Filed: 12/14/18 15:36>





Subjective





- Date & Time of Evaluation


Date of Evaluation: 12/14/18


Time of Evaluation: 09:30





- Subjective


Subjective: 


Internal medicine Progress note Dr. Vázquez





Patient seen and examined this morning at bedside.  Patient again became very 

agitated overnight and required Geodon and PRN Ativan. Patient is continuing to 

require 1;1 sitter.  Patient was sleeping at the time of encounter and refused 

to awaken or answer questions.  ROS unobtainable d/t patient refusal








Objective





- Vital Signs/Intake and Output


Vital Signs (last 24 hours): 


                                        











Temp Pulse Resp BP Pulse Ox


 


 97.1 F L  104 H  20   106/73   95 


 


 12/14/18 00:01  12/14/18 06:00  12/14/18 00:01  12/14/18 00:01  12/14/18 00:01








Intake and Output: 


                                        











 12/14/18 12/14/18





 06:59 18:59


 


Intake Total 240 


 


Balance 240 














- Medications


Medications: 


                               Current Medications





Chlordiazepoxide (Librium)  50 mg PO Q8 MIL[facility]; Protocol


   Last Admin: 12/14/18 06:00 Dose:  Not Given


Folic Acid (Folic Acid)  1 mg PO DAILY [facility]


   Last Admin: 12/13/18 09:08 Dose:  1 mg


Sodium Chloride (Sodium Chloride 0.45%)  1,000 mls @ 100 mls/hr IV .Q10H [facility]


   Last Admin: 12/11/18 20:40 Dose:  Not Given


Lorazepam (Ativan)  2 mg IVP Q4 [facility]; Protocol


   Last Admin: 12/14/18 04:15 Dose:  2 mg


Lorazepam (Ativan)  2 mg IVP Q4 PRN; Protocol


   PRN Reason: Agitation


   Last Admin: 12/13/18 11:59 Dose:  2 mg


Multivitamins/Minerals (Therapeutic-M Tab)  1 tab PO DAILY [facility]


   Last Admin: 12/13/18 09:09 Dose:  1 tab


Risperidone (Risperdal Tab)  1 mg PO BID [facility]; Protocol


   Last Admin: 12/13/18 17:03 Dose:  1 mg


Risperidone (Risperdal Tab)  2 mg PO HS [facility]; Protocol


   Last Admin: 12/13/18 21:22 Dose:  2 mg


Thiamine HCl (Vitamin B1 Tab)  100 mg PO DAILY [facility]


   Last Admin: 12/13/18 09:08 Dose:  100 mg


Ziprasidone (Geodon Inj)  10 mg IM DAILY PRN; Protocol


   PRN Reason: Agitation


   Last Admin: 12/13/18 21:42 Dose:  10 mg











- Labs


Labs: 


                                        





                                 12/14/18 06:00 





                                 12/13/18 06:15 





                                        











PT  11.6 SECONDS (9.4-12.5)   12/11/18  01:15    


 


INR  1.02   12/11/18  01:15    


 


APTT  25.5 Seconds (25.1-36.5)   12/11/18  01:15    














- Constitutional


Appears: Non-toxic, No Acute Distress, Cachectic, Chronically Ill





- Head Exam


Head Exam: NORMOCEPHALIC


Additional comments: 





bilateral infraorbital ecchymosis improving





- Eye Exam


Eye Exam: EOMI, Periorbital swelling





- ENT Exam


ENT Exam: Mucous Membranes Moist





- Respiratory Exam


Respiratory Exam: NORMAL BREATHING PATTERN





- Cardiovascular Exam


Cardiovascular Exam: REGULAR RHYTHM





- GI/Abdominal Exam


GI & Abdominal Exam: Soft.  absent: Distended, Guarding, Tenderness





- Neurological Exam


Additional comments: 





pt asleep refused to interact for encounter, was arousable and talking





- Skin


Skin Exam: Dry, Intact, Warm





Assessment and Plan





- Assessment and Plan (Free Text)


Assessment: 





63 yr old female with hx of ETOH abuse s/p fall and currently in ETOH withdrawal








Plan: 








Multiple Fall


- likely from ETOH abuse


- 12/11 EKG normal sinus rythm QTc slightly prolonged at 469 no arrhythmias, 

troponins less than 0.01  x 1


- 12/11/18 Head CT without contrast- age appropriate cerebellar and cerebral 

atrophy, mild chronic microvascular disease, no acute intracranial pathology


- 12/11/18 Maxillofacial CT without contrast- no acute bone pathology, anterior 

facial soft tissue contusions


- 12/11/2018 Cervical Spine CT- no acute bone pathology, spondylosis, multilevel

facet joint arthropathy


- high risk fall precautions


- PT evaluation upon conclusion of ETOH withdrawal 





Back/Flank Pain


- resolved patient not complaining of pain at this time


- 12/11/18 Abdomen/Pelvis CT IV Contrast- no abnormalities


- 12/11/18 Lumbrosacral xray- no abnormalities


- likely musculoskeletal 2/2 to fall





ETOH Withdrawl


- CIWA approximately 12 today, continue CIWA evaluations


- high risk fall precautions


- ativan 2 q4 [place]


- ativan 2 q4 prn sxs of ETOH withdrawal


- Librium 50 Q8 [place]


- supplement with thiamine, folate, and multivitamin





Suicidal Ideation


- Psych consulted, recs appreciated


- increase in geodon by psych noted


- psychiatry med adjustments per psych


- 1:1 sitter





Diet: Regular Diet





Prophylaxis


- DVT: SCDs


- GI: not indicated





Discussed seen and examined with Dr. Gurpreet Carmona, PGY 1





<Aníbal Vázquez - Last Filed: 12/16/18 15:02>





Objective





- Vital Signs/Intake and Output


Vital Signs (last 24 hours): 


                                        











Temp Pulse Resp BP Pulse Ox


 


 97.9 F   80   20   90/60 L  94 L


 


 12/16/18 08:16  12/16/18 14:00  12/16/18 08:16  12/16/18 08:16  12/16/18 08:16








Intake and Output: 


                                        











 12/16/18 12/16/18





 06:59 18:59


 


Intake Total 360 


 


Balance 360 














- Medications


Medications: 


                               Current Medications





Benzonatate (Tessalon Perles)  100 mg PO ONCE PRN


   PRN Reason: Cough


   Last Admin: 12/15/18 00:25 Dose:  100 mg


Folic Acid (Folic Acid)  1 mg PO DAILY [facility]


   Last Admin: 12/16/18 10:53 Dose:  Not Given


Sodium Chloride (Sodium Chloride 0.45%)  1,000 mls @ 100 mls/hr IV .Q10H [facility]


   Last Admin: 12/11/18 20:40 Dose:  Not Given


Sodium Chloride (Sodium Chloride 0.9%)  1,000 mls @ 100 mls/hr IV .Q10H [facility]


   Last Admin: 12/16/18 09:21 Dose:  100 mls/hr


Lorazepam (Ativan)  2 mg IVP Q8 [facility]; Protocol


   Last Admin: 12/16/18 13:36 Dose:  2 mg


Lorazepam (Ativan)  2 mg IVP Q6H PRN; Protocol


   PRN Reason: Anxiety


Multivitamins/Minerals (Therapeutic-M Tab)  1 tab PO DAILY [facility]


   Last Admin: 12/16/18 10:54 Dose:  Not Given


Pantoprazole Sodium (Protonix Ec Tab)  40 mg PO 0600 [facility]


Phenol/Menthol (Phenaseptic 1.4% Throat Spray)  0 ml MT Q2H PRN


   PRN Reason: Sore Throat


   Last Admin: 12/16/18 14:06 Dose:  1 spr


Risperidone (Risperdal Tab)  1 mg PO BID [facility]; Protocol


   Last Admin: 12/16/18 10:53 Dose:  Not Given


Risperidone (Risperdal Tab)  2 mg PO HS [facility]; Protocol


   Last Admin: 12/15/18 21:39 Dose:  2 mg


Thiamine HCl (Vitamin B1 Tab)  100 mg PO DAILY [facility]


   Last Admin: 12/16/18 10:54 Dose:  Not Given


Ziprasidone (Geodon Inj)  10 mg IM DAILY PRN; Protocol


   PRN Reason: Agitation


   Last Admin: 12/16/18 01:10 Dose:  10 mg











- Labs


Labs: 


                                        





                                 12/16/18 06:30 





                                 12/16/18 06:30 





                                        











PT  11.6 SECONDS (9.4-12.5)   12/11/18  01:15    


 


INR  1.02   12/11/18  01:15    


 


APTT  25.5 Seconds (25.1-36.5)   12/11/18  01:15    














Attending/Attestation





- Attestation


I have personally seen and examined this patient.: Yes


I have fully participated in the care of the patient.: Yes


I have reviewed all pertinent clinical information, including history, physical 

exam and plan: Yes


Notes (Text): 





12/16/18 15:02








attending note;





Patient seen and examined with resident.


Patient is confused and agitated at times.


still with alcohol withdrawal symptoms.





Patient  is a 63-year-old  female with past medical history of alcohol abuse, 

endometriosis, chronic left sided neck pain, anxiety who presents to the 

emergency room for evaluation and treatment of a fall and low back/flank pain.


Head CT without contrast- age appropriate cerebellar and cerebral atrophy, mild 

chronic microvascular disease, no acute intracranial pathology


 Maxillofacial CT without contrast- no acute bone pathology, anterior facial 

soft tissue contusions


 Cervical Spine CT- no acute bone pathology, spondylosis, multilevel facet joint

arthropathy


CT abdomen and pelvis is negative for any acute process.





Acute alcohol withdrawal; continue to monitor closely with CIWA protocol.


IV Ativan ordered.


Continue by mouth Librium.


IM Geodon as needed.





Anxiety; psychiatric evaluation appreciated.


Continue one to one. Restraints as needed.





Monitor closely.





Upon discharge the patient will follow-up with PMD Dr. Akhtar.





Prognosis is poor secondary to continuous alcohol abuse, psychiatric disorder 

and noncompliance with follow-up.

## 2018-12-14 NOTE — PN
DATE:  12/14/2018



FOLLOWUP NOTE



SUBJECTIVE:  The patient was followed up.  The patient still has episodes

of confusion.  The patient is still in delirium stage.  The patient was

emotional today, talking nonsense.  The patient was talking that she does

not want to see any of the doctors anymore.  The patient said that it is

too much on my plate right now and keeps crying.  As per staff, the patient

needed to be medicated overnight because of restless and agitated behavior.

Labs reviewed which most recent from today.  Chemistry reviewed. 

Urinalysis reviewed.  Toxicology reviewed.



MENTAL STATUS EXAM:  The patient presented to be emotional, labile, refused

to talk, intermittent eye contact.  Mood described as I do not want to see

any doctors anymore.  Thought process circumstantial, tangential.  Thought

content, the patient denied visual, auditory, tactile hallucinations, but

the patient appears to be paranoid, yesterday the patient was feeling that

her boyfriend has an affair.  There is no evidence for that.  The patient

is confused and still in delirium stage.  Insight and judgment seems to be

impaired.  Impulses are not predictable.



IMPRESSION:  Alcohol withdrawal, delirium.  The patient has history of mood

spectrum disorder and psychosis and alcohol use disorder.



PLAN:  Continue current management.  Continue current medication.  The

patient is not clear from the psychiatric standpoint.  We will follow up

and advise accordingly.  Dr. No will follow up on this patient over the

weekend.



Thank you very much for letting me participate in care of your patient.







__________________________________________

Alba Masters MD





DD:  12/14/2018 12:50:21

DT:  12/14/2018 12:52:44

Job # [id number]

## 2018-12-15 LAB
ALBUMIN SERPL-MCNC: 3.8 G/DL (ref 3–4.8)
ALBUMIN/GLOB SERPL: 1.2 {RATIO} (ref 1.1–1.8)
ALT SERPL-CCNC: 33 U/L (ref 7–56)
AST SERPL-CCNC: 64 U/L (ref 14–36)
BASOPHILS # BLD AUTO: 0.02 K/MM3 (ref 0–2)
BASOPHILS NFR BLD: 0.2 % (ref 0–3)
BUN SERPL-MCNC: 15 MG/DL (ref 7–21)
CALCIUM SERPL-MCNC: 9.9 MG/DL (ref 8.4–10.5)
EOSINOPHIL # BLD: 0.2 10*3/UL (ref 0–0.7)
EOSINOPHIL NFR BLD: 2.5 % (ref 1.5–5)
ERYTHROCYTE [DISTWIDTH] IN BLOOD BY AUTOMATED COUNT: 15.7 % (ref 11.5–14.5)
GFR NON-AFRICAN AMERICAN: > 60
GRANULOCYTES # BLD: 5.31 10*3/UL (ref 1.4–6.5)
GRANULOCYTES NFR BLD: 60 % (ref 50–68)
HGB BLD-MCNC: 12.4 G/DL (ref 12–16)
LYMPHOCYTES # BLD: 1.9 10*3/UL (ref 1.2–3.4)
LYMPHOCYTES NFR BLD AUTO: 21.2 % (ref 22–35)
MCH RBC QN AUTO: 30.8 PG (ref 25–35)
MCHC RBC AUTO-ENTMCNC: 32.5 G/DL (ref 31–37)
MCV RBC AUTO: 94.8 FL (ref 80–105)
MONOCYTES # BLD AUTO: 1.4 10*3/UL (ref 0.1–0.6)
MONOCYTES NFR BLD: 16.1 % (ref 1–6)
PLATELET # BLD: 137 10^3/UL (ref 120–450)
PMV BLD AUTO: 9.8 FL (ref 7–11)
RBC # BLD AUTO: 4.02 10^6/UL (ref 3.5–6.1)
WBC # BLD AUTO: 8.8 10^3/UL (ref 4.5–11)

## 2018-12-15 RX ADMIN — MULTIPLE VITAMINS W/ MINERALS TAB SCH TAB: TAB at 09:48

## 2018-12-15 NOTE — CP.PCM.PN
<MauroJuan - Last Filed: 12/15/18 13:03>





Subjective





- Date & Time of Evaluation


Date of Evaluation: 12/15/18


Time of Evaluation: 10:30





- Subjective


Subjective: 





Juan Wade Internal Medicine Resident- Progress Note on Behalf of Hospitalist 

Team


Subjective:


Patient seen and examined at bedside.  No acute events overnight. Patient states

she needs to take care of her mother. Offers no new complaints at this time. 

Denies fever, chills, chest pain, SOB. 





12 Point ROS negative except as indicated altered mental status





Physical Examination:


- Constitutional


Appears: Older Than Stated Age





- Head Exam


Head Exam: bilateral infraorbital ecchymosis 





- Eye Exam


Eye Exam: EOMI, PERRL





- Neck Exam


Neck exam: FROM, nontender to tough mid-spine/paraspinal, no step off sign





- Respiratory Exam


Respiratory Exam: Clear to Auscultation Bilateral, NORMAL BREATHING PATTERN.  

absent: Wheezes





- Cardiovascular Exam


Cardiovascular Exam: REGULAR RHYTHM, +S1, +S2





- GI/Abdominal Exam


GI & Abdominal Exam: Normal Bowel Sounds, Soft.  absent: Firm, Guarding





- Back Exam


Back Exam: No midline or paraspinal tenderness, no CVA tenderness bilaterally





- Extremities Exam


Extremities exam: Positive for: normal inspection.  Negative for: calf 

tenderness, pedal edema





- Neurological Exam


Neurological exam: Alert, Oriented x 3, answers questions appropriately, follows

commands, moves extremities past midline, CN II-XII intact bilaterally, muscle 

strength 5/5 throughout, sensation intact to touch throughout, no step off sign,

no mid-spine or paraspinal tenderness





- Psychiatric Exam


Psychiatric exam: Anxious





- Skin


Skin Exam: Dry, Warm, bilateral infraorbital ecchymosis. Ecchymosis on right low

back 








Assessment and Plan:


Patient is a 62 year old female with past medical history of alcohol abuse, 

endometriosis, chronic left sided neck pain, anxiety who was admitted to the 

emergency room for evaluation and treatment of a fall and low back/flank pain





Multiple Falls


- likely from ETOH abuse


- 12/11/18 Head CT without contrast- age appropriate cerebellar and cerebral 

atrophy, mild chronic microvascular disease, no acute intracranial pathology


- 12/11/18 Maxill[address] CT without contrast- no acute bone pathology, anterior 

facial soft tissue contusions


- 12/11/2018 Cervical Spine CT- no acute bone pathology, spondylosis, multilevel

facet joint arthropathy


- high risk fall precautions


- PT evaluation upon conclusion of ETOH withdrawl 





Back/Flank Pain


- improved- 2/2 to fall


- 12/11/18 Abdomen/Pelvis CT IV Contrast- No acute findings related to/ 

accounting  for the clinical presentation


- 12/11/18 Lumbrosacral xray- No fracture or spondylolisthesis.  Multilevel 

spondylosis is mild.





ETOH Withdrawl


- CIWA


- high risk fall precautions


- librium 50mg to 25mg PO q8h  


- ativan 2mg q4 jose


- ativan 2mg q4 prn sxs of ETOH withdrawl


- supplement with thiamine, folate, and multivitamin





Prophylaxis


- DVT- SCDs


- GI- not indicated





Patient case discussed with and plan approved by attending physician, Dr. Zeny Wade.


























Objective





- Vital Signs/Intake and Output


Vital Signs (last 24 hours): 


                                        











Temp Pulse Resp BP Pulse Ox


 


 97.6 F   76   18   107/79   98 


 


 12/15/18 09:09  12/15/18 09:09  12/15/18 09:09  12/15/18 09:09  12/15/18 09:09








Intake and Output: 


                                        











 12/15/18 12/15/18





 06:59 18:59


 


Intake Total 250 


 


Output Total 0 


 


Balance 250 














- Medications


Medications: 


                               Current Medications





Benzonatate (Tessalon Perles)  100 mg PO ONCE PRN


   PRN Reason: Cough


   Last Admin: 12/15/18 00:25 Dose:  100 mg


Chlordiazepoxide (Librium)  50 mg PO Q8 [facility]; Protocol


   Last Admin: 12/15/18 06:21 Dose:  50 mg


Folic Acid (Folic Acid)  1 mg PO DAILY JOSE


   Last Admin: 12/15/18 09:48 Dose:  1 mg


Sodium Chloride (Sodium Chloride 0.45%)  1,000 mls @ 100 mls/hr IV .Q10H JOSE


   Last Admin: 12/11/18 20:40 Dose:  Not Given


Lorazepam (Ativan)  2 mg IVP Q4 JOSE; Protocol


   Last Admin: 12/15/18 08:31 Dose:  2 mg


Lorazepam (Ativan)  2 mg IVP Q4 PRN; Protocol


   PRN Reason: Agitation


   Last Admin: 12/14/18 17:11 Dose:  1 mg


Multivitamins/Minerals (Therapeutic-M Tab)  1 tab PO DAILY JOSE


   Last Admin: 12/15/18 09:48 Dose:  1 tab


Risperidone (Risperdal Tab)  1 mg PO BID JOSE; Protocol


   Last Admin: 12/15/18 09:48 Dose:  1 mg


Risperidone (Risperdal Tab)  2 mg PO HS JOSE; Protocol


   Last Admin: 12/14/18 21:46 Dose:  2 mg


Thiamine HCl (Vitamin B1 Tab)  100 mg PO DAILY JOSE


   Last Admin: 12/15/18 09:48 Dose:  100 mg


Ziprasidone (Geodon Inj)  10 mg IM DAILY PRN; Protocol


   PRN Reason: Agitation


   Last Admin: 12/13/18 21:42 Dose:  10 mg











- Labs


Labs: 


                                        





                                 12/15/18 05:30 





                                 12/15/18 05:30 





                                        











PT  11.6 SECONDS (9.4-12.5)   12/11/18  01:15    


 


INR  1.02   12/11/18  01:15    


 


APTT  25.5 Seconds (25.1-36.5)   12/11/18  01:15    














<Zeny Wade R - Last Filed: 12/16/18 14:33>





Objective





- Vital Signs/Intake and Output


Vital Signs (last 24 hours): 


                                        











Temp Pulse Resp BP Pulse Ox


 


 97.9 F   80   20   90/60 L  94 L


 


 12/16/18 08:16  12/16/18 14:00  12/16/18 08:16  12/16/18 08:16  12/16/18 08:16








Intake and Output: 


                                        











 12/16/18 12/16/18





 06:59 18:59


 


Intake Total 360 


 


Balance 360 














- Medications


Medications: 


                               Current Medications





Benzonatate (Tessalon Perles)  100 mg PO ONCE PRN


   PRN Reason: Cough


   Last Admin: 12/15/18 00:25 Dose:  100 mg


Folic Acid (Folic Acid)  1 mg PO DAILY JOSE


   Last Admin: 12/16/18 10:53 Dose:  Not Given


Sodium Chloride (Sodium Chloride 0.45%)  1,000 mls @ 100 mls/hr IV .Q10H JOSE


   Last Admin: 12/11/18 20:40 Dose:  Not Given


Sodium Chloride (Sodium Chloride 0.9%)  1,000 mls @ 100 mls/hr IV .Q10H JOSE


   Last Admin: 12/16/18 09:21 Dose:  100 mls/hr


Lorazepam (Ativan)  2 mg IVP Q8 JOSE; Protocol


   Last Admin: 12/16/18 13:36 Dose:  2 mg


Lorazepam (Ativan)  2 mg IVP Q6H PRN; Protocol


   PRN Reason: Anxiety


Multivitamins/Minerals (Therapeutic-M Tab)  1 tab PO DAILY [facility]


   Last Admin: 12/16/18 10:54 Dose:  Not Given


Pantoprazole Sodium (Protonix Ec Tab)  40 mg PO 0600 JOSE


Phenol/Menthol (Phenaseptic 1.4% Throat Spray)  0 ml MT Q2H PRN


   PRN Reason: Sore Throat


   Last Admin: 12/16/18 14:06 Dose:  1 spr


Risperidone (Risperdal Tab)  1 mg PO BID JOSE; Protocol


   Last Admin: 12/16/18 10:53 Dose:  Not Given


Risperidone (Risperdal Tab)  2 mg PO HS JOSE; Protocol


   Last Admin: 12/15/18 21:39 Dose:  2 mg


Thiamine HCl (Vitamin B1 Tab)  100 mg PO DAILY JOSE


   Last Admin: 12/16/18 10:54 Dose:  Not Given


Ziprasidone (Geodon Inj)  10 mg IM DAILY PRN; Protocol


   PRN Reason: Agitation


   Last Admin: 12/16/18 01:10 Dose:  10 mg











- Labs


Labs: 


                                        





                                 12/16/18 06:30 





                                 12/16/18 06:30 





                                        











PT  11.6 SECONDS (9.4-12.5)   12/11/18  01:15    


 


INR  1.02   12/11/18  01:15    


 


APTT  25.5 Seconds (25.1-36.5)   12/11/18  01:15    














Attending/Attestation





- Attestation


I have personally seen and examined this patient.: Yes


I have fully participated in the care of the patient.: Yes


I have reviewed all pertinent clinical information, including history, physical 

exam and plan: Yes


Notes (Text): 


Patient seen and examined by me with resident at 10:05 AM on 12/15/18. Case 

including HPI, physical exam, and assessment and plan discussed with resident. 

Agree with above with following additions/corrections.


Patient is a 62 year old female with past medical history significant for 

alcohol abuse, endometriosis, chronic left neck pain, and anxiety that presented

to the emergency room for a fall, low back/flank pain.    


Patient states she is feeling ok.  States she needs to leave to take her mother 

to the doctors office. Patient complains of some lower back pain with no 

radiation of pain. Pain comes and goes and feels like a soreness. She denies 

chest pain or palpitations. No nausea, vomiting, or abdominal pain. No diarrhea 

or constipation. No fevers or chills. No dysuria. No headaches or dizziness. 


Physical exam:


General: Awake and alert sitting up in bed in no acute distress


HEENT: Normocephalic, atraumatic. Extraocular muscles intact, pupils equal and 

reactive, no scleral icterus. Oropharynx is pink moist. Neck is supple. 


Cardiovascular: Regular rhythm. Normal S1 and S2. No murmurs, rubs, or gallops 

appreciated


Pulmonary: Normal respiratory effort. No rhonchi, rales, or wheezing appreciated


Gastrointestinal: Soft, nondistended. Nontender. Positive bowel sounds all 4 

quadrants. No guarding. 


Musculoskeletal: Moves all extremities. No calf tenderness. No edema. No CVA 

tenderness


Central nervous system: Awake and alert 


Dermatologic: Skin warm and dry. 


Assessment and plan: Patient is a 62 year old female with past medical history 

significant for alcohol abuse, endometriosis, chronic left neck pain, and 

anxiety that presented to the emergency room for a fall, low back/flank pain.   




1. Multiple falls. Likely secondary to ETOH abuse. Head CT per radiologist 

showed no acute intracranial abnormalities. Maxillofacial CT per radiologist 

showed soft tissue swelling without acute articular or osseous abnormality. CT 

abd/pelvis per radiologist showed no acute findings. Cervical spine CT per 

radiologist showed multilevel degenerative changes, no acute findings. Continue 

fall precautions. PT eval and treat when CIWA score has improved. 


2. ETOH abuse and withdrawal. Continue CIWA protocol. Continue thiamine, folic 

acid, and MVI. Continue Ativan and Librium, continue to taper. Patient counseled

on alcohol cessation. 


3. Back/Flank pain. Lumbar xray spine per radiologist showed no fracture or 

spondylolisthesis, multilevel spondylosis is mild. PT eval and treat once CIWA 

score has improved.


4. Suicidal ideation. Psychiatry following, recommendations appreciated. 

Continue Risperidone and Geodon. 


5. Elevated AST. Likely secondary to ETOH abuse. Continue to monitor


6. GI/DVT prophylaxis. Protonix/SCDS


7. Patient is a full code.


Case was discussed in detail with patient regarding current diagnosis and 

treatment plan. All questions answered.

## 2018-12-15 NOTE — CON
DATE:  12/15/2018





HISTORY OF PRESENT ILLNESS:  The patient is a 63-year-old white female with

history of severe alcohol use disorder.  She is well known to this provider

from prior admission to psychiatric inpatient unit as well as complications

in medical floor for alcohol withdrawal.  The patient reports periods of

agitation, restlessness and psychosis as well as disorientation on the

unit.  The patient has been confused; however, this has been improving. 

She continues to be suffering from alcohol withdrawal and delirium.



This provider _____.  This patient has been confused, paranoid with

multiple outbursts on the unit including yelling and has disorganized. 

When I meet with the patient, she is actually oriented to location, month

and year.  She has a very superficial understanding of her circumstances,

however, she asked to be discharged because she has to take care of her

mother who is 91 years old.  However, the patient can barely care for

herself at this time due to her confusion and medical issues.  She denies

having any depression or psychosis, although she does report that her

boyfriend of 30 years has been cheating on her.  It is unclear whether this

is actually true or a paranoid delusion; however, it is not a bizarre

paranoid delusion.  I agree she has been restless.  She does not appear to

be actively hallucinating in my presence and she appears to be in a more

lucid state of her waxing and waning process of delirium.  Her insight and

judgment  was impaired due to delirious process.



Labs and vital signs were reviewed.



Relevant psychiatric medications include Geodon 10 mg IM daily p.r.n. which

the patient received one dose on 12/13/2018, Risperdal 1 mg p.o. b.i.d. and

at bedtime, Ativan 2 mg IV every 4 hours scheduled and Librium 50 mg p.o.

every 8 hours scheduled.



IMPRESSION:  Alcohol withdrawal delirium, rule out benzodiazepine delirium.

The patient has a history of psychosis and mood spectrum disorder in the

past.



RECOMMENDATIONS:  I will discontinue the Librium.  The patient is on two

different types of benzos and this is clearly polypharmacy.  The patient

does not need two different types of benzos as one type of benzo optimized

can provide a same type of benefit.  Moreover, her vitals are very stable. 

There is no need for patient to be on high dose benzos and although benzos

can be quite beneficial for alcohol withdrawal, having too many benzos can

scale over to contributing to the patient's confusion, discomfort, and

disinhibition.  Therefore, this provider will discontinue Librium and add a

small decrease of Ativan.  All of her other medications will be continued. 

There is no other acute indication to change them at this time.  Psychiatry

will continue to follow the patient's mental status and follow the

aforementioned recommendations.







__________________________________________

Shola No MD





DD:  12/15/2018 10:26:42

DT:  12/15/2018 11:23:55

Job # [id number]

## 2018-12-16 LAB
ALBUMIN SERPL-MCNC: 3.8 G/DL (ref 3–4.8)
ALBUMIN/GLOB SERPL: 1.1 {RATIO} (ref 1.1–1.8)
ALT SERPL-CCNC: 66 U/L (ref 7–56)
AST SERPL-CCNC: 170 U/L (ref 14–36)
BASOPHILS # BLD AUTO: 0.03 K/MM3 (ref 0–2)
BASOPHILS NFR BLD: 0.4 % (ref 0–3)
BUN SERPL-MCNC: 11 MG/DL (ref 7–21)
CALCIUM SERPL-MCNC: 9.7 MG/DL (ref 8.4–10.5)
EOSINOPHIL # BLD: 0.3 10*3/UL (ref 0–0.7)
EOSINOPHIL NFR BLD: 3.6 % (ref 1.5–5)
ERYTHROCYTE [DISTWIDTH] IN BLOOD BY AUTOMATED COUNT: 15.9 % (ref 11.5–14.5)
GFR NON-AFRICAN AMERICAN: > 60
GRANULOCYTES # BLD: 3.86 10*3/UL (ref 1.4–6.5)
GRANULOCYTES NFR BLD: 53.2 % (ref 50–68)
HGB BLD-MCNC: 12.9 G/DL (ref 12–16)
LYMPHOCYTE: 23 % (ref 22–35)
LYMPHOCYTES # BLD: 1.6 10*3/UL (ref 1.2–3.4)
LYMPHOCYTES NFR BLD AUTO: 21.7 % (ref 22–35)
MCH RBC QN AUTO: 31.3 PG (ref 25–35)
MCHC RBC AUTO-ENTMCNC: 32.8 G/DL (ref 31–37)
MCV RBC AUTO: 95.4 FL (ref 80–105)
MONOCYTE: 24 % (ref 1–6)
MONOCYTES # BLD AUTO: 1.5 10*3/UL (ref 0.1–0.6)
MONOCYTES NFR BLD: 21.1 % (ref 1–6)
NEUTROPHILS NFR BLD AUTO: 53 % (ref 50–70)
PLATELET # BLD EST: NORMAL 10*3/UL
PLATELET # BLD: 192 10^3/UL (ref 120–450)
PMV BLD AUTO: 9.6 FL (ref 7–11)
RBC # BLD AUTO: 4.12 10^6/UL (ref 3.5–6.1)
WBC # BLD AUTO: 7.3 10^3/UL (ref 4.5–11)

## 2018-12-16 RX ADMIN — MULTIPLE VITAMINS W/ MINERALS TAB SCH: TAB at 10:54

## 2018-12-16 NOTE — CP.PCM.PN
<Thomas Arzate - Last Filed: 12/16/18 10:51>





Subjective





- Date & Time of Evaluation


Date of Evaluation: 12/16/18


Time of Evaluation: 06:00





- Subjective


Subjective: 


Patient seen bedside in AM. Patient was agitated and combative at 2am for which 

geodon was given, patient was also noted to be hypotensive.  Patient drowsy and 

sleepy, unable to obtain full ROS. 








Objective





- Vital Signs/Intake and Output


Vital Signs (last 24 hours): 


                                        











Temp Pulse Resp BP Pulse Ox


 


 97.9 F   88   20   90/60 L  94 L


 


 12/16/18 08:16  12/16/18 10:00  12/16/18 08:16  12/16/18 08:16  12/16/18 08:16








Intake and Output: 


                                        











 12/16/18 12/16/18





 06:59 18:59


 


Intake Total 360 


 


Balance 360 














- Medications


Medications: 


                               Current Medications





Benzonatate (Tessalon Perles)  100 mg PO ONCE PRN


   PRN Reason: Cough


   Last Admin: 12/15/18 00:25 Dose:  100 mg


Folic Acid (Folic Acid)  1 mg PO DAILY [facility]


   Last Admin: 12/15/18 09:48 Dose:  1 mg


Sodium Chloride (Sodium Chloride 0.45%)  1,000 mls @ 100 mls/hr IV .Q10H [facility]


   Last Admin: 12/11/18 20:40 Dose:  Not Given


Sodium Chloride (Sodium Chloride 0.9%)  1,000 mls @ 100 mls/hr IV .Q10H [facility]


   Last Admin: 12/16/18 09:21 Dose:  100 mls/hr


Lorazepam (Ativan)  2 mg IVP Q8 PREETI; Protocol


   Last Admin: 12/16/18 06:23 Dose:  Not Given


Lorazepam (Ativan)  2 mg IVP Q6H PRN; Protocol


   PRN Reason: Anxiety


Multivitamins/Minerals (Therapeutic-M Tab)  1 tab PO DAILY [facility]


   Last Admin: 12/15/18 09:48 Dose:  1 tab


Risperidone (Risperdal Tab)  1 mg PO BID PREETI; Protocol


   Last Admin: 12/15/18 18:07 Dose:  1 mg


Risperidone (Risperdal Tab)  2 mg PO HS PREETI; Protocol


   Last Admin: 12/15/18 21:39 Dose:  2 mg


Thiamine HCl (Vitamin B1 Tab)  100 mg PO DAILY [facility]


   Last Admin: 12/15/18 09:48 Dose:  100 mg


Ziprasidone (Geodon Inj)  10 mg IM DAILY PRN; Protocol


   PRN Reason: Agitation


   Last Admin: 12/16/18 01:10 Dose:  10 mg











- Labs


Labs: 


                                        





                                 12/16/18 06:30 





                                 12/16/18 06:30 





                                        











PT  11.6 SECONDS (9.4-12.5)   12/11/18  01:15    


 


INR  1.02   12/11/18  01:15    


 


APTT  25.5 Seconds (25.1-36.5)   12/11/18  01:15    














- Constitutional


Appears: Non-toxic, No Acute Distress





- Head Exam


Head Exam: ATRAUMATIC, NORMAL INSPECTION, NORMOCEPHALIC





- Eye Exam


Eye Exam: Normal appearance





- ENT Exam


ENT Exam: Mucous Membranes Moist





- Cardiovascular Exam


Cardiovascular Exam: REGULAR RHYTHM





- GI/Abdominal Exam


GI & Abdominal Exam: Soft





Assessment and Plan





- Assessment and Plan (Free Text)


Assessment: 


Patient is a 62 year old female with past medical history of alcohol abuse, 

endometriosis, chronic left sided neck pain, anxiety who was admitted to the 

emergency room for evaluation and treatment of a fall and low back/flank pain. 





Plan: 


Multiple Falls


- likely secondary from ETOH abuse


- 12/11/18 Head CT without contrast- age appropriate cerebellar and cerebral 

atrophy, mild chronic microvascular disease, no acute intracranial pathology


- 12/11/18 Maxillofacial CT without contrast- no acute bone pathology, anterior 

facial soft tissue contusions


- 12/11/2018 Cervical Spine CT- no acute bone pathology, spondylosis, multilevel

facet joint arthropathy


- high risk fall precautions


- PT evaluation pending





Hypotension


- continue to monitor


- start NS@100 


- avoid medications that will worsen hypotension 





Back/Flank Pain


- improved- 2/2 to fall


- 12/11/18 Abdomen/Pelvis CT IV Contrast- No acute findings related to/ 

accounting  for the clinical presentation


- 12/11/18 Lumbrosacral xray- No fracture or spondylolisthesis.  Multilevel 

spondylosis is mild.





ETOH Withdrawl


- CIWA, last score is 1 this AM


- high risk fall precautions


- librium 


- ativan 


- ativan 2mg q4 prn sxs of ETOH withdrawl


- thiamine, folate, and multivitamin





Prophylaxis


- DVT- SCDs


- GI-protonix 














<Rangasamy,Ajantha - Last Filed: 12/16/18 15:06>





Objective





- Vital Signs/Intake and Output


Vital Signs (last 24 hours): 


                                        











Temp Pulse Resp BP Pulse Ox


 


 97.9 F   80   20   90/60 L  94 L


 


 12/16/18 08:16  12/16/18 14:00  12/16/18 08:16  12/16/18 08:16  12/16/18 08:16








Intake and Output: 


                                        











 12/16/18 12/16/18





 06:59 18:59


 


Intake Total 360 


 


Balance 360 














- Medications


Medications: 


                               Current Medications





Benzonatate (Tessalon Perles)  100 mg PO ONCE PRN


   PRN Reason: Cough


   Last Admin: 12/15/18 00:25 Dose:  100 mg


Folic Acid (Folic Acid)  1 mg PO DAILY [facility]


   Last Admin: 12/16/18 10:53 Dose:  Not Given


Sodium Chloride (Sodium Chloride 0.45%)  1,000 mls @ 100 mls/hr IV .Q10H [facility]


   Last Admin: 12/11/18 20:40 Dose:  Not Given


Sodium Chloride (Sodium Chloride 0.9%)  1,000 mls @ 100 mls/hr IV .Q10H PREETI


   Last Admin: 12/16/18 09:21 Dose:  100 mls/hr


Lorazepam (Ativan)  2 mg IVP Q8 PREETI; Protocol


   Last Admin: 12/16/18 13:36 Dose:  2 mg


Lorazepam (Ativan)  2 mg IVP Q6H PRN; Protocol


   PRN Reason: Anxiety


Multivitamins/Minerals (Therapeutic-M Tab)  1 tab PO DAILY [facility]


   Last Admin: 12/16/18 10:54 Dose:  Not Given


Pantoprazole Sodium (Protonix Ec Tab)  40 mg PO 0600 [facility]


Phenol/Menthol (Phenaseptic 1.4% Throat Spray)  0 ml MT Q2H PRN


   PRN Reason: Sore Throat


   Last Admin: 12/16/18 14:06 Dose:  1 spr


Risperidone (Risperdal Tab)  1 mg PO BID PREETI; Protocol


   Last Admin: 12/16/18 10:53 Dose:  Not Given


Risperidone (Risperdal Tab)  2 mg PO HS PREETI; Protocol


   Last Admin: 12/15/18 21:39 Dose:  2 mg


Thiamine HCl (Vitamin B1 Tab)  100 mg PO DAILY [facility]


   Last Admin: 12/16/18 10:54 Dose:  Not Given


Ziprasidone (Geodon Inj)  10 mg IM DAILY PRN; Protocol


   PRN Reason: Agitation


   Last Admin: 12/16/18 01:10 Dose:  10 mg











- Labs


Labs: 


                                        





                                 12/16/18 06:30 





                                 12/16/18 06:30 





                                        











PT  11.6 SECONDS (9.4-12.5)   12/11/18  01:15    


 


INR  1.02   12/11/18  01:15    


 


APTT  25.5 Seconds (25.1-36.5)   12/11/18  01:15    














Attending/Attestation





- Attestation


I have personally seen and examined this patient.: Yes


I have fully participated in the care of the patient.: Yes


I have reviewed all pertinent clinical information, including history, physical 

exam and plan: Yes


Notes (Text): 





12/16/18 15:03





attending note;





Patient seen and examined with resident.


Patient is confused and agitated at times.


patient was sedated with Geodon this morning.


Currently resting comfortably.





Mild hypotension secondary to sedation.


Started on IV fluids.





still with alcohol withdrawal symptoms.


one-to-one by the bedside.





Patient  is a 63-year-old  female with past medical history of alcohol abuse, 

endometriosis, chronic left sided neck pain, anxiety who presents to the 

emergency room for evaluation and treatment of a fall and low back/flank pain.


Head CT without contrast- age appropriate cerebellar and cerebral atrophy, mild 

chronic microvascular disease, no acute intracranial pathology


 Maxillofacial CT without contrast- no acute bone pathology, anterior facial 

soft tissue contusions


 Cervical Spine CT- no acute bone pathology, spondylosis, multilevel facet joint

arthropathy


CT abdomen and pelvis is negative for any acute process.





Acute alcohol withdrawal; continue to monitor closely with CIWA protocol.


IV Ativan ordered.


Continue by mouth Librium.


IM Geodon as needed.





Anxiety; psychiatric evaluation appreciated.


Continue one to one. .


Monitor closely.





Upon discharge the patient will follow-up with PMD Dr. Akhtar.





Prognosis is poor secondary to continuous alcohol abuse, psychiatric disorder 

and noncompliance with follow-up.

## 2018-12-17 LAB
ALBUMIN SERPL-MCNC: 3 G/DL (ref 3–4.8)
ALBUMIN/GLOB SERPL: 1 {RATIO} (ref 1.1–1.8)
ALT SERPL-CCNC: 65 U/L (ref 7–56)
AST SERPL-CCNC: 99 U/L (ref 14–36)
BASOPHILS # BLD AUTO: 0.05 K/MM3 (ref 0–2)
BASOPHILS NFR BLD: 0.8 % (ref 0–3)
BUN SERPL-MCNC: 7 MG/DL (ref 7–21)
BUN SERPL-MCNC: 9 MG/DL (ref 7–21)
CALCIUM SERPL-MCNC: 8.6 MG/DL (ref 8.4–10.5)
CALCIUM SERPL-MCNC: 8.9 MG/DL (ref 8.4–10.5)
EOSINOPHIL # BLD: 0.3 10*3/UL (ref 0–0.7)
EOSINOPHIL NFR BLD: 4 % (ref 1.5–5)
ERYTHROCYTE [DISTWIDTH] IN BLOOD BY AUTOMATED COUNT: 15.8 % (ref 11.5–14.5)
GFR NON-AFRICAN AMERICAN: > 60
GFR NON-AFRICAN AMERICAN: > 60
GRANULOCYTES # BLD: 3.03 10*3/UL (ref 1.4–6.5)
GRANULOCYTES NFR BLD: 48.4 % (ref 50–68)
HGB BLD-MCNC: 10.8 G/DL (ref 12–16)
LYMPHOCYTES # BLD: 1.7 10*3/UL (ref 1.2–3.4)
LYMPHOCYTES NFR BLD AUTO: 26.8 % (ref 22–35)
MCH RBC QN AUTO: 30.9 PG (ref 25–35)
MCHC RBC AUTO-ENTMCNC: 32.5 G/DL (ref 31–37)
MCV RBC AUTO: 95.1 FL (ref 80–105)
MONOCYTES # BLD AUTO: 1.3 10*3/UL (ref 0.1–0.6)
MONOCYTES NFR BLD: 20 % (ref 1–6)
PLATELET # BLD: 203 10^3/UL (ref 120–450)
PMV BLD AUTO: 8.7 FL (ref 7–11)
RBC # BLD AUTO: 3.49 10^6/UL (ref 3.5–6.1)
WBC # BLD AUTO: 6.3 10^3/UL (ref 4.5–11)

## 2018-12-17 RX ADMIN — PANTOPRAZOLE SODIUM SCH MG: 40 TABLET, DELAYED RELEASE ORAL at 06:30

## 2018-12-17 RX ADMIN — MULTIPLE VITAMINS W/ MINERALS TAB SCH TAB: TAB at 11:18

## 2018-12-17 NOTE — CP.PCM.PN
<Marcelino Wade - Last Filed: 12/17/18 15:22>





Subjective





- Date & Time of Evaluation


Date of Evaluation: 12/17/18


Time of Evaluation: 14:55





- Subjective


Subjective: 





Marcelino Wade DO PGY1 - Internal Medicine Intern - Hospital Progress Note





Patient was seen and examined at bedside this morning; overnight patient was 

restless/ agitated. However PRN geodon was not utilized. 


She reports mild improvement in back pain; denies chest pain, sob, abd pain at 

this time. 





Objective





- Vital Signs/Intake and Output


Vital Signs (last 24 hours): 


                                        











Temp Pulse Resp BP Pulse Ox


 


 97.9 F   96 H  20   90/60 L  94 L


 


 12/16/18 08:16  12/17/18 06:00  12/16/18 08:16  12/16/18 08:16  12/16/18 08:16











- Medications


Medications: 


                               Current Medications





Benzonatate (Tessalon Perles)  100 mg PO ONCE PRN


   PRN Reason: Cough


   Last Admin: 12/15/18 00:25 Dose:  100 mg


Folic Acid (Folic Acid)  1 mg PO DAILY [facility]


   Last Admin: 12/17/18 11:18 Dose:  1 mg


Sodium Chloride (Sodium Chloride 0.45%)  1,000 mls @ 100 mls/hr IV .Q10H [facility]


   Last Admin: 12/11/18 20:40 Dose:  Not Given


Lorazepam (Ativan)  2 mg IVP Q6H PRN; Protocol


   PRN Reason: Anxiety


   Last Admin: 12/17/18 14:25 Dose:  2 mg


Lorazepam (Ativan)  2 mg IVP DAILY [facility]; Protocol


   Last Admin: 12/17/18 11:44 Dose:  2 mg


Multivitamins/Minerals (Therapeutic-M Tab)  1 tab PO DAILY [facility]


   Last Admin: 12/17/18 11:18 Dose:  1 tab


Pantoprazole Sodium (Protonix Ec Tab)  40 mg PO 0600 [facility]


   Last Admin: 12/17/18 06:30 Dose:  40 mg


Phenol/Menthol (Phenaseptic 1.4% Throat Spray)  0 ml MT Q2H PRN


   PRN Reason: Sore Throat


   Last Admin: 12/16/18 14:06 Dose:  1 spr


Risperidone (Risperdal Tab)  1 mg PO BID PREETI; Protocol


   Last Admin: 12/17/18 11:18 Dose:  1 mg


Risperidone (Risperdal Tab)  2 mg PO HS PREETI; Protocol


   Last Admin: 12/16/18 21:56 Dose:  2 mg


Thiamine HCl (Vitamin B1 Tab)  100 mg PO DAILY [facility]


   Last Admin: 12/17/18 11:19 Dose:  100 mg


Ziprasidone (Geodon Inj)  10 mg IM DAILY PRN; Protocol


   PRN Reason: Agitation


   Last Admin: 12/16/18 01:10 Dose:  10 mg











- Labs


Labs: 


                                        





                                 12/17/18 07:30 





                                 12/17/18 12:54 





                                        











PT  11.6 SECONDS (9.4-12.5)   12/11/18  01:15    


 


INR  1.02   12/11/18  01:15    


 


APTT  25.5 Seconds (25.1-36.5)   12/11/18  01:15    














- Constitutional


Appears: Non-toxic, Agitated





- Head Exam


Head Exam: NORMOCEPHALIC


Additional comments: 





Bilateral periorbital echymosis appreciated 





- Eye Exam


Eye Exam: EOMI, Normal appearance





- ENT Exam


ENT Exam: Mucous Membranes Moist, Normal Exam





- Respiratory Exam


Respiratory Exam: Clear to Ausculation Bilateral, NORMAL BREATHING PATTERN





- Cardiovascular Exam


Cardiovascular Exam: RRR, +S1, +S2





- GI/Abdominal Exam


GI & Abdominal Exam: Soft, Normal Bowel Sounds.  absent: Tenderness





- Extremities Exam


Extremities Exam: Normal Capillary Refill





- Neurological Exam


Neurological Exam: Alert, Awake





- Psychiatric Exam


Psychiatric exam: Agitated, Anxious





- Skin


Skin Exam: Dry, Intact, Normal Color, Warm





Assessment and Plan





- Assessment and Plan (Free Text)


Assessment: 





Patient is a 62 year old female with past medical history of alcohol abuse, 

endometriosis, chronic left sided neck pain, anxiety who was admitted to the 

emergency room for evaluation and treatment of a fall and low back/flank pain. 

Patie





Plan: 


Multiple Falls


- likely secondary from ETOH abuse


- 12/11/18 Head CT without contrast- age appropriate cerebellar and cerebral 

atrophy, mild chronic microvascular disease, no acute intracranial pathology


- 12/11/18 Maxillofacial CT without contrast- no acute bone pathology, anterior 

facial soft tissue contusions


- 12/11/2018 Cervical Spine CT- no acute bone pathology, spondylosis, multilevel

facet joint arthropathy


- high risk fall precautions





ETOH Withdrawl


- CIWA - 2 this AM  


- C/w high risk fall precaution/ Seiure precaution


- C/w Ativan PRN as per psych


- thiamine, folate, and multivitamin





Alcohol Withdrawal Delirium vs Benzo intoxication Delirium


- Patient refusing to voluntary commitment to behavioral health 


- Ativan as per psych 


- Risperdal as per psych 





Hypotension


- Improving 


- DC IVF 





Back/Flank Pain 2/2 Fall


- improved- 2/2 to fall


- 12/11/18 Abdomen/Pelvis CT IV Contrast- No acute findings related to/ 

accounting  for the clinical presentation


- 12/11/18 Lumbrosacral xray- No fracture or spondylolisthesis.  Multilevel 

spondylosis is mild.





Prophylaxis


- DVT- SCDs


- GI-protonix 








Patient was seen, examined and discussed w/ attending Dr. Zeny Wade DO PGY1 - Internal Medicine Intern - Medicine Progress Note 





<Zeny Wade R - Last Filed: 12/17/18 16:27>





Objective





- Vital Signs/Intake and Output


Vital Signs (last 24 hours): 


                                        











Temp Pulse Resp BP Pulse Ox


 


 97.9 F   96 H  20   90/60 L  94 L


 


 12/16/18 08:16  12/17/18 06:00  12/16/18 08:16  12/16/18 08:16  12/16/18 08:16











- Medications


Medications: 


                               Current Medications





Benzonatate (Tessalon Perles)  100 mg PO ONCE PRN


   PRN Reason: Cough


   Last Admin: 12/15/18 00:25 Dose:  100 mg


Folic Acid (Folic Acid)  1 mg PO DAILY [facility]


   Last Admin: 12/17/18 11:18 Dose:  1 mg


Lorazepam (Ativan)  2 mg IVP Q6H PRN; Protocol


   PRN Reason: Anxiety


   Last Admin: 12/17/18 14:25 Dose:  2 mg


Lorazepam (Ativan)  2 mg IVP DAILY [facility]; Protocol


   Last Admin: 12/17/18 11:44 Dose:  2 mg


Multivitamins/Minerals (Therapeutic-M Tab)  1 tab PO DAILY [facility]


   Last Admin: 12/17/18 11:18 Dose:  1 tab


Pantoprazole Sodium (Protonix Ec Tab)  40 mg PO 0600 [facility]


   Last Admin: 12/17/18 06:30 Dose:  40 mg


Phenol/Menthol (Phenaseptic 1.4% Throat Spray)  0 ml MT Q2H PRN


   PRN Reason: Sore Throat


   Last Admin: 12/16/18 14:06 Dose:  1 spr


Risperidone (Risperdal Tab)  1 mg PO BID PREETI; Protocol


   Last Admin: 12/17/18 11:18 Dose:  1 mg


Risperidone (Risperdal Tab)  2 mg PO HS PREETI; Protocol


   Last Admin: 12/16/18 21:56 Dose:  2 mg


Thiamine HCl (Vitamin B1 Tab)  100 mg PO DAILY [facility]


   Last Admin: 12/17/18 11:19 Dose:  100 mg


Ziprasidone (Geodon Inj)  10 mg IM DAILY PRN; Protocol


   PRN Reason: Agitation


   Last Admin: 12/16/18 01:10 Dose:  10 mg











- Labs


Labs: 


                                        





                                 12/17/18 07:30 





                                 12/17/18 12:54 





                                        











PT  11.6 SECONDS (9.4-12.5)   12/11/18  01:15    


 


INR  1.02   12/11/18  01:15    


 


APTT  25.5 Seconds (25.1-36.5)   12/11/18  01:15    














Attending/Attestation





- Attestation


I have personally seen and examined this patient.: Yes


I have fully participated in the care of the patient.: Yes


I have reviewed all pertinent clinical information, including history, physical 

exam and plan: Yes


Notes (Text): 


Patient seen and examined by me with resident at 11 AM on 12/17/18. Case 

including HPI, physical exam, and assessment and plan discussed with resident. 

Agree with above with following additions/corrections.


Patient is a 62 year old female with past medical history significant for 

alcohol abuse, endometriosis, chronic left neck pain, and anxiety that presented

to the emergency room for a fall, low back/flank pain.    


Patient states she is feeling much better. States her lower back pain has 

improved. Patient states she has ambulated a little in her room. Patient AAOx3 

with periods of confusion/delusions.  States her boyfriend is picking her up 

today and she's going home. She denies chest pain or palpitations. No nausea, 

vomiting, or abdominal pain. No diarrhea or constipation. No fevers or chills. 

No dysuria. No headaches or dizziness.  Patient on 1:1. 


Physical exam:


General: Awake and alert sitting up in bed in no acute distress


HEENT: Normocephalic, atraumatic. Extraocular muscles intact, pupils equal and 

reactive, no scleral icterus. Oropharynx is pink moist. Neck is supple. 


Cardiovascular: Regular rhythm. Normal S1 and S2. No murmurs, rubs, or gallops 

appreciated


Pulmonary: Normal respiratory effort. No rhonchi, rales, or wheezing appreciated


Gastrointestinal: Soft, nondistended. Nontender. Positive bowel sounds all 4 

quadrants. No guarding. 


Musculoskeletal: Moves all extremities. No calf tenderness. No edema. No CVA 

tenderness


Central nervous system: AAOx 3 with periods of delusions and confusion


Dermatologic: Skin warm and dry. 


Assessment and plan: Patient is a 62 year old female with past medical history 

significant for alcohol abuse, endometriosis, chronic left neck pain, and 

anxiety that presented to the emergency room for a fall, low back/flank pain.   




1. Multiple falls. Likely secondary to ETOH abuse. Head CT per radiologist 

showed no acute intracranial abnormalities. Maxillofacial CT per radiologist 

showed soft tissue swelling without acute articular or osseous abnormality. CT 

abd/pelvis per radiologist showed no acute findings. Cervical spine CT per 

radiologist showed multilevel degenerative changes, no acute findings. Continue 

fall precautions. PT eval and treat.


2. ETOH abuse and withdrawal. Continue CIWA protocol. Continue thiamine, folic 

acid, and MVI. Taper off ativan. Patient counseled on alcohol cessation. 


3. Back/Flank pain. Improved today. Lumbar xray spine per radiologist showed no 

fracture or spondylolisthesis, multilevel spondylosis is mild. PT eval and treat


4. Suicidal ideation. Delusions. ?Psychosis. Psychiatry following, 

recommendations appreciated. Continue Risperidone and Geodon. Continue 1:1. 


5. Elevated LFTS. Likely secondary to ETOH abuse, librium, and ativan. LFTs 

downtrending. Continue to monitor


6. Hypokalemia. Replace with PO potassium. Follow up repeat lab work in AM. 


7. GI/DVT prophylaxis. Protonix/SCDS


8. Patient is a full code.


Case was discussed in detail with patient regarding current diagnosis and 

treatment plan. All questions answered.

## 2018-12-17 NOTE — CP.PCM.DIS
Provider





- Provider


Date of Admission: 


12/11/18 04:38





Attending physician: 


Zeny Wade DO





Primary care physician: 


Triny Akhtar MD





Consults: 








12/11/18 13:34


Psychiatry Consult Routine 


   Comment: 


   Consulting Provider: Alba Masters


   Consulting Physician: Alba Masters


   Reason for Consult: Anxiety and Adjustment Disorder














Hospital Course





- Lab Results


Lab Results: 


                             Most Recent Lab Values











WBC  6.3 10^3/uL (4.5-11.0)   12/17/18  07:30    


 


RBC  3.49 10^6/uL (3.5-6.1)  L  12/17/18  07:30    


 


Hgb  10.8 g/dL (12.0-16.0)  L D 12/17/18  07:30    


 


Hct  33.2 % (36.0-48.0)  L  12/17/18  07:30    


 


MCV  95.1 fl (80.0-105.0)   12/17/18  07:30    


 


MCH  30.9 pg (25.0-35.0)   12/17/18  07:30    


 


MCHC  32.5 g/dl (31.0-37.0)   12/17/18  07:30    


 


RDW  15.8 % (11.5-14.5)  H  12/17/18  07:30    


 


Plt Count  203 10^3/uL (120.0-450.0)   12/17/18  07:30    


 


MPV  8.7 fl (7.0-11.0)   12/17/18  07:30    


 


Gran %  48.4 % (50.0-68.0)  L  12/17/18  07:30    


 


Lymph % (Auto)  26.8 % (22.0-35.0)   12/17/18  07:30    


 


Mono % (Auto)  20.0 % (1.0-6.0)  H  12/17/18  07:30    


 


Eos % (Auto)  4.0 % (1.5-5.0)   12/17/18  07:30    


 


Baso % (Auto)  0.8 % (0.0-3.0)   12/17/18  07:30    


 


Gran #  3.03  (1.4-6.5)   12/17/18  07:30    


 


Lymph # (Auto)  1.7  (1.2-3.4)   12/17/18  07:30    


 


Mono # (Auto)  1.3  (0.1-0.6)  H  12/17/18  07:30    


 


Eos # (Auto)  0.3  (0.0-0.7)   12/17/18  07:30    


 


Baso # (Auto)  0.05 K/mm3 (0.0-2.0)   12/17/18  07:30    


 


Neutrophils % (Manual)  53 % (50.0-70.0)   12/16/18  06:30    


 


Lymphocytes % (Manual)  23 % (22.0-35.0)   12/16/18  06:30    


 


Monocytes % (Manual)  24 % (1.0-6.0)  H  12/16/18  06:30    


 


Platelet Evaluation  Normal  (NORMAL)   12/16/18  06:30    


 


PT  11.6 SECONDS (9.4-12.5)   12/11/18  01:15    


 


INR  1.02   12/11/18  01:15    


 


APTT  25.5 Seconds (25.1-36.5)   12/11/18  01:15    


 


Sodium  135 mmol/L (132-148)   12/17/18  07:30    


 


Potassium  3.5 mmol/L (3.6-5.0)  L  12/17/18  07:30    


 


Chloride  108 mmol/L ()  H  12/17/18  07:30    


 


Carbon Dioxide  22 mmol/L (21-33)   12/17/18  07:30    


 


Anion Gap  9  (10-20)  L  12/17/18  07:30    


 


BUN  9 mg/dL (7-21)   12/17/18  07:30    


 


Creatinine  0.6 mg/dl (0.7-1.2)  L  12/17/18  07:30    


 


Est GFR ( Amer)  > 60   12/17/18  07:30    


 


Est GFR (Non-Af Amer)  > 60   12/17/18  07:30    


 


Random Glucose  109 mg/dL ()   12/17/18  07:30    


 


Calcium  8.6 mg/dL (8.4-10.5)   12/17/18  07:30    


 


Magnesium  1.8 mg/dL (1.7-2.2)   12/12/18  06:00    


 


Total Bilirubin  0.2 mg/dL (0.2-1.3)   12/17/18  07:30    


 


AST  99 U/L (14-36)  H D 12/17/18  07:30    


 


ALT  65 U/L (7-56)  H  12/17/18  07:30    


 


Alkaline Phosphatase  104 U/L ()   12/17/18  07:30    


 


Lactate Dehydrogenase  500 U/L (333-699)   12/11/18  01:15    


 


Total Creatine Kinase  61 U/L ()   12/11/18  01:15    


 


Troponin I  < 0.01 ng/mL  12/11/18  01:15    


 


Total Protein  6.1 g/dL (5.8-8.3)   12/17/18  07:30    


 


Albumin  3.0 g/dL (3.0-4.8)   12/17/18  07:30    


 


Globulin  3.0 gm/dL  12/17/18  07:30    


 


Albumin/Globulin Ratio  1.0  (1.1-1.8)  L  12/17/18  07:30    


 


Urine Color  Yellow  (YELLOW)   12/11/18  03:45    


 


Urine Appearance  Clear  (CLEAR)   12/11/18  03:45    


 


Urine pH  6.5  (4.7-8.0)   12/11/18  03:45    


 


Ur Specific Gravity  1.010  (1.005-1.035)   12/11/18  03:45    


 


Urine Protein  Trace mg/dL (<30 mg/dL)  H  12/11/18  03:45    


 


Urine Glucose (UA)  Negative mg/dL (NEGATIVE)   12/11/18  03:45    


 


Urine Ketones  15 mg/dL (NEGATIVE)  H  12/11/18  03:45    


 


Urine Blood  Trace-intact  (NEGATIVE)  H  12/11/18  03:45    


 


Urine Nitrate  Negative  (NEGATIVE)   12/11/18  03:45    


 


Urine Bilirubin  Negative  (NEGATIVE)   12/11/18  03:45    


 


Urine Urobilinogen  0.2 E.U./dL (<1 E.U./dL)   12/11/18  03:45    


 


Ur Leukocyte Esterase  Negative Danny/uL (NEGATIVE)   12/11/18  03:45    


 


Urine RBC  0 - 2 /hpf (0-2)   12/11/18  03:45    


 


Urine WBC  0 - 2 /hpf (0-6)   12/11/18  03:45    


 


Ur Epithelial Cells  1 - 3 /hpf (0-5)   12/11/18  03:45    


 


Urine Bacteria  None  (NEG)   12/11/18  03:45    


 


Urine Opiates Screen  Negative  (NEGATIVE)   12/11/18  05:00    


 


Urine Methadone Screen  Negative  (NEGATIVE)   12/11/18  05:00    


 


Ur Barbiturates Screen  Negative  (NEGATIVE)   12/11/18  05:00    


 


Ur Phencyclidine Scrn  Negative  (NEGATIVE)   12/11/18  05:00    


 


Ur Amphetamines Screen  Negative  (NEGATIVE)   12/11/18  05:00    


 


U Benzodiazepines Scrn  Positive  (NEGATIVE)  H  12/11/18  05:00    


 


U Oth Cocaine Metabols  Negative  (NEGATIVE)   12/11/18  05:00    


 


U Cannabinoids Screen  Negative  (NEGATIVE)   12/11/18  05:00    


 


Alcohol, Quantitative  98 mg/dL (0-10)  H  12/11/18  01:15    














Discharge Exam





- Head Exam


Head Exam: ATRAUMATIC, NORMAL INSPECTION, NORMOCEPHALIC





Discharge Plan





- Follow Up Plan


Condition: GUARDED


Disposition: HOME/ ROUTINE


Additional Instructions: 


Please continue medications as prescribed. 





Please follow up with your PMD, Dr. Sarah Akhtar in 3-5 days from discharge 

from psych. 





Continue management of your psychiatric condition as per psychiatry. 





Referrals: 


Triny Akhtar MD [Primary Care Provider] -

## 2018-12-17 NOTE — CON
DATE:  12/16/2018



HISTORY OF PRESENT ILLNESS:  The patient is a 63-year-old white female with

a history of severe alcohol use disorder, who is currently being seen by

Psychiatry for alcohol withdrawal, delirium with confused agitation,

restlessness, and psychosis, as well as disorientation on the unit.  The

patient has been improving but slowly.  This provider also decreased _____

as this type of medication _____ beneficial for alcohol withdrawal when

given in higher dosages and higher frequency contributes to confusion

disinhibition.  _____ changes _____ very recently and _____ decrease _____

be appreciated.  The patient continues as mentioned to be unpredictable,

disorientation and tries a lot of staff's attention due to her behaviors. 

Her insight and judgment continues to be poor and this provider cannot

psychiatrically clear her at this time.



Labs and vital signs were reviewed _____.



Relevant psychiatric medications include:  Geodon 10 mg IM daily p.r.n.,

Risperdal 1 mg two times a day, Ativan 2 mg IV every 8 hours scheduled and

2 mg every 6 hours p.r.n..



IMPRESSION:  Alcohol withdrawal _____ delirium/intoxication.  The patient

also has a history of mood spectrum disorder and psychosis.



RECOMMENDATIONS:  We will continue with current medications.  There is no

acute indication for _____ at this time.  The patient needs to be on lower

doses of benzodiazepines to _____ contributory to her confusion.  As such,

a psychiatric followup tomorrow 12/17/2018.  Further assess _____ effects.







__________________________________________

Shola No MD





DD:  12/16/2018 11:10:09

DT:  12/16/2018 11:36:56

Job # [id number]

## 2018-12-18 LAB
ALBUMIN SERPL-MCNC: 3.7 G/DL (ref 3–4.8)
ALBUMIN/GLOB SERPL: 1.2 {RATIO} (ref 1.1–1.8)
ALT SERPL-CCNC: 91 U/L (ref 7–56)
AST SERPL-CCNC: 81 U/L (ref 14–36)
BASOPHILS # BLD AUTO: 0.04 K/MM3 (ref 0–2)
BASOPHILS NFR BLD: 0.6 % (ref 0–3)
BUN SERPL-MCNC: 7 MG/DL (ref 7–21)
CALCIUM SERPL-MCNC: 9.6 MG/DL (ref 8.4–10.5)
EOSINOPHIL # BLD: 0.3 10*3/UL (ref 0–0.7)
EOSINOPHIL NFR BLD: 3.8 % (ref 1.5–5)
ERYTHROCYTE [DISTWIDTH] IN BLOOD BY AUTOMATED COUNT: 15.8 % (ref 11.5–14.5)
GFR NON-AFRICAN AMERICAN: > 60
GRANULOCYTES # BLD: 3.88 10*3/UL (ref 1.4–6.5)
GRANULOCYTES NFR BLD: 54 % (ref 50–68)
HGB BLD-MCNC: 12.2 G/DL (ref 12–16)
LYMPHOCYTES # BLD: 1.7 10*3/UL (ref 1.2–3.4)
LYMPHOCYTES NFR BLD AUTO: 23.2 % (ref 22–35)
MCH RBC QN AUTO: 30.9 PG (ref 25–35)
MCHC RBC AUTO-ENTMCNC: 32.4 G/DL (ref 31–37)
MCV RBC AUTO: 95.4 FL (ref 80–105)
MONOCYTES # BLD AUTO: 1.3 10*3/UL (ref 0.1–0.6)
MONOCYTES NFR BLD: 18.4 % (ref 1–6)
PLATELET # BLD: 278 10^3/UL (ref 120–450)
PMV BLD AUTO: 8.6 FL (ref 7–11)
RBC # BLD AUTO: 3.95 10^6/UL (ref 3.5–6.1)
WBC # BLD AUTO: 7.2 10^3/UL (ref 4.5–11)

## 2018-12-18 RX ADMIN — MULTIPLE VITAMINS W/ MINERALS TAB SCH TAB: TAB at 10:52

## 2018-12-18 RX ADMIN — PANTOPRAZOLE SODIUM SCH MG: 40 TABLET, DELAYED RELEASE ORAL at 06:21

## 2018-12-18 NOTE — PN
DATE:  12/18/2018



FOLLOWUP NOTE



SUBJECTIVE:  The patient is a 63-year-old  female with history of

mental illness, currently the patient under care of Dr. Fernandez.  The patient

has history of alcohol use disorder, history of admissions to the

psychiatric inpatient unit.  This time, the patient was admitted on to the

medical site in delirium stage and psychosis.  Psych consult was called for

the same reason.  This writer is very familiar with this patient from the

previous admission to the psychiatric inpatient unit.  This admission, the

patient does not present very well because of altered mental status and

agitation with aggressive behavior.  The patient was seen by this writer

last week as well as Dr. No consulted on the patient yesterday. 

Discussed with Dr. Wade today.  The patient has episodes of confusion and

restless behavior, agitation, alternating with periods of lucidity and

presenting well so description of delirium.  This writer will adjust

medication with the hope that the patient will be able to make decision

about signing herself into the psychiatric inpatient unit or the patient

will be clear enough in order to be screened by [facility].

The patient was seen today.  The patient appears to be sleepy.  The patient

was talking to this writer with her eyes closed.  The patient said that her

eyes are killing her because it is "tearing."  The patient reported that it

is not the first time when she feels this way.  The patient does not want

to talk this writer.  The patient does not want to go to the psychiatric

inpatient unit.  The patient presented to be disorganized and lethargic as

well as psychotic.



OBJECTIVE:

VITAL SIGNS:  Reviewed.  Temperature 97.8, pulse of 90, blood pressure

128/74, respirations 18, oxygen saturation 95%.



MEDICATIONS:  Reviewed.  Benzonatate, folic acid, Ativan 2 mg IV push every

6 hours p.r.n. and 2 mg p.o. daily, multivitamins, Protonix, Risperdal will

be increased to 2 mg three times a day, vitamin B1 as well as Geodon 10 mg

IM daily p.r.n. for agitation, yesterday was the last dose.



LABORATORY DATA:  Labs reviewed.  Most recent was today.  Chemistry

reviewed.  AST and ALT are going down.  Urinalysis:  Blood trace, intact. 

Alcohol 98 at the time of admission and benzodiazepines positive.



MENTAL STATUS EXAMINATION:  As this writer described above.  The patient is

sleepy, disorganized.  Denied any thoughts of harming herself or others. 

At the same time, the patient has episodes of confusion, disorganized

thought, disorganized behavior.  Insight and judgment seems to be limited. 

Impulses are unpredictable.



IMPRESSION:  Multifactorial delirium.



PLAN:  We will observe the patient for another 24 hours.  Medications

adjusted.  If the patient will be willing to sign herself into the

psychiatric inpatient unit, the patient needs to have capacity to do so. 

At present moment, the patient lacks capacity.  On top of that, the patient

needs to be medically cleared in order to be screened if the patient

refused to stay in the hospital.  Discussed with Dr. Wade.  Should you

have any questions, give me a call back.



Thank you very much for letting me participate in care of your patient.







__________________________________________

Alba Masters MD





DD:  12/18/2018 15:59:40

DT:  12/18/2018 16:02:03

Job # [id number]

## 2018-12-18 NOTE — CP.PCM.PN
<Marcelino Wade - Last Filed: 12/18/18 13:00>





Subjective





- Date & Time of Evaluation


Date of Evaluation: 12/18/18


Time of Evaluation: 13:00





- Subjective


Subjective: 





Marcelino Wade DO PGY1 - Internal Medicine Intern - Hospital Progress Note





Seen and examined at bedside this AM. Overnight patient remains agitated; 

yesterday afternoon geodon prn utilized as well as x2 ativan given overnight. 


Denies chest pain, shortness of breath, abdominal pain, and reports she is 

tolerating her diet well this morning.





Objective





- Vital Signs/Intake and Output


Vital Signs (last 24 hours): 


                                        











Temp Pulse Resp BP Pulse Ox


 


 97.8 F   90   18   128/78   95 


 


 12/18/18 08:52  12/18/18 08:52  12/18/18 08:52  12/18/18 08:52  12/18/18 08:52











- Medications


Medications: 


                               Current Medications





Benzonatate (Tessalon Perles)  100 mg PO ONCE PRN


   PRN Reason: Cough


   Last Admin: 12/15/18 00:25 Dose:  100 mg


Folic Acid (Folic Acid)  1 mg PO DAILY [facility]


   Last Admin: 12/18/18 10:52 Dose:  1 mg


Lorazepam (Ativan)  2 mg IVP Q6H PRN; Protocol


   PRN Reason: Anxiety


   Last Admin: 12/18/18 06:21 Dose:  2 mg


Lorazepam (Ativan)  2 mg PO DAILY PREETI; Protocol


   Last Admin: 12/18/18 08:44 Dose:  2 mg


Multivitamins/Minerals (Therapeutic-M Tab)  1 tab PO DAILY PREETI


   Last Admin: 12/18/18 10:52 Dose:  1 tab


Pantoprazole Sodium (Protonix Ec Tab)  40 mg PO 0600 PREETI


   Last Admin: 12/18/18 06:21 Dose:  40 mg


Phenol/Menthol (Phenaseptic 1.4% Throat Spray)  0 ml MT Q2H PRN


   PRN Reason: Sore Throat


   Last Admin: 12/16/18 14:06 Dose:  1 spr


Risperidone (Risperdal Tab)  1 mg PO BID PREETI; Protocol


   Last Admin: 12/18/18 10:52 Dose:  1 mg


Risperidone (Risperdal Tab)  2 mg PO HS PREETI; Protocol


   Last Admin: 12/17/18 21:36 Dose:  2 mg


Thiamine HCl (Vitamin B1 Tab)  100 mg PO DAILY PREETI


   Last Admin: 12/18/18 10:52 Dose:  100 mg


Ziprasidone (Geodon Inj)  10 mg IM DAILY PRN; Protocol


   PRN Reason: Agitation


   Last Admin: 12/17/18 19:09 Dose:  10 mg











- Labs


Labs: 


                                        





                                 12/18/18 06:30 





                                 12/18/18 06:30 





                                        











PT  11.6 SECONDS (9.4-12.5)   12/11/18  01:15    


 


INR  1.02   12/11/18  01:15    


 


APTT  25.5 Seconds (25.1-36.5)   12/11/18  01:15    








Physical Exam:





- Constitutional


Appears: Non-toxic, Agitated





- Head Exam


Head Exam: NORMOCEPHALIC


Additional comments: 





Bilateral periorbital echymosis appreciated 





- Eye Exam


Eye Exam: EOMI, Normal appearance





- ENT Exam


ENT Exam: Mucous Membranes Moist, Normal Exam





- Respiratory Exam


Respiratory Exam: Clear to Ausculation Bilateral, NORMAL BREATHING PATTERN





- Cardiovascular Exam


Cardiovascular Exam: RRR, +S1, +S2





- GI/Abdominal Exam


GI & Abdominal Exam: Soft, Normal Bowel Sounds.  absent: Tenderness





- Extremities Exam


Extremities Exam: Normal Capillary Refill





- Neurological Exam


Neurological Exam: Alert, Awake





- Psychiatric Exam


Psychiatric exam: Agitated, Anxious





- Skin


Skin Exam: Dry, Intact, Normal Color, Warm











Assessment and Plan





- Assessment and Plan (Free Text)


Assessment: 





Patient is a 62 year old female with past medical history of alcohol abuse, 

endometriosis, chronic left sided neck pain, anxiety who was admitted to the 

emergency room for evaluation and treatment of a fall and low back/flank pain. 

Patient continually remains agitated w/ intermittent periods of lucidity. 





Plan: 


Alcohol Withdrawal Delirium vs Benzo intoxication Delirium


- Patient refusing to voluntary commitment to behavioral health


- As per discussion w/ psych; Risperdal will be increased today


- Continued management 


- Ativan as per psych 


- Risperdal as per psych 





Multiple Falls


- likely secondary from ETOH abuse


- 12/11/18 Head CT without contrast- age appropriate cerebellar and cerebral 

atrophy, mild chronic microvascular disease, no acute intracranial pathology


- 12/11/18 Maxillofacial CT without contrast- no acute bone pathology, anterior 

facial soft tissue contusions


- 12/11/2018 Cervical Spine CT- no acute bone pathology, spondylosis, multilevel

facet joint arthropathy


- high risk fall precautions





ETOH Withdrawl


- CIWA - 3 this AM  


- C/w high risk fall precaution/ Seiure precaution


- C/w Ativan PRN as per psych


- C/w thiamine, folate, and multivitamin





Hypotension


- Improving 


- DC IVF 





Back/Flank Pain 2/2 Fall


- improved- 2/2 to fall


- 12/11/18 Abdomen/Pelvis CT IV Contrast- No acute findings related to/ 

accounting  for the clinical presentation


- 12/11/18 Lumbrosacral xray- No fracture or spondylolisthesis.  Multilevel 

spondylosis is mild.





Prophylaxis


- DVT- SCDs


- GI-protonix 








Patient was seen, examined and discussed w/ attending Dr. Zeny Wade DO PGY1 - Internal Medicine Intern - Medicine Progress Note 





<Zeny Wade R - Last Filed: 12/18/18 16:46>





Objective





- Vital Signs/Intake and Output


Vital Signs (last 24 hours): 


                                        











Temp Pulse Resp BP Pulse Ox


 


 97.8 F   90   18   128/78   95 


 


 12/18/18 08:52  12/18/18 08:52  12/18/18 08:52  12/18/18 08:52  12/18/18 08:52











- Medications


Medications: 


                               Current Medications





Benzonatate (Tessalon Perles)  100 mg PO ONCE PRN


   PRN Reason: Cough


   Last Admin: 12/15/18 00:25 Dose:  100 mg


Folic Acid (Folic Acid)  1 mg PO DAILY PREETI


   Last Admin: 12/18/18 10:52 Dose:  1 mg


Lorazepam (Ativan)  2 mg IVP Q6H PRN; Protocol


   PRN Reason: Anxiety


   Last Admin: 12/18/18 15:18 Dose:  2 mg


Lorazepam (Ativan)  2 mg PO DAILY PREETI; Protocol


   Last Admin: 12/18/18 08:44 Dose:  2 mg


Multivitamins/Minerals (Therapeutic-M Tab)  1 tab PO DAILY PREETI


   Last Admin: 12/18/18 10:52 Dose:  1 tab


Pantoprazole Sodium (Protonix Ec Tab)  40 mg PO 0600 PREETI


   Last Admin: 12/18/18 06:21 Dose:  40 mg


Phenol/Menthol (Phenaseptic 1.4% Throat Spray)  0 ml MT Q2H PRN


   PRN Reason: Sore Throat


   Last Admin: 12/16/18 14:06 Dose:  1 spr


Risperidone (Risperdal Tab)  2 mg PO HS PREETI; Protocol


   Last Admin: 12/17/18 21:36 Dose:  2 mg


Risperidone (Risperdal Tab)  2 mg PO BID PREETI; Protocol


Thiamine HCl (Vitamin B1 Tab)  100 mg PO DAILY PREETI


   Last Admin: 12/18/18 10:52 Dose:  100 mg


Ziprasidone (Geodon Inj)  10 mg IM DAILY PRN; Protocol


   PRN Reason: Agitation


   Last Admin: 12/17/18 19:09 Dose:  10 mg











- Labs


Labs: 


                                        





                                 12/18/18 06:30 





                                 12/18/18 06:30 





                                        











PT  11.6 SECONDS (9.4-12.5)   12/11/18  01:15    


 


INR  1.02   12/11/18  01:15    


 


APTT  25.5 Seconds (25.1-36.5)   12/11/18  01:15    














Attending/Attestation





- Attestation


I have personally seen and examined this patient.: Yes


I have fully participated in the care of the patient.: Yes


I have reviewed all pertinent clinical information, including history, physical 

exam and plan: Yes


Notes (Text): 


Patient seen and examined by me with resident at 10:05 AM on 12/18/18. Case 

including HPI, physical exam, and assessment and plan discussed with resident. 

Agree with above with following additions/corrections.


Patient is a 62 year old female with past medical history significant for 

alcohol abuse, endometriosis, chronic left neck pain, and anxiety that presented

to the emergency room for a fall, low back/flank pain.    


Patient states she is feeling ok. Patient is ambulating. Patient with periods of

delusion and confusion. Patient is refusing to be admitted to the psychiatry 

unit. She denies chest pain or palpitations. No nausea, vomiting, or abdominal 

pain. No diarrhea or constipation. No fevers or chills. No dysuria. No headaches

or dizziness.  Patient on 1:1. 


Physical exam:


General: Awake and alert sitting up in bed in no acute distress


HEENT: Normocephalic, atraumatic. Extraocular muscles intact, pupils equal and 

reactive, no scleral icterus. Oropharynx is pink moist. Neck is supple. 


Cardiovascular: Regular rhythm. Normal S1 and S2. No murmurs, rubs, or gallops 

appreciated


Pulmonary: Normal respiratory effort. No rhonchi, rales, or wheezing appreciated


Gastrointestinal: Soft, nondistended. Nontender. Positive bowel sounds all 4 

quadrants. No guarding. 


Musculoskeletal: Moves all extremities. No calf tenderness. No edema. No CVA 

tenderness


Central nervous system: AAOx 3 with periods of delusions and confusion


Dermatologic: Skin warm and dry. 


Assessment and plan: Patient is a 62 year old female with past medical history 

significant for alcohol abuse, endometriosis, chronic left neck pain, and 

anxiety that presented to the emergency room for a fall, low back/flank pain.   




1. Multiple falls. Likely secondary to ETOH abuse. Continue fall precautions. PT

eval and treat. Head CT per radiologist showed no acute intracranial 

abnormalities. Maxillofacial CT per radiologist showed soft tissue swelling 

without acute articular or osseous abnormality. CT abd/pelvis per radiologist 

showed no acute findings. Cervical spine CT per radiologist showed multilevel 

degenerative changes, no acute findings. 


2. ETOH abuse and withdrawal. Continue CIWA protocol. Continue thiamine, folic 

acid, and MVI. Ativan changed to PO 2mg daily. Continue Ativan IV prn for 

anxiety. Patient counseled on alcohol cessation. 


3. Back/Flank pain. Improved today. Lumbar xray spine per radiologist showed no 

fracture or spondylolisthesis, multilevel spondylosis is mild. PT eval and treat


4. Suicidal ideation. Delusions. ?Psychosis. Psychiatry following, 

recommendations appreciated. Risperidone dose increased. Continue Geodon as 

needed.


5. Elevated LFTS. Likely secondary to ETOH abuse, librium, and ativan. Continues

to downtrend. Continue to monitor


6. Hypokalemia. Resolved


7. GI/DVT prophylaxis. Protonix/SCDS


8. Patient is a full code.


Case was discussed in detail with patient  and psychiatrist Dr. Willis regarding 

current diagnosis and treatment plan. All questions answered.

## 2018-12-19 LAB
ALBUMIN SERPL-MCNC: 3.9 G/DL (ref 3–4.8)
ALBUMIN/GLOB SERPL: 1.1 {RATIO} (ref 1.1–1.8)
ALT SERPL-CCNC: 57 U/L (ref 7–56)
AST SERPL-CCNC: 56 U/L (ref 14–36)
BASOPHILS # BLD AUTO: 0.06 K/MM3 (ref 0–2)
BASOPHILS NFR BLD: 0.8 % (ref 0–3)
BUN SERPL-MCNC: 8 MG/DL (ref 7–21)
CALCIUM SERPL-MCNC: 9.8 MG/DL (ref 8.4–10.5)
EOSINOPHIL # BLD: 0.2 10*3/UL (ref 0–0.7)
EOSINOPHIL NFR BLD: 2.8 % (ref 1.5–5)
ERYTHROCYTE [DISTWIDTH] IN BLOOD BY AUTOMATED COUNT: 15.9 % (ref 11.5–14.5)
GFR NON-AFRICAN AMERICAN: > 60
GRANULOCYTES # BLD: 4.42 10*3/UL (ref 1.4–6.5)
GRANULOCYTES NFR BLD: 58.6 % (ref 50–68)
HGB BLD-MCNC: 12.6 G/DL (ref 12–16)
LYMPHOCYTES # BLD: 1.6 10*3/UL (ref 1.2–3.4)
LYMPHOCYTES NFR BLD AUTO: 21.1 % (ref 22–35)
MCH RBC QN AUTO: 30.9 PG (ref 25–35)
MCHC RBC AUTO-ENTMCNC: 32.6 G/DL (ref 31–37)
MCV RBC AUTO: 94.9 FL (ref 80–105)
MONOCYTES # BLD AUTO: 1.3 10*3/UL (ref 0.1–0.6)
MONOCYTES NFR BLD: 16.7 % (ref 1–6)
PLATELET # BLD: 353 10^3/UL (ref 120–450)
PMV BLD AUTO: 8.6 FL (ref 7–11)
RBC # BLD AUTO: 4.08 10^6/UL (ref 3.5–6.1)
WBC # BLD AUTO: 7.5 10^3/UL (ref 4.5–11)

## 2018-12-19 RX ADMIN — PANTOPRAZOLE SODIUM SCH MG: 40 TABLET, DELAYED RELEASE ORAL at 05:32

## 2018-12-19 RX ADMIN — MULTIPLE VITAMINS W/ MINERALS TAB SCH TAB: TAB at 11:59

## 2018-12-19 NOTE — PN
DATE:  12/19/2018



SUBJECTIVE:  The patient was followed up today, but the patient was very

drowsy.  The patient was not able to hold conversation as per report from

the nursing staff and one to one observation.  The patient was up all night

long, has periods of confusion, and trying to climb off the bed.  [facility] screener came over, but the patient was deeply sedated.

Medical team asked this writer to decrease the dose of medication.  From

this writer observation, the patient is in delirium stage.  The patient is

on Risperdal, this is what the patient was tolerating before very well. 

The patient was medicated with Geodon 10 mg, Geodon is one of the

medication which will be not that sedative and I think this drowsiness is

related to delirium stage, but we will respect that request, we will

decrease the Geodon to 5 mg IM daily as needed for agitation.  The patient

also is on Risperdal 6 mg daily, Ativan 1 mg IV push every 6 hours p.r.n.

for anxiety, folic acid, multivitamins and thiamine.



MENTAL STATUS EXAMINATION:  The patient was deeply sedated.  This writer

was not able to assess mental status, but as per report, the patient has

periods of confusion, agitation, trying to climb off the bed which

correlates with diagnosis of delirium.  The patient also is alcoholic as

per the patient's family collaterals, which was obtained by . 

The patient is chronic alcoholic and the patient will be not safe back

home.



LABORATORY DATA:  Reviewed, most recent was from today.  Chemistry

reviewed.  AST and ALT is trending down.  Urinalysis reviewed.  Toxicology

reviewed.



IMPRESSION:  The patient is in delirium stage which seems to be

multifactorial.  The patient has history of alcohol use disorder and most

likely the patient is in delirium stage.  The patient also has history of

mental illness.  The patient sees Dr. Fernandez in the community.



PLAN:  Continue current management.  Continue current medications.  Geodon

was decreased to 5 mg as needed medication also requested who initiate

[facility] if the patient does not want to sign herself

into the psychiatric inpatient unit, but present moment, the patient is in

delirium stage.  We will follow up and advise accordingly.



Thank you very much for letting me to participate in the care of your

patient.







__________________________________________

Alba Masters MD





DD:  12/19/2018 16:14:11

DT:  12/19/2018 16:16:40

[facility] # [id number]

## 2018-12-19 NOTE — CP.PCM.PN
<Marcelino Wade - Last Filed: 12/19/18 16:04>





Subjective





- Date & Time of Evaluation


Date of Evaluation: 12/19/18


Time of Evaluation: 12:49





- Subjective


Subjective: 





Marcelino Wade DO PGY1 - Internal Medicine Intern - Medicine Progress Note





Patient was seen and examined at bedside this morning patient was again agitated

overnight; 


Ativan PRN and Geodon PRN both given last night. Upon evaluation this morning 

patient somnolent from PRNs during evaluation. 


Denies any chest pain, sob, abd pain, n/v/d/c, tolerating diet well. 





Objective





- Vital Signs/Intake and Output


Vital Signs (last 24 hours): 


                                        











Temp Pulse Resp BP Pulse Ox


 


 98.0 F   90   18   92/60 L  98 


 


 12/18/18 18:00  12/18/18 18:00  12/18/18 18:00  12/18/18 18:00  12/18/18 18:00











- Medications


Medications: 


                               Current Medications





Benzonatate (Tessalon Perles)  100 mg PO ONCE PRN


   PRN Reason: Cough


   Last Admin: 12/15/18 00:25 Dose:  100 mg


Folic Acid (Folic Acid)  1 mg PO DAILY [facility]


   Last Admin: 12/19/18 11:58 Dose:  1 mg


Lorazepam (Ativan)  2 mg IVP Q6H PRN; Protocol


   PRN Reason: Anxiety


   Last Admin: 12/18/18 21:40 Dose:  2 mg


Multivitamins/Minerals (Therapeutic-M Tab)  1 tab PO DAILY PREETI


   Last Admin: 12/19/18 11:59 Dose:  1 tab


Pantoprazole Sodium (Protonix Ec Tab)  40 mg PO 0600 PREETI


   Last Admin: 12/19/18 05:32 Dose:  40 mg


Phenol/Menthol (Phenaseptic 1.4% Throat Spray)  0 ml MT Q2H PRN


   PRN Reason: Sore Throat


   Last Admin: 12/16/18 14:06 Dose:  1 spr


Risperidone (Risperdal Tab)  2 mg PO HS PREETI; Protocol


   Last Admin: 12/18/18 21:42 Dose:  2 mg


Risperidone (Risperdal Tab)  2 mg PO BID PREETI; Protocol


   Last Admin: 12/19/18 11:58 Dose:  2 mg


Thiamine HCl (Vitamin B1 Tab)  100 mg PO DAILY PREETI


   Last Admin: 12/19/18 11:58 Dose:  100 mg


Ziprasidone (Geodon Inj)  10 mg IM DAILY PRN; Protocol


   PRN Reason: Agitation


   Last Admin: 12/19/18 04:46 Dose:  10 mg











- Labs


Labs: 


                                        





                                 12/19/18 06:00 





                                 12/19/18 06:00 





                                        











PT  11.6 SECONDS (9.4-12.5)   12/11/18  01:15    


 


INR  1.02   12/11/18  01:15    


 


APTT  25.5 Seconds (25.1-36.5)   12/11/18  01:15    








Physical Exam:





- Constitutional


Appears: Non-toxic, Agitated, Somnolent





- Head Exam


Head Exam: NORMOCEPHALIC


Additional comments: 





Bilateral periorbital echymosis appreciated 





- Eye Exam


Eye Exam: EOMI, Normal appearance





- ENT Exam


ENT Exam: Mucous Membranes Moist, Normal Exam





- Respiratory Exam


Respiratory Exam: Clear to Auscultation Bilateral, NORMAL BREATHING PATTERN





- Cardiovascular Exam


Cardiovascular Exam: RRR, +S1, +S2





- GI/Abdominal Exam


GI & Abdominal Exam: Soft, Normal Bowel Sounds.  absent: Tenderness





- Extremities Exam


Extremities Exam: Normal Capillary Refill





- Neurological Exam


Neurological Exam: Alert, Awake





- Psychiatric Exam


Psychiatric exam: Agitated, Anxious





- Skin


Skin Exam: Dry, Intact, Normal Color, Warm











Assessment and Plan





- Assessment and Plan (Free Text)


Assessment: 





Patient is a 62 year old female with past medical history of alcohol abuse, 

endometriosis, chronic left sided neck pain, anxiety who was admitted to the 

emergency room for evaluation and treatment of a fall and low back/flank pain. 

Patient continually remains agitated w/ intermittent periods of lucidity.





Plan: 


Alcohol Withdrawal Delirium vs Benzo intoxication Delirium vs. psychosis?


- Patient refusing to voluntary commitment to behavioral health


- As per evaluation by involuntary psych screening; patient is not candidate for

involuntary admission


- PRN Geodon dosing lowered as patient is somnolent during evaluation


- Standing PO ativan DC; 


- Start IVP Ativan 1mg Q6 PRN


- C/w Risperdal 2mg BID; 2mg HS as per psych


- C/w 1:1 sitter


- Psych following, appreciate recs





Multiple Falls


- likely secondary from ETOH abuse


- 12/11/18 Head CT without contrast- age appropriate cerebellar and cerebral 

atrophy, mild chronic microvascular disease, no acute intracranial pathology


- 12/11/18 Maxillofacial CT without contrast- no acute bone pathology, anterior 

facial soft tissue contusions


- 12/11/2018 Cervical Spine CT- no acute bone pathology, spondylosis, multilevel

facet joint arthropathy


- high risk fall precautions





Hx ETOH Abuse


- C/w high risk fall precaution/ Seiure precaution


- C/w Ativan PRN as per psych


- C/w thiamine, folate, and multivitamin





Hypotension - Resolved





Back/Flank Pain 2/2 Fall


- improved- 2/2 to fall


- 12/11/18 Abdomen/Pelvis CT IV Contrast- No acute findings related to/ 

accounting  for the clinical presentation


- 12/11/18 Lumbrosacral xray- No fracture or spondylolisthesis.  Multilevel 

spondylosis is mild.





Prophylaxis


- DVT- SCDs


- GI-protonix 








Patient was seen, examined and discussed w/ attending Dr. Zeny Wade DO PGY1 - Internal Medicine Intern - Medicine Progress Note 





<Zeny Wade R - Last Filed: 12/20/18 08:42>





Objective





- Vital Signs/Intake and Output


Vital Signs (last 24 hours): 


                                        











Temp Pulse Resp BP Pulse Ox


 


 98.2 F   81   18   89/57 L  96 


 


 12/20/18 08:04  12/20/18 08:04  12/20/18 08:04  12/20/18 08:04  12/20/18 08:04








Intake and Output: 


                                        











 12/20/18 12/20/18





 06:59 18:59


 


Intake Total 480 


 


Balance 480 














- Medications


Medications: 


                               Current Medications





Benzonatate (Tessalon Perles)  100 mg PO ONCE PRN


   PRN Reason: Cough


   Last Admin: 12/15/18 00:25 Dose:  100 mg


Folic Acid (Folic Acid)  1 mg PO DAILY PREETI


   Last Admin: 12/19/18 11:58 Dose:  1 mg


Lorazepam (Ativan)  0.5 mg IVP Q12 PREETI; Protocol


Multivitamins/Minerals (Therapeutic-M Tab)  1 tab PO DAILY PRETEI


   Last Admin: 12/19/18 11:59 Dose:  1 tab


Pantoprazole Sodium (Protonix Ec Tab)  40 mg PO 0600 PREETI


   Last Admin: 12/20/18 05:50 Dose:  40 mg


Phenol/Menthol (Phenaseptic 1.4% Throat Spray)  0 ml MT Q2H PRN


   PRN Reason: Sore Throat


   Last Admin: 12/16/18 14:06 Dose:  1 spr


Risperidone (Risperdal Tab)  2 mg PO HS PREETI; Protocol


   Last Admin: 12/19/18 21:36 Dose:  2 mg


Risperidone (Risperdal Tab)  2 mg PO BID PREETI; Protocol


   Last Admin: 12/19/18 17:46 Dose:  2 mg


Thiamine HCl (Vitamin B1 Tab)  100 mg PO DAILY PREETI


   Last Admin: 12/19/18 11:58 Dose:  100 mg


Ziprasidone (Geodon Inj)  5 mg IM DAILY PRN; Protocol


   PRN Reason: Agitation


   Last Admin: 12/19/18 22:03 Dose:  5 mg











- Labs


Labs: 


                                        





                                 12/19/18 06:00 





                                 12/19/18 06:00 





                                        











PT  11.6 SECONDS (9.4-12.5)   12/11/18  01:15    


 


INR  1.02   12/11/18  01:15    


 


APTT  25.5 Seconds (25.1-36.5)   12/11/18  01:15    














Attending/Attestation





- Attestation


I have personally seen and examined this patient.: Yes


I have fully participated in the care of the patient.: Yes


I have reviewed all pertinent clinical information, including history, physical 

exam and plan: Yes


Notes (Text): 


Patient seen and examined by me with resident at 10:05 AM on 12/19/18. Case 

including HPI, physical exam, and assessment and plan discussed with resident. 

Agree with above with following additions/corrections.


Patient is a 62 year old female with past medical history significant for 

alcohol abuse, endometriosis, chronic left neck pain, and anxiety that presented

to the emergency room for a fall, low back/flank pain.    


Patient states sleeping. Arousable. Not answering questions. Patient agitated 

over night.  Patient on 1:1. Patient afebrile. 


Physical exam:


General: Awake and alert sitting up in bed in no acute distress


HEENT: Normocephalic, atraumatic. Extraocular muscles intact, pupils equal and 

reactive, no scleral icterus. Oropharynx is pink moist. Neck is supple. 


Cardiovascular: Regular rhythm. Normal S1 and S2. No murmurs, rubs, or gallops 

appreciated


Pulmonary: Normal respiratory effort. No rhonchi, rales, or wheezing appreciated


Gastrointestinal: Soft, nondistended. Nontender. Positive bowel sounds all 4 

quadrants. No guarding. 


Musculoskeletal: Moves all extremities. No calf tenderness. No edema. No CVA 

tenderness


Central nervous system: Sleepy but arousable 


Dermatologic: Skin warm and dry. 


Assessment and plan: Patient is a 62 year old female with past medical history 

significant for alcohol abuse, endometriosis, chronic left neck pain, and 

anxiety that presented to the emergency room for a fall, low back/flank pain.   




1. Suicidal ideation. Delusions. ?Psychosis. Delirium. Psychiatry following, 

recommendations appreciated. Discussed change of medications with psychatrist 

and patient lethargic after receiving multiple medications for agitation. 

Medication changes to be made by psychiatrist.


2. Multiple falls. Likely secondary to ETOH abuse. Continue fall precautions. PT

eval and treat. Head CT per radiologist showed no acute intracranial 

abnormalities. Maxillofacial CT per radiologist showed soft tissue swelling 

without acute articular or osseous abnormality. CT abd/pelvis per radiologist 

showed no acute findings. Cervical spine CT per radiologist showed multilevel de

generative changes, no acute findings. 


3. ETOH abuse and withdrawal. Continue CIWA protocol. Continue thiamine, folic 

acid, and MVI. Taper off ativan. Patient counseled on alcohol cessation. 


4. Back/Flank pain.  Lumbar xray spine per radiologist showed no fracture or 

spondylolisthesis, multilevel spondylosis is mild. PT eval and treat


5. Elevated LFTS. Likely secondary to ETOH abuse, librium, and ativan. Continues

to downtrend. Continue to monitor


6. Hypokalemia. Resolved


7. GI/DVT prophylaxis. Protonix/SCDS


8. Patient is a full code.


Case was discussed in detail with psychiatrist Dr. Willis regarding current 

diagnosis and treatment plan.

## 2018-12-20 RX ADMIN — PANTOPRAZOLE SODIUM SCH MG: 40 TABLET, DELAYED RELEASE ORAL at 05:50

## 2018-12-20 RX ADMIN — MULTIPLE VITAMINS W/ MINERALS TAB SCH TAB: TAB at 09:29

## 2018-12-20 NOTE — PN
DATE:  12/20/2018



FOLLOWUP NOTE



SUBJECTIVE:  The patient was seen and evaluated today.  The patient

presented much better from the mental standpoint, but delusions and

psychosis symptoms are prominent right now.  The patient is convinced that

her boyfriend has an affair with a new girlfriend.  The patient is

convinced that she saw a picture of "blunt lady" next to the new car.  The

patient also reported that she saw pictures of her boyfriend with the same

lady in new apartment.  The patient is convinced that this is true.  The

patient reported that she saw also the pictures on her cell phone.  This

writer would like to emphasize the fact that the patient does not have cell

phone.  The patient reported that her boyfriend has wife as well as paying

for the patient's apartment for past 23 years, and right now he has an

affair with another female.  Most likely, the patient is delusional,

psychotic and disorganized.  The patient is continuing to be on one-to-one.

The patient is convinced that all of these are true.  The patient has poor

reality testing.  The patient still has episodes of agitation and restless

behavior, required Geodon over nighttime.



OBJECTIVE:

VITAL SIGNS:  Stable.  Temperature 98.2, pulse is 80, blood pressure 88/60,

respirations 18, oxygen saturation is 96.



MEDICATIONS:  Medications reviewed.  The patient is on folic acid, Ativan

0.5 mg IV push every 12 hours as scheduled, multivitamins, Protonix,

Risperdal 2 mg twice a day as well as at the nighttime, sodium chloride,

vitamin B and Geodon 5 mg IM daily for agitation.



LABORATORY DATA:  Labs reviewed.  Most recent was from yesterday. 

Toxicology reviewed.



MENTAL STATUS EXAMINATION:  The patient presented to be alert, oriented. 

Mood described as upset.  Affect was constricted and tearful.  Thought

process seems to be circumstantial, tangential and disorganized.  Thought

content, the patient denied visual, auditory, tactile hallucinations. 

Denied paranoid ideation, but the patient is obviously psychotic,

disorganized, delusional.  Insight and judgment seems to be impaired. 

Impulses are unpredictable.



IMPRESSION:  Psychosis, not otherwise specified.  Delirium is clearing. 

The patient has history of mental illness.



PLAN:  The patient was offered psychiatric admission, but the patient

declined that offer.  The patient wants to go home, but the patient is

psychotic, disorganized and the patient's family feels that it will be not

safe for the patient to go home.  Meanwhile, continue Risperdal.  Continue

current medications.  [facility] recommended at present

moment.  The patient is doing much better from the medical standpoint as

well as delirium is clearing.



Thank you very much for letting me to participate in care of your patient. 

Should you have any questions, give me a call back.







__________________________________________

Alba Masters MD





DD:  12/20/2018 15:54:12

DT:  12/20/2018 15:56:31

Job # [id number]

## 2018-12-20 NOTE — CP.PCM.PN
<Marcelino Wade - Last Filed: 12/20/18 14:42>





Subjective





- Date & Time of Evaluation


Date of Evaluation: 12/20/18


Time of Evaluation: 14:19





- Subjective


Subjective: 





Marcelino Wade DO PGY1 - Internal Medicine Intern - Medicine Progress Note








Seen and examined at bedside this morning


Overnight remains agitated / combative; Ativan 1mg PRN + Geodon 5mg PRN utilized

overnight. 


As per nursing patient did not sleep the entire night and was disruptive. When 

examined at bedside this morning patient is more alert today than day prior; 

less agitated; however still exhibiting circular thought process. Patient shows 

intermittment periods of lucidity 





Denies any chest pain, shortness of breath, abd pain, n/v/d/c, tolerating diet 

well. 





Objective





- Vital Signs/Intake and Output


Vital Signs (last 24 hours): 


                                        











Temp Pulse Resp BP Pulse Ox


 


 98.2 F   81   18   90/56 L  96 


 


 12/20/18 08:04  12/20/18 08:04  12/20/18 08:04  12/20/18 12:44  12/20/18 08:04








Intake and Output: 


                                        











 12/20/18 12/20/18





 06:59 18:59


 


Intake Total 480 


 


Balance 480 














- Medications


Medications: 


                               Current Medications





Folic Acid (Folic Acid)  1 mg PO DAILY PREETI


   Last Admin: 12/20/18 09:28 Dose:  1 mg


Lorazepam (Ativan)  0.5 mg IVP Q12 PREETI; Protocol


   Last Admin: 12/20/18 09:27 Dose:  0.5 mg


Multivitamins/Minerals (Therapeutic-M Tab)  1 tab PO DAILY PREETI


   Last Admin: 12/20/18 09:29 Dose:  1 tab


Pantoprazole Sodium (Protonix Ec Tab)  40 mg PO 0600 PREETI


   Last Admin: 12/20/18 05:50 Dose:  40 mg


Risperidone (Risperdal Tab)  2 mg PO HS PREETI; Protocol


   Last Admin: 12/19/18 21:36 Dose:  2 mg


Risperidone (Risperdal Tab)  2 mg PO BID PREETI; Protocol


   Last Admin: 12/20/18 09:28 Dose:  2 mg


Thiamine HCl (Vitamin B1 Tab)  100 mg PO DAILY PREETI


   Last Admin: 12/20/18 09:29 Dose:  100 mg


Ziprasidone (Geodon Inj)  5 mg IM DAILY PRN; Protocol


   PRN Reason: Agitation


   Last Admin: 12/19/18 22:03 Dose:  5 mg











- Labs


Labs: 


                                        





                                 12/19/18 06:00 





                                 12/19/18 06:00 





                                        











PT  11.6 SECONDS (9.4-12.5)   12/11/18  01:15    


 


INR  1.02   12/11/18  01:15    


 


APTT  25.5 Seconds (25.1-36.5)   12/11/18  01:15    








Physical Exam:





- Constitutional


Appears: No acute distress, 





- Head Exam


Head Exam: NORMOCEPHALIC, Bilateral periorbital echymosis present 





- Eye Exam


Eye Exam: EOMI, Normal appearance





- ENT Exam


ENT Exam: Mucous Membranes Moist, Normal Exam





- Respiratory Exam


Respiratory Exam: Clear to Auscultation Bilateral, NORMAL BREATHING PATTERN





- Cardiovascular Exam


Cardiovascular Exam: RRR, +S1, +S2





- GI/Abdominal Exam


GI & Abdominal Exam: Soft, Normal Bowel Sounds.  absent: Tenderness





- Extremities Exam


Extremities Exam: Normal Capillary Refill





- Neurological Exam


Neurological Exam: Alert, Awake





- Psychiatric Exam


Psychiatric exam: Less lethargic this AM compared to previous examination; AAO3 

w/ intermittent periods of lucidity. 





- Skin


Skin Exam: Dry, Intact, Normal Color, Warm








 





Assessment and Plan





- Assessment and Plan (Free Text)


Assessment: 





Patient is a 62 year old female with past medical history of alcohol abuse, 

endometriosis, chronic left sided neck pain, anxiety who was admitted to the 

emergency room for evaluation and treatment of a fall and low back/flank pain. 

Patient continually remains agitated w/ intermittent periods of lucidity.





Plan: 


Delirium w/ Intermittent periods of lucidity - etiology unknown 


- Patient exhibits delirious state w/ intermittent lucid intervals; It appears 

that overnight she is much more agitated and combative during the night 


- Patient refusing to voluntary commitment to behavioral health


- As per evaluation by involuntary psych screening; patient is not candidate for

involuntary admission


- C/w Geodon 5mg PRN 


- Decreased IVP Ativan 1mg Q6 PRN to Q12 PRN


- C/w Risperdal 2mg BID; 2mg HS as per psych


- Psych following, appreciate recs





Multiple Falls


- likely secondary from ETOH abuse


- 12/11/18 Head CT without contrast- age appropriate cerebellar and cerebral 

atrophy, mild chronic microvascular disease, no acute intracranial pathology


- 12/11/18 Maxillof[address] CT without contrast- no acute bone pathology, anterior 

facial soft tissue contusions


- 12/11/2018 Cervical Spine CT- no acute bone pathology, spondylosis, multilevel

facet joint arthropathy


- high risk fall precautions





Hx ETOH Abuse


- C/w high risk fall precaution/ Seiure precaution


- C/w Ativan PRN as per psych


- C/w thiamine, folate, and multivitamin





Hypotension 


90/60 


Reassess after 500cc bolus 





Back/Flank Pain 2/2 Fall


- improved- 2/2 to fall


- 12/11/18 Abdomen/Pelvis CT IV Contrast- No acute findings related to/ 

accounting  for the clinical presentation


- 12/11/18 Lumbrosacral xray- No fracture or spondylolisthesis.  Multilevel 

spondylosis is mild.





Prophylaxis


- DVT- SCDs


- GI-protonix 





Patient was seen, examined and discussed w/ attending Dr. Zeny Wade DO PGY1 - Internal Medicine Intern - Medicine Progress Note





<Zeny Wade R - Last Filed: 12/22/18 07:36>





Objective





- Vital Signs/Intake and Output


Vital Signs (last 24 hours): 


                                        











Temp Pulse Resp BP Pulse Ox


 


 98.1 F   83   20   122/70   99 


 


 12/21/18 07:50  12/21/18 07:50  12/21/18 07:50  12/21/18 07:50  12/21/18 07:50











- Labs


Labs: 


                                        





                                 12/21/18 06:00 





                                 12/21/18 06:00 





                                        











PT  11.6 SECONDS (9.4-12.5)   12/11/18  01:15    


 


INR  1.02   12/11/18  01:15    


 


APTT  25.5 Seconds (25.1-36.5)   12/11/18  01:15    














Attending/Attestation





- Attestation


I have personally seen and examined this patient.: Yes


I have fully participated in the care of the patient.: Yes


I have reviewed all pertinent clinical information, including history, physical 

exam and plan: Yes


Notes (Text): 


Patient seen and examined by me with resident at 10:20 AM on 12/20/18. Case 

including HPI, physical exam, and assessment and plan discussed with resident. 

Agree with above with following additions/corrections.


Patient is a 62 year old female with past medical history significant for 

alcohol abuse, endometriosis, chronic left neck pain, and anxiety that presented

to the emergency room for a fall, low back/flank pain.    


Patient states she is feeling better. Patient complains of pain at her coccyx. 

Patient is AAOx3 with periods of confusion. Patient is ambulating. No chest pain

or shortness of breath. No nausea, vomiting, or abdominal pain. No headaches or 

dizziness. No fevers or chills. No dysuria. Patient on 1:1. 


Physical exam:


General: Awake and alert sitting up in bed in no acute distress


HEENT: Normocephalic, atraumatic. Extraocular muscles intact, pupils equal and 

reactive, no scleral icterus. Oropharynx is pink moist. Neck is supple. 


Cardiovascular: Regular rhythm. Normal S1 and S2. No murmurs, rubs, or gallops 

appreciated


Pulmonary: Normal respiratory effort. No rhonchi, rales, or wheezing appreciated


Gastrointestinal: Soft, nondistended. Nontender. Positive bowel sounds all 4 

quadrants. No guarding. 


Musculoskeletal: Moves all extremities. No calf tenderness. No edema. No CVA 

tenderness


Central nervous system: AAO x 3 with periods of confusion


Dermatologic: Skin warm and dry. 


Assessment and plan: Patient is a 62 year old female with past medical history 

significant for alcohol abuse, endometriosis, chronic left neck pain, and 

anxiety that presented to the emergency room for a fall, low back/flank pain.   




1. Suicidal ideation. Delusions. ?Psychosis.  Delirium. Psychiatry following, 

recommendations appreciated. Continue risperidone and Ativan as needed.


2. Multiple falls. Likely secondary to ETOH abuse. Continue fall precautions. 

Patient is ambulating without difficulty. Head CT per radiologist showed no ac

cruz intracranial abnormalities. Maxillofacial CT per radiologist showed soft 

tissue swelling without acute articular or osseous abnormality. CT abd/pelvis 

per radiologist showed no acute findings. Cervical spine CT per radiologist 

showed multilevel degenerative changes, no acute findings. 


3. ETOH abuse and withdrawal. Continue CIWA protocol. Continue thiamine, folic 

acid, and MVI. Ativan when necessary. Patient counseled on alcohol cessation. 


4. Back/Flank pain.  Lumbar xray spine per radiologist showed no fracture or 

spondylolisthesis, multilevel spondylosis is mild. Patient is ambulating without

difficulty


5. Elevated LFTS. Likely secondary to ETOH abuse, librium, and ativan. Continues

to downtrend. Continue to monitor


6. Hypokalemia. Resolved


7. GI/DVT prophylaxis. Protonix/SCDS


8. Patient is a full code.


Case was discussed in detail with psychiatrist Dr. Willis regarding current diag

nosis and treatment plan.

## 2018-12-21 ENCOUNTER — HOSPITAL ENCOUNTER (INPATIENT)
Dept: HOSPITAL 42 - PSYC | Age: 63
LOS: 4 days | Discharge: HOME | DRG: 430 | End: 2018-12-25
Attending: PSYCHIATRY & NEUROLOGY | Admitting: PSYCHIATRY & NEUROLOGY
Payer: MEDICAID

## 2018-12-21 VITALS
HEART RATE: 83 BPM | SYSTOLIC BLOOD PRESSURE: 122 MMHG | TEMPERATURE: 98.1 F | RESPIRATION RATE: 20 BRPM | OXYGEN SATURATION: 99 % | DIASTOLIC BLOOD PRESSURE: 70 MMHG

## 2018-12-21 VITALS — BODY MASS INDEX: 23.3 KG/M2

## 2018-12-21 DIAGNOSIS — Z87.891: ICD-10-CM

## 2018-12-21 DIAGNOSIS — G47.00: ICD-10-CM

## 2018-12-21 DIAGNOSIS — F29: Primary | ICD-10-CM

## 2018-12-21 DIAGNOSIS — F10.231: ICD-10-CM

## 2018-12-21 DIAGNOSIS — Z72.820: ICD-10-CM

## 2018-12-21 DIAGNOSIS — F32.9: ICD-10-CM

## 2018-12-21 DIAGNOSIS — F41.9: ICD-10-CM

## 2018-12-21 LAB
ALBUMIN SERPL-MCNC: 3.5 G/DL (ref 3–4.8)
ALBUMIN/GLOB SERPL: 1.1 {RATIO} (ref 1.1–1.8)
ALT SERPL-CCNC: 33 U/L (ref 7–56)
AST SERPL-CCNC: 38 U/L (ref 14–36)
BASOPHILS # BLD AUTO: 0.09 K/MM3 (ref 0–2)
BASOPHILS NFR BLD: 1.1 % (ref 0–3)
BUN SERPL-MCNC: 12 MG/DL (ref 7–21)
CALCIUM SERPL-MCNC: 9.2 MG/DL (ref 8.4–10.5)
EOSINOPHIL # BLD: 0.3 10*3/UL (ref 0–0.7)
EOSINOPHIL NFR BLD: 4.1 % (ref 1.5–5)
ERYTHROCYTE [DISTWIDTH] IN BLOOD BY AUTOMATED COUNT: 15.7 % (ref 11.5–14.5)
GFR NON-AFRICAN AMERICAN: > 60
GRANULOCYTES # BLD: 4.65 10*3/UL (ref 1.4–6.5)
GRANULOCYTES NFR BLD: 57.2 % (ref 50–68)
HGB BLD-MCNC: 11.9 G/DL (ref 12–16)
LYMPHOCYTES # BLD: 1.9 10*3/UL (ref 1.2–3.4)
LYMPHOCYTES NFR BLD AUTO: 23.6 % (ref 22–35)
MCH RBC QN AUTO: 30.7 PG (ref 25–35)
MCHC RBC AUTO-ENTMCNC: 32.1 G/DL (ref 31–37)
MCV RBC AUTO: 95.9 FL (ref 80–105)
MONOCYTES # BLD AUTO: 1.1 10*3/UL (ref 0.1–0.6)
MONOCYTES NFR BLD: 14 % (ref 1–6)
PLATELET # BLD: 440 10^3/UL (ref 120–450)
PMV BLD AUTO: 8.4 FL (ref 7–11)
RBC # BLD AUTO: 3.87 10^6/UL (ref 3.5–6.1)
WBC # BLD AUTO: 8.1 10^3/UL (ref 4.5–11)

## 2018-12-21 PROCEDURE — GZ3ZZZZ MEDICATION MANAGEMENT: ICD-10-PCS | Performed by: PSYCHIATRY & NEUROLOGY

## 2018-12-21 RX ADMIN — PANTOPRAZOLE SODIUM SCH MG: 40 TABLET, DELAYED RELEASE ORAL at 06:06

## 2018-12-21 RX ADMIN — MULTIPLE VITAMINS W/ MINERALS TAB SCH TAB: TAB at 09:59

## 2018-12-21 NOTE — PCM.BM
<Anna Monson - Last Filed: 12/21/18 16:35>





Treatment Plan Problems





- Problems identified on initial assessmt


  ** High Risk Suicide


Date Initiated: 12/21/18


Time Initiated: 16:00


Assessment reference: NA


Status: Active


Priority: 1





  ** Ineffective Coping


Date Initiated: 12/21/18


Time Initiated: 16:00


Assessment reference: NA


Status: Active


Priority: 2





  ** Hopelessness/Helplessness


Date Initiated: 12/21/18


Time Initiated: 16:00


Assessment reference: NA


Status: Active


Priority: 3





Treatment assets and liabiliti


Patient Assests: cooperative, motivated, resourceful, self-reliant, ADL 

independent, physically healthy, negotiates basic needs, cognitively intact


Patient Liabilities: live alone, financial problems, poor support system, 

substance abuse





- Milieu Protocol


Maintain good personal hygiene: daily Encourage regular showers, daily Remind 

patient to perform daily oral care, daily Assist patient to perform ADL's


Conduct patient checks and document Observation sheet: Q15 minutes


Maintain personal safety: every shift Educate patient to report safety concerns 

to staff, every shift Monitor environment for contraband/sharps


Medication safety: Monitor for expected outcome, potential side effects: every 

shift, Assess barriers to learning: every shift, Assess readiness for medication

education: every shift





Discharge/Continuing Care





- Education Needs


Education Needs: Family Medication, Family Diagnosis/Disease Process, Family 

Coping Skills, Family Anger Management skills, Family Placement options, Family 

Community resources, Family Activities of Daily Living, Family Pain, Family 

Nutrition, Family Health Practices/Safety, Family Personal Hygiene/Grooming, 

Family Aftercare Safety Plan





- Discharge


Discharge Criteria: Tolerates medication w/o severe side effects, Free of 

Suicidal thoughts, Free of agitation, Normal sleep pattern





<Shola No - Last Filed: 12/22/18 11:41>





- Diagnosis


(1) Alcohol withdrawal


Status: Acute   


Interventions: 





12/22/18 11:43





* group, milieu and supportive tx


* Ativan 0.5 mg po TID for anxiety


* remeron 15 mg po HS for depression


* Risperdal 2 mg po bid for disorganization and paranoia


* Geodon and Ativan prns for agitation


* Consider naltrexone for suspected alcohol abuse








(2) Mood disorder


Status: Chronic   


Interventions: 





12/22/18 11:43





* group, milieu and supportive tx


* Ativan 0.5 mg po TID for anxiety


* remeron 15 mg po HS for depression


* Risperdal 2 mg po bid for disorganization and paranoia


* Geodon and Ativan prns for agitation


* Consider naltrexone for suspected alcohol abuse








(3) Alcohol use disorder


Status: Suspected   


Interventions: 





12/22/18 11:43





* group, milieu and supportive tx


* Ativan 0.5 mg po TID for anxiety


* remeron 15 mg po HS for depression


* Risperdal 2 mg po bid for disorganization and paranoia


* Geodon and Ativan prns for agitation


* Consider naltrexone for suspected alcohol abuse








<Gretchen Chairez - Last Filed: 12/24/18 10:18>





Family Contact


Family involvement: Family/SO is involved


Family contact: Patient agrees to contact


Family contact name: Juve Bolanos([phone]) boyfriend


Family contacted how many times per week?: 2





<Мария Hoffman - Last Filed: 12/24/18 16:21>

## 2018-12-21 NOTE — CP.PCM.PN
Subjective





- Date & Time of Evaluation


Date of Evaluation: 12/21/18


Time of Evaluation: 11:09





Objective





- Vital Signs/Intake and Output


Vital Signs (last 24 hours): 


                                        











Temp Pulse Resp BP Pulse Ox


 


 98.1 F   83   20   122/70   99 


 


 12/21/18 07:50  12/21/18 07:50  12/21/18 07:50  12/21/18 07:50  12/21/18 07:50











- Medications


Medications: 


                               Current Medications





Acetaminophen (Tylenol 325mg Tab)  650 mg PO Q6H PRN


   PRN Reason: Pain, Mild (1-3)


Folic Acid (Folic Acid)  1 mg PO DAILY [facility]


   Last Admin: 12/21/18 09:59 Dose:  1 mg


Lorazepam (Ativan)  0.5 mg IVP Q12 PRN; Protocol


   PRN Reason: Agitation


Multivitamins/Minerals (Therapeutic-M Tab)  1 tab PO DAILY [facility]


   Last Admin: 12/21/18 09:59 Dose:  1 tab


Pantoprazole Sodium (Protonix Ec Tab)  40 mg PO 0600 [facility]


   Last Admin: 12/21/18 06:06 Dose:  40 mg


Risperidone (Risperdal Tab)  2 mg PO HS [facility]ETI; Protocol


   Last Admin: 12/20/18 21:55 Dose:  2 mg


Risperidone (Risperdal Tab)  2 mg PO BID PREETI; Protocol


   Last Admin: 12/21/18 09:59 Dose:  2 mg


Thiamine HCl (Vitamin B1 Tab)  100 mg PO DAILY [facility]


   Last Admin: 12/21/18 09:59 Dose:  100 mg


Ziprasidone (Geodon Inj)  5 mg IM DAILY PRN; Protocol


   PRN Reason: Agitation


   Last Admin: 12/19/18 22:03 Dose:  5 mg











- Labs


Labs: 


                                        





                                 12/21/18 06:00 





                                 12/21/18 06:00 





                                        











PT  11.6 SECONDS (9.4-12.5)   12/11/18  01:15    


 


INR  1.02   12/11/18  01:15    


 


APTT  25.5 Seconds (25.1-36.5)   12/11/18  01:15

## 2018-12-21 NOTE — PN
DATE:  12/21/2018



SUBJECTIVE:  The patient was screened by [facility]

yesterday and the patient was found to be not committable.  The patient was

followed up today.  The patient still guarded and paranoid, but has some

stable improvement with her mental status.  The patient reported that she

feels depressed, right now she wants to sign herself into the psychiatric

inpatient unit.  The patient still has paranoia and disorganized thoughts,

which could be addressed into the psychiatric inpatient unit.



OBJECTIVE:

VITAL SIGNS:  Stable.  Temperature 98, pulse is 81, blood pressure 119/79,

respirations 18, and oxygen saturation is 100.

MENTAL STATUS EXAM:  Poor hygiene, ecchymosis under her both eyes are

getting better.  Mood described as depressed.  Affect was tearful and

constricted.  Thought process seems to be circumstantial, tangential. 

Thought content, the patient denied visual, auditory, or tactile

hallucinations, but the patient appears to be disorganized, paranoid and

psychotic.  Insight and judgment seems to be very limited.  Impulses are

well controlled.



MEDICATIONS:  Reviewed.  From the psychiatric standpoint, the patient is on

small dose of Ativan as well as Risperdal 2 mg twice a day as well as at

the nighttime.  We will implement Remeron as well as needed medications as

well as we will continue multivitamins, thiamine and folic acid.



LABORATORY DATA:  Reviewed.  Microbiology reviewed.  Discussed with the

medical team and nurses.



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

rule out substance-induced psychosis.  Delirium is improving.



PLAN:  The patient is agreeing to be transferred to the psychiatric

inpatient unit.  We will continue current management, current medications. 

Medical team will be following the patient up.   will be

involved.  Family will be involved.  The patient will be transferred today.

Should you have any questions, give me a call back.



Thank you very much for letting me to participate in the care of your

patient.







__________________________________________

Alba Masters MD





DD:  12/21/2018 16:54:09

DT:  12/21/2018 16:56:07

Job # [id number]

## 2018-12-21 NOTE — CP.PCM.DIS
<Marcelino Wade - Last Filed: 12/21/18 18:17>





Provider





- Provider


Date of Admission: 


12/11/18 04:38





Attending physician: 


Zeny Wade DO





Primary care physician: 


Triny Akhtar MD





Consults: 








12/11/18 13:34


Psychiatry Consult Routine 


   Comment: 


   Consulting Provider: Alba Masters


   Consulting Physician: Alba Masters


   Reason for Consult: Anxiety and Adjustment Disorder











Time Spent in preparation of Discharge (in minutes): 45





Diagnosis





- Discharge Diagnosis


(1) Delirium


Status: Acute   Priority: High   





(2) Alcohol withdrawal


Status: Acute   Priority: High   





(3) Hypotension


Status: Acute   Priority: Low   





(4) Abnormal liver enzymes


Status: Acute   Priority: Low   





Hospital Course





- Lab Results


Lab Results: 


                             Most Recent Lab Values











WBC  8.1 10^3/uL (4.5-11.0)   12/21/18  06:00    


 


RBC  3.87 10^6/uL (3.5-6.1)   12/21/18  06:00    


 


Hgb  11.9 g/dL (12.0-16.0)  L  12/21/18  06:00    


 


Hct  37.1 % (36.0-48.0)   12/21/18  06:00    


 


MCV  95.9 fl (80.0-105.0)   12/21/18  06:00    


 


MCH  30.7 pg (25.0-35.0)   12/21/18  06:00    


 


MCHC  32.1 g/dl (31.0-37.0)   12/21/18  06:00    


 


RDW  15.7 % (11.5-14.5)  H  12/21/18  06:00    


 


Plt Count  440 10^3/uL (120.0-450.0)   12/21/18  06:00    


 


MPV  8.4 fl (7.0-11.0)   12/21/18  06:00    


 


Gran %  57.2 % (50.0-68.0)   12/21/18  06:00    


 


Lymph % (Auto)  23.6 % (22.0-35.0)   12/21/18  06:00    


 


Mono % (Auto)  14.0 % (1.0-6.0)  H  12/21/18  06:00    


 


Eos % (Auto)  4.1 % (1.5-5.0)   12/21/18  06:00    


 


Baso % (Auto)  1.1 % (0.0-3.0)   12/21/18  06:00    


 


Gran #  4.65  (1.4-6.5)   12/21/18  06:00    


 


Lymph # (Auto)  1.9  (1.2-3.4)   12/21/18  06:00    


 


Mono # (Auto)  1.1  (0.1-0.6)  H  12/21/18  06:00    


 


Eos # (Auto)  0.3  (0.0-0.7)   12/21/18  06:00    


 


Baso # (Auto)  0.09 K/mm3 (0.0-2.0)   12/21/18  06:00    


 


Neutrophils % (Manual)  53 % (50.0-70.0)   12/16/18  06:30    


 


Lymphocytes % (Manual)  23 % (22.0-35.0)   12/16/18  06:30    


 


Monocytes % (Manual)  24 % (1.0-6.0)  H  12/16/18  06:30    


 


Platelet Evaluation  Normal  (NORMAL)   12/16/18  06:30    


 


PT  11.6 SECONDS (9.4-12.5)   12/11/18  01:15    


 


INR  1.02   12/11/18  01:15    


 


APTT  25.5 Seconds (25.1-36.5)   12/11/18  01:15    


 


Sodium  137 mmol/L (132-148)   12/21/18  06:00    


 


Potassium  3.9 mmol/L (3.6-5.0)   12/21/18  06:00    


 


Chloride  107 mmol/L ()   12/21/18  06:00    


 


Carbon Dioxide  23 mmol/L (21-33)   12/21/18  06:00    


 


Anion Gap  11  (10-20)   12/21/18  06:00    


 


BUN  12 mg/dL (7-21)   12/21/18  06:00    


 


Creatinine  0.7 mg/dl (0.7-1.2)   12/21/18  06:00    


 


Est GFR ( Amer)  > 60   12/21/18  06:00    


 


Est GFR (Non-Af Amer)  > 60   12/21/18  06:00    


 


Random Glucose  110 mg/dL ()   12/21/18  06:00    


 


Calcium  9.2 mg/dL (8.4-10.5)   12/21/18  06:00    


 


Magnesium  1.8 mg/dL (1.7-2.2)   12/12/18  06:00    


 


Total Bilirubin  0.2 mg/dL (0.2-1.3)   12/21/18  06:00    


 


AST  38 U/L (14-36)  H D 12/21/18  06:00    


 


ALT  33 U/L (7-56)   12/21/18  06:00    


 


Alkaline Phosphatase  99 U/L ()   12/21/18  06:00    


 


Lactate Dehydrogenase  500 U/L (333-699)   12/11/18  01:15    


 


Total Creatine Kinase  61 U/L ()   12/11/18  01:15    


 


Troponin I  < 0.01 ng/mL  12/11/18  01:15    


 


Total Protein  6.6 g/dL (5.8-8.3)   12/21/18  06:00    


 


Albumin  3.5 g/dL (3.0-4.8)   12/21/18  06:00    


 


Globulin  3.1 gm/dL  12/21/18  06:00    


 


Albumin/Globulin Ratio  1.1  (1.1-1.8)   12/21/18  06:00    


 


Urine Color  Yellow  (YELLOW)   12/11/18  03:45    


 


Urine Appearance  Clear  (CLEAR)   12/11/18  03:45    


 


Urine pH  6.5  (4.7-8.0)   12/11/18  03:45    


 


Ur Specific Gravity  1.010  (1.005-1.035)   12/11/18  03:45    


 


Urine Protein  Trace mg/dL (<30 mg/dL)  H  12/11/18  03:45    


 


Urine Glucose (UA)  Negative mg/dL (NEGATIVE)   12/11/18  03:45    


 


Urine Ketones  15 mg/dL (NEGATIVE)  H  12/11/18  03:45    


 


Urine Blood  Trace-intact  (NEGATIVE)  H  12/11/18  03:45    


 


Urine Nitrate  Negative  (NEGATIVE)   12/11/18  03:45    


 


Urine Bilirubin  Negative  (NEGATIVE)   12/11/18  03:45    


 


Urine Urobilinogen  0.2 E.U./dL (<1 E.U./dL)   12/11/18  03:45    


 


Ur Leukocyte Esterase  Negative Danny/uL (NEGATIVE)   12/11/18  03:45    


 


Urine RBC  0 - 2 /hpf (0-2)   12/11/18  03:45    


 


Urine WBC  0 - 2 /hpf (0-6)   12/11/18  03:45    


 


Ur Epithelial Cells  1 - 3 /hpf (0-5)   12/11/18  03:45    


 


Urine Bacteria  None  (NEG)   12/11/18  03:45    


 


Urine HCG, Qual  Negative  (NEGATIVE)   12/18/18  15:20    


 


Urine Opiates Screen  Negative  (NEGATIVE)   12/11/18  05:00    


 


Urine Methadone Screen  Negative  (NEGATIVE)   12/11/18  05:00    


 


Ur Barbiturates Screen  Negative  (NEGATIVE)   12/11/18  05:00    


 


Ur Phencyclidine Scrn  Negative  (NEGATIVE)   12/11/18  05:00    


 


Ur Amphetamines Screen  Negative  (NEGATIVE)   12/11/18  05:00    


 


U Benzodiazepines Scrn  Positive  (NEGATIVE)  H  12/11/18  05:00    


 


U Oth Cocaine Metabols  Negative  (NEGATIVE)   12/11/18  05:00    


 


U Cannabinoids Screen  Negative  (NEGATIVE)   12/11/18  05:00    


 


Alcohol, Quantitative  98 mg/dL (0-10)  H  12/11/18  01:15    














- Hospital Course


Hospital Course: 





Marcelino Wade DO PGY1 - Internal Medicine Intern - Medicine Discharge Summary





Upon admission, Patient is a 62 year old female with past medical history of 

alcohol abuse, endometriosis, chronic left sided neck pain, anxiety who presents

to the emergency room for evaluation and treatment of a fall and low back/flank 

pain. Patient states that she was trying to move her television closer to her 

seat when the entire unit fell upon her. Admits to losing consciousness, trauma 

to the head, and waking up after an unknown amount of time. Admits to waking up 

and experiencing a second mechanical fall in which she landed upon her right 

side. Denies head trauma and loss of consciousness during this second event. 

Denies associated dizziness, headache, visual/auditory changes, palpitations, 

and sudden weakness prior to the falls. Admits to experiencing 8/10 low back 

pain after the first fall. Pain is characterized as being dull in nature and 

nonradiating. Also admits to constipation, with last bowel movement 2 days ago. 

Further denies fever, chills, chest pain, shortness of breath, nausea, vomiting,

diarrhea, and urinary symptoms. 








During evaluation extensive imaging was performed to rule out acute 

abnormalities 2/2 repetitive trauma


12/11/18 Head CT without contrast- age appropriate cerebellar and cerebral 

atrophy, mild chronic microvascular disease, no acute intracranial pathology


12/11/18 Maxillofacial CT without contrast- no acute bone pathology, anterior 

facial soft tissue contusions


12/11/18 Cervical Spine CT- no acute bone pathology, spondylosis, multilevel 

facet joint arthropathy


12/11/18 Lumbar XR : multilevel mild spondylosis





Throughout hospitalization patient was monitored for signs and symptoms of EtOH 

withdrawal. It was noted multiple times through course that patient was 

exhibiting symptoms of withdrawal and appropriately managed w/ anxiolytics. 

Psychiatric evaluation was requested and it was determined patient was 

undergoing delirium w/ episodes of lucidity. Throughout the course she was 

started on scheduled and asneeded antipsychotics; medications were titrated to 

effect as per psychiatry services. Patient was screened by [facility] and found to

not be a good candidate for involuntary psychiatric admission. Through her 

course she was offered multiple admissions to voluntary psych unit to which she 

refused. Patient was noted to be continually agitated and combatitive at times 

throughout her admission. Towards end of course patient began showing thought 

process and increased lucidity. 1:1 was discontinued 24hr prior to discharge, 

and patient was tolerating well. Patient voluntarily signed her self into 

psychiatric unit for admission at time of discharge. 





Prior to admission to psychiatric unit, patient was doing well overnight; no PRN

antipsychotics were utilized. She denied any complaints of chest pain, sob, abd 

pain, n/v/d/c. 





Patient was DC to psychiatric unit





DISCLAIMER: this is a brief summery of patient hospital course; for full course 

please refer to EMR





Discharge Exam





- Head Exam


Head Exam: ATRAUMATIC, NORMOCEPHALIC





- Eye Exam


Eye Exam: EOMI, Normal appearance, PERRL





- Respiratory Exam


Respiratory Exam: Clear to PA & Lateral, NORMAL BREATHING PATTERN, UNREMARKABLE.

 absent: Rales, Rhonchi





- GI/Abdominal Exam


GI & Abdominal Exam: Normal Bowel Sounds, Unremarkable.  absent: Tenderness





- Neurological Exam


Neurological exam: Alert, CN II-XII Intact, Oriented x3





- Psychiatric Exam


Additional comments: 





Less anxious AAO3





- Skin


Skin Exam: Dry, Intact, Normal Color





Discharge Plan





- Follow Up Plan


Condition: GUARDED


Disposition: DISCHARGE TO PSYCH HOSPITAL


Instructions:  Delirium (Confusion), Preventing Falls, Delirium (Confusion) 

(DC), Alcohol Withdrawal (DC)


Additional Instructions: 


Please continue medications as prescribed. 





Please follow up with your primary care doctor, Dr. Sarah Akhtar, within 3-5 

days from discharge from psych. 





Continue management of your psychiatric condition as per psychiatry. 





Return to the emergency room if symptoms return. 


Referrals: 


Triny Akhtar MD [Primary Care Provider] - 





<Zeny Wade - Last Filed: 12/23/18 14:28>





Provider





- Provider


Date of Admission: 


12/11/18 04:38





Attending physician: 


Zeny Wade DO





Primary care physician: 


Triny Akhtar MD





Consults: 








12/11/18 13:34


Psychiatry Consult Routine 


   Comment: 


   Consulting Provider: Alba Masters


   Consulting Physician: Alba Masters


   Reason for Consult: Anxiety and Adjustment Disorder














Hospital Course





- Lab Results


Lab Results: 


                             Most Recent Lab Values











WBC  8.1 10^3/uL (4.5-11.0)   12/21/18  06:00    


 


RBC  3.87 10^6/uL (3.5-6.1)   12/21/18  06:00    


 


Hgb  11.9 g/dL (12.0-16.0)  L  12/21/18  06:00    


 


Hct  37.1 % (36.0-48.0)   12/21/18  06:00    


 


MCV  95.9 fl (80.0-105.0)   12/21/18  06:00    


 


MCH  30.7 pg (25.0-35.0)   12/21/18  06:00    


 


MCHC  32.1 g/dl (31.0-37.0)   12/21/18  06:00    


 


RDW  15.7 % (11.5-14.5)  H  12/21/18  06:00    


 


Plt Count  440 10^3/uL (120.0-450.0)   12/21/18  06:00    


 


MPV  8.4 fl (7.0-11.0)   12/21/18  06:00    


 


Gran %  57.2 % (50.0-68.0)   12/21/18  06:00    


 


Lymph % (Auto)  23.6 % (22.0-35.0)   12/21/18  06:00    


 


Mono % (Auto)  14.0 % (1.0-6.0)  H  12/21/18  06:00    


 


Eos % (Auto)  4.1 % (1.5-5.0)   12/21/18  06:00    


 


Baso % (Auto)  1.1 % (0.0-3.0)   12/21/18  06:00    


 


Gran #  4.65  (1.4-6.5)   12/21/18  06:00    


 


Lymph # (Auto)  1.9  (1.2-3.4)   12/21/18  06:00    


 


Mono # (Auto)  1.1  (0.1-0.6)  H  12/21/18  06:00    


 


Eos # (Auto)  0.3  (0.0-0.7)   12/21/18  06:00    


 


Baso # (Auto)  0.09 K/mm3 (0.0-2.0)   12/21/18  06:00    


 


Neutrophils % (Manual)  53 % (50.0-70.0)   12/16/18  06:30    


 


Lymphocytes % (Manual)  23 % (22.0-35.0)   12/16/18  06:30    


 


Monocytes % (Manual)  24 % (1.0-6.0)  H  12/16/18  06:30    


 


Platelet Evaluation  Normal  (NORMAL)   12/16/18  06:30    


 


PT  11.6 SECONDS (9.4-12.5)   12/11/18  01:15    


 


INR  1.02   12/11/18  01:15    


 


APTT  25.5 Seconds (25.1-36.5)   12/11/18  01:15    


 


Sodium  137 mmol/L (132-148)   12/21/18  06:00    


 


Potassium  3.9 mmol/L (3.6-5.0)   12/21/18  06:00    


 


Chloride  107 mmol/L ()   12/21/18  06:00    


 


Carbon Dioxide  23 mmol/L (21-33)   12/21/18  06:00    


 


Anion Gap  11  (10-20)   12/21/18  06:00    


 


BUN  12 mg/dL (7-21)   12/21/18  06:00    


 


Creatinine  0.7 mg/dl (0.7-1.2)   12/21/18  06:00    


 


Est GFR ( Amer)  > 60   12/21/18  06:00    


 


Est GFR (Non-Af Amer)  > 60   12/21/18  06:00    


 


Random Glucose  110 mg/dL ()   12/21/18  06:00    


 


Calcium  9.2 mg/dL (8.4-10.5)   12/21/18  06:00    


 


Magnesium  1.8 mg/dL (1.7-2.2)   12/12/18  06:00    


 


Total Bilirubin  0.2 mg/dL (0.2-1.3)   12/21/18  06:00    


 


AST  38 U/L (14-36)  H D 12/21/18  06:00    


 


ALT  33 U/L (7-56)   12/21/18  06:00    


 


Alkaline Phosphatase  99 U/L ()   12/21/18  06:00    


 


Lactate Dehydrogenase  500 U/L (333-699)   12/11/18  01:15    


 


Total Creatine Kinase  61 U/L ()   12/11/18  01:15    


 


Troponin I  < 0.01 ng/mL  12/11/18  01:15    


 


Total Protein  6.6 g/dL (5.8-8.3)   12/21/18  06:00    


 


Albumin  3.5 g/dL (3.0-4.8)   12/21/18  06:00    


 


Globulin  3.1 gm/dL  12/21/18  06:00    


 


Albumin/Globulin Ratio  1.1  (1.1-1.8)   12/21/18  06:00    


 


Urine Color  Yellow  (YELLOW)   12/11/18  03:45    


 


Urine Appearance  Clear  (CLEAR)   12/11/18  03:45    


 


Urine pH  6.5  (4.7-8.0)   12/11/18  03:45    


 


Ur Specific Gravity  1.010  (1.005-1.035)   12/11/18  03:45    


 


Urine Protein  Trace mg/dL (<30 mg/dL)  H  12/11/18  03:45    


 


Urine Glucose (UA)  Negative mg/dL (NEGATIVE)   12/11/18  03:45    


 


Urine Ketones  15 mg/dL (NEGATIVE)  H  12/11/18  03:45    


 


Urine Blood  Trace-intact  (NEGATIVE)  H  12/11/18  03:45    


 


Urine Nitrate  Negative  (NEGATIVE)   12/11/18  03:45    


 


Urine Bilirubin  Negative  (NEGATIVE)   12/11/18  03:45    


 


Urine Urobilinogen  0.2 E.U./dL (<1 E.U./dL)   12/11/18  03:45    


 


Ur Leukocyte Esterase  Negative Danny/uL (NEGATIVE)   12/11/18  03:45    


 


Urine RBC  0 - 2 /hpf (0-2)   12/11/18  03:45    


 


Urine WBC  0 - 2 /hpf (0-6)   12/11/18  03:45    


 


Ur Epithelial Cells  1 - 3 /hpf (0-5)   12/11/18  03:45    


 


Urine Bacteria  None  (NEG)   12/11/18  03:45    


 


Urine HCG, Qual  Negative  (NEGATIVE)   12/18/18  15:20    


 


Urine Opiates Screen  Negative  (NEGATIVE)   12/11/18  05:00    


 


Urine Methadone Screen  Negative  (NEGATIVE)   12/11/18  05:00    


 


Ur Barbiturates Screen  Negative  (NEGATIVE)   12/11/18  05:00    


 


Ur Phencyclidine Scrn  Negative  (NEGATIVE)   12/11/18  05:00    


 


Ur Amphetamines Screen  Negative  (NEGATIVE)   12/11/18  05:00    


 


U Benzodiazepines Scrn  Positive  (NEGATIVE)  H  12/11/18  05:00    


 


U Oth Cocaine Metabols  Negative  (NEGATIVE)   12/11/18  05:00    


 


U Cannabinoids Screen  Negative  (NEGATIVE)   12/11/18  05:00    


 


Alcohol, Quantitative  98 mg/dL (0-10)  H  12/11/18  01:15    














Attending/Attestation





- Attestation


I have personally seen and examined this patient.: Yes


I have fully participated in the care of the patient.: Yes


I have reviewed all pertinent clinical information, including history, physical 

exam and plan: Yes


Notes (Text): 


Please note this dc summary is for 12/21/18


Patient seen and examined by me with resident 9:40 AM and prior to discharge on 

12/21/18.  Case including discharge plan discussed with resident. Agree with 

above with following additions/corrections.


Patient is a 62 year old female with past medical history significant for 

alcohol abuse, endometriosis, chronic left neck pain, and anxiety that presented

to the emergency room for a fall, low back/flank pain. Please see H&P for full 

details.


Patient was admitted with multiple falls, back/flank pain, and ETOH withdrawal. 

Head CT per radiologist showed no acute intracranial abnormalities. 

Maxillofacial CT per radiologist showed soft tissue swelling without acute 

articular or osseous abnormality.Cervical spine CT per radiologist showed 

multilevel degenerative changes, no acute findings. Falls likely secondary to 

ETOH abuse. ETOH level on admission 98. UDS was positive for benzodiazepines. 

Patient was also complaining of back and flank pain. Lumbar xray spine per 

radiologist showed no fracture or spondylolisthesis, multilevel spondylosis is 

mild.  CT abd/pelvis per radiologist showed no acute findings. Patient was 

continued on CIWA protocol. Patient was placed on Ativan and Librium and was 

tapered off. Patient had suicidal ideations. She then had delusions and 

questionable delirium. She was being followed by psychiatry. She was continued o

n risperidone. Patient did receive Geodon as needed for agitation at night. 

Patient had no signs of infection. Patient agreed to go to the psychiatric unit 

for further stabilization. LFTs were elevated likely secondary to alcohol abuse 

and medications. LFTs did downtrend. Patient also had hypokalemia which resolved

with replacement. Patient was AAO 3 and feeling better. Patient was transferred

to the psychiatric unit for further stabilization.


On day of discharge,  patient stated she was feeling much better. Patient AAOx3.

Patient states she has intermittent coccyx pain but it is chronic. Patient is 

ambulating. No chest pain. No shortness of breath or palpitations. No nausea, 

vomiting, or abdominal pain. No hedache or dizziness. No fevers or chills. No 

palpitations. No dysuria. No diarrhea or constipation. 


Physical exam:


General: Awake and alert sitting up in bed in no acute distress


HEENT: Normocephalic, atraumatic. Extraocular muscles intact, pupils equal and 

reactive, no scleral icterus. Oropharynx is pink moist. Neck is supple. No 

pharyngeal erythema or exudate appreciated. 


Cardiovascular: Regular rhythm. Normal S1 and S2. No murmurs, rubs, or gallops 

appreciated


Pulmonary: Normal respiratory effort. No rhonchi, rales, or wheezing appreciated


Gastrointestinal: Soft, nondistended. Nontender. Positive bowel sounds all 4 

quadrants. No guarding. 


Musculoskeletal: Moves all extremities. No calf tenderness. No edema. No CVA 

tenderness


Central nervous system: AAO x 3 with periods of delusions


Dermatologic: Skin warm and dry.





Please see chart for full details.





Follow up instructions: Patient to follow-up with her primary care doctor within

3-5 days of discharge from hospital. Patient to refrain from alcohol use. All 

instructions explained to the patient in detail. Patient both understands and 

agrees to all instructions. Written instructions also given. 





Time spent in discharging the patient including chart review, medication 

reconciliation, discussion with the patient, medical resident, consultants, and 

nursing staff was approximately 35 minutes.

## 2018-12-22 VITALS — RESPIRATION RATE: 20 BRPM

## 2018-12-22 LAB
HDLC SERPL-MCNC: 63 MG/DL
LDLC SERPL-MCNC: 112 MG/DL
T4 FREE SERPL-MCNC: 1.16 NG/DL

## 2018-12-22 RX ADMIN — THERA TABS SCH TAB: TAB at 08:50

## 2018-12-22 RX ADMIN — PANTOPRAZOLE SODIUM SCH MG: 40 TABLET, DELAYED RELEASE ORAL at 06:24
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
## 2018-12-23 VITALS — OXYGEN SATURATION: 99 %

## 2018-12-23 RX ADMIN — THERA TABS SCH TAB: TAB at 08:38

## 2018-12-23 RX ADMIN — PANTOPRAZOLE SODIUM SCH MG: 40 TABLET, DELAYED RELEASE ORAL at 05:53
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
## 2018-12-24 RX ADMIN — PANTOPRAZOLE SODIUM SCH MG: 40 TABLET, DELAYED RELEASE ORAL at 06:23

## 2018-12-24 RX ADMIN — THERA TABS SCH TAB: TAB at 09:52

## 2018-12-24 NOTE — PCM.PYCHPN
Psychiatric Progress Note





- Psychiatric Progress Note


Patient seen today, length of contact: 30 minutes


Patient Chief Complaint: 





"I am feeling fine, I am not depressed, not hallucinating"


Problems Identified/Issues Discussed: 


Suicide/ homicide prevention, past psychiatric h/o, current psychiatric 

symptoms, medical problems, risk/benefits and alternatives of medications, 

medications compliance, coping strategies, substance abuse h/o, relapse 

prevention, importance of follow up with psychiatrist and therapist, discharge 

plan. 





Medical Problems: 





Patient has multiple medical issues, patient status post fall, status post 

resolved alcohol withdrawal delirium


Diagnostic Results: 








                                   Lab Results





12/22/18 08:00: Free T4 1.16, TSH 3rd Generation 2.65


12/22/18 07:00: RPR Nonreactive


12/22/18 06:00: Fasting Glucose 94, Triglycerides 105, Cholesterol 224 H, LDL 

Cholesterol Direct 112, HDL Cholesterol 63 H








Vital Signs











  Temp Pulse Resp BP Pulse Ox


 


 12/24/18 07:00  98.7 F  100 H  20  115/73 


 


 12/23/18 16:00   84   95/56 L 


 


 12/23/18 05:48  98.3 F  87  20  120/61  99


 


 12/22/18 07:13  97.4 F L  78  20  105/63 


 


 12/21/18 15:05  98.0 F  81  18  119/79 











DSM 5 Symptoms Update: 


Patient is a 62yo  Female with history of depression, anxiety and 

alcohol used disorder, history of pain killer addiction (with detoxes and rehabs

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

least 3-4 psychiatric admissions here in [place], most recently 10/2018 who was 

transferred to our psychiatric unit after treatment on the medical floor from 

12/11/18-12/21/18 for symptoms of alcohol withdrawal and observation s/p fall.  





While patient was on the medical side patient was notably confused,delusional 

and paranoid with multiple outbursts. She demanded multiple times to be 

discharged because she needed to take care of her 92 yo mother however she could

barely care for herself due to her confusion and medical issues.Patient was 

treated for suspected alcohol withdrawal with benzos during medical treatment 

and eventually her delirium greatly improved once her benzos were sufficiently 

tapered by the psychiatric team. Her psychotic and behavioral symptoms were 

treated with risperdal. As noted above, patient did not meet criteria for 

involuntary commitment by [facility]. Nonetheless patient was 

wiling to sign into our unit for treatment of depression and psychosis.  During 

the weekend she was still observed to be a little paranoid and disorganized, 

additional symptoms to be addressed on the psychiatric inpatient unit. 

















Patient was seen in the treatment team meeting, patient presented much better to

compare to the patient reported that she feels medical floor.  "Much better", 

patient reported that she sleeps very well, patient denied feeling depressed, 

denied any psychotic symptoms but patient presented to be mildly disorganized 

and circumstantial with difficulty to stay focused and concentrate but much 

improved. 





Collateral from patient's boyfriend obtained by  [phone], [place] [place]


As per boyfriend below patient presented much better, patient's boyfriend 

confirmed that patient lives independently, boyfriend reports patient f

inancially and willing to continue that.  As per boyfriend patient never 

verbalized thoughts of harming himself or others.





So far patient tolerates medications well, no side effects observed or reported,

aims 0, no EPS.





Diagnostic Results: 


Mood Disorder NOS


r/o SIMD


r/o Major Depression


Suspect Alcohol Use Disorder, alcohol withdrawal delirium and benzo delirium, 

resolving. 


Medication Change: Yes (risperdal decreased)


Medical Record Reviewed: Yes


Consults ordered or reviewed: 





Patient was transferred from the medical side, patient was medically stable, 

will call for follow-up if patient meets criteria.





Mental Status Examination





- Cognitive Function


Orientation: Person, Place, Situation


Memory: Intact


Attention: Poor (Improvement)


Concentration: Poor (With improvement)


Association: Loose (With improvement)


Fund of Knowledge: WNL





- Mood


Mood: Depressed, Anxious





- Affect


Affect: Constricted





- Formal Thought Process


Formal Thought Process: Paranoia (Denied today), Circumstantial





- Suicidal Ideation


Suicidal Ideation: No





- Homicidal Ideation


Homicidal Ideation: No





Goal/Treatment Plan





- Goal/Treatment Plan


Need for Continued Stay: Remain at risks for inpatient hospitalization, Severe 

depression anxiety, Discharge may exacerbated symptoms, Severe functional 

impairment


Progress Toward Problem(s) and Goals/Treatment Plan: 


Milieu/structure/supportive therapy 


Medical consult will be considered


risperdal 1mg po am and 2mg po hs for psychosis


MVI, thiamine, Folic acid


SW consultation for discharge plan and social issues 


Family involvement 


Follow up on labs 


Will monitor closely 


Pt was educated about risk/benefits and alternatives of medications, coping 

strategies (safety plan, suicide prevention), relapse prevention, importance of 

follow up with psychiatrist and therapist, stay away from drugs/alcohol/smoking





Estimated Date of D/C: 12/26/18

## 2018-12-25 VITALS — SYSTOLIC BLOOD PRESSURE: 109 MMHG | HEART RATE: 98 BPM | TEMPERATURE: 98 F | DIASTOLIC BLOOD PRESSURE: 72 MMHG

## 2018-12-25 RX ADMIN — THERA TABS SCH TAB: TAB at 09:00

## 2018-12-25 RX ADMIN — PANTOPRAZOLE SODIUM SCH MG: 40 TABLET, DELAYED RELEASE ORAL at 06:09

## 2018-12-25 NOTE — PCM.PYCHDC
Mental Status Examination





- Mental Status Examination


Orientation: Person, Place, Situation, Time


Memory: Intact


Mood: Neutral


Affect: Broad (Mood congruent)


Speech: Appropriate


Attention: WNL


Concentration: WNL


Association: WNL


Fund of Knowledge: WNL


Formal Thought Process: No Impairment


Description of patient's judgement and insight: 


Pt has improved insight into mental and medical illness, pt was compliant with 

medications and unit rules and regulations, pt was going to groups, was calm, 

cooperative, socially appropriate, no behavioral incidents, no agitation, no 

aggression.








Psychotic Thoughts and Behaviors: 





Pt denied v/a/t hallucinations, denied paranoid ideations, pt does not appear to

be psychotic, and thought process is goal directed. 





Suicidal Ideation: No


Current Homicidal Ideation?: No


Plan: 





pt adamantly denied thoughts of harming self or others denied intent or plan.





Discharge Summary





- Discharge Note


Reason for Hospitalization: 





Patient was transferred from the medical floor for evaluation and stabilization 

of disorganized thoughts and behavior, psychosis, possible withdrawal symptoms 

from the alcohol, delirium.


Psychiatric History (includes Medical, Family, Personal Hx): History of mental 

illness please see admission note for more detailed infor


Laboratory Data: 








                                   Lab Results





12/22/18 08:00: Free T4 1.16, TSH 3rd Generation 2.65


12/22/18 07:00: RPR Nonreactive


12/22/18 06:00: Fasting Glucose 94, Triglycerides 105, Cholesterol 224 H, LDL 

Cholesterol Direct 112, HDL Cholesterol 63 H








Vital Signs











  Temp Pulse Resp BP Pulse Ox


 


 12/25/18 07:25  98.0 F  98 H  20  109/72 


 


 12/24/18 15:59   95 H   118/80 


 


 12/24/18 07:00  98.7 F  100 H  20  115/73 


 


 12/23/18 16:00   84   95/56 L 


 


 12/23/18 05:48  98.3 F  87  20  120/61  99


 


 12/22/18 07:13  97.4 F L  78  20  105/63 


 


 12/21/18 15:05  98.0 F  81  18  119/79 











Consultations:: List each consultation separately and include:  1. Reason for 

request.  2. Findings.  3. Follow-up


Consultations: 





Patient was transferred from the medical side, patient was medically stable, 

patient was doing well, did not require follow-up from the medical team.


Summary of Hospital Course include:: 1. Description of specific treatment plan 

utilized for patients during their course of treatmen.  2. Summarize the time-

course for resolution of acute symptoms and/or regressed behaviors.  3. Describe

issues identified and worked on during hospitalization.  4. Describe medication 

utilized.  5. Describe medical problems identified and treated.  6. Reassessment

of suicide risk


Summary of Hospital Course: 


Patient is a 62yo  Female with history of depression, anxiety and 

alcohol used disorder, history of pain killer addiction (with detoxes and rehabs

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

least 3-4 psychiatric admissions here in [place], most recently 10/2018 who was 

transferred to our psychiatric unit after treatment on the medical floor from 

12/11/18-12/21/18 for symptoms of alcohol withdrawal and observation s/p fall.  


Please see admission note for more detailed information.





Patient was stabilized on the following medications:


folic acid p.o. daily


Remeron 15 mg at the nighttime


Multivitamins daily


Naltrexone 50 mg daily for alcohol cravings


Protonix 30 mg daily


Risperdal 1 mg in the morning time and 2 mg at the nighttime for psychosis


Thiamine 100 mg daily





Patient tolerated medications well, no side effects observed or reported, aims 

0, no EPS.





Collaterals were obtained from patient's boyfriend, please see  

notes for more detailed information.  Patient boyfriend is willing to take 

patient back home, patient and boyfriend will provide financial support for the 

patient.





Patient was seen today next to the nursing station, patient presented well, 

patient is willing to go back home today, patient's boyfriend is on the way to 

pick patient up.





Over the course of this hospitalization pt was attending groups, pt also had 

medication management, had therapeutic milieu. 





Patient psychosis cleared up, more alert and oriented, no agitation or 

aggression, thought process is coherent and goal-directed, patient denied any 

psychotic symptoms and does not appear to be disorganized.No behavioral issues, 

pts insight improved as well and soon pt deemed to be ready for discharge. 





At the time of the discharge pt denied been depressed, denied thoughts of 

harming self or others, denied psychotic symptoms, and pt does not appeared to 

be psychotic, denied been anxious, pt is not in  imminent danger to self or 

others, pt will be followed up with Dr. No at the [facility], 

information about follow up appointment, time and address provided to the pt, it

is patient responsibility to follow up with outpatient clinic, PMD as well as 

specialists (see  note for more detailed information).


In case pt will need to obtain results of studies pending at discharge pt was 

provided with contact information of Psychiatric Inpatient unit [phone] as

well as Medical Record Department [phone].


Naltrexone treatment provided


Counseling about smoking and alcohol cessation provided


AA meetings as well as smoking cessation treatment program information was 

provided by the 


pt was provided with prescriptions for all of medications (please see medication

reconciliation form)


Pt was educated about safety plan in case of worsening of  symptoms or in case 

of suicidal or homicidal ideation call 911 or go to the nearest ER, also was 

educated to take meds as prescribed and stay away from drugs, pt verbalized 

understanding.








- Diagnosis


(1) Psychosis


Current Visit: No   Status: Acute   Priority: High   Comment: it was related to 

the sleep depravation, stimulants 


   





(2) Alcohol use disorder


Current Visit: No   Status: Chronic   Priority: High   





- Final Diagnosis (DSM 5)


Condition upon Discharge: GOOD


Disposition: HOME/ ROUTINE


Follow-up Treatment Plan: 


At the time of the discharge pt denied been depressed, denied thoughts of 

harming self or others, denied psychotic symptoms, and pt does not appeared to 

be psychotic, denied been anxious, pt is not in  imminent danger to self or 

others, pt will be followed up with Dr. No at the [facility], 

information about follow up appointment, time and address provided to the pt, it

is patient responsibility to follow up with outpatient clinic, PMD as well as 

specialists (see  note for more detailed information).


In case pt will need to obtain results of studies pending at discharge pt was 

provided with contact information of Psychiatric Inpatient unit [phone] as

well as Medical Record Department [phone].


Naltrexone treatment provided


Counseling about smoking and alcohol cessation provided


AA meetings as well as smoking cessation treatment program information was prov

ided by the 


pt was provided with prescriptions for all of medications (please see medication

reconciliation form)


Pt was educated about safety plan in case of worsening of  symptoms or in case 

of suicidal or homicidal ideation call 911 or go to the nearest ER, also was 

educated to take meds as prescribed and stay away from drugs, pt verbalized 

understanding.





Prescriptions/Medication Reconciliation: 


Folic Acid 1 mg PO DAILY #14 tab


Mirtazapine [Remeron] 15 mg PO HS #14 tab


Multivitamin Therapeutic Tab [Thera Tab] 1 tab PO 0800 #14 tab


Naltrexone [Revia] 50 mg PO DAILY #14 tab


Pantoprazole [Protonix EC Tab] 40 mg PO 0600 #7 ect


risperiDONE [RisperDAL Tab] 1 mg PO QAM #14 tab


risperiDONE [RisperDAL Tab] 2 mg PO HS #14 tab


Thiamine [Vitamin B1 Tab] 100 mg PO DAILY #14 tab





- Smoking Cessation


Smoking Cessation Medication prescribed: No


Reason for not providing: Denies smoking





- Antipsychotic Medications


Pt discharged on 2 or more routine antipsychotic medications: No

## 2019-01-15 ENCOUNTER — HOSPITAL ENCOUNTER (INPATIENT)
Dept: HOSPITAL 42 - ED | Age: 64
LOS: 6 days | Discharge: LEFT BEFORE DISCHARGE | DRG: 770 | End: 2019-01-21
Attending: INTERNAL MEDICINE | Admitting: INTERNAL MEDICINE
Payer: MEDICAID

## 2019-01-15 VITALS — BODY MASS INDEX: 8.9 KG/M2

## 2019-01-15 DIAGNOSIS — F32.9: ICD-10-CM

## 2019-01-15 DIAGNOSIS — E87.6: ICD-10-CM

## 2019-01-15 DIAGNOSIS — I10: ICD-10-CM

## 2019-01-15 DIAGNOSIS — F41.9: ICD-10-CM

## 2019-01-15 DIAGNOSIS — Z91.19: ICD-10-CM

## 2019-01-15 DIAGNOSIS — Z87.891: ICD-10-CM

## 2019-01-15 DIAGNOSIS — K70.10: ICD-10-CM

## 2019-01-15 DIAGNOSIS — F10.231: Primary | ICD-10-CM

## 2019-01-15 DIAGNOSIS — M54.2: ICD-10-CM

## 2019-01-15 DIAGNOSIS — E83.42: ICD-10-CM

## 2019-01-15 DIAGNOSIS — F20.9: ICD-10-CM

## 2019-01-15 DIAGNOSIS — Z87.440: ICD-10-CM

## 2019-01-15 DIAGNOSIS — F10.24: ICD-10-CM
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
## 2019-01-15 DIAGNOSIS — D69.59: ICD-10-CM

## 2019-01-15 LAB
ALBUMIN SERPL-MCNC: 4.8 G/DL (ref 3–4.8)
ALBUMIN/GLOB SERPL: 1.4 {RATIO} (ref 1.1–1.8)
ALT SERPL-CCNC: 38 U/L (ref 7–56)
APAP SERPL-MCNC: < 10 UG/ML (ref 10–20)
AST SERPL-CCNC: 106 U/L (ref 14–36)
BASOPHILS # BLD AUTO: 0.02 K/MM3 (ref 0–2)
BASOPHILS NFR BLD: 0.3 % (ref 0–3)
BUN SERPL-MCNC: 12 MG/DL (ref 7–21)
CALCIUM SERPL-MCNC: 9.6 MG/DL (ref 8.4–10.5)
EOSINOPHIL # BLD: 0 10*3/UL (ref 0–0.7)
EOSINOPHIL NFR BLD: 0 % (ref 1.5–5)
ERYTHROCYTE [DISTWIDTH] IN BLOOD BY AUTOMATED COUNT: 15.8 % (ref 11.5–14.5)
GFR NON-AFRICAN AMERICAN: > 60
GRANULOCYTES # BLD: 5.98 10*3/UL (ref 1.4–6.5)
GRANULOCYTES NFR BLD: 82.5 % (ref 50–68)
HGB BLD-MCNC: 13.1 G/DL (ref 12–16)
LYMPHOCYTES # BLD: 1 10*3/UL (ref 1.2–3.4)
LYMPHOCYTES NFR BLD AUTO: 13.1 % (ref 22–35)
MCH RBC QN AUTO: 31.1 PG (ref 25–35)
MCHC RBC AUTO-ENTMCNC: 33.1 G/DL (ref 31–37)
MCV RBC AUTO: 94.1 FL (ref 80–105)
MONOCYTES # BLD AUTO: 0.3 10*3/UL (ref 0.1–0.6)
MONOCYTES NFR BLD: 4.1 % (ref 1–6)
PLATELET # BLD: 65 10^3/UL (ref 120–450)
PMV BLD AUTO: 9.5 FL (ref 7–11)
RBC # BLD AUTO: 4.21 10^6/UL (ref 3.5–6.1)
SALICYLATES SERPL-MCNC: < 1 MG/DL (ref 2–20)
WBC # BLD AUTO: 7.3 10^3/UL (ref 4.5–11)
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
## 2019-01-16 LAB
ALBUMIN SERPL-MCNC: 4.2 G/DL (ref 3–4.8)
ALBUMIN/GLOB SERPL: 1.3 {RATIO} (ref 1.1–1.8)
ALT SERPL-CCNC: 29 U/L (ref 7–56)
APPEARANCE UR: (no result)
AST SERPL-CCNC: 73 U/L (ref 14–36)
BACTERIA #/AREA URNS HPF: (no result) /HPF
BASOPHILS # BLD AUTO: 0.01 K/MM3 (ref 0–2)
BASOPHILS NFR BLD: 0.2 % (ref 0–3)
BILIRUB UR-MCNC: (no result) MG/DL
BUN SERPL-MCNC: 8 MG/DL (ref 7–21)
CALCIUM SERPL-MCNC: 9.3 MG/DL (ref 8.4–10.5)
COLOR UR: YELLOW
EOSINOPHIL # BLD: 0 10*3/UL (ref 0–0.7)
EOSINOPHIL NFR BLD: 0.4 % (ref 1.5–5)
ERYTHROCYTE [DISTWIDTH] IN BLOOD BY AUTOMATED COUNT: 15.7 % (ref 11.5–14.5)
GFR NON-AFRICAN AMERICAN: > 60
GLUCOSE UR STRIP-MCNC: NEGATIVE MG/DL
GRANULOCYTES # BLD: 2.86 10*3/UL (ref 1.4–6.5)
GRANULOCYTES NFR BLD: 62.8 % (ref 50–68)
HGB BLD-MCNC: 12.6 G/DL (ref 12–16)
LEUKOCYTE ESTERASE UR-ACNC: NEGATIVE LEU/UL
LIPASE SERPL-CCNC: 145 U/L (ref 23–300)
LYMPHOCYTES # BLD: 1.3 10*3/UL (ref 1.2–3.4)
LYMPHOCYTES NFR BLD AUTO: 28.7 % (ref 22–35)
MCH RBC QN AUTO: 30.7 PG (ref 25–35)
MCHC RBC AUTO-ENTMCNC: 32.4 G/DL (ref 31–37)
MCV RBC AUTO: 94.6 FL (ref 80–105)
MONOCYTES # BLD AUTO: 0.4 10*3/UL (ref 0.1–0.6)
MONOCYTES NFR BLD: 7.9 % (ref 1–6)
PH UR STRIP: 6.5 [PH] (ref 4.7–8)
PLATELET # BLD: 50 10^3/UL (ref 120–450)
PMV BLD AUTO: 9.3 FL (ref 7–11)
PROT UR STRIP-MCNC: NEGATIVE MG/DL
RBC # BLD AUTO: 4.11 10^6/UL (ref 3.5–6.1)
RBC # UR STRIP: (no result) /UL
SP GR UR STRIP: 1.01 (ref 1–1.03)
UROBILINOGEN UR STRIP-ACNC: 0.2 E.U./DL
WBC # BLD AUTO: 4.6 10^3/UL (ref 4.5–11)

## 2019-01-16 RX ADMIN — MULTIPLE VITAMINS W/ MINERALS TAB SCH TAB: TAB at 09:00

## 2019-01-16 NOTE — CARD
--------------- APPROVED REPORT --------------





Date of service: 01/15/2019



EKG Measurement

Heart [id number]

[id number]

[id number]

[id number]

[id number]



<Conclusion>

Normal sinus rhythm

Normal ECG

## 2019-01-16 NOTE — CP.PCM.PN
<Baudilio Chavira - Last Filed: 01/16/19 22:38>





Subjective





- Date & Time of Evaluation


Date of Evaluation: 01/16/19


Time of Evaluation: 14:29





- Subjective


Subjective: 





INTERNAL MEDICINE PROGRESS NOTE FOR DR. MAURO Chavira





Pt seen and examined at bedside this am. No acute nursing events overnight. Pt 

reports she feels anxious, but denies other 12 point ROS. 





Objective





- Vital Signs/Intake and Output


Vital Signs (last 24 hours): 


                                        











Temp Pulse Resp BP Pulse Ox


 


 97.1 F L  92 H  20   123/75   99 


 


 01/16/19 12:00  01/16/19 12:00  01/16/19 12:00  01/16/19 12:00  01/16/19 06:00








Intake and Output: 


                                        











 01/16/19 01/16/19





 06:59 18:59


 


Intake Total 480 


 


Output Total 300 


 


Balance 180 














- Medications


Medications: 


                               Current Medications





Famotidine (Pepcid)  40 mg PO 0600 [facility]


   Last Admin: 01/16/19 05:34 Dose:  40 mg


Folic Acid (Folic Acid)  1 mg PO DAILY [facility]


   Last Admin: 01/16/19 09:00 Dose:  1 mg


Lorazepam (Ativan)  1 mg IVP Q4H PRN; Protocol


   PRN Reason: Agitation


Lorazepam (Ativan)  2 mg IVP Q4 [facility]; Protocol


   Last Admin: 01/16/19 12:30 Dose:  2 mg


Multivitamins/Minerals (Therapeutic-M Tab)  1 tab PO DAILY [facility]


   Last Admin: 01/16/19 09:00 Dose:  1 tab


Ondansetron HCl (Zofran Inj)  4 mg IVP Q4H PRN


   PRN Reason: Nausea/Vomiting


Risperidone (Risperdal Tab)  1 mg PO QAM [facility]; Protocol


   Last Admin: 01/16/19 09:00 Dose:  1 mg


Risperidone (Risperdal Tab)  2 mg PO HS [facility]; Protocol


Thiamine HCl (Vitamin B1 Tab)  100 mg PO DAILY [facility]


   Last Admin: 01/16/19 09:00 Dose:  100 mg











- Labs


Labs: 


                                        





                                 01/16/19 08:45 





                                 01/16/19 06:30 











- Constitutional


Appears: Well, Non-toxic, Agitated





- Head Exam


Head Exam: NORMAL INSPECTION, NORMOCEPHALIC





- Eye Exam


Eye Exam: EOMI, Normal appearance





- ENT Exam


ENT Exam: Mucous Membranes Moist, Normal Exam





- Neck Exam


Neck Exam: Normal Inspection





- Respiratory Exam


Respiratory Exam: Clear to Ausculation Bilateral, NORMAL BREATHING PATTERN





- Cardiovascular Exam


Cardiovascular Exam: REGULAR RHYTHM, +S1, +S2





- GI/Abdominal Exam


GI & Abdominal Exam: Soft, Normal Bowel Sounds.  absent: Tenderness





- Extremities Exam


Extremities Exam: Normal Inspection.  absent: Calf Tenderness





- Back Exam


Back Exam: NORMAL INSPECTION





- Neurological Exam


Neurological Exam: Alert, Awake, Oriented x3





- Psychiatric Exam


Psychiatric exam: Anxious, Normal Mood





- Skin


Skin Exam: Dry, Intact, Warm





Assessment and Plan





- Assessment and Plan (Free Text)


Assessment: 





Patient is a 62 year old female with pmh of alcoholic hepatitis, and ETOH abuse 

admitted for alcohol withdrawal 


Plan: 





ETOH Withdrawal


Continue folic acid, thiamine, multivitamin


Continue Ativan 2Q4, 1Q4PRN 


Monitor CIWA protocol


Seizure precautions


Aspiration precautions


Fall precautions





Thrombocytopenia


Like secondary to liver failure from ETOH abuse


Transaminitis resolving


Avoid hepatotoxins





GI/DVT PPx: Pepcid/SCDs





Case seen, examined and discussed with attending physician, Dr. Mauro Chavira PGY1











<Zeny Wade - Last Filed: 01/18/19 07:47>





Objective





- Vital Signs/Intake and Output


Vital Signs (last 24 hours): 


                                        











Temp Pulse Resp BP Pulse Ox


 


 98.6 F   109 H  20   109/79   100 


 


 01/18/19 06:00  01/18/19 06:00  01/18/19 06:00  01/18/19 06:00  01/18/19 06:00








Intake and Output: 


                                        











 01/18/19 01/18/19





 06:59 18:59


 


Intake Total 460 


 


Output Total 350 


 


Balance 110 














- Medications


Medications: 


                               Current Medications





Famotidine (Pepcid)  40 mg PO 0600 [facility]


   Last Admin: 01/18/19 06:29 Dose:  40 mg


Folic Acid (Folic Acid)  1 mg PO DAILY [facility]


   Last Admin: 01/17/19 11:56 Dose:  1 mg


Lorazepam (Ativan)  1 mg IVP Q4H PRN; Protocol


   PRN Reason: Agitation


   Last Admin: 01/18/19 02:05 Dose:  1 mg


Lorazepam (Ativan)  2 mg IVP Q4 [facility]; Protocol


   Last Admin: 01/18/19 04:00 Dose:  2 mg


Multivitamins/Minerals (Therapeutic-M Tab)  1 tab PO DAILY [facility]


   Last Admin: 01/17/19 11:56 Dose:  1 tab


Ondansetron HCl (Zofran Inj)  4 mg IVP Q4H PRN


   PRN Reason: Nausea/Vomiting


Risperidone (Risperdal Tab)  1 mg PO QAM [facility]; Protocol


   Last Admin: 01/17/19 11:57 Dose:  1 mg


Risperidone (Risperdal Tab)  2 mg PO HS [facility]; Protocol


   Last Admin: 01/17/19 21:33 Dose:  2 mg


Thiamine HCl (Vitamin B1 Tab)  100 mg PO DAILY [facility]


   Last Admin: 01/17/19 11:56 Dose:  100 mg











- Labs


Labs: 


                                        





                                 01/17/19 07:10 





                                 01/17/19 07:10 











Attending/Attestation





- Attestation


I have personally seen and examined this patient.: Yes


I have fully participated in the care of the patient.: Yes


I have reviewed all pertinent clinical information, including history, physical 

exam and plan: Yes


Notes (Text): 


Patient seen and examined by me with resident at 10:10AM on 1/16/19.  Case 

including HPI, physical exam, and assessment and plan discussed with resident. 

Agree with above with following additions/corrections.


Patient is a 63 year old female with past medical history significant for 

alcohol abuse, endometriosis, chronic left neck pain, alcoholic hepatitis, fall,

 and anxiety that presented to the emergency room for alcohol withdrawal.


Patient states she is feeling ok. States she wants to go home. Patient states 

she is feeling anxious and is having a lot of tremors. Patient states her fer myers is going to take her to rehab.  spoke to daughter on the 

phone who stated she has not spoken to her mother in some time. Patient denies 

any falls at home this time. No chest pain or shortness of breath. No headaches 

or dizziness. No fevers or chills. No nausea, vomiting, or abdominal pain. No 

dysuria. 


Physical exam:


General: Awake and alert sitting up in bed in no acute distress


HEENT: Normocephalic, atraumatic. Extraocular muscles intact, pupils equal and 

reactive, no scleral icterus. Oropharynx is pink moist. Neck is supple. 


Cardiovascular: Regular rhythm. Normal S1 and S2. No murmurs, rubs, or gallops 

appreciated


Pulmonary: Normal respiratory effort. No rhonchi, rales, or wheezing appreciated


Gastrointestinal: Soft, nondistended. Nontender. Positive bowel sounds all 4 

quadrants. No guarding. 


Musculoskeletal: Moves all extremities. No calf tenderness. No edema. No CVA 

tenderness. Positive tremors bilateral upper extremities. 


Central nervous system: AAO x 3 with periods of confusion, CN 2-12 grossly 

intact. 


Dermatologic: Skin warm and dry. 


Assessment and plan: Patient is a 63 year old female with past medical history 

significant for alcohol abuse, endometriosis, chronic left neck pain, alcoholic 

hepatitis, fall,  and anxiety that presented to the emergency room for alcohol 

withdrawal.


1. Alcohol abuse and withdrawal. Continue CIWA protocol. Continue thiamine, 

folic acid, and MVI. Continue Ativan scheduled and prn, taper when tolerated. 

Patient counseled on alcohol cessation.


2. Elevated LFTS. Likely secondary to ETOH abuse. Monitor for now.


3. Hypokalemia. Hypomagnesemia. Resolved. Continue to monitor. 


4. Thrombocytopenia. Downtrending. Secondary to ETOH abuse.  Continue to 

monitor. 


5. Anxiety. Continue risperidone. 


6. GI/DVT prophylaxis. Protonix/SCDS


7. Patient is a full code.


Case was discussed in detail with patient regarding current diagnosis and 

treatment plan.  All questions answered.

## 2019-01-16 NOTE — RAD
Date of service: 



01/15/2019



HISTORY:

 medical clearance 



COMPARISON:

12/11/2018 



FINDINGS:



LUNGS:

No active pulmonary disease.



PLEURA:

No significant pleural effusion identified, no pneumothorax apparent.



CARDIOVASCULAR:

No aortic atherosclerotic calcification present.



Normal cardiac size. No pulmonary vascular congestion. 



OSSEOUS STRUCTURES:

No significant abnormalities.



VISUALIZED UPPER ABDOMEN:

Normal.



OTHER FINDINGS:

None.



IMPRESSION:

No active disease.

## 2019-01-17 LAB
ALBUMIN SERPL-MCNC: 4.9 G/DL (ref 3–4.8)
ALBUMIN/GLOB SERPL: 1.3 {RATIO} (ref 1.1–1.8)
ALT SERPL-CCNC: 31 U/L (ref 7–56)
AST SERPL-CCNC: 70 U/L (ref 14–36)
BASOPHILS # BLD AUTO: 0.01 K/MM3 (ref 0–2)
BASOPHILS NFR BLD: 0.2 % (ref 0–3)
BUN SERPL-MCNC: 5 MG/DL (ref 7–21)
CALCIUM SERPL-MCNC: 10.2 MG/DL (ref 8.4–10.5)
EOSINOPHIL # BLD: 0 10*3/UL (ref 0–0.7)
EOSINOPHIL NFR BLD: 0.4 % (ref 1.5–5)
ERYTHROCYTE [DISTWIDTH] IN BLOOD BY AUTOMATED COUNT: 15.2 % (ref 11.5–14.5)
GFR NON-AFRICAN AMERICAN: > 60
GRANULOCYTES # BLD: 4.32 10*3/UL (ref 1.4–6.5)
GRANULOCYTES NFR BLD: 80.3 % (ref 50–68)
HGB BLD-MCNC: 14.2 G/DL (ref 12–16)
LYMPHOCYTES # BLD: 0.8 10*3/UL (ref 1.2–3.4)
LYMPHOCYTES NFR BLD AUTO: 13.9 % (ref 22–35)
MCH RBC QN AUTO: 31.7 PG (ref 25–35)
MCHC RBC AUTO-ENTMCNC: 33.9 G/DL (ref 31–37)
MCV RBC AUTO: 93.5 FL (ref 80–105)
MONOCYTES # BLD AUTO: 0.3 10*3/UL (ref 0.1–0.6)
MONOCYTES NFR BLD: 5.2 % (ref 1–6)
PLATELET # BLD: 47 10^3/UL (ref 120–450)
PMV BLD AUTO: 9.9 FL (ref 7–11)
RBC # BLD AUTO: 4.48 10^6/UL (ref 3.5–6.1)
WBC # BLD AUTO: 5.4 10^3/UL (ref 4.5–11)

## 2019-01-17 RX ADMIN — MULTIPLE VITAMINS W/ MINERALS TAB SCH TAB: TAB at 11:56

## 2019-01-17 NOTE — CP.PCM.PN
<Baudilio Chavira - Last Filed: 01/17/19 15:05>





Subjective





- Date & Time of Evaluation


Date of Evaluation: 01/17/19


Time of Evaluation: 15:05





- Subjective


Subjective: 





INTERNAL MEDICINE PROGRESS NOTE FOR DR. MAURO Chavira PGY1





Pt seen and examined at bedside this am. Nursing staff reported CIWA score of 

21, 16 and 18. Pt was very agitated overnight, requiring geodon and benadryl 

treatments. This am upon interview, patient is answering questions with eyes 

closed and is somnolent. She is AXO x 3, and answers questions appropriately. 

She reports she want to go home. She is denying 12 point ROS





Objective





- Vital Signs/Intake and Output


Vital Signs (last 24 hours): 


                                        











Temp Pulse Resp BP Pulse Ox


 


 97.1 F L  117 H  19   140/97 H  98 


 


 01/17/19 12:00  01/17/19 12:00  01/17/19 12:00  01/17/19 12:00  01/17/19 06:00








Intake and Output: 


                                        











 01/17/19 01/17/19





 06:59 18:59


 


Intake Total 500 


 


Balance 500 














- Medications


Medications: 


                               Current Medications





Famotidine (Pepcid)  40 mg PO 0600 [facility]


   Last Admin: 01/17/19 05:06 Dose:  40 mg


Folic Acid (Folic Acid)  1 mg PO DAILY [facility]


   Last Admin: 01/17/19 11:56 Dose:  1 mg


Lorazepam (Ativan)  1 mg IVP Q4H PRN; Protocol


   PRN Reason: Agitation


   Last Admin: 01/17/19 10:04 Dose:  1 mg


Lorazepam (Ativan)  2 mg IVP Q4 [facility]; Protocol


   Last Admin: 01/17/19 11:57 Dose:  2 mg


Multivitamins/Minerals (Therapeutic-M Tab)  1 tab PO DAILY [facility]


   Last Admin: 01/17/19 11:56 Dose:  1 tab


Ondansetron HCl (Zofran Inj)  4 mg IVP Q4H PRN


   PRN Reason: Nausea/Vomiting


Risperidone (Risperdal Tab)  1 mg PO QAM [facility]; Protocol


   Last Admin: 01/17/19 11:57 Dose:  1 mg


Risperidone (Risperdal Tab)  2 mg PO HS [facility]; Protocol


   Last Admin: 01/16/19 21:58 Dose:  2 mg


Thiamine HCl (Vitamin B1 Tab)  100 mg PO DAILY [facility]


   Last Admin: 01/17/19 11:56 Dose:  100 mg











- Labs


Labs: 


                                        





                                 01/17/19 07:10 





                                 01/17/19 07:10 











- Constitutional


Appears: Well, Non-toxic, No Acute Distress





- Head Exam


Head Exam: NORMAL INSPECTION, NORMOCEPHALIC





- Eye Exam


Eye Exam: EOMI, Normal appearance





- ENT Exam


ENT Exam: Mucous Membranes Moist, Normal Exam





- Neck Exam


Neck Exam: Normal Inspection





- Respiratory Exam


Respiratory Exam: Clear to Ausculation Bilateral, NORMAL BREATHING PATTERN





- Cardiovascular Exam


Cardiovascular Exam: Tachycardia, REGULAR RHYTHM





- GI/Abdominal Exam


GI & Abdominal Exam: Soft.  absent: Tenderness





- Extremities Exam


Extremities Exam: Normal Inspection.  absent: Calf Tenderness





- Back Exam


Back Exam: NORMAL INSPECTION





- Neurological Exam


Neurological Exam: Alert, Awake, Oriented x3





- Psychiatric Exam


Psychiatric exam: Normal Affect, Normal Mood





- Skin


Skin Exam: Dry, Intact, Warm





Assessment and Plan





- Assessment and Plan (Free Text)


Assessment: 





62 year old female with pmh of alcoholic hepatitis, and ETOH abuse admitted for 

alcohol withdrawal, hallucinations and agitation.


Plan: 





ETOH Withdrawal


CIWA scores of 21, 16, 18 overnight. Very agitated


Continue folic acid, thiamine, multivitamin


Continue Ativan 2Q4, 1Q4PRN 


Monitor CIWA protocol


Seizure precautions


Aspiration precautions


Fall precautions


Consult psychiatry due to intermittent confusion, hallucinations & agitation





Hallucinations


Continue risperidone


f/u psych recs for med mgmt





Thrombocytopenia


Like secondary to liver failure from ETOH abuse


Transaminitis resolving


Avoid hepatotoxins





GI/DVT PPx: Pepcid/SCDs





Case seen, examined and discussed with attending physician, Dr. Mauro Chavira PGY1





<Zeny Wade R - Last Filed: 01/18/19 07:53>





Objective





- Vital Signs/Intake and Output


Vital Signs (last 24 hours): 


                                        











Temp Pulse Resp BP Pulse Ox


 


 98.6 F   109 H  20   109/79   100 


 


 01/18/19 06:00  01/18/19 06:00  01/18/19 06:00  01/18/19 06:00  01/18/19 06:00








Intake and Output: 


                                        











 01/18/19 01/18/19





 06:59 18:59


 


Intake Total 460 


 


Output Total 350 


 


Balance 110 














- Medications


Medications: 


                               Current Medications





Famotidine (Pepcid)  40 mg PO 0600 [facility]


   Last Admin: 01/18/19 06:29 Dose:  40 mg


Folic Acid (Folic Acid)  1 mg PO DAILY [facility]


   Last Admin: 01/17/19 11:56 Dose:  1 mg


Lorazepam (Ativan)  1 mg IVP Q4H PRN; Protocol


   PRN Reason: Agitation


   Last Admin: 01/18/19 02:05 Dose:  1 mg


Lorazepam (Ativan)  2 mg IVP Q4 [facility]; Protocol


   Last Admin: 01/18/19 04:00 Dose:  2 mg


Multivitamins/Minerals (Therapeutic-M Tab)  1 tab PO DAILY [facility]


   Last Admin: 01/17/19 11:56 Dose:  1 tab


Ondansetron HCl (Zofran Inj)  4 mg IVP Q4H PRN


   PRN Reason: Nausea/Vomiting


Risperidone (Risperdal Tab)  1 mg PO QAM [facility]; Protocol


   Last Admin: 01/17/19 11:57 Dose:  1 mg


Risperidone (Risperdal Tab)  2 mg PO HS [facility]; Protocol


   Last Admin: 01/17/19 21:33 Dose:  2 mg


Thiamine HCl (Vitamin B1 Tab)  100 mg PO DAILY [facility]


   Last Admin: 01/17/19 11:56 Dose:  100 mg











- Labs


Labs: 


                                        





                                 01/17/19 07:10 





                                 01/17/19 07:10 











Attending/Attestation





- Attestation


I have personally seen and examined this patient.: Yes


I have fully participated in the care of the patient.: Yes


I have reviewed all pertinent clinical information, including history, physical 

exam and plan: Yes


Notes (Text): 


Patient seen and examined by me with resident at 10:20AM on 1/17/19.  Case 

including HPI, physical exam, and assessment and plan discussed with resident. 

Agree with above with following additions/corrections.


Patient is a 63 year old female with past medical history significant for 

alcohol abuse, endometriosis, chronic left neck pain, alcoholic hepatitis, fall,

 and anxiety that presented to the emergency room for alcohol withdrawal.


Patient more agitated and trying to climb out of bed per nurse. Patient sedated 

at time of exam. Unable to get HPI from patient. Patient is afebrile. 


Physical exam:


General: Lying in bed in no acute distress


HEENT: Normocephalic, atraumatic. Extraocular muscles intact, pupils equal and 

reactive, no scleral icterus. Oropharynx is pink moist. Neck is supple. 


Cardiovascular: Regular rhythm. Normal S1 and S2. No murmurs, rubs, or gallops 

appreciated


Pulmonary: Normal respiratory effort. No rhonchi, rales, or wheezing appreciated


Gastrointestinal: Soft, nondistended. Nontender. Positive bowel sounds all 4 

quadrants. No guarding. 


Musculoskeletal: Moves all extremities. No calf tenderness. No edema. Positive 

tremors bilateral upper extremities visualized. 


Central nervous system: Sedated


Dermatologic: Skin warm and dry. 


Assessment and plan: Patient is a 63 year old female with past medical history 

significant for alcohol abuse, endometriosis, chronic left neck pain, alcoholic 

hepatitis, fall,  and anxiety that presented to the emergency room for alcohol 

withdrawal.


1. Alcohol abuse and withdrawal. Continue CIWA protocol. Continue thiamine, 

folic acid, and MVI. Continue Ativan scheduled and prn, taper when tolerated. 

Patient counseled on alcohol cessation. Patient with more agitation overnight 

and this morning. Psychiatry consulted, follow up recommendations. 


2. Elevated LFTS. Downtrending. Secondary to alcohol abuse.  Continue to monitor

for now.


3. Hypokalemia. Replace. Follow up repeat labs in AM. 


4. Hypomagnesemia. Resolved. Continue to monitor. 


5. Thrombocytopenia. Downtrending. Secondary to ETOH abuse.  Continue to 

monitor. 


6. Anxiety/hallucinations/agitation. Continue risperidone. Psychiatry consulted.




7. GI/DVT prophylaxis. Pepcid/SCDS


8. Patient is a full code.


Case was discussed in detail with psychiatrist Dr. Willis regarding current 

diagnosis and treatment plan.

## 2019-01-18 LAB
ALBUMIN SERPL-MCNC: 4.8 G/DL (ref 3–4.8)
ALBUMIN/GLOB SERPL: 1.2 {RATIO} (ref 1.1–1.8)
ALT SERPL-CCNC: 34 U/L (ref 7–56)
AST SERPL-CCNC: 60 U/L (ref 14–36)
BASOPHILS # BLD AUTO: 0.01 K/MM3 (ref 0–2)
BASOPHILS NFR BLD: 0.2 % (ref 0–3)
BUN SERPL-MCNC: 10 MG/DL (ref 7–21)
CALCIUM SERPL-MCNC: 10.8 MG/DL (ref 8.4–10.5)
EOSINOPHIL # BLD: 0.1 10*3/UL (ref 0–0.7)
EOSINOPHIL NFR BLD: 1.7 % (ref 1.5–5)
ERYTHROCYTE [DISTWIDTH] IN BLOOD BY AUTOMATED COUNT: 15.4 % (ref 11.5–14.5)
GFR NON-AFRICAN AMERICAN: > 60
GRANULOCYTES # BLD: 4.99 10*3/UL (ref 1.4–6.5)
GRANULOCYTES NFR BLD: 75.7 % (ref 50–68)
HGB BLD-MCNC: 14.3 G/DL (ref 12–16)
LYMPHOCYTES # BLD: 1.1 10*3/UL (ref 1.2–3.4)
LYMPHOCYTES NFR BLD AUTO: 16.3 % (ref 22–35)
MCH RBC QN AUTO: 31 PG (ref 25–35)
MCHC RBC AUTO-ENTMCNC: 32.9 G/DL (ref 31–37)
MCV RBC AUTO: 94.1 FL (ref 80–105)
MONOCYTES # BLD AUTO: 0.4 10*3/UL (ref 0.1–0.6)
MONOCYTES NFR BLD: 6.1 % (ref 1–6)
PLATELET # BLD: 52 10^3/UL (ref 120–450)
PMV BLD AUTO: 9.6 FL (ref 7–11)
RBC # BLD AUTO: 4.61 10^6/UL (ref 3.5–6.1)
WBC # BLD AUTO: 6.6 10^3/UL (ref 4.5–11)

## 2019-01-18 RX ADMIN — MULTIPLE VITAMINS W/ MINERALS TAB SCH TAB: TAB at 10:28

## 2019-01-18 NOTE — CON
DATE:  01/18/2019



HISTORY OF PRESENT ILLNESS:  The patient is a 63-year-old white 

female with history of depression, anxiety, severe alcohol use disorder as

well as history of pain killer addiction with detox, is on rehab in the

past as well as psychosis with stimulant abuse and sleep deprivation with

atleast 4-5 admissions here at [facility] who was admitted to the medical

floor for treatment of alcohol withdrawal and psychiatry was requested to

consult on this patient.  Providers following up with the patient who is at

bedside.  She is oriented to month, year, location and circumstances.  She

reports that she is feeling a little better since admission.  She still

feels tremulous.  She admits that she stops drinking cold turkey prior to

arriving to our hospital and states that it was a bad idea.  She is not

suicidal.  She is frustrated with her continued drinking.  She indicates

that she has been drinking pint of vodka daily prior to her presentation. 

She is not hallucinating.  She does not appear to be responsive to

perceptual disturbance and has no major behavioral issues on the unit.  The

patient has been taking the medications provided on the unit.  She denies

any issues with the medications, denies any side effects and feels they

have been beneficial.  Her insight and judgment are improving, they are

fair at this time.



Vital signs and labs are reviewed.



Relevant psychiatric mediations provided on the unit include Ativan 1 mg

every 4 p.r.n. and 2 mg IV every 6 scheduled, Risperdal 1 mg in the morning

2 mg at bedtime.



Psychiatric history, as noted is known to the psychiatric unit where she

has been treated for depression, last hospitalization was 12/21 to 12/25. 

She was given a prescription for Remeron 50 mg at bedtime, naltrexone 50 mg

daily, Risperdal 1 mg in the morning 2 mg at bedtime and patient was close

to follow up with this provider actually today at outpatient unit.  The

patient indicates she has been taking her medications when I met with her

at bedside.



IMPRESSION:  Severe alcohol use disorder, likely substance use mood

disorder, depression, _____, anxiety disorder,alcohol withdrawal, delirum

improving.



RECOMMENDATIONS:  We will continue with Risperdal 1 mg a.m. and at bedtime,

Remeron 50 mg p.o, and at bedtime and as well as alcohol withdrawal.



MEDICATIONS:  The patient should be referred to a dual diagnosis unit to

treat her substantial alcohol use disorder.



Social work should followup and provide referrals for this disposition. 

The patient cannot be treated at [facility] as

patient clearly requires more intensive outpatient substance abuse

treatment along with health center by  _____ as patient clearly requires

_____ outpatient substance abuse treatment along with psychiatric

treatment.  The best course of action per patient is to attend rehab after

she is medically cleared and this option should be discussed at length with

patient, , otherwise she is unwilling to follow up on this

strong recommendation.  She should be given referrals for dual treatment

program.







__________________________________________

Shola oN MD





DD:  01/18/2019 8:58:48

DT:  01/18/2019 9:34:58

Job # [id number]

## 2019-01-18 NOTE — CP.PCM.PN
<Baudilio Chavira - Last Filed: 01/18/19 20:32>





Subjective





- Date & Time of Evaluation


Date of Evaluation: 01/18/19


Time of Evaluation: 11:00





- Subjective


Subjective: 





INTERNAL MEDICINE PROGRESS NOTE FORE HOSPITALIST TEAM


Baudilio Chavira PGY1





Pt seen and examined at bedside this am. Pt had CIWA scores of 15, 12, 19 & 20 

overnight. Upon interview, she is somnolent but answers all questions and is AxO

x 4. She reports she want to leave and denies 12 point ROS 





Objective





- Vital Signs/Intake and Output


Vital Signs (last 24 hours): 


                                        











Temp Pulse Resp BP Pulse Ox


 


 97.7 F   110 H  18   104/74   98 


 


 01/18/19 17:46  01/18/19 18:00  01/18/19 17:46  01/18/19 17:46  01/18/19 10:28











- Medications


Medications: 


                               Current Medications





Famotidine (Pepcid)  40 mg PO 0600 [facility]


   Last Admin: 01/18/19 06:29 Dose:  40 mg


Folic Acid (Folic Acid)  1 mg PO DAILY [facility]


   Last Admin: 01/18/19 10:28 Dose:  1 mg


Lorazepam (Ativan)  1 mg IVP Q4H PRN; Protocol


   PRN Reason: Agitation


   Last Admin: 01/18/19 10:29 Dose:  1 mg


Lorazepam (Ativan)  1 mg IVP Q4 [facility]; Protocol


   Last Admin: 01/18/19 19:56 Dose:  1 mg


Mirtazapine (Remeron)  15 mg PO HS [facility]


Multivitamins/Minerals (Therapeutic-M Tab)  1 tab PO DAILY [facility]


   Last Admin: 01/18/19 10:28 Dose:  1 tab


Ondansetron HCl (Zofran Inj)  4 mg IVP Q4H PRN


   PRN Reason: Nausea/Vomiting


Risperidone (Risperdal Tab)  1 mg PO QAM [facility]; Protocol


   Last Admin: 01/18/19 10:28 Dose:  1 mg


Risperidone (Risperdal Tab)  2 mg PO HS [facility]; Protocol


   Last Admin: 01/17/19 21:33 Dose:  2 mg


Thiamine HCl (Vitamin B1 Tab)  100 mg PO DAILY [facility]


   Last Admin: 01/18/19 10:28 Dose:  100 mg











- Labs


Labs: 


                                        





                                 01/18/19 08:20 





                                 01/18/19 08:20 











- Constitutional


Appears: Non-toxic, No Acute Distress





- Head Exam


Head Exam: NORMAL INSPECTION, NORMOCEPHALIC





- Eye Exam


Eye Exam: EOMI, Normal appearance





- ENT Exam


ENT Exam: Mucous Membranes Moist, Normal Exam





- Neck Exam


Neck Exam: Normal Inspection.  absent: Meningismus





- Respiratory Exam


Respiratory Exam: Clear to Ausculation Bilateral, NORMAL BREATHING PATTERN





- Cardiovascular Exam


Cardiovascular Exam: REGULAR RHYTHM, +S1, +S2





- GI/Abdominal Exam


GI & Abdominal Exam: Soft.  absent: Tenderness





- Extremities Exam


Extremities Exam: Normal Inspection.  absent: Calf Tenderness





- Back Exam


Back Exam: NORMAL INSPECTION





- Neurological Exam


Neurological Exam: Alert, Awake, Oriented x3





- Psychiatric Exam


Psychiatric exam: Normal Affect, Normal Mood





- Skin


Skin Exam: Dry, Intact, Warm





Assessment and Plan





- Assessment and Plan (Free Text)


Assessment: 





62 year old female with pmh of alcoholic hepatitis, and ETOH abuse admitted for 

alcohol withdrawal, hallucinations and agitation


Plan: 





ETOH Withdrawal


Continue CIWA protocol


Continue folic acid, thiamine, multivitamin


Continue Ativan 2Q4, 1Q4PRN 


Monitor CIWA protocol


Seizure precautions


Aspiration precautions


Fall precautions


Psychiatry is following, pt will most likely require inpatient psychiatry 

admission





Hallucinations


Continue risperidone, remeron 


f/u psych recs for med mgmt





Thrombocytopenia


Like secondary to liver failure from ETOH abuse


Transaminitis resolving


Avoid hepatotoxins





GI/DVT PPx: Pepcid/SCDs





Case seen, examined and discussed with attending physician, Dr. Mauro Chavira PGY1





<Zeny Wade - Last Filed: 01/19/19 13:14>





Objective





- Vital Signs/Intake and Output


Vital Signs (last 24 hours): 


                                        











Temp Pulse Resp BP Pulse Ox


 


 98 F   107 H  16   99/69 L  95 


 


 01/19/19 06:00  01/19/19 06:00  01/19/19 06:00  01/19/19 06:00  01/19/19 06:00








Intake and Output: 


                                        











 01/19/19 01/19/19





 06:59 18:59


 


Intake Total 700 


 


Output Total 2 


 


Balance 698 














- Medications


Medications: 


                               Current Medications





Famotidine (Pepcid)  40 mg PO 0600 [facility]


   Last Admin: 01/19/19 05:28 Dose:  Not Given


Folic Acid (Folic Acid)  1 mg PO DAILY [facility]


   Last Admin: 01/18/19 10:28 Dose:  1 mg


Lorazepam (Ativan)  1 mg IVP Q4H PRN; Protocol


   PRN Reason: Agitation


   Last Admin: 01/18/19 22:51 Dose:  1 mg


Lorazepam (Ativan)  1 mg IVP Q4 [facility]; Protocol


   Last Admin: 01/19/19 11:59 Dose:  Not Given


Mirtazapine (Remeron)  15 mg PO HS [facility]


   Last Admin: 01/18/19 21:29 Dose:  15 mg


Multivitamins/Minerals (Therapeutic-M Tab)  1 tab PO DAILY [facility]


   Last Admin: 01/18/19 10:28 Dose:  1 tab


Ondansetron HCl (Zofran Inj)  4 mg IVP Q4H PRN


   PRN Reason: Nausea/Vomiting


Risperidone (Risperdal Tab)  1 mg PO QAM [facility]; Protocol


   Last Admin: 01/18/19 10:28 Dose:  1 mg


Risperidone (Risperdal Tab)  2 mg PO HS [facility]; Protocol


   Last Admin: 01/18/19 21:28 Dose:  2 mg


Thiamine HCl (Vitamin B1 Tab)  100 mg PO DAILY [facility]


   Last Admin: 01/18/19 10:28 Dose:  100 mg











- Labs


Labs: 


                                        





                                 01/19/19 09:00 





                                 01/19/19 09:00 











Attending/Attestation





- Attestation


I have personally seen and examined this patient.: Yes


I have fully participated in the care of the patient.: Yes


I have reviewed all pertinent clinical information, including history, physical 

exam and plan: Yes


Notes (Text): 


Patient seen and examined by me with resident at 10:10AM on 1/18/19.  Case 

including HPI, physical exam, and assessment and plan discussed with resident. 

Agree with above with following additions/corrections.


Patient is a 63 year old female with past medical history significant for 

alcohol abuse, endometriosis, chronic left neck pain, alcoholic hepatitis, fall,

 and anxiety that presented to the emergency room for alcohol withdrawal.


Patient states she is feeling ok. States she is having tremors that are causing 

her neck to hurt as well. Patient states she already has chronic neck pain and 

tremors seem to make it worse. Discussed with patient that this is likely 

secondary to to alcohol withdrawal. Patient denies any headaches or dizziness. 

No chest pain or shortness of breath. No palpitations. No fevers or chills. No 

nausea, vomiting, or abdominal pain. No dysuria.


Physical exam:


General: Awake and alert lying in bed in no acute distress


HEENT: Normocephalic, atraumatic. Extraocular muscles intact, pupils equal and 

reactive, no scleral icterus. Oropharynx is pink moist. Neck is supple. 


Cardiovascular: Regular rhythm. Normal S1 and S2. No murmurs, rubs, or gallops 

appreciated


Pulmonary: Normal respiratory effort. No rhonchi, rales, or wheezing appreciated


Gastrointestinal: Soft, nondistended. Nontender. Positive bowel sounds all 4 

quadrants. No guarding. 


Musculoskeletal: Moves all extremities. No calf tenderness. No edema. Positive 

tremors bilateral upper extremities visualized. 


Central nervous system: AAO 3


Dermatologic: Skin warm and dry. 


Assessment and plan: Patient is a 63 year old female with past medical history 

significant for alcohol abuse, endometriosis, chronic left neck pain, alcoholic 

hepatitis, fall,  and anxiety that presented to the emergency room for alcohol 

withdrawal.


1. Alcohol abuse and withdrawal. Continue CIWA protocol. Continue thiamine, 

folic acid, and MVI. Continue Ativan scheduled and prn, taper . Patient 

counseled on alcohol cessation. Psychiatry following, recommendations 

appreciated.


2. Elevated LFTS. Downtrending. Secondary to alcohol abuse.  Continue to monitor

for now.


3. Hypokalemia. Resolved. Continue to monitor.


4. Hypomagnesemia. Resolved. Continue to monitor. 


5. Thrombocytopenia. Improving. Secondary to ETOH abuse.  Continue to monitor. 


6. Anxiety/hallucinations/agitation. Continue risperidone. Psychiatry following.




7. GI/DVT prophylaxis. Pepcid/SCDS


8. Patient is a full code.


Case was discussed in detail with the patient regarding current diagnosis and 

treatment plan.  All questions answered.

## 2019-01-18 NOTE — CON
DATE:  01/17/2019



In short, the patient is a 63-year-old  female with reported

history of alcohol use disorder, history of mental illness.  Please see

previous admissions note for more detailed information.  The patient came

to the hospital in confused stage.  The patient relapsed on alcohol and the

patient was admitted to the medical site.  Psych consult was called for

medication management and the patient has history of mental illness.  This

writer is very familiar with this patient from the previous admissions to

the psychiatric inpatient unit, which took place here in [place].  The

patient usually comes to the hospital in delirium stage due to alcohol

withdrawals and subsequently the patient is transferred to the psychiatric

inpatient unit for further stabilization and observation.  The patient was

seen and examined today, discussed with the medical team.  The patient

presented to be confused.  The patient does remember this writer by name. 

The patient said that she relapsed on alcohol.  The patient reported that

she was not feeling well, and at this time, the patient is willing to go to

inpatient rehab.  The patient denied hearing voices, denied seeing things,

but as per medical team report, the patient has episodes of confusion,

agitation, and the patient is in delirium stage.



MEDICATIONS:  Reviewed.



LABORATORIES:  Reviewed.



VITALS:  Reviewed.



PAST PSYCHIATRIC HISTORY:  The patient has mental illness.  The patient was

taking Risperdal in the past.  The patient was seeing her private

psychiatrist in the community.



MENTAL STATUS EXAMINATION:  The patient presented to be confused, upper

extremity shakes.  The patient reported that she is doing just fine, but

the patient did not present like this.  Mood described as horrible.  Affect

was constricted, irritable.  Thought process concrete.  Thought content,

the patient has episodes of confusion as well as agitation, but during this

writer visit, the patient presented as relatively well.  Insight and

judgment seems to be limited.  Impulses are unpredictable.



IMPRESSION:  Most likely the patient is in delirium stage.  The patient

also has history of mental illness, depression, and history of psychosis.



PLAN:  Medications were resumed by medical team, I agree with that.  The

patient was resumed on Risperdal at present moment.  The patient will be on

benzodiazepine scheduled and tapering dose.  CIWA protocol recommended. 

Multivitamins, thiamine, and folic acid is recommended.  As-needed

medication also need to be considered.  We will follow up and advise

accordingly.  Most likely, the patient would require to go to the

psychiatric inpatient unit, but we will follow up and advise accordingly.



Thank you very much.







__________________________________________

Alba Masters MD





 





DD:  01/17/2019 16:43:03

DT:  01/17/2019 16:44:51

Job # [id number]

## 2019-01-19 LAB
ALBUMIN SERPL-MCNC: 4 G/DL (ref 3–4.8)
ALBUMIN/GLOB SERPL: 1.2 {RATIO} (ref 1.1–1.8)
ALT SERPL-CCNC: 31 U/L (ref 7–56)
AST SERPL-CCNC: 44 U/L (ref 14–36)
BASOPHILS # BLD AUTO: 0.01 K/MM3 (ref 0–2)
BASOPHILS NFR BLD: 0.2 % (ref 0–3)
BUN SERPL-MCNC: 13 MG/DL (ref 7–21)
CALCIUM SERPL-MCNC: 10.2 MG/DL (ref 8.4–10.5)
EOSINOPHIL # BLD: 0.2 10*3/UL (ref 0–0.7)
EOSINOPHIL NFR BLD: 2.9 % (ref 1.5–5)
ERYTHROCYTE [DISTWIDTH] IN BLOOD BY AUTOMATED COUNT: 15.4 % (ref 11.5–14.5)
GFR NON-AFRICAN AMERICAN: > 60
GRANULOCYTES # BLD: 3.98 10*3/UL (ref 1.4–6.5)
GRANULOCYTES NFR BLD: 68.4 % (ref 50–68)
HGB BLD-MCNC: 13.3 G/DL (ref 12–16)
LYMPHOCYTES # BLD: 1.1 10*3/UL (ref 1.2–3.4)
LYMPHOCYTES NFR BLD AUTO: 18.4 % (ref 22–35)
MCH RBC QN AUTO: 31.1 PG (ref 25–35)
MCHC RBC AUTO-ENTMCNC: 32.8 G/DL (ref 31–37)
MCV RBC AUTO: 94.6 FL (ref 80–105)
MONOCYTES # BLD AUTO: 0.6 10*3/UL (ref 0.1–0.6)
MONOCYTES NFR BLD: 10.1 % (ref 1–6)
PLATELET # BLD: 64 10^3/UL (ref 120–450)
PMV BLD AUTO: 9.7 FL (ref 7–11)
RBC # BLD AUTO: 4.28 10^6/UL (ref 3.5–6.1)
WBC # BLD AUTO: 5.8 10^3/UL (ref 4.5–11)

## 2019-01-19 RX ADMIN — MULTIPLE VITAMINS W/ MINERALS TAB SCH TAB: TAB at 18:53

## 2019-01-19 NOTE — CP.PCM.PN
<Phani Tobias - Last Filed: 01/19/19 12:09>





Subjective





- Date & Time of Evaluation


Date of Evaluation: 01/19/19


Time of Evaluation: 09:10





- Subjective


Subjective: 





Pt seen and examined at bedside this morning. 





Objective





- Vital Signs/Intake and Output


Vital Signs (last 24 hours): 


                                        











Temp Pulse Resp BP Pulse Ox


 


 98 F   107 H  16   99/69 L  95 


 


 01/19/19 06:00  01/19/19 06:00  01/19/19 06:00  01/19/19 06:00  01/19/19 06:00








Intake and Output: 


                                        











 01/19/19 01/19/19





 06:59 18:59


 


Intake Total 700 


 


Output Total 2 


 


Balance 698 














- Medications


Medications: 


                               Current Medications





Famotidine (Pepcid)  40 mg PO 0600 [facility]


   Last Admin: 01/19/19 05:28 Dose:  Not Given


Folic Acid (Folic Acid)  1 mg PO DAILY [facility]


   Last Admin: 01/18/19 10:28 Dose:  1 mg


Lorazepam (Ativan)  1 mg IVP Q4H PRN; Protocol


   PRN Reason: Agitation


   Last Admin: 01/18/19 22:51 Dose:  1 mg


Lorazepam (Ativan)  1 mg IVP Q4 [facility]; Protocol


   Last Admin: 01/19/19 11:59 Dose:  Not Given


Mirtazapine (Remeron)  15 mg PO HS [facility]


   Last Admin: 01/18/19 21:29 Dose:  15 mg


Multivitamins/Minerals (Therapeutic-M Tab)  1 tab PO DAILY [facility]


   Last Admin: 01/18/19 10:28 Dose:  1 tab


Ondansetron HCl (Zofran Inj)  4 mg IVP Q4H PRN


   PRN Reason: Nausea/Vomiting


Risperidone (Risperdal Tab)  1 mg PO QAM [facility]; Protocol


   Last Admin: 01/18/19 10:28 Dose:  1 mg


Risperidone (Risperdal Tab)  2 mg PO HS [facility]; Protocol


   Last Admin: 01/18/19 21:28 Dose:  2 mg


Thiamine HCl (Vitamin B1 Tab)  100 mg PO DAILY [facility]


   Last Admin: 01/18/19 10:28 Dose:  100 mg











- Labs


Labs: 


                                        





                                 01/19/19 09:00 





                                 01/19/19 09:00 











- Constitutional


Appears: No Acute Distress





- Head Exam


Head Exam: ATRAUMATIC, NORMOCEPHALIC





- Eye Exam


Eye Exam: EOMI





- ENT Exam


ENT Exam: Mucous Membranes Moist





- Respiratory Exam


Respiratory Exam: Clear to Ausculation Bilateral, NORMAL BREATHING PATTERN.  

absent: Accessory Muscle Use, Respiratory Distress





- Cardiovascular Exam


Cardiovascular Exam: RRR, +S1, +S2.  absent: Diastolic murmur, Murmur





- GI/Abdominal Exam


GI & Abdominal Exam: Soft, Normal Bowel Sounds





- Extremities Exam


Extremities Exam: Full ROM.  absent: Calf Tenderness, Pedal Edema





- Skin


Skin Exam: Intact, Warm





Assessment and Plan





- Assessment and Plan (Free Text)


Assessment: 





Pt is a 61 yo female with PMH of alcoholic hepatitis, and alcohol abuse who was 

admitted for alcohol withdrawal. 


Plan: 





ETOH Withdrawal


- CIWA protocol


- folic acid, thiamine, multivitamin


- Ativan 1Q4 [facility]/ PRN


- Seizure precautions, Aspiration precautions, Fall precautions


- Psyc consulted





Hallucinations


- Risperidone


- Remeron


- psyc following 





Thrombocytopenia


- plt 64


- continue to monitor





Ppx 


- Pepcid


- SCD








Pt seen, examined, assessment and plan discussed with Dr Zeny Tobias PGY1








<Zeny Wade R - Last Filed: 01/19/19 13:20>





Objective





- Vital Signs/Intake and Output


Vital Signs (last 24 hours): 


                                        











Temp Pulse Resp BP Pulse Ox


 


 98 F   107 H  16   99/69 L  95 


 


 01/19/19 06:00  01/19/19 06:00  01/19/19 06:00  01/19/19 06:00  01/19/19 06:00








Intake and Output: 


                                        











 01/19/19 01/19/19





 06:59 18:59


 


Intake Total 700 


 


Output Total 2 


 


Balance 698 














- Medications


Medications: 


                               Current Medications





Famotidine (Pepcid)  40 mg PO 0600 [facility]


   Last Admin: 01/19/19 05:28 Dose:  Not Given


Folic Acid (Folic Acid)  1 mg PO DAILY [facility]


   Last Admin: 01/18/19 10:28 Dose:  1 mg


Lorazepam (Ativan)  1 mg IVP Q4H [facility]; Protocol


   PRN Reason: Agitation


   Last Admin: 01/18/19 22:51 Dose:  1 mg


Lorazepam (Ativan)  1 mg IVP Q4 [facility]; Protocol


   Last Admin: 01/19/19 11:59 Dose:  Not Given


Mirtazapine (Remeron)  15 mg PO HS [facility]


   Last Admin: 01/18/19 21:29 Dose:  15 mg


Multivitamins/Minerals (Therapeutic-M Tab)  1 tab PO DAILY [facility]


   Last Admin: 01/18/19 10:28 Dose:  1 tab


Ondansetron HCl (Zofran Inj)  4 mg IVP Q4H PRN


   PRN Reason: Nausea/Vomiting


Risperidone (Risperdal Tab)  1 mg PO QAM [facility]; Protocol


   Last Admin: 01/18/19 10:28 Dose:  1 mg


Risperidone (Risperdal Tab)  2 mg PO HS [facility]; Protocol


   Last Admin: 01/18/19 21:28 Dose:  2 mg


Thiamine HCl (Vitamin B1 Tab)  100 mg PO DAILY [facility]


   Last Admin: 01/18/19 10:28 Dose:  100 mg











- Labs


Labs: 


                                        





                                 01/19/19 09:00 





                                 01/19/19 09:00 











Attending/Attestation





- Attestation


I have personally seen and examined this patient.: Yes


I have fully participated in the care of the patient.: Yes


I have reviewed all pertinent clinical information, including history, physical 

exam and plan: Yes


Notes (Text): 


Patient seen and examined by me with resident at 8:45AM on 1/19/19.  Case 

including HPI, physical exam, and assessment and plan discussed with resident. 

Agree with above with following additions/corrections.


Patient is a 63 year old female with past medical history significant for 

alcohol abuse, endometriosis, chronic left neck pain, alcoholic hepatitis, fall,

 and anxiety that presented to the emergency room for alcohol withdrawal.


Patient sedated. Unable to obtain review of systems from patient. Patient 

agitated over night and received geodon. Patient is afebrile.


Physical exam:


General: Lying in bed in no acute distress, sedated. 


HEENT: Normocephalic, atraumatic. Pupils equal and reactive, no scleral icterus.

 Neck is supple. 


Cardiovascular: Regular rhythm. Normal S1 and S2. No murmurs, rubs, or gallops 

appreciated


Pulmonary: Normal respiratory effort. No rhonchi, rales, or wheezing appreciated


Gastrointestinal: Soft, nondistended. Nontender. Positive bowel sounds all 4 

quadrants. No guarding. 


Musculoskeletal: Moves all extremities. No calf tenderness. No edema. 


Central nervous system: Sedated


Dermatologic: Skin warm and dry. 


Assessment and plan: Patient is a 63 year old female with past medical history 

significant for alcohol abuse, endometriosis, chronic left neck pain, alcoholic 

hepatitis, fall,  and anxiety that presented to the emergency room for alcohol 

withdrawal.


1. Alcohol abuse and withdrawal. Continue CIWA protocol. Continue thiamine, 

folic acid, and MVI. Continue Ativan scheduled and prn, taper. Had to receive 

geodon overnight. Patient was counseled on alcohol cessation. Psychiatry 

following, recommendations appreciated.


2. Elevated LFTS. Downtrending. Secondary to alcohol abuse.  Continue to monitor

for now.


3. Hypokalemia. Will replace K. Continue to monitor.


4. Hypomagnesemia. Resolved. Continue to monitor. 


5. Thrombocytopenia. Continues to improve. Secondary to ETOH abuse.  Continue to

monitor. 


6. Anxiety/hallucinations/agitation. Continue risperidone. Continue Remeron at 

bedtime.  Psychiatry following. Received Geodon overnight. 


7. GI/DVT prophylaxis. Pepcid/SCDS


8. Patient is a full code.

## 2019-01-20 LAB
ALBUMIN SERPL-MCNC: 4.4 G/DL (ref 3–4.8)
ALBUMIN/GLOB SERPL: 1.2 {RATIO} (ref 1.1–1.8)
ALT SERPL-CCNC: 22 U/L (ref 7–56)
AST SERPL-CCNC: 35 U/L (ref 14–36)
BASOPHILS # BLD AUTO: 0.01 K/MM3 (ref 0–2)
BASOPHILS NFR BLD: 0.2 % (ref 0–3)
BUN SERPL-MCNC: 22 MG/DL (ref 7–21)
CALCIUM SERPL-MCNC: 10.7 MG/DL (ref 8.4–10.5)
EOSINOPHIL # BLD: 0.1 10*3/UL (ref 0–0.7)
EOSINOPHIL NFR BLD: 1.1 % (ref 1.5–5)
ERYTHROCYTE [DISTWIDTH] IN BLOOD BY AUTOMATED COUNT: 16 % (ref 11.5–14.5)
GFR NON-AFRICAN AMERICAN: > 60
GRANULOCYTES # BLD: 3.94 10*3/UL (ref 1.4–6.5)
GRANULOCYTES NFR BLD: 60.9 % (ref 50–68)
HGB BLD-MCNC: 14 G/DL (ref 12–16)
LYMPHOCYTES # BLD: 1.6 10*3/UL (ref 1.2–3.4)
LYMPHOCYTES NFR BLD AUTO: 25.4 % (ref 22–35)
MCH RBC QN AUTO: 31.1 PG (ref 25–35)
MCHC RBC AUTO-ENTMCNC: 32.1 G/DL (ref 31–37)
MCV RBC AUTO: 96.9 FL (ref 80–105)
MONOCYTES # BLD AUTO: 0.8 10*3/UL (ref 0.1–0.6)
MONOCYTES NFR BLD: 12.4 % (ref 1–6)
PLATELET # BLD: 98 10^3/UL (ref 120–450)
PMV BLD AUTO: 10.1 FL (ref 7–11)
RBC # BLD AUTO: 4.5 10^6/UL (ref 3.5–6.1)
WBC # BLD AUTO: 6.5 10^3/UL (ref 4.5–11)

## 2019-01-20 RX ADMIN — MULTIPLE VITAMINS W/ MINERALS TAB SCH TAB: TAB at 10:25

## 2019-01-20 NOTE — CP.PCM.PN
<JatinderPhani - Last Filed: 01/20/19 11:51>





Subjective





- Date & Time of Evaluation


Date of Evaluation: 01/20/19


Time of Evaluation: 06:20





- Subjective


Subjective: 





Pt seen and examined at bedside this morning. Per nursing, visible tremors noted

last night. Pt reports she is feeling "like I am getting sick". 





Objective





- Vital Signs/Intake and Output


Vital Signs (last 24 hours): 


                                        











Temp Pulse Resp BP Pulse Ox


 


 98.3 F   100 H  18   107/72   98 


 


 01/20/19 06:00  01/20/19 06:00  01/20/19 06:00  01/20/19 06:00  01/20/19 06:00








Intake and Output: 


                                        











 01/20/19 01/20/19





 06:59 18:59


 


Intake Total 240 


 


Balance 240 














- Medications


Medications: 


                               Current Medications





Famotidine (Pepcid)  40 mg PO 0600 [facility]


   Last Admin: 01/20/19 07:06 Dose:  40 mg


Folic Acid (Folic Acid)  1 mg PO DAILY [facility]


   Last Admin: 01/20/19 10:25 Dose:  1 mg


Lorazepam (Ativan)  1 mg IVP Q4H PRN; Protocol


   PRN Reason: Agitation


   Last Admin: 01/20/19 07:07 Dose:  1 mg


Lorazepam (Ativan)  1 mg IVP Q6H [facility]; Protocol


   Last Admin: 01/20/19 10:25 Dose:  1 mg


Mirtazapine (Remeron)  15 mg PO HS [facility]


   Last Admin: 01/19/19 23:26 Dose:  15 mg


Multivitamins/Minerals (Therapeutic-M Tab)  1 tab PO DAILY [facility]


   Last Admin: 01/20/19 10:25 Dose:  1 tab


Ondansetron HCl (Zofran Inj)  4 mg IVP Q4H PRN


   PRN Reason: Nausea/Vomiting


Risperidone (Risperdal Tab)  1 mg PO QAM [facility]; Protocol


   Last Admin: 01/20/19 10:25 Dose:  1 mg


Risperidone (Risperdal Tab)  2 mg PO HS [facility]; Protocol


   Last Admin: 01/19/19 23:26 Dose:  2 mg


Thiamine HCl (Vitamin B1 Tab)  100 mg PO DAILY [facility]


   Last Admin: 01/20/19 10:25 Dose:  100 mg











- Labs


Labs: 


                                        





                                 01/20/19 05:00 





                                 01/20/19 05:00 











- Constitutional


Appears: No Acute Distress





- Head Exam


Head Exam: ATRAUMATIC, NORMOCEPHALIC





- Eye Exam


Eye Exam: EOMI





- ENT Exam


ENT Exam: Mucous Membranes Moist





- Neck Exam


Neck Exam: Full ROM





- Respiratory Exam


Respiratory Exam: Clear to Ausculation Bilateral.  absent: Accessory Muscle Use,

Respiratory Distress





- Cardiovascular Exam


Cardiovascular Exam: RRR, +S1, +S2.  absent: Diastolic murmur, Irregular Rhythm





- GI/Abdominal Exam


GI & Abdominal Exam: Soft, Normal Bowel Sounds.  absent: Tenderness





- Extremities Exam


Extremities Exam: Full ROM.  absent: Calf Tenderness





- Neurological Exam


Neurological Exam: Alert, Awake, Oriented x3





- Psychiatric Exam


Psychiatric exam: Normal Affect, Normal Mood





- Skin


Skin Exam: Dry, Normal Color, Warm





Assessment and Plan





- Assessment and Plan (Free Text)


Assessment: 





Pt is a 61 yo female with PMH of alcoholic hepatitis and alcohol abuse who was 

admitted for alcohol withdrawal. 


Plan: 





ETOH Withdrawal


- CIWA 4


- folic acid, thiamine, multivitamin


- Ativan 1Q6 [facility], 1Q4 PRN


- Seizure precautions, Aspiration precautions, Fall precautions


- Psyc consulted, Dr Masters rec that most likely the pt will require gong 

to the psyc unit, but will follow up and advise accordingly 





Hallucinations


- Risperidone


- Remeron


- psyc following 





Thrombocytopenia


- plt 98, improving 


- continue to monitor





Ppx 


- Pepcid


- SCD








Pt seen, examined, assessment and plan discussed with Dr Zeny Tobias PGY1, Internal Medicine Resident 





<Zeny Wade R - Last Filed: 01/20/19 12:47>





Objective





- Vital Signs/Intake and Output


Vital Signs (last 24 hours): 


                                        











Temp Pulse Resp BP Pulse Ox


 


 98.3 F   100 H  18   107/72   98 


 


 01/20/19 06:00  01/20/19 06:00  01/20/19 06:00  01/20/19 06:00  01/20/19 06:00








Intake and Output: 


                                        











 01/20/19 01/20/19





 06:59 18:59


 


Intake Total 240 


 


Balance 240 














- Medications


Medications: 


                               Current Medications





Famotidine (Pepcid)  40 mg PO 0600 [facility]


   Last Admin: 01/20/19 07:06 Dose:  40 mg


Folic Acid (Folic Acid)  1 mg PO DAILY [facility]


   Last Admin: 01/20/19 10:25 Dose:  1 mg


Lorazepam (Ativan)  1 mg IVP Q4H PRN; Protocol


   PRN Reason: Agitation


   Last Admin: 01/20/19 07:07 Dose:  1 mg


Lorazepam (Ativan)  1 mg IVP Q6H [facility]; Protocol


   Last Admin: 01/20/19 10:25 Dose:  1 mg


Mirtazapine (Remeron)  15 mg PO HS [facility]


   Last Admin: 01/19/19 23:26 Dose:  15 mg


Multivitamins/Minerals (Therapeutic-M Tab)  1 tab PO DAILY [facility]


   Last Admin: 01/20/19 10:25 Dose:  1 tab


Ondansetron HCl (Zofran Inj)  4 mg IVP Q4H PRN


   PRN Reason: Nausea/Vomiting


Polyethylene Glycol (Miralax)  17 gm PO DAILY PRN


   PRN Reason: Constipation


Polyethylene Glycol (Miralax)  17 gm PO ONCE ONE


   Stop: 01/20/19 12:42


Risperidone (Risperdal Tab)  1 mg PO QAM [facility]; Protocol


   Last Admin: 01/20/19 10:25 Dose:  1 mg


Risperidone (Risperdal Tab)  2 mg PO HS [facility]; Protocol


   Last Admin: 01/19/19 23:26 Dose:  2 mg


Thiamine HCl (Vitamin B1 Tab)  100 mg PO DAILY [facility]


   Last Admin: 01/20/19 10:25 Dose:  100 mg











- Labs


Labs: 


                                        





                                 01/20/19 05:00 





                                 01/20/19 05:00 











Attending/Attestation





- Attestation


I have personally seen and examined this patient.: Yes


I have fully participated in the care of the patient.: Yes


I have reviewed all pertinent clinical information, including history, physical 

exam and plan: Yes


Notes (Text): 


Patient seen and examined by me with resident at 8:45AM on 1/19/19.  Case 

including HPI, physical exam, and assessment and plan discussed with resident. 

Agree with above with following additions/corrections.


Patient is a 63 year old female with past medical history significant for 

alcohol abuse, endometriosis, chronic left neck pain, alcoholic hepatitis, fall,

 and anxiety that presented to the emergency room for alcohol withdrawal.


Patient is more awake today. States she is not feeling "too great." Patient 

states that she feels her nose is a little congested and her throat feels 

"scratchy." Patient is tolerating diet. She states she is still having some 

tremors. No chest pain or shortness of breath. No palpitations. No nausea, 

vomiting, or abdomen. No headaches or dizziness. No fevers or chills. No 

dysuria. Patient states that she has not had a bowel movement.


Physical exam:


General: Sitting up in bed in no acute distress


HEENT: Normocephalic, atraumatic. Pupils equal and reactive, no scleral icterus.

 Neck is supple. 


Cardiovascular: Regular rhythm. Normal S1 and S2. No murmurs, rubs, or gallops 

appreciated


Pulmonary: Normal respiratory effort. No rhonchi, rales, or wheezing appreciated


Gastrointestinal: Soft, nondistended. Nontender. Positive bowel sounds all 4 

quadrants. No guarding. 


Musculoskeletal: Moves all extremities. No calf tenderness. No edema. 


Central nervous system: AAO x 3. CN 2-12 grossly intact. Mild tremors in upper 

extremities. 


Dermatologic: Skin warm and dry. 


Assessment and plan: Patient is a 63 year old female with past medical history 

significant for alcohol abuse, endometriosis, chronic left neck pain, alcoholic 

hepatitis, fall,  and anxiety that presented to the emergency room for alcohol 

withdrawal.


1. Nasal congestion. Throat discomfort. Afebrile. No leukocytosis. Likely viral.

Will rule out influenza. 


2. Alcohol abuse and withdrawal. Continue CIWA protocol. Continue thiamine, 

folic acid, and MVI. Continue Ativan scheduled and prn, continue to taper. 

Patient was counseled on alcohol cessation. Psychiatry following, 

recommendations appreciated.


3. Elevated LFTS. Resolved. Secondary to alcohol abuse.  Continue to monitor. 


4. Hypokalemia. Resolved Continue to monitor.


5. Hypomagnesemia. Resolved. Continue to monitor. 


6. Thrombocytopenia. Resolving. Secondary to ETOH abuse.  Continue to monitor. 


7. Anxiety/hallucinations/agitation. Continue risperidone. Continue Remeron at 

bedtime.  Psychiatry following, recommendations appreciated. 


8. GI/DVT prophylaxis. Pepcid/SCDS


9. Patient is a full code.


Case was discussed in detail with the patient regarding current diagnosis and 

treatment plan. All questions answered.

## 2019-01-21 VITALS — RESPIRATION RATE: 16 BRPM | SYSTOLIC BLOOD PRESSURE: 107 MMHG | DIASTOLIC BLOOD PRESSURE: 69 MMHG

## 2019-01-21 VITALS — TEMPERATURE: 98 F | OXYGEN SATURATION: 95 %

## 2019-01-21 VITALS — HEART RATE: 106 BPM

## 2019-01-21 RX ADMIN — MULTIPLE VITAMINS W/ MINERALS TAB SCH TAB: TAB at 09:58

## 2019-01-21 NOTE — CP.PCM.DIS
<Baudilio Chavira - Last Filed: 01/21/19 12:23>





Provider





- Provider


Date of Admission: 


01/17/19 15:30





Attending physician: 


Aníbal Vázquez MD





Consults: 








01/17/19 10:12


Psychiatry Consult Routine 


   Comment: 


   Consulting Provider: Alba Masters


   Consulting Physician: Alba Masters


   Reason for Consult: intermittent confusion, hallucinations, alcohol abuse hx











Time Spent in preparation of Discharge (in minutes): 45





Diagnosis





- Discharge Diagnosis


(1) Alcohol withdrawal syndrome


Status: Resolved   





(2) Delirium


Status: Resolved   Priority: High   





Hospital Course





- Lab Results


Lab Results: 


                             Most Recent Lab Values











WBC  6.5 10^3/uL (4.5-11.0)   01/20/19  05:00    


 


RBC  4.50 10^6/uL (3.5-6.1)   01/20/19  05:00    


 


Hgb  14.0 g/dL (12.0-16.0)   01/20/19  05:00    


 


Hct  43.6 % (36.0-48.0)   01/20/19  05:00    


 


MCV  96.9 fl (80.0-105.0)   01/20/19  05:00    


 


MCH  31.1 pg (25.0-35.0)   01/20/19  05:00    


 


MCHC  32.1 g/dl (31.0-37.0)   01/20/19  05:00    


 


RDW  16.0 % (11.5-14.5)  H  01/20/19  05:00    


 


Plt Count  98 10^3/uL (120.0-450.0)  L  01/20/19  05:00    


 


MPV  10.1 fl (7.0-11.0)   01/20/19  05:00    


 


Gran %  60.9 % (50.0-68.0)   01/20/19  05:00    


 


Lymph % (Auto)  25.4 % (22.0-35.0)   01/20/19  05:00    


 


Mono % (Auto)  12.4 % (1.0-6.0)  H  01/20/19  05:00    


 


Eos % (Auto)  1.1 % (1.5-5.0)  L  01/20/19  05:00    


 


Baso % (Auto)  0.2 % (0.0-3.0)   01/20/19  05:00    


 


Gran #  3.94  (1.4-6.5)   01/20/19  05:00    


 


Lymph # (Auto)  1.6  (1.2-3.4)   01/20/19  05:00    


 


Mono # (Auto)  0.8  (0.1-0.6)  H  01/20/19  05:00    


 


Eos # (Auto)  0.1  (0.0-0.7)   01/20/19  05:00    


 


Baso # (Auto)  0.01 K/mm3 (0.0-2.0)   01/20/19  05:00    


 


Sodium  139 mmol/L (132-148)   01/20/19  05:00    


 


Potassium  4.5 mmol/L (3.6-5.0)   01/20/19  05:00    


 


Chloride  108 mmol/L ()  H  01/20/19  05:00    


 


Carbon Dioxide  21 mmol/L (21-33)   01/20/19  05:00    


 


Anion Gap  14  (10-20)   01/20/19  05:00    


 


BUN  22 mg/dL (7-21)  H  01/20/19  05:00    


 


Creatinine  0.9 mg/dl (0.7-1.2)   01/20/19  05:00    


 


Est GFR ( Amer)  > 60   01/20/19  05:00    


 


Est GFR (Non-Af Amer)  > 60   01/20/19  05:00    


 


Random Glucose  111 mg/dL ()  H  01/20/19  05:00    


 


Calcium  10.7 mg/dL (8.4-10.5)  H  01/20/19  05:00    


 


Phosphorus  4.6 mg/dL (2.5-4.5)  H  01/20/19  05:00    


 


Magnesium  2.1 mg/dL (1.7-2.2)   01/20/19  05:00    


 


Total Bilirubin  0.9 mg/dL (0.2-1.3)   01/20/19  05:00    


 


AST  35 U/L (14-36)   01/20/19  05:00    


 


ALT  22 U/L (7-56)   01/20/19  05:00    


 


Alkaline Phosphatase  96 U/L ()   01/20/19  05:00    


 


Total Protein  8.0 g/dL (5.8-8.3)   01/20/19  05:00    


 


Albumin  4.4 g/dL (3.0-4.8)   01/20/19  05:00    


 


Globulin  3.6 gm/dL  01/20/19  05:00    


 


Albumin/Globulin Ratio  1.2  (1.1-1.8)   01/20/19  05:00    


 


Lipase  145 U/L ()   01/16/19  06:30    


 


Urine Color  Yellow  (YELLOW)   01/16/19  16:20    


 


Urine Appearance  Sl cloudy  (CLEAR)   01/16/19  16:20    


 


Urine pH  6.5  (4.7-8.0)   01/16/19  16:20    


 


Ur Specific Gravity  1.010  (1.005-1.035)   01/16/19  16:20    


 


Urine Protein  Negative mg/dL (<30 mg/dL)   01/16/19  16:20    


 


Urine Glucose (UA)  Negative mg/dL (NEGATIVE)   01/16/19  16:20    


 


Urine Ketones  40 mg/dL (NEGATIVE)  H  01/16/19  16:20    


 


Urine Blood  Small  (NEGATIVE)  H  01/16/19  16:20    


 


Urine Nitrate  Negative  (NEGATIVE)   01/16/19  16:20    


 


Urine Bilirubin  Small  (NEGATIVE)  H  01/16/19  16:20    


 


Urine Urobilinogen  0.2 E.U./dL (<1 E.U./dL)   01/16/19  16:20    


 


Ur Leukocyte Esterase  Negative Danny/uL (NEGATIVE)   01/16/19  16:20    


 


Urine RBC  2 - 5 /hpf (0-2)  H  01/16/19  16:20    


 


Urine WBC  2 - 5 /hpf (0-6)   01/16/19  16:20    


 


Ur Epithelial Cells  6 - 8 /hpf (0-5)  H  01/16/19  16:20    


 


Urine Bacteria  Mod /hpf (NONE)   01/16/19  16:20    


 


Salicylates  < 1 mg/dL (2.0-20.0)  L  01/15/19  17:52    


 


Urine Opiates Screen  Negative  (NEGATIVE)   01/16/19  16:20    


 


Urine Methadone Screen  Negative  (NEGATIVE)   01/16/19  16:20    


 


Acetaminophen  < 10.0 ug/ml (10.0-20.0)  L  01/15/19  17:52    


 


Ur Barbiturates Screen  Negative  (NEGATIVE)   01/16/19  16:20    


 


Ur Phencyclidine Scrn  Negative  (NEGATIVE)   01/16/19  16:20    


 


Ur Amphetamines Screen  Negative  (NEGATIVE)   01/16/19  16:20    


 


U Benzodiazepines Scrn  Positive  (NEGATIVE)  H  01/16/19  16:20    


 


U Oth Cocaine Metabols  Negative  (NEGATIVE)   01/16/19  16:20    


 


U Cannabinoids Screen  Negative  (NEGATIVE)   01/16/19  16:20    


 


Alcohol, Quantitative  47 mg/dL (0-10)  H  01/15/19  17:52    


 


Influenza Typ A,B (EIA)  Negative for flu a/b  (NEGATIVE)   01/20/19  09:15    














- Hospital Course


Hospital Course: 





Upon Admission: 


63 year old female with past medical history of alcohol abuse, endometriosis, 

alcoholic hepatitis, chronic left sided neck pain, and anxiety who presented to 

the emergency room for with tremor and anxiety since earlier that dy. Patient 

stated she drinks 1 pint of vodka almost every day and her last drink was this 

morning. She states that she is in withdrawal and started having tremors this 

morning. She took Valium but the her symptoms persists. She also states that she

started to feel anxious this morning





Hospital Course: 


Pt was treated in the hospital for alcohol withdrawals. She had markedly 

elevated CIWA protocol throughout the hospital stay. Initially, she was in the 

delirium stages of alcohol withdrawal. She was evaluated by psychiatry who 

adjusted medications and started antipsychotics. She was also treated with 

multivitamins, thiamine and folic acid supplements. She improved throughout 

hospital course. On hospital day 7, pt requested multiple times that she wanted 

to leave the hospital against medical advice. Here most recent CIWA score was 0.

Upon interview, pt was not showing signs of withdrawal. She was AxO x 4 and 

demonstrated adequate decision making capacity. She reported she has a home to 

return to, and reported that is she going to her an appointment with her 

physician today. She was explained, at length, and in great detail the benefits 

risks and alternatives to remaining in the hospital. 








Discharge Exam





- Head Exam


Head Exam: ATRAUMATIC, NORMOCEPHALIC





- Eye Exam


Eye Exam: EOMI, Normal appearance





- ENT Exam


ENT Exam: Mucous Membranes Moist, Normal Exam





- Neck Exam


Neck exam: Normal Inspection





- Respiratory Exam


Respiratory Exam: Clear to PA & Lateral, NORMAL BREATHING PATTERN





- Cardiovascular Exam


Cardiovascular Exam: +S1, +S2





- GI/Abdominal Exam


GI & Abdominal Exam: Normal Bowel Sounds, Soft





- Extremities Exam


Extremities exam: normal inspection





- Back Exam


Back exam: NORMAL INSPECTION





- Neurological Exam


Neurological exam: Alert, Oriented x3





- Psychiatric Exam


Psychiatric exam: Normal Affect, Normal Mood





- Skin


Skin Exam: Dry, Intact, Warm





Discharge Plan





- Follow Up Plan


Condition: STABLE


Disposition: AGAINST MEDICAL ADVICE





<Aníbal Vázquez - Last Filed: 01/21/19 15:09>





Provider





- Provider


Date of Admission: 


01/17/19 15:30





Attending physician: 


Aníbal Vázquez MD





Consults: 








01/17/19 10:12


Psychiatry Consult Routine 


   Comment: 


   Consulting Provider: Alba Masters


   Consulting Physician: Alba Masters


   Reason for Consult: intermittent confusion, hallucinations, alcohol abuse hx














Hospital Course





- Lab Results


Lab Results: 


                             Most Recent Lab Values











WBC  6.5 10^3/uL (4.5-11.0)   01/20/19  05:00    


 


RBC  4.50 10^6/uL (3.5-6.1)   01/20/19  05:00    


 


Hgb  14.0 g/dL (12.0-16.0)   01/20/19  05:00    


 


Hct  43.6 % (36.0-48.0)   01/20/19  05:00    


 


MCV  96.9 fl (80.0-105.0)   01/20/19  05:00    


 


MCH  31.1 pg (25.0-35.0)   01/20/19  05:00    


 


MCHC  32.1 g/dl (31.0-37.0)   01/20/19  05:00    


 


RDW  16.0 % (11.5-14.5)  H  01/20/19  05:00    


 


Plt Count  98 10^3/uL (120.0-450.0)  L  01/20/19  05:00    


 


MPV  10.1 fl (7.0-11.0)   01/20/19  05:00    


 


Gran %  60.9 % (50.0-68.0)   01/20/19  05:00    


 


Lymph % (Auto)  25.4 % (22.0-35.0)   01/20/19  05:00    


 


Mono % (Auto)  12.4 % (1.0-6.0)  H  01/20/19  05:00    


 


Eos % (Auto)  1.1 % (1.5-5.0)  L  01/20/19  05:00    


 


Baso % (Auto)  0.2 % (0.0-3.0)   01/20/19  05:00    


 


Gran #  3.94  (1.4-6.5)   01/20/19  05:00    


 


Lymph # (Auto)  1.6  (1.2-3.4)   01/20/19  05:00    


 


Mono # (Auto)  0.8  (0.1-0.6)  H  01/20/19  05:00    


 


Eos # (Auto)  0.1  (0.0-0.7)   01/20/19  05:00    


 


Baso # (Auto)  0.01 K/mm3 (0.0-2.0)   01/20/19  05:00    


 


Sodium  139 mmol/L (132-148)   01/20/19  05:00    


 


Potassium  4.5 mmol/L (3.6-5.0)   01/20/19  05:00    


 


Chloride  108 mmol/L ()  H  01/20/19  05:00    


 


Carbon Dioxide  21 mmol/L (21-33)   01/20/19  05:00    


 


Anion Gap  14  (10-20)   01/20/19  05:00    


 


BUN  22 mg/dL (7-21)  H  01/20/19  05:00    


 


Creatinine  0.9 mg/dl (0.7-1.2)   01/20/19  05:00    


 


Est GFR ( Amer)  > 60   01/20/19  05:00    


 


Est GFR (Non-Af Amer)  > 60   01/20/19  05:00    


 


Random Glucose  111 mg/dL ()  H  01/20/19  05:00    


 


Calcium  10.7 mg/dL (8.4-10.5)  H  01/20/19  05:00    


 


Phosphorus  4.6 mg/dL (2.5-4.5)  H  01/20/19  05:00    


 


Magnesium  2.1 mg/dL (1.7-2.2)   01/20/19  05:00    


 


Total Bilirubin  0.9 mg/dL (0.2-1.3)   01/20/19  05:00    


 


AST  35 U/L (14-36)   01/20/19  05:00    


 


ALT  22 U/L (7-56)   01/20/19  05:00    


 


Alkaline Phosphatase  96 U/L ()   01/20/19  05:00    


 


Total Protein  8.0 g/dL (5.8-8.3)   01/20/19  05:00    


 


Albumin  4.4 g/dL (3.0-4.8)   01/20/19  05:00    


 


Globulin  3.6 gm/dL  01/20/19  05:00    


 


Albumin/Globulin Ratio  1.2  (1.1-1.8)   01/20/19  05:00    


 


Lipase  145 U/L ()   01/16/19  06:30    


 


Urine Color  Yellow  (YELLOW)   01/16/19  16:20    


 


Urine Appearance  Sl cloudy  (CLEAR)   01/16/19  16:20    


 


Urine pH  6.5  (4.7-8.0)   01/16/19  16:20    


 


Ur Specific Gravity  1.010  (1.005-1.035)   01/16/19  16:20    


 


Urine Protein  Negative mg/dL (<30 mg/dL)   01/16/19  16:20    


 


Urine Glucose (UA)  Negative mg/dL (NEGATIVE)   01/16/19  16:20    


 


Urine Ketones  40 mg/dL (NEGATIVE)  H  01/16/19  16:20    


 


Urine Blood  Small  (NEGATIVE)  H  01/16/19  16:20    


 


Urine Nitrate  Negative  (NEGATIVE)   01/16/19  16:20    


 


Urine Bilirubin  Small  (NEGATIVE)  H  01/16/19  16:20    


 


Urine Urobilinogen  0.2 E.U./dL (<1 E.U./dL)   01/16/19  16:20    


 


Ur Leukocyte Esterase  Negative Danny/uL (NEGATIVE)   01/16/19  16:20    


 


Urine RBC  2 - 5 /hpf (0-2)  H  01/16/19  16:20    


 


Urine WBC  2 - 5 /hpf (0-6)   01/16/19  16:20    


 


Ur Epithelial Cells  6 - 8 /hpf (0-5)  H  01/16/19  16:20    


 


Urine Bacteria  Mod /hpf (NONE)   01/16/19  16:20    


 


Salicylates  < 1 mg/dL (2.0-20.0)  L  01/15/19  17:52    


 


Urine Opiates Screen  Negative  (NEGATIVE)   01/16/19  16:20    


 


Urine Methadone Screen  Negative  (NEGATIVE)   01/16/19  16:20    


 


Acetaminophen  < 10.0 ug/ml (10.0-20.0)  L  01/15/19  17:52    


 


Ur Barbiturates Screen  Negative  (NEGATIVE)   01/16/19  16:20    


 


Ur Phencyclidine Scrn  Negative  (NEGATIVE)   01/16/19  16:20    


 


Ur Amphetamines Screen  Negative  (NEGATIVE)   01/16/19  16:20    


 


U Benzodiazepines Scrn  Positive  (NEGATIVE)  H  01/16/19  16:20    


 


U Oth Cocaine Metabols  Negative  (NEGATIVE)   01/16/19  16:20    


 


U Cannabinoids Screen  Negative  (NEGATIVE)   01/16/19  16:20    


 


Alcohol, Quantitative  47 mg/dL (0-10)  H  01/15/19  17:52    


 


Influenza Typ A,B (EIA)  Negative for flu a/b  (NEGATIVE)   01/20/19  09:15    














Attending/Attestation





- Attestation


I have personally seen and examined this patient.: Yes


I have fully participated in the care of the patient.: Yes


I have reviewed all pertinent clinical information, including history, physical 

exam and plan: Yes


Notes (Text): 





01/21/19 15:05





attending note;





Patient seen and examined with resident.


Walking in the hallway.


 Denies any chest pain, shortness of breath.


Denies any palpitation.


Denies any nausea, vomiting.


Tolerating diet well.





Patient wanted to sign AMA. She has appointment with Dr. Fernandez psychiatrist 

today at 12 PM.





Patient is a 63 year old female with past medical history significant for 

alcohol abuse, endometriosis, chronic left neck pain, alcoholic hepatitis, fall,

 and anxiety that presented to the emergency room for alcohol withdrawal.








1. Alcohol abuse and withdrawal.  Continue thiamine, folic acid, and MVI. 

Continue Ativan scheduled and prn, continue to taper. Patient was counseled on 

alcohol cessation. 


Psychiatric evaluation appreciated. Patient is alert and  awake. Denies any 

suicidal, homicidal ideation.


2. Elevated LFTS. Resolved. Secondary to alcohol abuse.  Continue to monitor. 


3. Hypokalemia. Resolved.


4. Hypomagnesemia. Resolved. 





patient signed AGAINST MEDICAL ADVICE.





Patient will follow-up with PMD/psychiatrist Dr. Fernandez today as per patient.





Prognosis is poor secondary to continuous alcohol abuse and noncompliance with 

follow-up.

## 2019-02-11 ENCOUNTER — HOSPITAL ENCOUNTER (INPATIENT)
Dept: HOSPITAL 42 - ED | Age: 64
LOS: 3 days | Discharge: LEFT BEFORE DISCHARGE | DRG: 749 | End: 2019-02-14
Attending: INTERNAL MEDICINE | Admitting: FAMILY MEDICINE
Payer: MEDICAID

## 2019-02-11 VITALS — BODY MASS INDEX: 23 KG/M2

## 2019-02-11 DIAGNOSIS — F41.9: ICD-10-CM
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
## 2019-02-11 DIAGNOSIS — Z87.891: ICD-10-CM

## 2019-02-11 DIAGNOSIS — K70.10: ICD-10-CM

## 2019-02-11 DIAGNOSIS — S92.912A: ICD-10-CM

## 2019-02-11 DIAGNOSIS — Z91.14: ICD-10-CM

## 2019-02-11 DIAGNOSIS — F10.231: Primary | ICD-10-CM

## 2019-02-11 DIAGNOSIS — I10: ICD-10-CM

## 2019-02-11 DIAGNOSIS — F32.9: ICD-10-CM
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
## 2019-02-11 DIAGNOSIS — N80.9: ICD-10-CM

## 2019-02-11 DIAGNOSIS — M77.32: ICD-10-CM

## 2019-02-11 LAB
ALBUMIN SERPL-MCNC: 4.9 G/DL (ref 3–4.8)
ALBUMIN/GLOB SERPL: 1.3 {RATIO} (ref 1.1–1.8)
ALT SERPL-CCNC: 17 U/L (ref 7–56)
AST SERPL-CCNC: 55 U/L (ref 14–36)
BASOPHILS # BLD AUTO: 0.03 K/MM3 (ref 0–2)
BASOPHILS NFR BLD: 0.3 % (ref 0–3)
BUN SERPL-MCNC: 15 MG/DL (ref 7–21)
CALCIUM SERPL-MCNC: 10.2 MG/DL (ref 8.4–10.5)
EOSINOPHIL # BLD: 0.1 10*3/UL (ref 0–0.7)
EOSINOPHIL NFR BLD: 0.6 % (ref 1.5–5)
ERYTHROCYTE [DISTWIDTH] IN BLOOD BY AUTOMATED COUNT: 14.7 % (ref 11.5–14.5)
GFR NON-AFRICAN AMERICAN: > 60
HGB BLD-MCNC: 14.5 G/DL (ref 12–16)
LYMPHOCYTES # BLD: 2.5 10*3/UL (ref 1.2–3.4)
LYMPHOCYTES NFR BLD AUTO: 23.8 % (ref 22–35)
MCH RBC QN AUTO: 31.5 PG (ref 25–35)
MCHC RBC AUTO-ENTMCNC: 33.9 G/DL (ref 31–37)
MCV RBC AUTO: 92.8 FL (ref 80–105)
MONOCYTES # BLD AUTO: 0.5 10*3/UL (ref 0.1–0.6)
MONOCYTES NFR BLD: 4.5 % (ref 1–6)
PLATELET # BLD: 289 10^3/UL (ref 120–450)
PMV BLD AUTO: 8.6 FL (ref 7–11)
RBC # BLD AUTO: 4.61 10^6/UL (ref 3.5–6.1)
WBC # BLD AUTO: 10.7 10^3/UL (ref 4.5–11)
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
## 2019-02-11 NOTE — CT
Date of service: 02/11/2019



PROCEDURE:  CT HEAD WITHOUT CONTRAST.



HISTORY:

fall



COMPARISON:

Noncontrast head CT performed 12/11/18 



TECHNIQUE:

Axial computed tomography images were obtained through the head/brain 

without intravenous contrast.  



Radiation dose:



Total exam DLP = 738.08 mGy-cm.



This CT exam was performed using one or more of the following dose 

reduction techniques: Automated exposure control, adjustment of the 

mA and/or kV according to patient size, and/or use of iterative 

reconstruction technique.



FINDINGS:



HEMORRHAGE:

No intracranial hemorrhage. 



BRAIN:

Diffuse atrophy with prominence of the ventricles and sulci noted. 



No mass effect or edema. Intracranial atherosclerosis. 



Scattered periventricular and subcortical white matter hypodensities, 

which are nonspecific, but often seen with chronic microvascular 

ischemic disease. 



Please note that MRI with diffusion imaging is more sensitive in the 

detection of acute ischemic event.



VENTRICLES:

No hydrocephalus. 



CALVARIUM:

Unremarkable.



PARANASAL SINUSES:

Unremarkable as visualized. No significant inflammatory changes.



MASTOID AIR CELLS:

Unremarkable as visualized. No inflammatory changes.



OTHER FINDINGS:

None.



IMPRESSION:

No acute intracranial pathology identified.
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
## 2019-02-12 VITALS — RESPIRATION RATE: 20 BRPM

## 2019-02-12 LAB
ALBUMIN SERPL-MCNC: 4.6 G/DL (ref 3–4.8)
ALBUMIN/GLOB SERPL: 1.3 {RATIO} (ref 1.1–1.8)
ALT SERPL-CCNC: 12 U/L (ref 7–56)
AST SERPL-CCNC: 49 U/L (ref 14–36)
BASOPHILS # BLD AUTO: 0.04 K/MM3 (ref 0–2)
BASOPHILS NFR BLD: 0.4 % (ref 0–3)
BUN SERPL-MCNC: 15 MG/DL (ref 7–21)
CALCIUM SERPL-MCNC: 9.9 MG/DL (ref 8.4–10.5)
EOSINOPHIL # BLD: 0 10*3/UL (ref 0–0.7)
EOSINOPHIL NFR BLD: 0.3 % (ref 1.5–5)
ERYTHROCYTE [DISTWIDTH] IN BLOOD BY AUTOMATED COUNT: 14.5 % (ref 11.5–14.5)
GFR NON-AFRICAN AMERICAN: > 60
HGB BLD-MCNC: 13.8 G/DL (ref 12–16)
LYMPHOCYTES # BLD: 2.5 10*3/UL (ref 1.2–3.4)
LYMPHOCYTES NFR BLD AUTO: 22.8 % (ref 22–35)
MCH RBC QN AUTO: 31.4 PG (ref 25–35)
MCHC RBC AUTO-ENTMCNC: 34.1 G/DL (ref 31–37)
MCV RBC AUTO: 92.3 FL (ref 80–105)
MONOCYTES # BLD AUTO: 0.5 10*3/UL (ref 0.1–0.6)
MONOCYTES NFR BLD: 4.3 % (ref 1–6)
PLATELET # BLD: 255 10^3/UL (ref 120–450)
PMV BLD AUTO: 8.4 FL (ref 7–11)
RBC # BLD AUTO: 4.39 10^6/UL (ref 3.5–6.1)
WBC # BLD AUTO: 11.1 10^3/UL (ref 4.5–11)

## 2019-02-12 RX ADMIN — THERA TABS SCH TAB: TAB at 10:25

## 2019-02-12 RX ADMIN — ENOXAPARIN SODIUM SCH MG: 40 INJECTION SUBCUTANEOUS at 10:24

## 2019-02-12 NOTE — RAD
Date of service: 



02/11/2019



PROCEDURE:  Left Knee Radiographs.



HISTORY:

Pain.



COMPARISON:

None.



FINDINGS:



BONES:

Normal. No fracture. 



JOINTS:

Normal. No osteoarthritis. 



JOINT EFFUSION:

None. 



OTHER FINDINGS:

None.



IMPRESSION:

Normal radiographs of the left knee.

## 2019-02-12 NOTE — RAD
Date of service: 



02/11/2019



PROCEDURE:  Left Foot Radiographs.



HISTORY:

 pain 



COMPARISON:

None.



FINDINGS:



BONES:

No fracture.  Small plantar calcaneal spur. 



JOINTS:

Normal. 



SOFT TISSUES:

Normal. 



OTHER FINDINGS:

None.



IMPRESSION:

No acute fracture.  Plantar calcaneal spur.

## 2019-02-12 NOTE — CP.PCM.PCO
Physician Communication Note





- Physician Communication Note


Physician Communication Note: pt is in ED still, will f/u on pt tomorrow

## 2019-02-12 NOTE — RAD
Date of service: 



02/11/2019



PROCEDURE:  Left Wrist Radiographs.







HISTORY:

pain



COMPARISON:

None.



FINDINGS:



BONES:

Normal. No fracture. 



JOINTS:

Normal. No dislocation. 



SOFT TISSUES:

Normal. 



OTHER FINDINGS:

None.



IMPRESSION:

Normal left wrist radiographs.

## 2019-02-12 NOTE — CP.PCM.PN
<Nedra Duque - Last Filed: 02/12/19 13:00>





Subjective





- Date & Time of Evaluation


Date of Evaluation: 02/12/19


Time of Evaluation: 09:13





- Subjective


Subjective: 





PGY1 Medicine Progress Note for Dr. Mcgowan


Patient was seen and evaluated at bedside this morning. Patient is agitated and 

states she is very anxious and depressed due to the recent passing of her close 

friend. Patient admits to tremors, nausea, but denies chest pain, dyspnea, 

abdominal pain, fever, chills, diarrhea, headache, and/or lower extremity 

pain/numbness/tingling. 





Objective





- Vital Signs/Intake and Output


Vital Signs (last 24 hours): 


                                        











Temp Pulse Resp BP Pulse Ox


 


 97.9 F   96 H  20   116/66   99 


 


 02/11/19 16:20  02/11/19 16:20  02/11/19 16:20  02/11/19 16:20  02/11/19 16:20











- Medications


Medications: 


                               Current Medications





Chlordiazepoxide (Librium)  50 mg PO Q8H [facility]; Protocol


   Last Admin: 02/12/19 06:13 Dose:  50 mg


Enoxaparin Sodium (Lovenox)  40 mg SC DAILY [facility]; Protocol


Folic Acid (Folic Acid)  1 mg PO DAILY [facility]


Magnesium Sulfate (Magnesium Sulfate 2 Gm/50 Ml Water)  2 gm in 50 mls @ 50 

mls/hr IVPB ONCE ONE


   Stop: 02/12/19 09:18


   Last Admin: 02/12/19 08:46 Dose:  50 mls/hr


Lorazepam (Ativan)  1 mg IVP Q4H [facility]N; Protocol


   PRN Reason: Symptoms of alcohol withdrawl


   Last Admin: 02/12/19 08:34 Dose:  1 mg


Multivitamins (Thera Tab)  1 tab PO DAILY [facility]


Ondansetron HCl (Zofran Inj)  4 mg IVP Q6H PRN


   PRN Reason: Nausea/Vomiting


Thiamine HCl (Vitamin B1 Tab)  100 mg PO DAILY [facility]











- Labs


Labs: 


                                        





                                 02/11/19 23:40 





                                 02/11/19 23:40 











- Additional Findings


Additional findings: 





- Additional Findings


Additional findings: 





- Constitutional


Appears: Acute Distress 





- Head Exam


Head Exam: ATRAUMATIC, NORMOCEPHALIC





- Eye Exam


Eye Exam: EOMI, Normal appearance





- ENT Exam


ENT Exam: Mucous Membranes Dry





- Neck Exam


Neck exam: Positive for: Normal Inspection





- Respiratory Exam


Respiratory Exam: Clear to Auscultation Bilateral, NORMAL BREATHING PATTERN.  

absent: Decreased Breath Sounds, Rales, Rhonchi, Wheezes, Respiratory Distress





- Cardiovascular Exam


Cardiovascular Exam: Tachycardia, +S1, +S2.  absent: Gallop, Rubs, Systolic 

Murmur





- GI/Abdominal Exam


GI & Abdominal Exam: Normal Bowel Sounds, Soft.  absent: Distended, Guarding, Te

nderness





- Extremities Exam


Extremities exam: Positive for: normal inspection.  Negative for: calf 

tenderness, pedal edema


Additional comments:





LLE 5th toe ROM intact with no sensation changes 


DPPT pulses 2+ 





- Back Exam


Back exam: absent: CVA tenderness (L), CVA tenderness (R)





- Neurological Exam


Neurological exam: Alert, CN II-XII Intact, Oriented x3





- Psychiatric Exam


Psychiatric exam: Anxious





- Skin


Skin Exam: Dry, Intact, Normal Color, Warm





Assessment and Plan





- Assessment and Plan (Free Text)


Assessment: 





Patient is a 63 year old female with past medical history of alcohol abuse, 

alcoholic hepatitis, endometriosis, chronic left sided neck pain, and anxiety 

presenting with alcohol withdrawal and was also found to have fractured toe 

secondary to fall. 


Plan: 





Alcohol withdrawal


- Alcohol level 56 


- Head CT unremarkable 


- 2 mg Ativan, 50 mg Librium given in ED


- Banana bag x1; completed 


- Librium 50 mg PO Q8H 


- Folic acid, MVI, Thiamine 


- Zofran 4 mg IV Q4H PRN 


- Ativan 1 mg IV Q4H PRN 


- CIWA protocol 


- Neurochecks Q4H 


- Seizure, aspiration precautions 


- Cessation counseling 





Transaminitis, improving 


- AST/ALT mildly elevated on admission; downtrending today


- Monitor LFTs





Toe Fracture S/P Mechanical Fall 


- Foot x-ray shows fracture of proximal 5th toe 


- Tylenol 650mg PO was given in ED x2


- Podiatry consulted (Dr. Sanchez) recommendations appreciated 


- PT eval and treat; follow-up on recommendations


- Fall precautions 





S/P Mechanical Fall


- Patient denies being intoxicated at the time of fall


- Patient denies feeling lightheaded/dizzy and/or blacking out


- CT head without contrast negative for acute intracranial abnormalities


- Wrist x-ray: normal radiograph (see official report) 


- Knee x-ray: normal radiograph (see official report)  


- Fall precautions 





Anxiety/Depression 


- Psychiatry (Dr. Willis) consulted; recommendations appreciated





PPX


- DVT: Lovenox 40 mg SC daily ; SCD contraindicated 


- GI: not indicated at this time 





Patient seen and case discussed with Dr. Juan M Duque PGY1





<Aguilar Mcgowan - Last Filed: 02/12/19 17:46>





Objective





- Vital Signs/Intake and Output


Vital Signs (last 24 hours): 


                                        











Temp Pulse Resp BP Pulse Ox


 


 98.3 F   85   23   114/80   95 


 


 02/12/19 12:00  02/12/19 13:45  02/12/19 09:35  02/12/19 12:00  02/12/19 12:00











- Medications


Medications: 


                               Current Medications





Chlordiazepoxide (Librium)  50 mg PO Q8H [facility]; Protocol


   Last Admin: 02/12/19 13:18 Dose:  50 mg


Enoxaparin Sodium (Lovenox)  40 mg SC DAILY [facility]; Protocol


   Last Admin: 02/12/19 10:24 Dose:  40 mg


Folic Acid (Folic Acid)  1 mg PO DAILY [facility]


   Last Admin: 02/12/19 10:25 Dose:  1 mg


Lorazepam (Ativan)  1 mg IVP Q4H PRN; Protocol


   PRN Reason: Symptoms of alcohol withdrawl


   Last Admin: 02/12/19 17:02 Dose:  1 mg


Multivitamins (Thera Tab)  1 tab PO DAILY [facility]


   Last Admin: 02/12/19 10:25 Dose:  1 tab


Ondansetron HCl (Zofran Inj)  4 mg IVP Q6H PRN


   PRN Reason: Nausea/Vomiting


Thiamine HCl (Vitamin B1 Tab)  100 mg PO DAILY [facility]


   Last Admin: 02/12/19 10:25 Dose:  100 mg











- Labs


Labs: 


                                        





                                 02/11/19 23:40 





                                 02/11/19 23:40 











Attending/Attestation





- Attestation


I have personally seen and examined this patient.: Yes


I have fully participated in the care of the patient.: Yes


I have reviewed all pertinent clinical information, including history, physical 

exam and plan: Yes


Notes (Text): 





02/12/19 17:15


63 year old female with past medical history of alcohol abuse and anxiety who 

presented with complaint of foot pain s/p fall.  Admitted for alcohol 

withdrawal.  Counselled on alcohol abstinence.  Continue with banana bag.  C

ontinue with librium and ativan prn for withdrawal symptoms.  Mild transaminitis

likely secondary to ETOH abuse; continue to monitor while on librium.  Xrays 

were reviewed; foot xray shows plantar calcaneal spur.  PT evaluation is 

requested.  Patient complains of anxiety and depressed mood after recent loss of

friend.  Denies any thoughts of harming herself or others.  Patient was 

consoled.  Psychiatry evaluation is requested.





Aguilar Mcgowan MD


Hospitalist.

## 2019-02-13 LAB
ALBUMIN SERPL-MCNC: 4.2 G/DL (ref 3–4.8)
ALBUMIN/GLOB SERPL: 1.3 {RATIO} (ref 1.1–1.8)
ALT SERPL-CCNC: 13 U/L (ref 7–56)
AST SERPL-CCNC: 42 U/L (ref 14–36)
BASOPHILS # BLD AUTO: 0.02 K/MM3 (ref 0–2)
BASOPHILS NFR BLD: 0.3 % (ref 0–3)
BUN SERPL-MCNC: 10 MG/DL (ref 7–21)
CALCIUM SERPL-MCNC: 9.5 MG/DL (ref 8.4–10.5)
EOSINOPHIL # BLD: 0.2 10*3/UL (ref 0–0.7)
EOSINOPHIL NFR BLD: 2.8 % (ref 1.5–5)
ERYTHROCYTE [DISTWIDTH] IN BLOOD BY AUTOMATED COUNT: 14.6 % (ref 11.5–14.5)
GFR NON-AFRICAN AMERICAN: > 60
HGB BLD-MCNC: 13.6 G/DL (ref 12–16)
LYMPHOCYTES # BLD: 2.4 10*3/UL (ref 1.2–3.4)
LYMPHOCYTES NFR BLD AUTO: 33.8 % (ref 22–35)
MCH RBC QN AUTO: 30.8 PG (ref 25–35)
MCHC RBC AUTO-ENTMCNC: 33.2 G/DL (ref 31–37)
MCV RBC AUTO: 93 FL (ref 80–105)
MONOCYTES # BLD AUTO: 0.3 10*3/UL (ref 0.1–0.6)
MONOCYTES NFR BLD: 4.5 % (ref 1–6)
PLATELET # BLD: 214 10^3/UL (ref 120–450)
PMV BLD AUTO: 9 FL (ref 7–11)
RBC # BLD AUTO: 4.41 10^6/UL (ref 3.5–6.1)
WBC # BLD AUTO: 7.2 10^3/UL (ref 4.5–11)

## 2019-02-13 RX ADMIN — ENOXAPARIN SODIUM SCH MG: 40 INJECTION SUBCUTANEOUS at 10:44

## 2019-02-13 RX ADMIN — THERA TABS SCH TAB: TAB at 10:43

## 2019-02-13 NOTE — CP.PCM.PN
<Nedra Duque - Last Filed: 02/13/19 14:56>





Subjective





- Date & Time of Evaluation


Date of Evaluation: 02/13/19


Time of Evaluation: 07:47





- Subjective


Subjective: 





PGY1 Progress note for Dr. Mcgowan


Patient was seen and eavluated at bedside this morning. No acute events 

overnight. Patient has no new complaints. Patient denies headache, dizziness, 

chest pain, shortness of breath, abdominal pain, dysuria, numbness/tingling in l

ower extremities, nausea, vomiting, fever, and/or chills. Patient states her 

anxiety has improved only a little, and she feels that she still has tremors. 

CIWA score overnight=4, per nurse; this morning CIWA=2. 











Objective





- Vital Signs/Intake and Output


Vital Signs (last 24 hours): 


                                        











Temp Pulse Resp BP Pulse Ox


 


 97.6 F   71   20   119/85   97 


 


 02/13/19 06:00  02/13/19 06:00  02/13/19 06:00  02/13/19 06:00  02/13/19 06:00








Intake and Output: 


                                        











 02/13/19 02/13/19





 06:59 18:59


 


Intake Total  30


 


Output Total  500


 


Balance  -470














- Medications


Medications: 


                               Current Medications





Chlordiazepoxide (Librium)  50 mg PO Q8H [facility]; Protocol


   Last Admin: 02/13/19 05:29 Dose:  50 mg


Enoxaparin Sodium (Lovenox)  40 mg SC DAILY [facility]; Protocol


   Last Admin: 02/12/19 10:24 Dose:  40 mg


Folic Acid (Folic Acid)  1 mg PO DAILY [facility]


   Last Admin: 02/12/19 10:25 Dose:  1 mg


Lorazepam (Ativan)  1 mg IVP Q4H [facility]; Protocol


   PRN Reason: Symptoms of alcohol withdrawl


   Last Admin: 02/12/19 21:00 Dose:  1 mg


Multivitamins (Thera Tab)  1 tab PO DAILY [facility]


   Last Admin: 02/12/19 10:25 Dose:  1 tab


Ondansetron HCl (Zofran Inj)  4 mg IVP Q6H PRN


   PRN Reason: Nausea/Vomiting


Thiamine HCl (Vitamin B1 Tab)  100 mg PO DAILY [facility]


   Last Admin: 02/12/19 10:25 Dose:  100 mg











- Labs


Labs: 


                                        





                                 02/13/19 05:30 





                                 02/13/19 05:30 











- Additional Findings


Additional findings: 





- Constitutional


Appears: Acute Distress 





- Head Exam


Head Exam: ATRAUMATIC, NORMOCEPHALIC





- Eye Exam


Eye Exam: EOMI, Normal appearance





- ENT Exam


ENT Exam: Mucous Membranes Dry





- Neck Exam


Neck exam: Positive for: Normal Inspection





- Respiratory Exam


Respiratory Exam: Clear to Auscultation Bilateral, NORMAL BREATHING PATTERN.  

absent: Decreased Breath Sounds, Rales, Rhonchi, Wheezes, Respiratory Distress





- Cardiovascular Exam


Cardiovascular Exam: Tachycardia, +S1, +S2.  absent: Gallop, Rubs, Systolic 

Murmur





- GI/Abdominal Exam


GI & Abdominal Exam: Normal Bowel Sounds, Soft.  absent: Distended, Guarding, 

Tenderness





- Extremities Exam


Extremities exam: Positive for: normal inspection.  Negative for: calf tend

erness, pedal edema


Additional comments:





LLE 5th toe ROM intact with no sensation changes 


DPPT pulses 2+ 





- Back Exam


Back exam: absent: CVA tenderness (L), CVA tenderness (R)





- Neurological Exam


Neurological exam: Alert, CN II-XII Intact, Oriented x3





- Psychiatric Exam


Psychiatric exam: Anxious





- Skin


Skin Exam: Dry, Intact, Normal Color, Warm








Assessment and Plan





- Assessment and Plan (Free Text)


Assessment: 





Patient is a 63 year old female with past medical history of alcohol abuse, 

alcoholic hepatitis, endometriosis, chronic left sided neck pain, and anxiety 

presenting with alcohol withdrawal and was also found to have fractured toe 

secondary to fall. 


Plan: 





Alcohol withdrawal


- Alcohol level 56 


- Head CT unremarkable


- S/P Banana bag x1


- Will taper Librium 50 mg PO Q8H this evening if Patient is less anxious/CIWA 

improves 


- Continue Folic acid, MVI, Thiamine 


- Continue Zofran 4 mg IV Q4H PRN 


- Continue Ativan 1 mg IV Q4H PRN 


- CIWA protocol; today CIWA=2


- Continue Neurochecks Q4H 


- Seizure, aspiration precautions 


- Cessation counseling 





Transaminitis, improving 


- AST/ALT mildly elevated on admission; downtrending today


- Monitor LFTs





Toe Fracture S/P Mechanical Fall 


- Foot x-ray shows fracture of proximal 5th toe 


- Tylenol 650mg PO was given in ED x2


- Podiatry consulted (Dr. Sanchez) recommendations appreciated 


- PT eval and treat; follow-up on recommendations


- Fall precautions 





S/P Mechanical Fall


- Patient denies being intoxicated at the time of fall


- Patient denies feeling lightheaded/dizzy and/or blacking out


- CT head without contrast negative for acute intracranial abnormalities


- Wrist x-ray: normal radiograph (see official report) 


- Knee x-ray: normal radiograph (see official report)  


- Fall precautions 





Anxiety/Depression 


- Psychiatry (Dr. Willis) consulted; recommendations appreciated





PPX


- DVT: Lovenox 40 mg SC daily ; SCD contraindicated 


- GI: not indicated at this time 





Patient seen and case discussed with Dr. Juan M Duque PGY1








<Aguilar Mcgowan - Last Filed: 02/13/19 17:06>





Objective





- Vital Signs/Intake and Output


Vital Signs (last 24 hours): 


                                        











Temp Pulse Resp BP Pulse Ox


 


 98.4 F   102 H  20   100/64   95 


 


 02/13/19 16:00  02/13/19 16:00  02/13/19 16:00  02/13/19 16:00  02/13/19 16:00








Intake and Output: 


                                        











 02/13/19 02/13/19





 06:59 18:59


 


Intake Total  30


 


Output Total  500


 


Balance  -470














- Medications


Medications: 


                               Current Medications





Chlordiazepoxide (Librium)  25 mg PO Q6 [facility]; Protocol


Enoxaparin Sodium (Lovenox)  40 mg SC DAILY [facility]; Protocol


   Last Admin: 02/13/19 10:44 Dose:  40 mg


Folic Acid (Folic Acid)  1 mg PO DAILY [facility]


   Last Admin: 02/13/19 10:43 Dose:  1 mg


Lorazepam (Ativan)  1 mg IVP Q4H [facility]; Protocol


   PRN Reason: Symptoms of alcohol withdrawl


   Last Admin: 02/13/19 08:52 Dose:  1 mg


Mirtazapine (Remeron)  15 mg PO HS [facility]


Multivitamins (Thera Tab)  1 tab PO DAILY [facility]


   Last Admin: 02/13/19 10:43 Dose:  1 tab


Ondansetron HCl (Zofran Inj)  4 mg IVP Q6H PRN


   PRN Reason: Nausea/Vomiting


Risperidone (Risperdal Tab)  0.5 mg PO BID [facility]; Protocol


   Last Admin: 02/13/19 10:43 Dose:  0.5 mg


Thiamine HCl (Vitamin B1 Tab)  100 mg PO DAILY [facility]


   Last Admin: 02/13/19 10:44 Dose:  100 mg


Ziprasidone (Geodon Inj)  20 mg IM Q8 [facility]; Protocol


   PRN Reason: severe agitaiton/psychosis











- Labs


Labs: 


                                        





                                 02/13/19 05:30 





                                 02/13/19 05:30 











Attending/Attestation





- Attestation


I have personally seen and examined this patient.: Yes


I have fully participated in the care of the patient.: Yes


I have reviewed all pertinent clinical information, including history, physical 

exam and plan: Yes


Notes (Text): 





02/13/19 17:03


63 year old female with past medical history of alcohol abuse and anxiety who 

presented with complaint of foot pain s/p fall.  Admitted for alcohol 

withdrawal.  Counselled on alcohol abstinence.  Continue with multivitamin, 

folic acid and thiamine.  Continue with librium taper as tolerated and ativan 

prn for withdrawal symptoms.  Mild transaminitis likely secondary to ETOH abuse;

continue to monitor closley.  Xrays were reviewed; foot xray showed plantar 

calcaneal spur.  PT evaluation was appreciated; will follow up with 

recommendations.  Psychiatry evaluation today was appreciated as well.





Aguilar Mcgowan MD


Hospitalist.

## 2019-02-13 NOTE — CARD
--------------- APPROVED REPORT --------------





Date of service: 02/12/2019



EKG Measurement

Heart [id number]

CT [id number]

[id number]

[id number]

[id number]



<Conclusion>

Sinus rhythm with sinus arrhythmia 

Otherwise normal ECG

## 2019-02-13 NOTE — CON
DATE OF CONSULTATION:  02/12/2019



HISTORY OF PRESENT ILLNESS:  In short, the patient is a 63-year-old

 female with long and debilitating history of alcohol use

disorder.  The patient has a history of being in rehabs and detoxes in the

past.  The patient also has history of psychotic spectrum disorder and

bizarre delusions.  The patient has history of being admitted to the

psychiatric inpatient unit, most recent was in December 2018.  The patient

has chronic noncompliance with the medications and followup appointments. 

At this time, the patient was admitted to the medical side for evaluation

of alcohol withdrawal syndrome and also status post fall and injured her

left wrist.  Psych consult was called for evaluation of psychosis,

hallucinations, depressed mood.  This writer is very familiar with this

patient from the previous psychiatric admissions as well as consultation

services, which happen very frequent.  The patient presented today with

marginal personal hygiene.  There has very dirty hands and also some dirt

under her fingernails.  The patient was emotional, labile.  The patient

started her conversation with the fact that she is depressed from the

childhood.  The patient reported that her mother was embarrassing her like

at schools.  The patient's thought process was circumstantial and

tangential.  The patient was going to the fact that her mother is in

nursing home right now and broke two hips.  The patient reported that she

has nothing to do and nothing to help herself besides alcohol.  The patient

is not aware who is her outpatient psychiatrist.  This writer would like to

emphasize the fact that the patient has appointment with Dr. No at

[facility] on 01/18/2019.  The patient reported that she is off

medications, and she is not following up with any psychiatrist.  Most

likely, the patient did not follow up with Dr. No.  The patient reported

that she drinks about a pint of vodka on daily basis, and for past 7 days,

she was shrinking constantly.  The patient reported that her boyfriend of

many years is cheating on her.  She knows about that because he is taking

pictures of him and his new girlfriend on her cell phone obviously.  The

patient is delusional, and on last admission, the patient had the same six

false beliefs.  The patient is convinced that her boyfriend, and his new

girlfriend are against her, and the patient said that she is communicating

with his wife, and the patient is obviously paranoid and disorganized.



PHYSICAL EXAMINATION:

VITAL SIGNS:  Reviewed.  Temperature 97.6, pulse 71, blood pressure 119/85,

respirations 26, and saturation is 97.



MEDICATIONS:  Reviewed.  The patient is on Librium 50 mg every 8 hours

schedule.  The patient is on Lovenox, folic acid, Ativan 1 mg IV push every

4 hours for alcohol withdrawal symptoms.  The patient is on Zofran, vitamin

B1.



LABORATORY DATA:  Labs reviewed.  The patient had leukocytosis yesterday. 

AST and ALT are trending down.  Today, AST is 42 and ALT is 613. 

Toxicology, alcohol level was 56.  Last admission which took place here in

[facility]iatric [facility]patient Unit, the patient was

discharged on Risperdal 1 mg at the morning time, 2 mg at the nighttime. 

The patient was on ReVia 50 mg daily and Remeron 15 mg daily.  Most likely,

the patient was noncompliant with the medications..



MENTAL STATUS EXAMINATION:  The patient presented to be alert, tearful,

flat and angry affect.  Mood described "I'm always depressed, I have

nothing to calm himself down besides alcohol."  Thought content, the

patient appears to be paranoid, guarded.  The patient is convinced that her

boyfriend is cheating on her, and he is spending a lot of money for the new

girlfriend.  Thought process seems to be circumstantial and tangential,

even though the patient denied that she wants to harm herself.  The patient

was drinking excessively and hurt herself by falling.  Insight and judgment

seemed to be very, very limited.  Impulses are unpredictable.



IMPRESSION:

1.  Alcohol withdrawal delirium.

2.  Rule out schizophrenia spectrum disorder.

3.  Psychosis, not otherwise specified.

4.  Rule out substance-induced mood disorder.

5.  Severe alcohol use disorder.



PLAN:  Risperdal will be resumed.  Remeron will be resumed.  ReVia will be

discussed further.  Most likely, the patient would require psychiatric

admission, but the patient feels that she does not need to be on

Psychiatric Inpatient Unit.  We will follow up and advise accordingly.



Thank you very much for letting me participate in care of your patient.







__________________________________________

Alba Masters MD





DD:  02/13/2019 9:45:42

DT:  02/13/2019 9:49:50

Job # [id number]

## 2019-02-14 VITALS
OXYGEN SATURATION: 98 % | HEART RATE: 89 BPM | DIASTOLIC BLOOD PRESSURE: 81 MMHG | SYSTOLIC BLOOD PRESSURE: 117 MMHG | TEMPERATURE: 97.4 F

## 2019-02-14 LAB
ALBUMIN SERPL-MCNC: 5.1 G/DL (ref 3–4.8)
ALBUMIN/GLOB SERPL: 1.1 {RATIO} (ref 1.1–1.8)
ALT SERPL-CCNC: 16 U/L (ref 7–56)
AST SERPL-CCNC: 37 U/L (ref 14–36)
BASOPHILS # BLD AUTO: 0.02 K/MM3 (ref 0–2)
BASOPHILS NFR BLD: 0.3 % (ref 0–3)
BUN SERPL-MCNC: 12 MG/DL (ref 7–21)
CALCIUM SERPL-MCNC: 10.9 MG/DL (ref 8.4–10.5)
EOSINOPHIL # BLD: 0.2 10*3/UL (ref 0–0.7)
EOSINOPHIL NFR BLD: 3 % (ref 1.5–5)
ERYTHROCYTE [DISTWIDTH] IN BLOOD BY AUTOMATED COUNT: 14.7 % (ref 11.5–14.5)
GFR NON-AFRICAN AMERICAN: > 60
HGB BLD-MCNC: 16.3 G/DL (ref 12–16)
LYMPHOCYTES # BLD: 2.9 10*3/UL (ref 1.2–3.4)
LYMPHOCYTES NFR BLD AUTO: 36.6 % (ref 22–35)
MCH RBC QN AUTO: 31.8 PG (ref 25–35)
MCHC RBC AUTO-ENTMCNC: 33.7 G/DL (ref 31–37)
MCV RBC AUTO: 94.2 FL (ref 80–105)
MONOCYTES # BLD AUTO: 0.5 10*3/UL (ref 0.1–0.6)
MONOCYTES NFR BLD: 5.6 % (ref 1–6)
PLATELET # BLD: 206 10^3/UL (ref 120–450)
PMV BLD AUTO: 9.3 FL (ref 7–11)
RBC # BLD AUTO: 5.13 10^6/UL (ref 3.5–6.1)
WBC # BLD AUTO: 8 10^3/UL (ref 4.5–11)

## 2019-02-14 NOTE — CP.PCM.DIS
<John Cardoza - Last Filed: 02/14/19 18:30>





Provider





- Provider


Date of Admission: 


02/12/19 08:21





Attending physician: 


Aguilar Mcgowan MD





Primary care physician: 


Triny Akhtar MD





Consults: 








02/11/19 21:41


Podiatry Consult Stat 


   Comment: 


   Consulting Provider: Patria Sanchez


   Consulting Physician: Patria Sanchez


   Reason for Consult: toe fracture





02/12/19 09:39


Consult [Physician Consult] Routine 


   Comment: 


   Consulting Provider: Alba Masters


   Consulting Physician: Alba Masters


   Reason for Consult: Depression, Anxiety, ETOH





02/12/19 18:09


Inpatient NP Core Measures Referral Routine 


   Comment: 


   Physician Instructions: 


   Reason For Exam: EVALUATION


Nursing Referral for Wound Care Routine 


   Comment: BRUISING FROM FALLS.


   Physician Instructions: 


   Reason For Exam: EVALUATION


Transition In Care/Readmission Reduction Routine 


   Comment: 


   Physician Instructions: 


   Reason For Exam: EVALUATION











Time Spent in preparation of Discharge (in minutes): 60





Diagnosis





- Discharge Diagnosis


(1) Alcohol withdrawal


Status: Acute   Priority: High   





Hospital Course





- Lab Results


Lab Results: 


                             Most Recent Lab Values











WBC  8.0 10^3/uL (4.5-11.0)   02/14/19  06:20    


 


RBC  5.13 10^6/uL (3.5-6.1)   02/14/19  06:20    


 


Hgb  16.3 g/dL (12.0-16.0)  H D 02/14/19  06:20    


 


Hct  48.3 % (36.0-48.0)  H  02/14/19  06:20    


 


MCV  94.2 fl (80.0-105.0)   02/14/19  06:20    


 


MCH  31.8 pg (25.0-35.0)   02/14/19  06:20    


 


MCHC  33.7 g/dl (31.0-37.0)   02/14/19  06:20    


 


RDW  14.7 % (11.5-14.5)  H  02/14/19  06:20    


 


Plt Count  206 10^3/uL (120.0-450.0)   02/14/19  06:20    


 


MPV  9.3 fl (7.0-11.0)   02/14/19  06:20    


 


Neut % (Auto)  54.5 % (50.0-68.0)   02/14/19  06:20    


 


Lymph % (Auto)  36.6 % (22.0-35.0)  H  02/14/19  06:20    


 


Mono % (Auto)  5.6 % (1.0-6.0)   02/14/19  06:20    


 


Eos % (Auto)  3.0 % (1.5-5.0)   02/14/19  06:20    


 


Baso % (Auto)  0.3 % (0.0-3.0)   02/14/19  06:20    


 


Lymph # (Auto)  2.9  (1.2-3.4)   02/14/19  06:20    


 


Mono # (Auto)  0.5  (0.1-0.6)   02/14/19  06:20    


 


Eos # (Auto)  0.2  (0.0-0.7)   02/14/19  06:20    


 


Baso # (Auto)  0.02 K/mm3 (0.0-2.0)   02/14/19  06:20    


 


Absolute Neuts (auto)  4.35  (1.4-6.5)   02/14/19  06:20    


 


Sodium  138 mmol/L (132-148)   02/14/19  06:20    


 


Potassium  4.0 mmol/L (3.6-5.0)   02/14/19  06:20    


 


Chloride  103 mmol/L ()   02/14/19  06:20    


 


Carbon Dioxide  21 mmol/L (21-33)   02/14/19  06:20    


 


Anion Gap  18  (10-20)   02/14/19  06:20    


 


BUN  12 mg/dL (7-21)   02/14/19  06:20    


 


Creatinine  0.7 mg/dl (0.7-1.2)   02/14/19  06:20    


 


Est GFR ( Amer)  > 60   02/14/19  06:20    


 


Est GFR (Non-Af Amer)  > 60   02/14/19  06:20    


 


Random Glucose  100 mg/dL ()   02/14/19  06:20    


 


Calcium  10.9 mg/dL (8.4-10.5)  H  02/14/19  06:20    


 


Phosphorus  3.6 mg/dL (2.5-4.5)   02/14/19  06:20    


 


Magnesium  2.1 mg/dL (1.7-2.2)   02/14/19  06:20    


 


Total Bilirubin  0.7 mg/dL (0.2-1.3)   02/14/19  06:20    


 


AST  37 U/L (14-36)  H  02/14/19  06:20    


 


ALT  16 U/L (7-56)   02/14/19  06:20    


 


Alkaline Phosphatase  121 U/L ()   02/14/19  06:20    


 


Total Protein  9.6 g/dL (5.8-8.3)  H  02/14/19  06:20    


 


Albumin  5.1 g/dL (3.0-4.8)  H  02/14/19  06:20    


 


Globulin  4.5 gm/dL  02/14/19  06:20    


 


Albumin/Globulin Ratio  1.1  (1.1-1.8)   02/14/19  06:20    


 


Urine Opiates Screen  Negative  (NEGATIVE)   02/14/19  06:43    


 


Urine Methadone Screen  Negative  (NEGATIVE)   02/14/19  06:43    


 


Ur Barbiturates Screen  Negative  (NEGATIVE)   02/14/19  06:43    


 


Ur Phencyclidine Scrn  Negative  (NEGATIVE)   02/14/19  06:43    


 


Ur Amphetamines Screen  Negative  (NEGATIVE)   02/14/19  06:43    


 


U Benzodiazepines Scrn  Positive  (NEGATIVE)  H  02/14/19  06:43    


 


U Oth Cocaine Metabols  Negative  (NEGATIVE)   02/14/19  06:43    


 


U Cannabinoids Screen  Negative  (NEGATIVE)   02/14/19  06:43    


 


Alcohol, Quantitative  56 mg/dL (0-10)  H  02/11/19  21:28    














- Hospital Course


Hospital Course: 


John Cardoza, PGY-1, Internal Medicine Discharge Summary for Dr. Mcgowan





63 year old female with past medical history of alcohol abuse, alcoholic 

hepatitis, endometriosis, chronic left sided neck pain, and anxiety presented 

with chief complaints of tremor and anxiety. She had been drinking 1 pint of 

vodka daily for 2 weeks and last drink was 4 AM prior to arrival. She also had 

an episode of fall and pain in left foot. For her alcohol withdrawal, she had an

alcohol level of 56 on admission and was started on 2 mg of ativan, 50 mg of 

librium, banana bag, librium 40 mg Q8, folic acid, multivitamin, thiamine, 

zofran PRN for nausea, ativan 1 mg Q4PRN for withdrawal symptoms, and CIWA 

protocol was initiated. Librium was increased to 50 mg Q8 on day 2. On day 3, 

librium was reduced to 25 mg Q6. CIWA score on admission was 3 and never went 

above 4 throughout admission. For her fall, head CT was unremarkable, wrist X 

ray and knee X ray showed no fractures. Foot X ray showed fracture of 5th left 

toe, which was rapped and started on tylenol 650 mg PO for pain. Patient had 

mild transaminitis throughout admission. 





Today, patient reported urge to return home to take care of her mother. Patient 

was told that due to her medical condition, she was not medically stable for 

discharge. Patient reported she wanted to sign out against medical advice. 

Patient was explained risks of leaving such as worsening of withdrawal symptoms,

MI, agitation, and death. Patient was explained benefits of staying in hospital 

including treatment of alcohol withdrawal and toe fracture. Patient reported she

understood what had been explained to her and proceeded to sign out AMA. Patient

was told to return to the emergency department if she had any new concerning, or

worsening symptoms.





- Date & Time of H&P


Date of H&P: 02/11/19


Time of H&P: 21:09





Discharge Exam





- Eye Exam


Eye Exam: EOMI, PERRL





- ENT Exam


ENT Exam: Mucous Membranes Moist





- Respiratory Exam


Respiratory Exam: Clear to PA & Lateral, UNREMARKABLE





- Cardiovascular Exam


Cardiovascular Exam: REGULAR RHYTHM, RRR





- GI/Abdominal Exam


GI & Abdominal Exam: Normal Bowel Sounds, Soft.  absent: Tenderness





- Extremities Exam


Extremities exam: full ROM





- Neurological Exam


Neurological exam: Alert, CN II-XII Intact, Oriented x3


Additional comments: 


no tremors








- Skin


Skin Exam: Dry, Intact, Normal Color





Discharge Plan





- Follow Up Plan


Condition: FAIR


Disposition: AGAINST MEDICAL ADVICE


Referrals: 


Triny Akhtar MD [Primary Care Provider] - 





<Aguilar Mcgowan - Last Filed: 02/15/19 08:50>





Provider





- Provider


Date of Admission: 


02/12/19 08:21





Attending physician: 


Aguilar Mcgowan MD





Primary care physician: 


Triny Akhtar MD





Consults: 








02/11/19 21:41


Podiatry Consult Stat 


   Comment: 


   Consulting Provider: Patria Sanchez


   Consulting Physician: Patria Sanchez


   Reason for Consult: toe fracture





02/12/19 09:39


Consult [Physician Consult] Routine 


   Comment: 


   Consulting Provider: Alba Masters


   Consulting Physician: Alba Masters


   Reason for Consult: Depression, Anxiety, ETOH





02/12/19 18:09


Inpatient NP Core Measures Referral Routine 


   Comment: 


   Physician Instructions: 


   Reason For Exam: EVALUATION


Nursing Referral for Wound Care Routine 


   Comment: BRUISING FROM FALLS.


   Physician Instructions: 


   Reason For Exam: EVALUATION


Transition In Care/Readmission Reduction Routine 


   Comment: 


   Physician Instructions: 


   Reason For Exam: EVALUATION














Hospital Course





- Lab Results


Lab Results: 


                             Most Recent Lab Values











WBC  8.0 10^3/uL (4.5-11.0)   02/14/19  06:20    


 


RBC  5.13 10^6/uL (3.5-6.1)   02/14/19  06:20    


 


Hgb  16.3 g/dL (12.0-16.0)  H D 02/14/19  06:20    


 


Hct  48.3 % (36.0-48.0)  H  02/14/19  06:20    


 


MCV  94.2 fl (80.0-105.0)   02/14/19  06:20    


 


MCH  31.8 pg (25.0-35.0)   02/14/19  06:20    


 


MCHC  33.7 g/dl (31.0-37.0)   02/14/19  06:20    


 


RDW  14.7 % (11.5-14.5)  H  02/14/19  06:20    


 


Plt Count  206 10^3/uL (120.0-450.0)   02/14/19  06:20    


 


MPV  9.3 fl (7.0-11.0)   02/14/19  06:20    


 


Neut % (Auto)  54.5 % (50.0-68.0)   02/14/19  06:20    


 


Lymph % (Auto)  36.6 % (22.0-35.0)  H  02/14/19  06:20    


 


Mono % (Auto)  5.6 % (1.0-6.0)   02/14/19  06:20    


 


Eos % (Auto)  3.0 % (1.5-5.0)   02/14/19  06:20    


 


Baso % (Auto)  0.3 % (0.0-3.0)   02/14/19  06:20    


 


Lymph # (Auto)  2.9  (1.2-3.4)   02/14/19  06:20    


 


Mono # (Auto)  0.5  (0.1-0.6)   02/14/19  06:20    


 


Eos # (Auto)  0.2  (0.0-0.7)   02/14/19  06:20    


 


Baso # (Auto)  0.02 K/mm3 (0.0-2.0)   02/14/19  06:20    


 


Absolute Neuts (auto)  4.35  (1.4-6.5)   02/14/19  06:20    


 


Sodium  138 mmol/L (132-148)   02/14/19  06:20    


 


Potassium  4.0 mmol/L (3.6-5.0)   02/14/19  06:20    


 


Chloride  103 mmol/L ()   02/14/19  06:20    


 


Carbon Dioxide  21 mmol/L (21-33)   02/14/19  06:20    


 


Anion Gap  18  (10-20)   02/14/19  06:20    


 


BUN  12 mg/dL (7-21)   02/14/19  06:20    


 


Creatinine  0.7 mg/dl (0.7-1.2)   02/14/19  06:20    


 


Est GFR ( Amer)  > 60   02/14/19  06:20    


 


Est GFR (Non-Af Amer)  > 60   02/14/19  06:20    


 


Random Glucose  100 mg/dL ()   02/14/19  06:20    


 


Calcium  10.9 mg/dL (8.4-10.5)  H  02/14/19  06:20    


 


Phosphorus  3.6 mg/dL (2.5-4.5)   02/14/19  06:20    


 


Magnesium  2.1 mg/dL (1.7-2.2)   02/14/19  06:20    


 


Total Bilirubin  0.7 mg/dL (0.2-1.3)   02/14/19  06:20    


 


AST  37 U/L (14-36)  H  02/14/19  06:20    


 


ALT  16 U/L (7-56)   02/14/19  06:20    


 


Alkaline Phosphatase  121 U/L ()   02/14/19  06:20    


 


Total Protein  9.6 g/dL (5.8-8.3)  H  02/14/19  06:20    


 


Albumin  5.1 g/dL (3.0-4.8)  H  02/14/19  06:20    


 


Globulin  4.5 gm/dL  02/14/19  06:20    


 


Albumin/Globulin Ratio  1.1  (1.1-1.8)   02/14/19  06:20    


 


Urine Opiates Screen  Negative  (NEGATIVE)   02/14/19  06:43    


 


Urine Methadone Screen  Negative  (NEGATIVE)   02/14/19  06:43    


 


Ur Barbiturates Screen  Negative  (NEGATIVE)   02/14/19  06:43    


 


Ur Phencyclidine Scrn  Negative  (NEGATIVE)   02/14/19  06:43    


 


Ur Amphetamines Screen  Negative  (NEGATIVE)   02/14/19  06:43    


 


U Benzodiazepines Scrn  Positive  (NEGATIVE)  H  02/14/19  06:43    


 


U Oth Cocaine Metabols  Negative  (NEGATIVE)   02/14/19  06:43    


 


U Cannabinoids Screen  Negative  (NEGATIVE)   02/14/19  06:43    


 


Alcohol, Quantitative  56 mg/dL (0-10)  H  02/11/19  21:28    














Attending/Attestation





- Attestation


I have reviewed all pertinent clinical information, including history, physical 

exam and plan: Yes


Notes (Text): 





02/14/19


Patient left against medical advice prior to rounds early this morning.

## 2019-02-14 NOTE — CP.PCM.PCO
Physician Communication Note





- Physician Communication Note


Physician Communication Note: Patient was discharged overnight.

## 2019-03-14 ENCOUNTER — HOSPITAL ENCOUNTER (OUTPATIENT)
Dept: HOSPITAL 42 - ED | Age: 64
Setting detail: OBSERVATION
LOS: 2 days | Discharge: HOME | End: 2019-03-16
Attending: INTERNAL MEDICINE | Admitting: INTERNAL MEDICINE
Payer: MEDICAID

## 2019-03-14 VITALS — BODY MASS INDEX: 23 KG/M2

## 2019-03-14 DIAGNOSIS — Z87.440: ICD-10-CM
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
## 2019-03-14 DIAGNOSIS — F10.239: Primary | ICD-10-CM

## 2019-03-14 DIAGNOSIS — I10: ICD-10-CM

## 2019-03-14 DIAGNOSIS — S80.02XA: ICD-10-CM

## 2019-03-14 DIAGNOSIS — E86.0: ICD-10-CM

## 2019-03-14 DIAGNOSIS — R74.8: ICD-10-CM

## 2019-03-14 DIAGNOSIS — K59.00: ICD-10-CM

## 2019-03-14 DIAGNOSIS — F41.9: ICD-10-CM

## 2019-03-14 DIAGNOSIS — K73.9: ICD-10-CM

## 2019-03-14 DIAGNOSIS — Z87.891: ICD-10-CM

## 2019-03-14 LAB
ALBUMIN SERPL-MCNC: 4.9 G/DL (ref 3–4.8)
ALBUMIN/GLOB SERPL: 1.4 {RATIO} (ref 1.1–1.8)
ALT SERPL-CCNC: 119 U/L (ref 7–56)
APTT BLD: 26.7 SECONDS (ref 26.9–38.3)
AST SERPL-CCNC: 443 U/L (ref 14–36)
BASOPHILS # BLD AUTO: 0.02 K/MM3 (ref 0–2)
BASOPHILS NFR BLD: 0.5 % (ref 0–3)
BUN SERPL-MCNC: 12 MG/DL (ref 7–21)
CALCIUM SERPL-MCNC: 10.1 MG/DL (ref 8.4–10.5)
EOSINOPHIL # BLD: 0 10*3/UL (ref 0–0.7)
EOSINOPHIL NFR BLD: 0.3 % (ref 1.5–5)
ERYTHROCYTE [DISTWIDTH] IN BLOOD BY AUTOMATED COUNT: 15.3 % (ref 11.5–14.5)
GFR NON-AFRICAN AMERICAN: > 60
HGB BLD-MCNC: 14 G/DL (ref 12–16)
INR PPP: 1.05
LIPASE SERPL-CCNC: 125 U/L (ref 23–300)
LYMPHOCYTES # BLD: 0.7 10*3/UL (ref 1.2–3.4)
LYMPHOCYTES NFR BLD AUTO: 17.1 % (ref 22–35)
MCH RBC QN AUTO: 31.3 PG (ref 25–35)
MCHC RBC AUTO-ENTMCNC: 33.7 G/DL (ref 31–37)
MCV RBC AUTO: 92.6 FL (ref 80–105)
MONOCYTES # BLD AUTO: 0.2 10*3/UL (ref 0.1–0.6)
MONOCYTES NFR BLD: 6.1 % (ref 1–6)
PLATELET # BLD: 98 10^3/UL (ref 120–450)
PMV BLD AUTO: 9.1 FL (ref 7–11)
PROTHROMBIN TIME: 11.7 SECONDS (ref 9.4–12.5)
RBC # BLD AUTO: 4.48 10^6/UL (ref 3.5–6.1)
WBC # BLD AUTO: 3.8 10^3/UL (ref 4.5–11)

## 2019-03-14 PROCEDURE — 80358 DRUG SCREENING METHADONE: CPT

## 2019-03-14 PROCEDURE — 97162 PT EVAL MOD COMPLEX 30 MIN: CPT

## 2019-03-14 PROCEDURE — 83735 ASSAY OF MAGNESIUM: CPT

## 2019-03-14 PROCEDURE — 73610 X-RAY EXAM OF ANKLE: CPT

## 2019-03-14 PROCEDURE — 80349 CANNABINOIDS NATURAL: CPT

## 2019-03-14 PROCEDURE — 99285 EMERGENCY DEPT VISIT HI MDM: CPT

## 2019-03-14 PROCEDURE — 96366 THER/PROPH/DIAG IV INF ADDON: CPT

## 2019-03-14 PROCEDURE — 96372 THER/PROPH/DIAG INJ SC/IM: CPT

## 2019-03-14 PROCEDURE — 36415 COLL VENOUS BLD VENIPUNCTURE: CPT

## 2019-03-14 PROCEDURE — 96375 TX/PRO/DX INJ NEW DRUG ADDON: CPT

## 2019-03-14 PROCEDURE — 73560 X-RAY EXAM OF KNEE 1 OR 2: CPT

## 2019-03-14 PROCEDURE — 80324 DRUG SCREEN AMPHETAMINES 1/2: CPT

## 2019-03-14 PROCEDURE — 83992 ASSAY FOR PHENCYCLIDINE: CPT

## 2019-03-14 PROCEDURE — 85025 COMPLETE CBC W/AUTO DIFF WBC: CPT

## 2019-03-14 PROCEDURE — 80053 COMPREHEN METABOLIC PANEL: CPT

## 2019-03-14 PROCEDURE — 83690 ASSAY OF LIPASE: CPT

## 2019-03-14 PROCEDURE — 81001 URINALYSIS AUTO W/SCOPE: CPT

## 2019-03-14 PROCEDURE — 80353 DRUG SCREENING COCAINE: CPT

## 2019-03-14 PROCEDURE — 80345 DRUG SCREENING BARBITURATES: CPT

## 2019-03-14 PROCEDURE — 85730 THROMBOPLASTIN TIME PARTIAL: CPT

## 2019-03-14 PROCEDURE — 96365 THER/PROPH/DIAG IV INF INIT: CPT

## 2019-03-14 PROCEDURE — 97530 THERAPEUTIC ACTIVITIES: CPT

## 2019-03-14 PROCEDURE — 82948 REAGENT STRIP/BLOOD GLUCOSE: CPT

## 2019-03-14 PROCEDURE — 85610 PROTHROMBIN TIME: CPT

## 2019-03-14 PROCEDURE — 96376 TX/PRO/DX INJ SAME DRUG ADON: CPT

## 2019-03-14 PROCEDURE — 80346 BENZODIAZEPINES1-12: CPT

## 2019-03-14 PROCEDURE — 84100 ASSAY OF PHOSPHORUS: CPT

## 2019-03-14 PROCEDURE — 80361 OPIATES 1 OR MORE: CPT

## 2019-03-14 PROCEDURE — 80320 DRUG SCREEN QUANTALCOHOLS: CPT
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
## 2019-03-15 LAB
ALBUMIN SERPL-MCNC: 4.3 G/DL (ref 3–4.8)
ALBUMIN/GLOB SERPL: 1.3 {RATIO} (ref 1.1–1.8)
ALT SERPL-CCNC: 86 U/L (ref 7–56)
AMORPH SED URNS QL MICRO: (no result) /HPF
APPEARANCE UR: CLEAR
AST SERPL-CCNC: 275 U/L (ref 14–36)
BACTERIA #/AREA URNS HPF: (no result) /HPF
BASOPHILS # BLD AUTO: 0.02 K/MM3 (ref 0–2)
BASOPHILS NFR BLD: 0.5 % (ref 0–3)
BILIRUB UR-MCNC: (no result) MG/DL
BUN SERPL-MCNC: 12 MG/DL (ref 7–21)
CALCIUM SERPL-MCNC: 9.9 MG/DL (ref 8.4–10.5)
COLOR UR: (no result)
EOSINOPHIL # BLD: 0.1 10*3/UL (ref 0–0.7)
EOSINOPHIL NFR BLD: 2.3 % (ref 1.5–5)
ERYTHROCYTE [DISTWIDTH] IN BLOOD BY AUTOMATED COUNT: 15.3 % (ref 11.5–14.5)
GFR NON-AFRICAN AMERICAN: > 60
GLUCOSE UR STRIP-MCNC: NEGATIVE MG/DL
HGB BLD-MCNC: 12.7 G/DL (ref 12–16)
HGB OTHER MFR BLD ELPH: (no result) /HPF
LEUKOCYTE ESTERASE UR-ACNC: (no result) LEU/UL
LYMPHOCYTES # BLD: 1.6 10*3/UL (ref 1.2–3.4)
LYMPHOCYTES NFR BLD AUTO: 35.8 % (ref 22–35)
MCH RBC QN AUTO: 30.2 PG (ref 25–35)
MCHC RBC AUTO-ENTMCNC: 32.8 G/DL (ref 31–37)
MCV RBC AUTO: 92.1 FL (ref 80–105)
MONOCYTES # BLD AUTO: 0.3 10*3/UL (ref 0.1–0.6)
MONOCYTES NFR BLD: 6.8 % (ref 1–6)
PH UR STRIP: 6 [PH] (ref 4.7–8)
PLATELET # BLD: 77 10^3/UL (ref 120–450)
PMV BLD AUTO: 9.1 FL (ref 7–11)
PROT UR STRIP-MCNC: 100 MG/DL
RBC # BLD AUTO: 4.2 10^6/UL (ref 3.5–6.1)
RBC # UR STRIP: NEGATIVE /UL
RBC #/AREA URNS HPF: (no result) /HPF (ref 0–2)
SP GR UR STRIP: 1.02 (ref 1–1.03)
URINE FINE GRANULAR CAST: (no result) /HPF
UROBILINOGEN UR STRIP-ACNC: 1 E.U./DL
WBC # BLD AUTO: 4.4 10^3/UL (ref 4.5–11)

## 2019-03-15 RX ADMIN — THERA TABS SCH TAB: TAB at 08:51

## 2019-03-15 RX ADMIN — ENOXAPARIN SODIUM SCH MG: 40 INJECTION SUBCUTANEOUS at 10:28

## 2019-03-15 NOTE — RAD
Date of service: 



03/14/2019



PROCEDURE:  Left Knee Radiographs.



HISTORY:

Pain.



COMPARISON:

Left knee x-rays 02/11/2019



FINDINGS:



BONES:

No fracture identified.



JOINTS:

No dislocation seen. Bony articulations appear maintained.  



JOINT EFFUSION:

None. 



OTHER FINDINGS:

None.



IMPRESSION:

No fracture or dislocation identified.
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
## 2019-03-15 NOTE — RAD
Date of service: 



03/14/2019



PROCEDURE:  Left Ankle Radiographs.



HISTORY:

 pain 



COMPARISON:

None available.



FINDINGS:



BONES:

No fracture identified.  Plantar calcaneal spur noted.  Small 

Achilles calcaneal enthesophytes are seen. 



JOINTS:

No dislocation seen. Bony articulations appear maintained.  Ankle 

mortise maintained. Talar dome intact



SOFT TISSUES:

Unremarkable 



OTHER FINDINGS:

None.



IMPRESSION:

No fracture or dislocation identified.

## 2019-03-16 VITALS — HEART RATE: 83 BPM

## 2019-03-16 VITALS
DIASTOLIC BLOOD PRESSURE: 84 MMHG | SYSTOLIC BLOOD PRESSURE: 130 MMHG | RESPIRATION RATE: 20 BRPM | TEMPERATURE: 97.5 F | OXYGEN SATURATION: 99 %

## 2019-03-16 LAB
ALBUMIN SERPL-MCNC: 4.2 G/DL (ref 3–4.8)
ALBUMIN/GLOB SERPL: 1.3 {RATIO} (ref 1.1–1.8)
ALT SERPL-CCNC: 65 U/L (ref 7–56)
AST SERPL-CCNC: 155 U/L (ref 14–36)
BASOPHILS # BLD AUTO: 0.01 K/MM3 (ref 0–2)
BASOPHILS NFR BLD: 0.2 % (ref 0–3)
BUN SERPL-MCNC: 10 MG/DL (ref 7–21)
CALCIUM SERPL-MCNC: 10.2 MG/DL (ref 8.4–10.5)
EOSINOPHIL # BLD: 0.1 10*3/UL (ref 0–0.7)
EOSINOPHIL NFR BLD: 1.9 % (ref 1.5–5)
ERYTHROCYTE [DISTWIDTH] IN BLOOD BY AUTOMATED COUNT: 14.9 % (ref 11.5–14.5)
GFR NON-AFRICAN AMERICAN: > 60
HGB BLD-MCNC: 13.2 G/DL (ref 12–16)
LYMPHOCYTES # BLD: 1.7 10*3/UL (ref 1.2–3.4)
LYMPHOCYTES NFR BLD AUTO: 39.2 % (ref 22–35)
MCH RBC QN AUTO: 30.8 PG (ref 25–35)
MCHC RBC AUTO-ENTMCNC: 33.5 G/DL (ref 31–37)
MCV RBC AUTO: 92.1 FL (ref 80–105)
MONOCYTES # BLD AUTO: 0.3 10*3/UL (ref 0.1–0.6)
MONOCYTES NFR BLD: 6.8 % (ref 1–6)
PLATELET # BLD: 73 10^3/UL (ref 120–450)
PMV BLD AUTO: 10.3 FL (ref 7–11)
RBC # BLD AUTO: 4.28 10^6/UL (ref 3.5–6.1)
WBC # BLD AUTO: 4.3 10^3/UL (ref 4.5–11)

## 2019-03-16 RX ADMIN — ENOXAPARIN SODIUM SCH MG: 40 INJECTION SUBCUTANEOUS at 09:48

## 2019-03-16 RX ADMIN — THERA TABS SCH TAB: TAB at 09:48

## 2019-03-16 NOTE — CP.PCM.DIS
<Wesley Greene - Last Filed: 03/16/19 17:19>





Provider





- Provider


Date of Admission: 


03/14/19 21:56





Attending physician: 


Aguilar Mcgowan MD





Consults: 








03/15/19 00:25


Nursing Referral for Palliative Care Routine 


   Comment: 


   Physician Instructions: 


   Reason For Exam: PALLIATIVE 7


Social Work Referral Routine 


   Comment: PROTOCOL


   Physician Instructions: 


   Reason For Exam: MARIE 14





03/15/19 01:17


Nursing Referral for Wound Care Routine 


   Comment: 


   Physician Instructions: 


   Reason For Exam: PROTOCOL


Social Work Referral Routine 


   Comment: PROTOCOL


   Physician Instructions: 


   Reason For Exam: DC PLANNING





03/15/19 13:12


 [Case Management Referral] Routine 


   Comment: 


   Physician Instructions: 


   Reason For Exam: ETOH cessation information


   Reason for Referral:  Eval











Time Spent in preparation of Discharge (in minutes): 45





Diagnosis





- Discharge Diagnosis


(1) Abnormal liver enzymes


Status: Acute   Priority: Low   





(2) Alcohol withdrawal


Status: Acute   Priority: High   





Hospital Course





- Lab Results


Lab Results: 


                             Most Recent Lab Values











WBC  4.3 10^3/uL (4.5-11.0)  L  03/16/19  07:00    


 


RBC  4.28 10^6/uL (3.5-6.1)   03/16/19  07:00    


 


Hgb  13.2 g/dL (12.0-16.0)   03/16/19  07:00    


 


Hct  39.4 % (36.0-48.0)   03/16/19  07:00    


 


MCV  92.1 fl (80.0-105.0)   03/16/19  07:00    


 


MCH  30.8 pg (25.0-35.0)   03/16/19  07:00    


 


MCHC  33.5 g/dl (31.0-37.0)   03/16/19  07:00    


 


RDW  14.9 % (11.5-14.5)  H  03/16/19  07:00    


 


Plt Count  73 10^3/uL (120.0-450.0)  L  03/16/19  07:00    


 


MPV  10.3 fl (7.0-11.0)   03/16/19  07:00    


 


Neut % (Auto)  51.9 % (50.0-68.0)   03/16/19  07:00    


 


Lymph % (Auto)  39.2 % (22.0-35.0)  H  03/16/19  07:00    


 


Mono % (Auto)  6.8 % (1.0-6.0)  H  03/16/19  07:00    


 


Eos % (Auto)  1.9 % (1.5-5.0)   03/16/19  07:00    


 


Baso % (Auto)  0.2 % (0.0-3.0)   03/16/19  07:00    


 


Lymph # (Auto)  1.7  (1.2-3.4)   03/16/19  07:00    


 


Mono # (Auto)  0.3  (0.1-0.6)   03/16/19  07:00    


 


Eos # (Auto)  0.1  (0.0-0.7)   03/16/19  07:00    


 


Baso # (Auto)  0.01 K/mm3 (0.0-2.0)   03/16/19  07:00    


 


Absolute Neuts (auto)  2.23  (1.4-6.5)   03/16/19  07:00    


 


PT  11.7 SECONDS (9.4-12.5)   03/14/19  18:50    


 


INR  1.05   03/14/19  18:50    


 


APTT  26.7 Seconds (26.9-38.3)  L  03/14/19  18:50    


 


Sodium  135 mmol/L (132-148)   03/16/19  07:00    


 


Potassium  3.6 mmol/L (3.6-5.0)   03/16/19  07:00    


 


Chloride  99 mmol/L ()   03/16/19  07:00    


 


Carbon Dioxide  25 mmol/L (21-33)   03/16/19  07:00    


 


Anion Gap  14  (10-20)   03/16/19  07:00    


 


BUN  10 mg/dL (7-21)   03/16/19  07:00    


 


Creatinine  0.5 mg/dl (0.7-1.2)  L  03/16/19  07:00    


 


Est GFR ( Amer)  > 60   03/16/19  07:00    


 


Est GFR (Non-Af Amer)  > 60   03/16/19  07:00    


 


POC Glucose (mg/dL)  94 mg/dL ()   03/14/19  18:27    


 


Random Glucose  101 mg/dL ()   03/16/19  07:00    


 


Calcium  10.2 mg/dL (8.4-10.5)   03/16/19  07:00    


 


Phosphorus  2.5 mg/dL (2.5-4.5)   03/16/19  07:00    


 


Magnesium  1.8 mg/dL (1.7-2.2)   03/16/19  07:00    


 


Total Bilirubin  0.8 mg/dL (0.2-1.3)   03/16/19  07:00    


 


AST  155 U/L (14-36)  H D 03/16/19  07:00    


 


ALT  65 U/L (7-56)  H  03/16/19  07:00    


 


Alkaline Phosphatase  108 U/L ()   03/16/19  07:00    


 


Total Protein  7.6 g/dL (5.8-8.3)   03/16/19  07:00    


 


Albumin  4.2 g/dL (3.0-4.8)   03/16/19  07:00    


 


Globulin  3.4 gm/dL  03/16/19  07:00    


 


Albumin/Globulin Ratio  1.3  (1.1-1.8)   03/16/19  07:00    


 


Lipase  125 U/L ()   03/14/19  18:50    


 


Urine Color  Dark yellow  (YELLOW)   03/15/19  11:30    


 


Urine Appearance  Clear  (CLEAR)   03/15/19  11:30    


 


Urine pH  6.0  (4.7-8.0)   03/15/19  11:30    


 


Ur Specific Gravity  1.025  (1.005-1.035)   03/15/19  11:30    


 


Urine Protein  100 mg/dL (<30 mg/dL)  H  03/15/19  11:30    


 


Urine Glucose (UA)  Negative mg/dL (NEGATIVE)   03/15/19  11:30    


 


Urine Ketones  >=80 mg/dL (NEGATIVE)   03/15/19  11:30    


 


Urine Blood  Negative  (NEGATIVE)   03/15/19  11:30    


 


Urine Nitrate  Negative  (NEGATIVE)   03/15/19  11:30    


 


Urine Bilirubin  Large  (NEGATIVE)  H  03/15/19  11:30    


 


Urine Urobilinogen  1.0 E.U./dL (<1 E.U./dL)  H  03/15/19  11:30    


 


Ur Leukocyte Esterase  Trace Danny/uL (NEGATIVE)  H  03/15/19  11:30    


 


Urine RBC  0 - 2 /hpf (0-2)   03/15/19  11:30    


 


Urine WBC  2 - 5 /hpf (0-6)   03/15/19  11:30    


 


Ur Epithelial Cells  6 - 8 /hpf (0-5)  H  03/15/19  11:30    


 


Amorphous Sediment  Few /hpf (NONE)   03/15/19  11:30    


 


Urine Bacteria  Many /hpf (NONE)   03/15/19  11:30    


 


Fine Granular Casts  0 - 2 /hpf (NONE)   03/15/19  11:30    


 


Coarse Granular Casts  Trace /hpf (NONE)   03/15/19  11:30    


 


Urine Other  Uyeast /hpf  03/15/19  11:30    


 


Urine Opiates Screen  Negative  (NEGATIVE)   03/15/19  11:30    


 


Urine Methadone Screen  Negative  (NEGATIVE)   03/15/19  11:30    


 


Ur Barbiturates Screen  Negative  (NEGATIVE)   03/15/19  11:30    


 


Ur Phencyclidine Scrn  Negative  (NEGATIVE)   03/15/19  11:30    


 


Ur Amphetamines Screen  Negative  (NEGATIVE)   03/15/19  11:30    


 


U Benzodiazepines Scrn  Positive  (NEGATIVE)  H  03/15/19  11:30    


 


U Oth Cocaine Metabols  Negative  (NEGATIVE)   03/15/19  11:30    


 


U Cannabinoids Screen  Negative  (NEGATIVE)   03/15/19  11:30    


 


Alcohol, Quantitative  < 10 mg/dL (0-10)   03/14/19  18:50    














- Hospital Course


Hospital Course: 





Upon Admission:


Patient is a 63 year old female with past medical history of alcohol abuse, 

alcoholic hepatitis, endometriosis, chronic left sided neck pain, and anxiety 

who presents with chief complaint of tremors and anxiety. Patient states she has

been drinking 1 pint of vodka every day for the past few weeks and her last d

rink was yesterday afternoon. She also had a fall episode where she lost her 

balance and fell on her knees. She has started following up with an outpatient 

psychiatrist but she still feels the urge to drink alcohol. Patient admits to 

poor oral intake, states the last time she ate was 4 days ago. She admits to 

headache, dizziness, nausea. Denies chest pain, palpitations, abdominal pain, 

vomiting, diarrhea, visual or auditory hallucinations. 





Hospital Course:


Pt was being worked up for EtOH withdrawl, and b/l knee contusion. Pt had XRs of

the L knee and ankle which both had no signs of fracture or dislocation. Pt also

had PT eval done, which recommended home w/services and pt was placed on fall 

precautions. For the EtOH w/drawl pt was placed on CIWA protocol and given a 

banana bag. Pt was also started on folic acid, thimaine, and MV. Pt was started 

on ativan 1mg q4 PRN, had neurochecks done. Pt the next day was noted to have a 

CIWA of 3 and the pt expressed interest in going home. Pt was noted by nursing 

to be able to walk around without difficulty and in the afternoon the pts CIWA 

was noted to be down to 2. Pt was not agitated nor tremulous on exam. Pt was 

then reassessed prior to d/c and showed improvement in CIWA score. Pt VSS and 

only had an acute complaint of constipation. Pt was given miralax prior to d/c 

and was continued on  her MVs, thiamine, and folic acid. Pt was informed of plan

for d/c and pt expressed understanding and agreement with the plan for 

discharge. Pt was also told to follow up with her PMD. All of the pts questions 

and concerns were addressed prior to d/c.





Discharge Exam





- Head Exam


Head Exam: ATRAUMATIC, NORMAL INSPECTION, NORMOCEPHALIC





- Eye Exam


Eye Exam: EOMI, Normal appearance, PERRL





- Respiratory Exam


Respiratory Exam: Clear to PA & Lateral, NORMAL BREATHING PATTERN, UNREMARKABLE.

 absent: Accessory Muscle Use, Rales, Rhonchi, Wheezes, Respiratory Distress, 

Stridor





- Cardiovascular Exam


Cardiovascular Exam: RRR, +S1, +S2.  absent: Gallop, Rubs





- GI/Abdominal Exam


GI & Abdominal Exam: Normal Bowel Sounds, Soft, Unremarkable.  absent: 

Distended, Firm, Guarding, Tenderness





- Extremities Exam


Extremities exam: normal capillary refill, normal inspection, pedal pulses 

present





- Back Exam


Back exam: NORMAL INSPECTION.  absent: CVA tenderness (L), CVA tenderness (R)





- Neurological Exam


Neurological exam: Alert, Oriented x3





- Psychiatric Exam


Psychiatric exam: Normal Affect, Normal Mood





- Skin


Skin Exam: Dry, Normal Color, Warm





Discharge Plan





- Discharge Medications


Prescriptions: 


Polyethylene Glycol 3350 [Miralax] 17 gm PO DAILY PRN 30 Days #30 ml


 PRN Reason: Constipation





- Follow Up Plan


Condition: STABLE


Disposition: HOME/ ROUTINE


Instructions:  Alcohol Abuse and Alcoholism (DC), Alcohol Level


Additional Instructions: 


- Please follow up with your primary care doctor at [facility].


- Please follow up with your Psychiatrist within 1 week of discharge.


- We have given you a new script for Miralax, once a day which you can use as 

needed for your constipation.


- Please continue your home medications as directed.


- Please discontinue drinking as this will continue to lead to a deterioration 

in your health.


- If you have any new or worsening symptoms please go to your nearest Emergency 

Department.





<Aguilar Mcgowan - Last Filed: 03/16/19 17:34>





Provider





- Provider


Date of Admission: 


03/14/19 21:56





Attending physician: 


Aguilar Mcgowan MD





Consults: 








03/15/19 00:25


Nursing Referral for Palliative Care Routine 


   Comment: 


   Physician Instructions: 


   Reason For Exam: PALLIATIVE 7


Social Work Referral Routine 


   Comment: PROTOCOL


   Physician Instructions: 


   Reason For Exam: MARIE 14





03/15/19 01:17


Nursing Referral for Wound Care Routine 


   Comment: 


   Physician Instructions: 


   Reason For Exam: PROTOCOL


Social Work Referral Routine 


   Comment: PROTOCOL


   Physician Instructions: 


   Reason For Exam: DC PLANNING





03/15/19 13:12


 [Case Management Referral] Routine 


   Comment: 


   Physician Instructions: 


   Reason For Exam: ETOH cessation information


   Reason for Referral:  [facility] Course





- Lab Results


Lab Results: 


                             Most Recent Lab Values











WBC  4.3 10^3/uL (4.5-11.0)  L  03/16/19  07:00    


 


RBC  4.28 10^6/uL (3.5-6.1)   03/16/19  07:00    


 


Hgb  13.2 g/dL (12.0-16.0)   03/16/19  07:00    


 


Hct  39.4 % (36.0-48.0)   03/16/19  07:00    


 


MCV  92.1 fl (80.0-105.0)   03/16/19  07:00    


 


MCH  30.8 pg (25.0-35.0)   03/16/19  07:00    


 


MCHC  33.5 g/dl (31.0-37.0)   03/16/19  07:00    


 


RDW  14.9 % (11.5-14.5)  H  03/16/19  07:00    


 


Plt Count  73 10^3/uL (120.0-450.0)  L  03/16/19  07:00    


 


MPV  10.3 fl (7.0-11.0)   03/16/19  07:00    


 


Neut % (Auto)  51.9 % (50.0-68.0)   03/16/19  07:00    


 


Lymph % (Auto)  39.2 % (22.0-35.0)  H  03/16/19  07:00    


 


Mono % (Auto)  6.8 % (1.0-6.0)  H  03/16/19  07:00    


 


Eos % (Auto)  1.9 % (1.5-5.0)   03/16/19  07:00    


 


Baso % (Auto)  0.2 % (0.0-3.0)   03/16/19  07:00    


 


Lymph # (Auto)  1.7  (1.2-3.4)   03/16/19  07:00    


 


Mono # (Auto)  0.3  (0.1-0.6)   03/16/19  07:00    


 


Eos # (Auto)  0.1  (0.0-0.7)   03/16/19  07:00    


 


Baso # (Auto)  0.01 K/mm3 (0.0-2.0)   03/16/19  07:00    


 


Absolute Neuts (auto)  2.23  (1.4-6.5)   03/16/19  07:00    


 


PT  11.7 SECONDS (9.4-12.5)   03/14/19  18:50    


 


INR  1.05   03/14/19  18:50    


 


APTT  26.7 Seconds (26.9-38.3)  L  03/14/19  18:50    


 


Sodium  135 mmol/L (132-148)   03/16/19  07:00    


 


Potassium  3.6 mmol/L (3.6-5.0)   03/16/19  07:00    


 


Chloride  99 mmol/L ()   03/16/19  07:00    


 


Carbon Dioxide  25 mmol/L (21-33)   03/16/19  07:00    


 


Anion Gap  14  (10-20)   03/16/19  07:00    


 


BUN  10 mg/dL (7-21)   03/16/19  07:00    


 


Creatinine  0.5 mg/dl (0.7-1.2)  L  03/16/19  07:00    


 


Est GFR ( Amer)  > 60   03/16/19  07:00    


 


Est GFR (Non-Af Amer)  > 60   03/16/19  07:00    


 


POC Glucose (mg/dL)  94 mg/dL ()   03/14/19  18:27    


 


Random Glucose  101 mg/dL ()   03/16/19  07:00    


 


Calcium  10.2 mg/dL (8.4-10.5)   03/16/19  07:00    


 


Phosphorus  2.5 mg/dL (2.5-4.5)   03/16/19  07:00    


 


Magnesium  1.8 mg/dL (1.7-2.2)   03/16/19  07:00    


 


Total Bilirubin  0.8 mg/dL (0.2-1.3)   03/16/19  07:00    


 


AST  155 U/L (14-36)  H D 03/16/19  07:00    


 


ALT  65 U/L (7-56)  H  03/16/19  07:00    


 


Alkaline Phosphatase  108 U/L ()   03/16/19  07:00    


 


Total Protein  7.6 g/dL (5.8-8.3)   03/16/19  07:00    


 


Albumin  4.2 g/dL (3.0-4.8)   03/16/19  07:00    


 


Globulin  3.4 gm/dL  03/16/19  07:00    


 


Albumin/Globulin Ratio  1.3  (1.1-1.8)   03/16/19  07:00    


 


Lipase  125 U/L ()   03/14/19  18:50    


 


Urine Color  Dark yellow  (YELLOW)   03/15/19  11:30    


 


Urine Appearance  Clear  (CLEAR)   03/15/19  11:30    


 


Urine pH  6.0  (4.7-8.0)   03/15/19  11:30    


 


Ur Specific Gravity  1.025  (1.005-1.035)   03/15/19  11:30    


 


Urine Protein  100 mg/dL (<30 mg/dL)  H  03/15/19  11:30    


 


Urine Glucose (UA)  Negative mg/dL (NEGATIVE)   03/15/19  11:30    


 


Urine Ketones  >=80 mg/dL (NEGATIVE)   03/15/19  11:30    


 


Urine Blood  Negative  (NEGATIVE)   03/15/19  11:30    


 


Urine Nitrate  Negative  (NEGATIVE)   03/15/19  11:30    


 


Urine Bilirubin  Large  (NEGATIVE)  H  03/15/19  11:30    


 


Urine Urobilinogen  1.0 E.U./dL (<1 E.U./dL)  H  03/15/19  11:30    


 


Ur Leukocyte Esterase  Trace Danny/uL (NEGATIVE)  H  03/15/19  11:30    


 


Urine RBC  0 - 2 /hpf (0-2)   03/15/19  11:30    


 


Urine WBC  2 - 5 /hpf (0-6)   03/15/19  11:30    


 


Ur Epithelial Cells  6 - 8 /hpf (0-5)  H  03/15/19  11:30    


 


Amorphous Sediment  Few /hpf (NONE)   03/15/19  11:30    


 


Urine Bacteria  Many /hpf (NONE)   03/15/19  11:30    


 


Fine Granular Casts  0 - 2 /hpf (NONE)   03/15/19  11:30    


 


Coarse Granular Casts  Trace /hpf (NONE)   03/15/19  11:30    


 


Urine Other  Uyeast /hpf  03/15/19  11:30    


 


Urine Opiates Screen  Negative  (NEGATIVE)   03/15/19  11:30    


 


Urine Methadone Screen  Negative  (NEGATIVE)   03/15/19  11:30    


 


Ur Barbiturates Screen  Negative  (NEGATIVE)   03/15/19  11:30    


 


Ur Phencyclidine Scrn  Negative  (NEGATIVE)   03/15/19  11:30    


 


Ur Amphetamines Screen  Negative  (NEGATIVE)   03/15/19  11:30    


 


U Benzodiazepines Scrn  Positive  (NEGATIVE)  H  03/15/19  11:30    


 


U Oth Cocaine Metabols  Negative  (NEGATIVE)   03/15/19  11:30    


 


U Cannabinoids Screen  Negative  (NEGATIVE)   03/15/19  11:30    


 


Alcohol, Quantitative  < 10 mg/dL (0-10)   03/14/19  18:50    














Attending/Attestation





- Attestation


I have personally seen and examined this patient.: Yes


I have fully participated in the care of the patient.: Yes


I have reviewed all pertinent clinical information, including history, physical 

exam and plan: Yes


Notes (Text): 





03/16/19 17:30


63 year old female with past medical history of alcohol abuse, alcohol 

hepatitis, depression and anxiety who presented with complaint of tremors and 

falls.  She was admitted for alcohol withdrawal symptoms.  She was started on 

banana bag and ativan which was tapered as her symptoms improved.  She had knee 

pain / bruising secondary to falls but xray was negative.  She was seen by PT 

which she tolerated well.  LFTs were elevated, likely secondary to ETOH abuse 

but began to improve.  She was started on miralax for constipation.





Patient is discharged home to follow up with pmd.


Monitor LFTs as outpatient.


Follow up with psychiatrist.


Counselled on risks of continued alcohol abuse.





Aguilar Mcgowan MD


Hospitalist.

## 2019-04-09 ENCOUNTER — HOSPITAL ENCOUNTER (INPATIENT)
Dept: HOSPITAL 42 - ED | Age: 64
LOS: 10 days | Discharge: HOME | DRG: 750 | End: 2019-04-19
Attending: INTERNAL MEDICINE | Admitting: INTERNAL MEDICINE
Payer: MEDICAID

## 2019-04-09 VITALS — BODY MASS INDEX: 23 KG/M2

## 2019-04-09 DIAGNOSIS — G62.1: ICD-10-CM

## 2019-04-09 DIAGNOSIS — R44.3: ICD-10-CM

## 2019-04-09 DIAGNOSIS — N17.9: ICD-10-CM

## 2019-04-09 DIAGNOSIS — F10.231: Primary | ICD-10-CM

## 2019-04-09 DIAGNOSIS — E87.6: ICD-10-CM

## 2019-04-09 DIAGNOSIS — E86.0: ICD-10-CM

## 2019-04-09 DIAGNOSIS — T73.0XXA: ICD-10-CM

## 2019-04-09 DIAGNOSIS — E83.42: ICD-10-CM

## 2019-04-09 DIAGNOSIS — K70.10: ICD-10-CM
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
## 2019-04-09 DIAGNOSIS — R33.9: ICD-10-CM

## 2019-04-09 DIAGNOSIS — E83.39: ICD-10-CM

## 2019-04-09 DIAGNOSIS — E87.2: ICD-10-CM

## 2019-04-09 DIAGNOSIS — D64.9: ICD-10-CM

## 2019-04-09 DIAGNOSIS — D69.59: ICD-10-CM

## 2019-04-09 DIAGNOSIS — Z87.891: ICD-10-CM

## 2019-04-09 DIAGNOSIS — F41.9: ICD-10-CM

## 2019-04-09 DIAGNOSIS — Z91.19: ICD-10-CM

## 2019-04-09 DIAGNOSIS — I10: ICD-10-CM

## 2019-04-09 DIAGNOSIS — F19.94: ICD-10-CM

## 2019-04-09 DIAGNOSIS — Z91.14: ICD-10-CM

## 2019-04-09 LAB
ALBUMIN SERPL-MCNC: 4.6 G/DL
ALBUMIN SERPL-MCNC: 4.7 G/DL
ALBUMIN/GLOB SERPL: 1.4 {RATIO}
ALBUMIN/GLOB SERPL: 1.5 {RATIO}
ALT SERPL-CCNC: 19 U/L
ALT SERPL-CCNC: 24 U/L
ARTERIAL BLOOD GAS O2 SAT: 99.7 %
ARTERIAL BLOOD GAS PCO2: 13 MM/HG
ARTERIAL BLOOD GAS TCO2: 4.6 MMOL.L
AST SERPL-CCNC: 101 U/L
AST SERPL-CCNC: 94 U/L
BASOPHILS # BLD AUTO: 0 K/MM3
BASOPHILS NFR BLD: 0 %
BNP SERPL-MCNC: 342 PG/ML
BUN SERPL-MCNC: 20 MG/DL
BUN SERPL-MCNC: 21 MG/DL
CALCIUM SERPL-MCNC: 8.4 MG/DL
CALCIUM SERPL-MCNC: 8.5 MG/DL
EOSINOPHIL # BLD: 0 10*3/UL
EOSINOPHIL NFR BLD: 0.1 %
ERYTHROCYTE [DISTWIDTH] IN BLOOD BY AUTOMATED COUNT: 17.2 %
ERYTHROCYTE [DISTWIDTH] IN BLOOD BY AUTOMATED COUNT: 17.4 %
GFR NON-AFRICAN AMERICAN: 18
GFR NON-AFRICAN AMERICAN: 19
HCO3 BLDA-SCNC: 4.2 MMOL/L
HGB BLD-MCNC: 12.6 G/DL
HGB BLD-MCNC: 12.8 G/DL
HYPOCHROMIA BLD QL SMEAR: (no result)
INHALED O2 CONCENTRATION: 21 %
LYMPHOCYTE: 5 %
LYMPHOCYTES # BLD: 0.7 10*3/UL
LYMPHOCYTES NFR BLD AUTO: 4.6 %
MCH RBC QN AUTO: 30.7 PG
MCH RBC QN AUTO: 31.3 PG
MCHC RBC AUTO-ENTMCNC: 30.4 G/DL
MCHC RBC AUTO-ENTMCNC: 31 G/DL
MCV RBC AUTO: 101 FL
MCV RBC AUTO: 101.2 FL
MONOCYTE: 9 %
MONOCYTES # BLD AUTO: 1.2 10*3/UL
MONOCYTES NFR BLD: 8.5 %
NEUTROPHILS NFR BLD AUTO: 84 %
NEUTS BAND NFR BLD: 1 %
PH BLDA: 7.12 [PH]
PLATELET # BLD EST: (no result) 10*3/UL
PLATELET # BLD: 84 10^3/UL
PLATELET # BLD: 95 10^3/UL
PMV BLD AUTO: 10.2 FL
PMV BLD AUTO: 10.2 FL
PO2 BLDA: 109 MM/HG
RBC # BLD AUTO: 4.09 10^6/UL
RBC # BLD AUTO: 4.1 10^6/UL
ROULEAU: (no result)
TOXIC GRANULES BLD QL SMEAR: (no result)
TROPONIN I SERPL-MCNC: < 0.01 NG/ML
WBC # BLD AUTO: 12.2 10^3/UL
WBC # BLD AUTO: 14.2 10^3/UL

## 2019-04-09 RX ADMIN — CEFTRIAXONE SCH MLS/HR: 1 INJECTION, SOLUTION INTRAVENOUS at 23:04

## 2019-04-09 NOTE — ED PDOC
Arrival/HPI





- General


Historian: Patient ( )





- History of Present Illness


Narrative History of Present Illness (Text): 





Patient is a 63 year old female with past medical history of alcohol abuse, 

alcoholic hepatitis, endometriosis, chronic left sided neck pain, and anxiety 

who presents with chief complaint of tremors and anxiety. Patient states she has

been drinking 1 pint of vodka every day for the past few weeks and her last 

drink was yesterday afternoon. Patient admits to poor oral intake, states the 

last time she ate was several days ago. She admits to headache, dizziness, 

nausea. Denies chest pain, palpitations, abdominal pain, vomiting, diarrhea, 

visual or auditory hallucinations. 


Time/Duration: 24 hours


Symptom Onset: Sudden


Symptom Course: Unchanged


Context: Home





<Pattie Maldonado - Last Filed: 04/09/19 19:50>





<Yoan Alvares - Last Filed: 04/09/19 19:57>





- General


Chief Complaint: Alcohol Ingestion


Time Seen by Provider: 04/09/19 17:26





Past Medical History





- Provider Review


Nursing Documentation Reviewed: Yes





- Past History


Past History: No Previous





- Infectious Disease


Hx of Infectious Diseases: None





- Tetanus Immunization


Tetanus Immunization: Unknown





- Cardiac


Hx Hypertension: Yes





- Pulmonary


Hx Respiratory Disorders: Yes (USED TO SMOKE CIGARETTES IN HER TEENS. PPD. 

QUIT.)





- Neurological


Hx Neurological Disorder: No





- HEENT


Hx HEENT Disorder: No





- Renal


Hx Renal Disorder: No





- Endocrine/Metabolic


Hx Endocrine Disorders: No





- Hematological/Oncological


Hx Cirrhosis: Yes (ETOH)





- Integumentary


Hx Dermatological Disorder: Yes (multiple bruising from fall)





- Musculoskeletal/Rheumatological


Hx Falls: Yes (MULTIPLE)





- Gastrointestinal


Hx Gastrointestinal Disorders: Yes (alcoholic liver)


Other/Comment: h/o rectal bleed





- Genitourinary/Gynecological


Hx Genitourinary Disorders: Yes


Hx Urinary Tract Infection: Yes





- Psychiatric


Hx Substance Use: No





- Surgical History


Other/Comment: endometrosis lap





- Anesthesia


Hx Anesthesia: No


Hx Anesthesia Reactions: No


Hx Malignant Hyperthermia: No





- Suicidal Assessment


Feels Threatened In Home Enviroment: No





<Pattie Maldonado - Last Filed: 04/09/19 19:50>





Family/Social History





- Physician Review


Nursing Documentation Reviewed: Yes


Family/Social History: No Known Family HX


Smoking Status: Former Smoker


Hx Alcohol Use: Yes (DAILY)


Hx Substance Use: No


Hx Substance Use Treatment: No





<Pattie Maldonado L - Last Filed: 04/09/19 19:50>





Allergies/Home Meds





<Pattie Maldonado L - Last Filed: 04/09/19 19:50>





<Yoan Alvares - Last Filed: 04/09/19 19:57>


Allergies/Adverse Reactions: 


Allergies





cyclobenzaprine [From Flexeril] Allergy (Verified 04/09/19 17:55)


   ANAPHYLAXIS


ibuprofen Allergy (Verified 04/09/19 17:55)


   SHORTNESS OF BREATH











Review of Systems





- Physician Review


All systems were reviewed & negative as marked: Yes





- Review of Systems


Respiratory: Normal


Cardiovascular: Normal


Gastrointestinal: Nausea


Psychiatric: Anxiety, Depression





<Pattie Maldonado L - Last Filed: 04/09/19 19:50>





Physical Exam


Vital Signs Reviewed: Yes





Vital Signs











  Temp Pulse Resp BP Pulse Ox


 


 04/09/19 17:27  97.7 F  118 H  16  120/66  100











Temperature: Afebrile


Blood Pressure: Normal


Pulse: Tachycardic


Respiratory Rate: Normal


Appearance: Positive for: Unkept


Pain Distress: None


Mental Status: Positive for: Alert and Oriented X 3


Finger Stick Blood Glucose: 120





- Systems Exam


Head: Present: Atraumatic, Normocephalic


Pupils: Present: PERRL


Extroacular Muscles: Present: EOMI


Conjunctiva: Present: Normal


Respiratory/Chest: Present: Clear to Auscultation.  No: Respiratory Distress, 

Accessory Muscle Use


Cardiovascular: Present: Regular Rate and Rhythm, Normal S1, S2, Tachycardic


Abdomen: Present: Normal Bowel Sounds.  No: Tenderness


Lower Extremity: Present: Normal Inspection


Neurological: Present: GCS=15, CN II-XII Intact, Speech Normal


Skin: Present: Warm, Dry


Psychiatric: Present: Alert, Oriented x 3, Anxious





<Pattie Maldonado L - Last Filed: 04/09/19 19:50>





Vital Signs











  Temp Pulse Resp BP Pulse Ox


 


 04/09/19 19:48   114 H  19  102/51 L  99


 


 04/09/19 17:27  97.7 F  118 H  16  120/66  100














<Yoan Alvares - Last Filed: 04/09/19 19:57>





Medical Decision Making


ED Course and Treatment: 





Impression: alcohol intoxication 





Differential Diagnosis included but are not limited to:   polysubstance abuse 





 Plan: 


- CBC/CMP 


- EKG


- serum alcohol 


- Zofran, IVF


- Reassess and disposition 





Prior Visits: 





Notes and results from previous visits were reviewed. Patient was last seen in 

the emergency department on 03/14/2019





Progress Notes: 





04/09/19 19:44: Spoke with Dr. French and medical resident regarding admission. 

Patient to be admitted to hospitalist service. 

















- EKG Interpretation


EKG Interpretation (Text): 





Sinus tachycardia 


Interpreted by ED Physician: Yes


Type: 12 lead EKG





- Medication Orders


Current Medication Orders: 











Lorazepam (Ativan)  1 mg PO ONCE ONE; Protocol


   Stop: 04/09/19 17:38


Ondansetron HCl (Zofran Inj)  4 mg IVP ONCE ONE


   Stop: 04/09/19 17:39











<Pattie Maldonado - Last Filed: 04/09/19 19:50>


ED Course and Treatment: 





Patient Seen with Resident:


In agreement with resident note which contains more details about the patient. 

Patient seen and evaluated with resident. Came up with plan and treatment 

together. 63 year old female presents complaining of tremors and anxiety. 

Patient has been drinking 1 pint of vodka every day for the past couple of 

weeks. 


Plan: 


-- Labs


-- IV Fluids, Librium, Ativan, Zofran Inj


-- Urine Culture


-- Urinalysis 


-- Reassess and disposition 











- Lab Interpretations


Lab Results: 





                                        











Total Bilirubin  0.9 mg/dL (0.2-1.3)   04/09/19  18:30    


 


AST  94 U/L (14-36)  H D 04/09/19  18:30    


 


ALT  19 U/L (7-56)   04/09/19  18:30    


 


Alkaline Phosphatase  115 U/L ()   04/09/19  18:30    


 


Total Protein  7.9 g/dL (5.8-8.3)   04/09/19  18:30    


 


Albumin  4.6 g/dL (3.0-4.8)   04/09/19  18:30    


 


Globulin  3.3 gm/dL  04/09/19  18:30    


 


Albumin/Globulin Ratio  1.4  (1.1-1.8)   04/09/19  18:30    











I have reviewed the lab results: Yes





- Medication Orders


Current Medication Orders: 











Multivitamins/Vitamin C 10 ml/Thiamine HCl 100 mg/ Folic Acid 1 mg/ Sodium 

Chloride  1,011.2 mls @ 1,000 mls/hr IV .Q1H1M ONE


   Stop: 04/09/19 20:41





Discontinued Medications





Chlordiazepoxide (Librium)  50 mg PO STAT STA; Protocol


   Stop: 04/09/19 19:31


   Last Admin: 04/09/19 19:48  Dose: 50 mg





Lorazepam (Ativan)  1 mg PO ONCE ONE; Protocol


   Stop: 04/09/19 17:38


   Last Admin: 04/09/19 18:31  Dose: 1 mg





Ondansetron HCl (Zofran Inj)  4 mg IVP ONCE ONE


   Stop: 04/09/19 17:39


   Last Admin: 04/09/19 18:30  Dose: 4 mg





IVP Administration


 Document     04/09/19 18:30  PELON  (Rec: 04/09/19 18:31  PELON  [id number])


     Charges for Administration


      # of IVP Administrations                   1














<Yoan Alvares - Last Filed: 04/09/19 19:57>





- PA / NP / Resident Statement


MD/ has reviewed & agrees with the documentation as recorded.


MD/ has examined the patient and agrees with the treatment plan.





- Scribe Statement


The provider has reviewed the documentation as recorded by the Brenna Babin





Provider Scribe Attestation:


All medical record entries made by the Brenna were at my direction and 

personally dictated by me. I have reviewed the chart and agree that the record 

accurately reflects my personal performance of the history, physical exam, 

medical decision making, and the department course for this patient. I have also

personally directed, reviewed, and agree with the discharge instructions and 

disposition.








<Yoan Alvares - Last Filed: 04/09/19 19:57>





Disposition/Present on Arrival





- Present on Arrival


Any Indicators Present on Arrival: No


History of DVT/PE: No


History of Uncontrolled Diabetes: No


Urinary Catheter: No


History Surgical Site Infection Following: None





- Disposition


Have Diagnosis and Disposition been Completed?: Yes


Disposition Time: 19:51





<Pattie Maldonado - Last Filed: 04/09/19 19:50>





<Yoan Alvares - Last Filed: 04/09/19 19:57>





- Disposition


Diagnosis: 


 Alcohol use disorder, Abnormal liver enzymes





Disposition: HOSPITALIZED


Condition: STABLE

## 2019-04-09 NOTE — CP.PCM.HP
<RufinopennyerwinCristino - Last Filed: 04/09/19 20:42>





History of Present Illness





- History of Present Illness


History of Present Illness: 


PGY-1 History and Physical for Dr. French





Patient is a 63 year old  female with past medical history of alcohol 

abuse, alcohol hepatitis, anxiety, chronic left sided neck pain, endometriosis 

presenting to ED with tremors and anxiety. Patient has a known history of 

alcohol abuse with multiple past admissions for alcohol withdrawal. She states 

she drinks 1 pt of vodka daily, her last drink was yesterday afternoon. She 

admits to poor PO intake, states she last ate something "a few days ago". She 

denies any fall or LOC, no head trauma. She endorses associated palpitations. 

Denies any chest pain, sob, cough, abdominal pain, n/v/d/c, dysuria, or changes 

in stool. 12 pt ROS reviewed and otherwise negative. 





PMHx: alcohol abuse, endometriosis, alcoholic hepatitis, chronic left sided neck

pain, anxiety  


PSHx: laparoscopic surgery for endometriosis x 3


Allergies: naproxen, advil, cyclobenzaprine 


Family Hx: noncontributory 


Social Hx: hx alcohol abuse (1 pt vodka daily), no tobacco or illicit drug use


PMD: Dr. Triny Akhtar








Present on Admission





- Present on Admission


Any Indicators Present on Admission: No





Review of Systems





- Review of Systems


All systems: reviewed and no additional remarkable complaints except


Review of Systems: 


as per HPI








Past Patient History





- Infectious Disease


Hx of Infectious Diseases: None





- Tetanus Immunizations


Tetanus Immunization: Unknown





- Past Social History


Smoking Status: Former Smoker





- CARDIAC


Hx Hypertension: Yes





- PULMONARY


Hx Respiratory Disorders: Yes (USED TO SMOKE CIGARETTES IN HER TEENS. PPD. 

QUIT.)





- NEUROLOGICAL


Hx Neurological Disorder: No





- HEENT


Hx HEENT Problems: No





- RENAL


Hx Chronic Kidney Disease: No





- ENDOCRINE/METABOLIC


Hx Endocrine Disorders: No





- HEMATOLOGICAL/ONCOLOGICAL


Hx Cirrhosis: Yes (ETOH)





- INTEGUMENTARY


Hx Dermatological Problems: Yes (multiple bruising from fall)





- MUSCULOSKELETAL/RHEUMATOLOGICAL


Hx Falls: Yes (MULTIPLE)





- GASTROINTESTINAL


Hx Gastrointestinal Disorders: Yes (alcoholic liver)


Other/Comment: h/o rectal bleed





- GENITOURINARY/GYNECOLOGICAL


Hx Genitourinary Disorders: Yes


Hx Urinary Tract Infection: Yes





- PSYCHIATRIC


Hx Substance Use: No





- SURGICAL HISTORY


Other/Comment: endometrosis lap





- ANESTHESIA


Hx Anesthesia: No


Hx Anesthesia Reactions: No


Hx Malignant Hyperthermia: No





Meds


Allergies/Adverse Reactions: 


                                    Allergies











Allergy/AdvReac Type Severity Reaction Status Date / Time


 


cyclobenzaprine Allergy  ANAPHYLAXIS Verified 04/09/19 17:55





[From Flexeril]     


 


ibuprofen Allergy  SHORTNESS Verified 04/09/19 17:55





   OF BREATH  














Physical Exam





- Constitutional


Appears: No Acute Distress





- Head Exam


Head Exam: NORMAL INSPECTION, NORMOCEPHALIC





- Eye Exam


Eye Exam: EOMI, Normal appearance


Pupil Exam: NORMAL ACCOMODATION





- ENT Exam


ENT Exam: Mucous Membranes Dry, Normal Exam





- Neck Exam


Neck exam: Positive for: Normal Inspection, Tenderness (L sided)





- Respiratory Exam


Respiratory Exam: Clear to Auscultation Bilateral, NORMAL BREATHING PATTERN.  

absent: Accessory Muscle Use, Rhonchi, Wheezes, Respiratory Distress





- Cardiovascular Exam


Cardiovascular Exam: Tachycardia, +S1, +S2





- GI/Abdominal Exam


GI & Abdominal Exam: Normal Bowel Sounds, Soft.  absent: Distended, Firm, Gua

rding, Rebound, Rigid, Tenderness





- Extremities Exam


Extremities exam: Positive for: normal capillary refill, normal inspection, 

pedal edema (trace), pedal pulses present.  Negative for: calf tenderness





- Back Exam


Back exam: NORMAL INSPECTION





- Neurological Exam


Neurological exam: Alert, Oriented x3





- Skin


Skin Exam: Dry, Intact, Normal Color, Warm





Results





- Vital Signs


Recent Vital Signs: 





                                Last Vital Signs











Temp  97.7 F   04/09/19 17:27


 


Pulse  118 H  04/09/19 17:27


 


Resp  16   04/09/19 17:27


 


BP  120/66   04/09/19 17:27


 


Pulse Ox  100   04/09/19 17:27














- Labs


Result Diagrams: 


                                 04/09/19 18:30





                                 04/09/19 18:30


Labs: 





                         Laboratory Results - last 24 hr











  04/09/19 04/09/19 04/09/19





  18:30 18:30 18:30


 


WBC  14.2 H D  


 


RBC  4.09  


 


Hgb  12.8  


 


Hct  41.3  


 


MCV  101.0  D  


 


MCH  31.3  


 


MCHC  31.0  


 


RDW  17.4 H  


 


Plt Count  95 L  


 


MPV  10.2  


 


Neut % (Auto)  86.8 H  


 


Lymph % (Auto)  4.6 L  


 


Mono % (Auto)  8.5 H  


 


Eos % (Auto)  0.1 L  


 


Baso % (Auto)  0.0  


 


Lymph # (Auto)  0.7 L  


 


Mono # (Auto)  1.2 H  


 


Eos # (Auto)  0.0  


 


Baso # (Auto)  0.00  


 


Absolute Neuts (auto)  12.35 H  


 


Sodium   144 


 


Potassium   5.1 H 


 


Chloride   98 


 


Carbon Dioxide   5 L D 


 


Anion Gap   46 H 


 


BUN   20 


 


Creatinine   2.7 H 


 


Est GFR ( Amer)   22 


 


Est GFR (Non-Af Amer)   18 


 


Random Glucose   113 H 


 


Calcium   8.5 


 


Phosphorus   11.1 H 


 


Magnesium   2.1 


 


Total Bilirubin   0.9 


 


AST   94 H D 


 


ALT   19 


 


Alkaline Phosphatase   115 


 


Total Protein   7.9 


 


Albumin   4.6 


 


Globulin   3.3 


 


Albumin/Globulin Ratio   1.4 


 


Alcohol, Quantitative    49 H














Assessment & Plan





- Assessment and Plan (Free Text)


Assessment: 


63 year old  female with pmhx of alcohol abuse, alcohol hepatitis, 

anxiety, chronic L neck pain presenting to ED with tremors likely 2/2 alcohol 

withdrawal. Drinks 1 pt vodka daily, last drink yesterday. 





Plan: 





Alcohol withdrawal, Hx of alcohol abuse


-last reported drink was yesterday afternoon


-serum alcohol 49


-WA protocol


-aspiration, fall precautions


-neuro checks q4h


-cbc/cmp


-banana bag


-thiamine/folate/MV


-ativan 2 mg q6 prn


-ativan 1 mg q2 prn


-zofran 4 mg q4 prn





SIRS of unknown etiology


-suspected UTI


-


-ABG: CO2 13, pH 7.12, Lactate 7.4


-WBC 14.2, N 84


-AG 41


-UA


-repeat lactate 


-procalcitonin


-CXR


-ID consult (Dr. Rodriguez)


-rocephin 1 gm IVP daily





High Anion Gap Metabolic Acidosis


-calculated AG 41


-ABG: CO2 13, pH 7.12, Lactate 7.4


-NS with bicarb @ 100cc/hr





JUAN


-likely 2/2 dehydration


-BUN/Cr: 20/2.7, continue to monitor


-Nephrology (Dr. Hatfield) consulted


-IVF (see plan above)





PPx, Diet, Disposition


-DVT ppx: scds, heparin 5000 units sc q8


-GI ppx: protonix 40 mg IVP daily


-Diet: HHD


-PT on board





Case discussed with Dr. Gillian Quevedo DO, PGY-1








<Lexus French - Last Filed: 04/10/19 03:15>





Results





- Vital Signs


Recent Vital Signs: 





                                Last Vital Signs











Temp  98.7 F   04/10/19 00:01


 


Pulse  128 H  04/10/19 00:01


 


Resp  19   04/10/19 00:01


 


BP  129/74   04/10/19 00:01


 


Pulse Ox  98   04/10/19 00:01














- Labs


Result Diagrams: 


                                 04/09/19 20:50





                                 04/09/19 20:50


Labs: 





                         Laboratory Results - last 24 hr











  04/09/19 04/09/19 04/09/19





  17:48 17:48 18:30


 


WBC    14.2 H D


 


RBC    4.09


 


Hgb    12.8


 


Hct    41.3


 


MCV    101.0  D


 


MCH    31.3


 


MCHC    31.0


 


RDW    17.4 H


 


Plt Count    95 L


 


MPV    10.2


 


Neut % (Auto)    86.8 H


 


Lymph % (Auto)    4.6 L


 


Mono % (Auto)    8.5 H


 


Eos % (Auto)    0.1 L


 


Baso % (Auto)    0.0


 


Lymph # (Auto)    0.7 L


 


Mono # (Auto)    1.2 H


 


Eos # (Auto)    0.0


 


Baso # (Auto)    0.00


 


Absolute Neuts (auto)    12.35 H


 


Neutrophils % (Manual)    84 H


 


Band Neutrophils %    1


 


Lymphocytes % (Manual)    5 L


 


Monocytes % (Manual)    9 H


 


Immature Lymphocytes    1


 


Toxic Granulation    2+


 


Platelet Evaluation    Low


 


Hypochromasia    1+


 


Rouleaux    2+


 


pCO2   


 


pO2   


 


HCO3   


 


ABG pH   


 


ABG Total CO2   


 


ABG O2 Saturation   


 


ABG Base Excess   


 


ABG Potassium   


 


VBG pH   


 


VBG pCO2   


 


VBG HCO3   


 


VBG Total CO2   


 


VBG O2 Sat (Calc)   


 


VBG Base Excess   


 


VBG Potassium   


 


Glucose   


 


Lactate   


 


FiO2   


 


Crit Value Called To   


 


Crit Value Called By   


 


Blood Gas Notified Time   


 


Sodium   


 


Potassium   


 


Chloride   


 


Carbon Dioxide   


 


Anion Gap   


 


BUN   


 


Creatinine   


 


Est GFR ( Amer)   


 


Est GFR (Non-Af Amer)   


 


Random Glucose   


 


Lactic Acid   


 


Calcium   


 


Phosphorus   


 


Magnesium   


 


Total Bilirubin   


 


AST   


 


ALT   


 


Alkaline Phosphatase   


 


Troponin I   


 


NT-Pro-B Natriuret Pep   


 


Total Protein   


 


Albumin   


 


Globulin   


 


Albumin/Globulin Ratio   


 


Arterial Blood Potassium   


 


Venous Blood Potassium   


 


Urine Color  Yellow  


 


Urine Appearance  Sl cloudy  


 


Urine pH  6.0  


 


Ur Specific Gravity  1.015  


 


Urine Protein  Trace H  


 


Urine Glucose (UA)  Negative  


 


Urine Ketones  >=80  


 


Urine Blood  Small H  


 


Urine Nitrate  Negative  


 


Urine Bilirubin  Small H  


 


Urine Urobilinogen  0.2  


 


Ur Leukocyte Esterase  Negative  


 


Urine RBC  0 - 2  


 


Urine WBC  0 - 2  


 


Ur Epithelial Cells  1 - 3  


 


Urine Bacteria  Few  


 


Hyaline Casts  0 - 2  


 


Urine Opiates Screen   Negative 


 


Urine Methadone Screen   Negative 


 


Ur Barbiturates Screen   Negative 


 


Ur Phencyclidine Scrn   Negative 


 


Ur Amphetamines Screen   Negative 


 


U Benzodiazepines Scrn   Positive H 


 


U Oth Cocaine Metabols   Negative 


 


U Cannabinoids Screen   Negative 


 


Alcohol, Quantitative   














  04/09/19 04/09/19 04/09/19





  18:30 18:30 20:28


 


WBC   


 


RBC   


 


Hgb   


 


Hct   


 


MCV   


 


MCH   


 


MCHC   


 


RDW   


 


Plt Count   


 


MPV   


 


Neut % (Auto)   


 


Lymph % (Auto)   


 


Mono % (Auto)   


 


Eos % (Auto)   


 


Baso % (Auto)   


 


Lymph # (Auto)   


 


Mono # (Auto)   


 


Eos # (Auto)   


 


Baso # (Auto)   


 


Absolute Neuts (auto)   


 


Neutrophils % (Manual)   


 


Band Neutrophils %   


 


Lymphocytes % (Manual)   


 


Monocytes % (Manual)   


 


Immature Lymphocytes   


 


Toxic Granulation   


 


Platelet Evaluation   


 


Hypochromasia   


 


Rouleaux   


 


pCO2    13 L*


 


pO2    109.0 H


 


HCO3    4.2 L*


 


ABG pH    7.12 L*


 


ABG Total CO2    4.6 L


 


ABG O2 Saturation    99.7 H


 


ABG Base Excess    -22.8 L


 


ABG Potassium    4.0


 


VBG pH   


 


VBG pCO2   


 


VBG HCO3   


 


VBG Total CO2   


 


VBG O2 Sat (Calc)   


 


VBG Base Excess   


 


VBG Potassium   


 


Glucose    97


 


Lactate    7.4 H*


 


FiO2    21.0


 


Crit Value Called To    [facility]


 


Crit Value Called By    Jeanine


 


Blood Gas Notified Time    2038


 


Sodium  144   139.0


 


Potassium  5.1 H  


 


Chloride  98   97.0 L


 


Carbon Dioxide  5 L D  


 


Anion Gap  46 H  


 


BUN  20  


 


Creatinine  2.7 H  


 


Est GFR ( Amer)  22  


 


Est GFR (Non-Af Amer)  18  


 


Random Glucose  113 H  


 


Lactic Acid   


 


Calcium  8.5  


 


Phosphorus  11.1 H  


 


Magnesium  2.1  


 


Total Bilirubin  0.9  


 


AST  94 H D  


 


ALT  19  


 


Alkaline Phosphatase  115  


 


Troponin I   


 


NT-Pro-B Natriuret Pep   


 


Total Protein  7.9  


 


Albumin  4.6  


 


Globulin  3.3  


 


Albumin/Globulin Ratio  1.4  


 


Arterial Blood Potassium    4.0


 


Venous Blood Potassium   


 


Urine Color   


 


Urine Appearance   


 


Urine pH   


 


Ur Specific Gravity   


 


Urine Protein   


 


Urine Glucose (UA)   


 


Urine Ketones   


 


Urine Blood   


 


Urine Nitrate   


 


Urine Bilirubin   


 


Urine Urobilinogen   


 


Ur Leukocyte Esterase   


 


Urine RBC   


 


Urine WBC   


 


Ur Epithelial Cells   


 


Urine Bacteria   


 


Hyaline Casts   


 


Urine Opiates Screen   


 


Urine Methadone Screen   


 


Ur Barbiturates Screen   


 


Ur Phencyclidine Scrn   


 


Ur Amphetamines Screen   


 


U Benzodiazepines Scrn   


 


U Oth Cocaine Metabols   


 


U Cannabinoids Screen   


 


Alcohol, Quantitative   49 H 














  04/09/19 04/09/19 04/10/19





  20:50 20:50 01:35


 


WBC   12.2 H 


 


RBC   4.10 


 


Hgb   12.6 


 


Hct   41.5 


 


MCV   101.2 


 


MCH   30.7 


 


MCHC   30.4 L 


 


RDW   17.2 H 


 


Plt Count   84 L 


 


MPV   10.2 


 


Neut % (Auto)   


 


Lymph % (Auto)   


 


Mono % (Auto)   


 


Eos % (Auto)   


 


Baso % (Auto)   


 


Lymph # (Auto)   


 


Mono # (Auto)   


 


Eos # (Auto)   


 


Baso # (Auto)   


 


Absolute Neuts (auto)   


 


Neutrophils % (Manual)   


 


Band Neutrophils %   


 


Lymphocytes % (Manual)   


 


Monocytes % (Manual)   


 


Immature Lymphocytes   


 


Toxic Granulation   


 


Platelet Evaluation   


 


Hypochromasia   


 


Rouleaux   


 


pCO2   


 


pO2    32


 


HCO3   


 


ABG pH   


 


ABG Total CO2   


 


ABG O2 Saturation   


 


ABG Base Excess   


 


ABG Potassium   


 


VBG pH    7.35


 


VBG pCO2    20.0 L


 


VBG HCO3    11.0 L


 


VBG Total CO2    11.6 L


 


VBG O2 Sat (Calc)    66.0 H


 


VBG Base Excess    -12.3 L


 


VBG Potassium    4.4


 


Glucose    144 H


 


Lactate    2.3 H


 


FiO2    21.0


 


Crit Value Called To    Cherie pruitt rn 2rno


 


Crit Value Called By    Sunil


 


Blood Gas Notified Time    207


 


Sodium  142   138.0


 


Potassium  4.7  


 


Chloride  98   93.0 L


 


Carbon Dioxide  5 L  


 


Anion Gap  44 H  


 


BUN  21  


 


Creatinine  2.6 H  


 


Est GFR ( Amer)  23  


 


Est GFR (Non-Af Amer)  19  


 


Random Glucose  96  


 


Lactic Acid   


 


Calcium  8.4  


 


Phosphorus   


 


Magnesium  2.0  


 


Total Bilirubin  0.9  


 


AST  101 H  


 


ALT  24  


 


Alkaline Phosphatase  112  


 


Troponin I  < 0.01  


 


NT-Pro-B Natriuret Pep  342  


 


Total Protein  7.8  


 


Albumin  4.7  


 


Globulin  3.1  


 


Albumin/Globulin Ratio  1.5  


 


Arterial Blood Potassium   


 


Venous Blood Potassium    4.4


 


Urine Color   


 


Urine Appearance   


 


Urine pH   


 


Ur Specific Gravity   


 


Urine Protein   


 


Urine Glucose (UA)   


 


Urine Ketones   


 


Urine Blood   


 


Urine Nitrate   


 


Urine Bilirubin   


 


Urine Urobilinogen   


 


Ur Leukocyte Esterase   


 


Urine RBC   


 


Urine WBC   


 


Ur Epithelial Cells   


 


Urine Bacteria   


 


Hyaline Casts   


 


Urine Opiates Screen   


 


Urine Methadone Screen   


 


Ur Barbiturates Screen   


 


Ur Phencyclidine Scrn   


 


Ur Amphetamines Screen   


 


U Benzodiazepines Scrn   


 


U Oth Cocaine Metabols   


 


U Cannabinoids Screen   


 


Alcohol, Quantitative   














  04/10/19





  01:35


 


WBC 


 


RBC 


 


Hgb 


 


Hct 


 


MCV 


 


MCH 


 


MCHC 


 


RDW 


 


Plt Count 


 


MPV 


 


Neut % (Auto) 


 


Lymph % (Auto) 


 


Mono % (Auto) 


 


Eos % (Auto) 


 


Baso % (Auto) 


 


Lymph # (Auto) 


 


Mono # (Auto) 


 


Eos # (Auto) 


 


Baso # (Auto) 


 


Absolute Neuts (auto) 


 


Neutrophils % (Manual) 


 


Band Neutrophils % 


 


Lymphocytes % (Manual) 


 


Monocytes % (Manual) 


 


Immature Lymphocytes 


 


Toxic Granulation 


 


Platelet Evaluation 


 


Hypochromasia 


 


Rouleaux 


 


pCO2 


 


pO2 


 


HCO3 


 


ABG pH 


 


ABG Total CO2 


 


ABG O2 Saturation 


 


ABG Base Excess 


 


ABG Potassium 


 


VBG pH 


 


VBG pCO2 


 


VBG HCO3 


 


VBG Total CO2 


 


VBG O2 Sat (Calc) 


 


VBG Base Excess 


 


VBG Potassium 


 


Glucose 


 


Lactate 


 


FiO2 


 


Crit Value Called To 


 


Crit Value Called By 


 


Blood Gas Notified Time 


 


Sodium 


 


Potassium 


 


Chloride 


 


Carbon Dioxide 


 


Anion Gap 


 


BUN 


 


Creatinine 


 


Est GFR ( Amer) 


 


Est GFR (Non-Af Amer) 


 


Random Glucose 


 


Lactic Acid  1.6


 


Calcium 


 


Phosphorus 


 


Magnesium 


 


Total Bilirubin 


 


AST 


 


ALT 


 


Alkaline Phosphatase 


 


Troponin I 


 


NT-Pro-B Natriuret Pep 


 


Total Protein 


 


Albumin 


 


Globulin 


 


Albumin/Globulin Ratio 


 


Arterial Blood Potassium 


 


Venous Blood Potassium 


 


Urine Color 


 


Urine Appearance 


 


Urine pH 


 


Ur Specific Gravity 


 


Urine Protein 


 


Urine Glucose (UA) 


 


Urine Ketones 


 


Urine Blood 


 


Urine Nitrate 


 


Urine Bilirubin 


 


Urine Urobilinogen 


 


Ur Leukocyte Esterase 


 


Urine RBC 


 


Urine WBC 


 


Ur Epithelial Cells 


 


Urine Bacteria 


 


Hyaline Casts 


 


Urine Opiates Screen 


 


Urine Methadone Screen 


 


Ur Barbiturates Screen 


 


Ur Phencyclidine Scrn 


 


Ur Amphetamines Screen 


 


U Benzodiazepines Scrn 


 


U Oth Cocaine Metabols 


 


U Cannabinoids Screen 


 


Alcohol, Quantitative 














Attending/Attestation





- Attestation


I have personally seen and examined this patient.: Yes


I have fully participated in the care of the patient.: Yes


I have reviewed all pertinent clinical information: Yes


Notes (Text): 





04/10/19 03:14


Patient was seen when she was in the ER in cubicle # 7 .


Agree with history, physical examination, assessment and plan.

## 2019-04-09 NOTE — CARD
--------------- APPROVED REPORT --------------





Date of service: 04/09/2019



EKG Measurement

Heart [id number]

AL [id number]

[id number]

[id number]

[id number]



<Conclusion>

Sinus tachycardia

Otherwise normal ECG

## 2019-04-10 LAB
ALBUMIN SERPL-MCNC: 3.7 G/DL
ALBUMIN/GLOB SERPL: 1.4 {RATIO}
ALT SERPL-CCNC: 17 U/L
APPEARANCE UR: (no result)
AST SERPL-CCNC: 87 U/L
BACTERIA #/AREA URNS HPF: (no result) /HPF
BASE EXCESS BLDV CALC-SCNC: -1.4 MMOL/L
BASE EXCESS BLDV CALC-SCNC: -12.3 MMOL/L
BASOPHILS # BLD AUTO: 0 K/MM3
BASOPHILS NFR BLD: 0 %
BILIRUB UR-MCNC: (no result) MG/DL
BUN SERPL-MCNC: 21 MG/DL
CALCIUM SERPL-MCNC: 7.4 MG/DL
COLOR UR: YELLOW
EOSINOPHIL # BLD: 0 10*3/UL
EOSINOPHIL NFR BLD: 0 %
ERYTHROCYTE [DISTWIDTH] IN BLOOD BY AUTOMATED COUNT: 16.8 %
GFR NON-AFRICAN AMERICAN: 38
GLUCOSE UR STRIP-MCNC: NEGATIVE MG/DL
HGB BLD-MCNC: 11.4 G/DL
LEUKOCYTE ESTERASE UR-ACNC: NEGATIVE LEU/UL
LYMPHOCYTES # BLD: 0.6 10*3/UL
LYMPHOCYTES NFR BLD AUTO: 8.6 %
MCH RBC QN AUTO: 31 PG
MCHC RBC AUTO-ENTMCNC: 32.5 G/DL
MCV RBC AUTO: 95.4 FL
MONOCYTES # BLD AUTO: 0.5 10*3/UL
MONOCYTES NFR BLD: 6.8 %
PH BLDV: 7.35 [PH]
PH BLDV: 7.5 [PH]
PH UR STRIP: 6 [PH]
PLATELET # BLD: 64 10^3/UL
PMV BLD AUTO: 10.2 FL
PROT UR STRIP-MCNC: (no result) MG/DL
RBC # BLD AUTO: 3.68 10^6/UL
RBC # UR STRIP: (no result) /UL
RBC #/AREA URNS HPF: (no result) /HPF
SP GR UR STRIP: 1.01
TROPONIN I SERPL-MCNC: 0.02 NG/ML
URINE HYALINE CAST: (no result) /HPF
UROBILINOGEN UR STRIP-ACNC: 0.2 E.U./DL
VENOUS BLOOD FIO2: 21 %
VENOUS BLOOD FIO2: 21 %
VENOUS BLOOD GAS PCO2: 20
VENOUS BLOOD GAS PCO2: 26
VENOUS BLOOD GAS PO2: 178 MM/HG
VENOUS BLOOD GAS PO2: 32 MM/HG
WBC # BLD AUTO: 7.2 10^3/UL
WBC #/AREA URNS HPF: (no result) /HPF

## 2019-04-10 RX ADMIN — MULTIPLE VITAMINS W/ MINERALS TAB SCH TAB: TAB at 08:23

## 2019-04-10 RX ADMIN — SODIUM BICARBONATE SCH MLS/HR: 84 INJECTION, SOLUTION INTRAVENOUS at 22:01

## 2019-04-10 RX ADMIN — CEFTRIAXONE SCH MLS/HR: 1 INJECTION, SOLUTION INTRAVENOUS at 10:33

## 2019-04-10 NOTE — CP.PCM.CON
History of Present Illness





- History of Present Illness


History of Present Illness: 


Nephrology Consultation Note:





Assessment: critical


alcohol intoxication/abuse with alcohol withdrawal


hypotension


JUAN


severe HAGMA with lactic acidosis and alcoholic ketoacidosis: likely all due to 

chronic etoh abuse and starvation


Anemia thrombocytopenia, Hyperphosphatemia (E83.39)





Plan


No acute need for renal replacement therapy at this time. acidosis improving. 

continue with D5w containing fluid with bicarb


high dose IV thiamine 200 mg q8 hrs is suggested.


Maintain hemodynamics stable. Avoid hypotension. Patient not on ACEI/ARB due to 

recent JUAN


Monitor Input/Output, daily weights and renal function with basic metabolic pan

el


monitor lytes. supplement as needed





Dose meds/antibiotics for improved GFR. Avoid fleets enema/magnesium based 

laxatives. Avoid nephrotoxins/NSAIDs/ iodinated contrast (unless needed 

emergently)


Glycemic control. pt need to abstain from alcohol. need lifestyle modifications.


Further work up/management as per primary team





Thanks for allowing me to participate in care of your patient. Will follow 

patient with you. Please call if any Qs


Dr Randy Hatfield


Office: [phone] Cell: [phone] Fax: [phone]





Chief Complaint; unable


Reason for consult: HAGMA


HPI: Pt is a 63 F with hx of chronic etoh and anxiety presented with complaints 

of anxiety, tremors and alcohol abuse. found to have severe acidosis hence renal

consuted. pt is being managed for alcohol withdrawal


No recent iodinated contrast exposure. Noted obvious episodes of low BP.





ROS: pt unable to provide





Physical Examination:      


General Appearance: ill appearing in no acute respiratory distress, mostly non 

communicative. speaks in incomprehensible words


Vitals reviewed and noted as below


Head; Atraumatic, normocephalic


ENT: no ulcers no thrush. Tongue is midline/DRY. Oropharynx: no rash or ulcers.


EYES: Pupils are equal, round and reactive to light accommodation. Eye muscles 

and extraocular movement intact. Sclera is anicteric.


Neck; supple no lymphadenopathy, no thyromegaly or bruit


Lungs: Normal respiratory rate/effort. Breath sounds bilateral equal and clear


Heart: Increased rate. s1s2 normal. No rub or gallop. 


Extremities: no edema. No varicose veins


Neurological: Patient is sleepy but arousable mostly non communicative. speaks 

in incomprehensible words


Skin: Warm and dry. Normal turgor. No rash. Palpitation: Normal elasticity for 

age


Abdomen: Abdomen is soft. Bowel sounds +. There is no abdominal tenderness, no 

guarding/rigidity no organomegaly


Psych: unable


MSK: no joint tenderness or swelling. Digits and nails normal, no deformity


: kidney or bladder not palpable





Labs/imaging reviewed.


Past medical history, past surgical history, family history, social history, 

allergy reviewed and noted as below


Family hx: no hx of CKD. Rest non-contributory 





UA ++ ketone lactic acid 7.4








Past Patient History





- Infectious Disease


Hx of Infectious Diseases: None





- Tetanus Immunizations


Tetanus Immunization: Unknown





- Past Social History


Smoking Status: Former Smoker





- CARDIAC


Hx Cardiac Disorders: Yes


Hx Hypertension: Yes





- PULMONARY


Hx Respiratory Disorders: No


Hx Asthma: No


Hx Bronchitis: No


Hx Chronic Obstructive Pulmonary Disease (COPD): No


Hx Emphysema: No


Hx Pneumonia: No


Hx Respiratory Aspiration: No


Hx Respiratory Tract Infection: No


Hx Sleep Apnea: No


Hx Tuberculosis: No





- NEUROLOGICAL


Hx Neurological Disorder: No


Hx Alzheimer's Disease: No


HX Cerebrovascular Accident: No


Hx Dementia: No


Hx Dizziness: No


Hx Meningitis: No


Hx Migraine: No


Hx Parkinson's Disease: No


Hx Seizures: No


Hx Transient Ischemic Attacks (TIA): No





- HEENT


Hx HEENT Problems: No


Hx Blind: No


Hx Cataracts: No


Hx Deafness: No


Hx Difficulty Chewing: No


Hx Epistaxis: No


Hx Glaucoma: No


Hx Macular Degeneration: No





- RENAL


Hx Chronic Kidney Disease: No


Hx Dialysis: No


Hx Kidney Stones: No


Hx Neurogenic Bladder: No


Hx Pyelonephritis: No


Hx Renal (Kidney) Cancer: No


Hx Renal Failure: No





- ENDOCRINE/METABOLIC


Hx Endocrine Disorders: No


Hx Adrenal Cancer: No


Hx Diabetes Insipidus: No


Hx Diabetes Mellitus Type 1: No


Hx Diabetes Mellitus Type 2: No


Hx Hyperthyroidism: No


Hx Hypothyroidism: No


Hx Systemic Lupus Erythematosus: No





- HEMATOLOGICAL/ONCOLOGICAL


Hx Blood Disorders: No


Hx AIDS: No


Hx Anemia: No


Hx Cancer: No


Hx Chemotherapy: No


Hx Cirrhosis: No


Hx Hemophilia: No


Hx Hepatitis A: No


Hx Hepatitis B: No


Hx Hepatitis C: No


Hx Human Immunodeficiency Virus (HIV): No


Hx Metastesis: No


Hx Shingles: No


Hx Sickle Cell Disease: No


Hx Unexplained Bleeding: No





- INTEGUMENTARY


Hx Dermatological Problems: No


Hx Basil Cell: No


Hx Eczema: No


Hx Melanoma: No


Hx Psoriasis: No


Hx Squamous Cell: No





- MUSCULOSKELETAL/RHEUMATOLOGICAL


Hx Musculoskeletal Disorders: Yes


Hx Arthritis: No


Hx Back Pain: No


Hx Degenerative Joint Disease: No


Hx Falls: Yes


Hx Fractures: No


Hx Gout: No


Hx Herniated Disk: No


Hx Myasthenia Gravis: No


Hx Osteoarthritis: No


Hx Osteomyelitis: No


Hx Osteoporosis: No


Hx Rhabdomyolysis: No


Hx Spinal Stenosis: No


Hx Unsteady Gait: Yes





- GASTROINTESTINAL


Hx Gastrointestinal Disorders: No


Hx Colostomy: No


Hx Crohn's Disease: No


Hx Diverticulitis: No


Hx Gall Bladder Disease: No


Hx Gastroesophageal Reflux: No


Hx Ileostomy: No


Hx Liver Failure: No


Hx Pancreatitis: No


HX Swallowing Problems: No


Hx Ulcer: No


Other/Comment: cirrhosis





- GENITOURINARY/GYNECOLOGICAL


Hx Genitourinary Disorders: Yes


Hx Hematuria: No


Hx Incontinence: Yes


Hx Sexually Transmitted Disorders: No


Hx Urinary Tract Infection: Yes





- PSYCHIATRIC


Hx Psychophysiologic Disorder: Yes


Hx Anxiety: Yes


Hx Bipolar Disorder: No


Hx Depression: Yes


Hx Emotional Abuse: No


Hx Hallucinations: Yes


Hx Panic Symptoms: No


Hx Paranoia: No


Hx Post Traumatic Stress Disorder: No


Hx Psychosis: No


Hx Physical Abuse: No


Hx Schizophrenia: Yes


Hx Sexual Abuse: No


Hx Substance Use: No





- SURGICAL HISTORY


Hx Surgeries: No


Hx Amputation: No


Hx Appendectomy: No


Hx Cardiac Catheterization: No


Hx Cholecystectomy: No


Hx Coronary Stent: No


Hx Gastric Bypass Surgery: No


Hx Hysterectomy: No


Hx Joint Replacement: No


Hx Kidney Transplant: No


Hx Liver Transplant: No


Hx Mastectomy: No


Hx Musculoskeletal Surgery: No


Hx Open Heart Surgery: No


Hx Orthopedic Surgery: No


Hx Splenectomy: No


Hx Valve Replacement: No





- ANESTHESIA


Hx Anesthesia: No


Hx Anesthesia Reactions: No


Hx Malignant Hyperthermia: No





Meds


Allergies/Adverse Reactions: 


                                    Allergies











Allergy/AdvReac Type Severity Reaction Status Date / Time


 


cyclobenzaprine Allergy  ANAPHYLAXIS Verified 04/09/19 17:55





[From Flexeril]     


 


ibuprofen Allergy  SHORTNESS Verified 04/09/19 17:55





   OF BREATH  














- Medications


Medications: 


                               Current Medications





Folic Acid (Folic Acid)  1 mg PO DAILY [facility]


   Last Admin: 04/10/19 09:20 Dose:  1 mg


Ceftriaxone Sodium (Rocephin 1 Gram Ivpb)  1 gm in 100 mls @ 100 mls/hr IVPB 

DAILY [facility]; Protocol


   Last Admin: 04/10/19 10:33 Dose:  100 mls/hr


Thiamine HCl 200 mg/ Sodium (Chloride)  52 mls @ 156 mls/hr IV Q8H PREETI


Lorazepam (Ativan)  2 mg IVP Q6H PREETI; Protocol


   Last Admin: 04/10/19 09:19 Dose:  2 mg


Lorazepam (Ativan)  1 mg IVP Q4H PRN; Protocol


   PRN Reason: Symptoms of alcohol withdrawl


   Last Admin: 04/10/19 14:01 Dose:  1 mg


Multivitamins/Minerals (Therapeutic-M Tab)  1 tab PO 0800 PREETI


   Last Admin: 04/10/19 08:23 Dose:  1 tab


Ondansetron HCl (Zofran Inj)  4 mg IVP Q6H PRN


   PRN Reason: Nausea/Vomiting











Results





- Vital Signs


Recent Vital Signs: 


                                Last Vital Signs











Temp  98.4 F   04/10/19 12:00


 


Pulse  95 H  04/10/19 14:00


 


Resp  20   04/10/19 12:00


 


BP  92/60 L  04/10/19 12:00


 


Pulse Ox  96   04/10/19 05:49














- Labs


Result Diagrams: 


                                 04/10/19 06:10





                                 04/10/19 06:10


Labs: 


                         Laboratory Results - last 24 hr











  04/09/19 04/09/19 04/09/19





  17:31 17:48 17:48


 


WBC   


 


RBC   


 


Hgb   


 


Hct   


 


MCV   


 


MCH   


 


MCHC   


 


RDW   


 


Plt Count   


 


MPV   


 


Neut % (Auto)   


 


Lymph % (Auto)   


 


Mono % (Auto)   


 


Eos % (Auto)   


 


Baso % (Auto)   


 


Lymph # (Auto)   


 


Mono # (Auto)   


 


Eos # (Auto)   


 


Baso # (Auto)   


 


Absolute Neuts (auto)   


 


Neutrophils % (Manual)   


 


Band Neutrophils %   


 


Lymphocytes % (Manual)   


 


Monocytes % (Manual)   


 


Immature Lymphocytes   


 


Toxic Granulation   


 


Platelet Evaluation   


 


Hypochromasia   


 


Rouleaux   


 


pCO2   


 


pO2   


 


HCO3   


 


ABG pH   


 


ABG Total CO2   


 


ABG O2 Saturation   


 


ABG Base Excess   


 


ABG Potassium   


 


VBG pH   


 


VBG pCO2   


 


VBG HCO3   


 


VBG Total CO2   


 


VBG O2 Sat (Calc)   


 


VBG Base Excess   


 


VBG Potassium   


 


Glucose   


 


Lactate   


 


FiO2   


 


Crit Value Called To   


 


Crit Value Called By   


 


Blood Gas Notified Time   


 


Sodium   


 


Potassium   


 


Chloride   


 


Carbon Dioxide   


 


Anion Gap   


 


BUN   


 


Creatinine   


 


Est GFR ( Amer)   


 


Est GFR (Non-Af Amer)   


 


POC Glucose (mg/dL)  120 H  


 


Random Glucose   


 


Lactic Acid   


 


Calcium   


 


Phosphorus   


 


Magnesium   


 


Total Bilirubin   


 


AST   


 


ALT   


 


Alkaline Phosphatase   


 


Troponin I   


 


NT-Pro-B Natriuret Pep   


 


Total Protein   


 


Albumin   


 


Globulin   


 


Albumin/Globulin Ratio   


 


Procalcitonin   


 


Arterial Blood Potassium   


 


Venous Blood Potassium   


 


Urine Color   Yellow 


 


Urine Appearance   Sl cloudy 


 


Urine pH   6.0 


 


Ur Specific Gravity   1.015 


 


Urine Protein   Trace H 


 


Urine Glucose (UA)   Negative 


 


Urine Ketones   >=80 


 


Urine Blood   Small H 


 


Urine Nitrate   Negative 


 


Urine Bilirubin   Small H 


 


Urine Urobilinogen   0.2 


 


Ur Leukocyte Esterase   Negative 


 


Urine RBC   0 - 2 


 


Urine WBC   0 - 2 


 


Ur Epithelial Cells   1 - 3 


 


Urine Bacteria   Few 


 


Hyaline Casts   0 - 2 


 


Urine Opiates Screen    Negative


 


Urine Methadone Screen    Negative


 


Ur Barbiturates Screen    Negative


 


Ur Phencyclidine Scrn    Negative


 


Ur Amphetamines Screen    Negative


 


U Benzodiazepines Scrn    Positive H


 


U Oth Cocaine Metabols    Negative


 


U Cannabinoids Screen    Negative


 


Alcohol, Quantitative   














  04/09/19 04/09/19 04/09/19





  18:30 18:30 18:30


 


WBC  14.2 H D  


 


RBC  4.09  


 


Hgb  12.8  


 


Hct  41.3  


 


MCV  101.0  D  


 


MCH  31.3  


 


MCHC  31.0  


 


RDW  17.4 H  


 


Plt Count  95 L  


 


MPV  10.2  


 


Neut % (Auto)  86.8 H  


 


Lymph % (Auto)  4.6 L  


 


Mono % (Auto)  8.5 H  


 


Eos % (Auto)  0.1 L  


 


Baso % (Auto)  0.0  


 


Lymph # (Auto)  0.7 L  


 


Mono # (Auto)  1.2 H  


 


Eos # (Auto)  0.0  


 


Baso # (Auto)  0.00  


 


Absolute Neuts (auto)  12.35 H  


 


Neutrophils % (Manual)  84 H  


 


Band Neutrophils %  1  


 


Lymphocytes % (Manual)  5 L  


 


Monocytes % (Manual)  9 H  


 


Immature Lymphocytes  1  


 


Toxic Granulation  2+  


 


Platelet Evaluation  Low  


 


Hypochromasia  1+  


 


Rouleaux  2+  


 


pCO2   


 


pO2   


 


HCO3   


 


ABG pH   


 


ABG Total CO2   


 


ABG O2 Saturation   


 


ABG Base Excess   


 


ABG Potassium   


 


VBG pH   


 


VBG pCO2   


 


VBG HCO3   


 


VBG Total CO2   


 


VBG O2 Sat (Calc)   


 


VBG Base Excess   


 


VBG Potassium   


 


Glucose   


 


Lactate   


 


FiO2   


 


Crit Value Called To   


 


Crit Value Called By   


 


Blood Gas Notified Time   


 


Sodium   144 


 


Potassium   5.1 H 


 


Chloride   98 


 


Carbon Dioxide   5 L D 


 


Anion Gap   46 H 


 


BUN   20 


 


Creatinine   2.7 H 


 


Est GFR ( Amer)   22 


 


Est GFR (Non-Af Amer)   18 


 


POC Glucose (mg/dL)   


 


Random Glucose   113 H 


 


Lactic Acid   


 


Calcium   8.5 


 


Phosphorus   11.1 H 


 


Magnesium   2.1 


 


Total Bilirubin   0.9 


 


AST   94 H D 


 


ALT   19 


 


Alkaline Phosphatase   115 


 


Troponin I   


 


NT-Pro-B Natriuret Pep   


 


Total Protein   7.9 


 


Albumin   4.6 


 


Globulin   3.3 


 


Albumin/Globulin Ratio   1.4 


 


Procalcitonin   


 


Arterial Blood Potassium   


 


Venous Blood Potassium   


 


Urine Color   


 


Urine Appearance   


 


Urine pH   


 


Ur Specific Gravity   


 


Urine Protein   


 


Urine Glucose (UA)   


 


Urine Ketones   


 


Urine Blood   


 


Urine Nitrate   


 


Urine Bilirubin   


 


Urine Urobilinogen   


 


Ur Leukocyte Esterase   


 


Urine RBC   


 


Urine WBC   


 


Ur Epithelial Cells   


 


Urine Bacteria   


 


Hyaline Casts   


 


Urine Opiates Screen   


 


Urine Methadone Screen   


 


Ur Barbiturates Screen   


 


Ur Phencyclidine Scrn   


 


Ur Amphetamines Screen   


 


U Benzodiazepines Scrn   


 


U Oth Cocaine Metabols   


 


U Cannabinoids Screen   


 


Alcohol, Quantitative    49 H














  04/09/19 04/09/19 04/09/19





  20:28 20:50 20:50


 


WBC    12.2 H


 


RBC    4.10


 


Hgb    12.6


 


Hct    41.5


 


MCV    101.2


 


MCH    30.7


 


MCHC    30.4 L


 


RDW    17.2 H


 


Plt Count    84 L


 


MPV    10.2


 


Neut % (Auto)   


 


Lymph % (Auto)   


 


Mono % (Auto)   


 


Eos % (Auto)   


 


Baso % (Auto)   


 


Lymph # (Auto)   


 


Mono # (Auto)   


 


Eos # (Auto)   


 


Baso # (Auto)   


 


Absolute Neuts (auto)   


 


Neutrophils % (Manual)   


 


Band Neutrophils %   


 


Lymphocytes % (Manual)   


 


Monocytes % (Manual)   


 


Immature Lymphocytes   


 


Toxic Granulation   


 


Platelet Evaluation   


 


Hypochromasia   


 


Rouleaux   


 


pCO2  13 L*  


 


pO2  109.0 H  


 


HCO3  4.2 L*  


 


ABG pH  7.12 L*  


 


ABG Total CO2  4.6 L  


 


ABG O2 Saturation  99.7 H  


 


ABG Base Excess  -22.8 L  


 


ABG Potassium  4.0  


 


VBG pH   


 


VBG pCO2   


 


VBG HCO3   


 


VBG Total CO2   


 


VBG O2 Sat (Calc)   


 


VBG Base Excess   


 


VBG Potassium   


 


Glucose  97  


 


Lactate  7.4 H*  


 


FiO2  21.0  


 


Crit Value Called To  [facility]  


 


Crit Value Called By  Jeanine  


 


Blood Gas Notified Time  2038  


 


Sodium  139.0  142 


 


Potassium   4.7 


 


Chloride  97.0 L  98 


 


Carbon Dioxide   5 L 


 


Anion Gap   44 H 


 


BUN   21 


 


Creatinine   2.6 H 


 


Est GFR ( Amer)   23 


 


Est GFR (Non-Af Amer)   19 


 


POC Glucose (mg/dL)   


 


Random Glucose   96 


 


Lactic Acid   


 


Calcium   8.4 


 


Phosphorus   


 


Magnesium   2.0 


 


Total Bilirubin   0.9 


 


AST   101 H 


 


ALT   24 


 


Alkaline Phosphatase   112 


 


Troponin I   < 0.01 


 


NT-Pro-B Natriuret Pep   342 


 


Total Protein   7.8 


 


Albumin   4.7 


 


Globulin   3.1 


 


Albumin/Globulin Ratio   1.5 


 


Procalcitonin   


 


Arterial Blood Potassium  4.0  


 


Venous Blood Potassium   


 


Urine Color   


 


Urine Appearance   


 


Urine pH   


 


Ur Specific Gravity   


 


Urine Protein   


 


Urine Glucose (UA)   


 


Urine Ketones   


 


Urine Blood   


 


Urine Nitrate   


 


Urine Bilirubin   


 


Urine Urobilinogen   


 


Ur Leukocyte Esterase   


 


Urine RBC   


 


Urine WBC   


 


Ur Epithelial Cells   


 


Urine Bacteria   


 


Hyaline Casts   


 


Urine Opiates Screen   


 


Urine Methadone Screen   


 


Ur Barbiturates Screen   


 


Ur Phencyclidine Scrn   


 


Ur Amphetamines Screen   


 


U Benzodiazepines Scrn   


 


U Oth Cocaine Metabols   


 


U Cannabinoids Screen   


 


Alcohol, Quantitative   














  04/09/19 04/10/19 04/10/19





  20:50 01:35 01:35


 


WBC   


 


RBC   


 


Hgb   


 


Hct   


 


MCV   


 


MCH   


 


MCHC   


 


RDW   


 


Plt Count   


 


MPV   


 


Neut % (Auto)   


 


Lymph % (Auto)   


 


Mono % (Auto)   


 


Eos % (Auto)   


 


Baso % (Auto)   


 


Lymph # (Auto)   


 


Mono # (Auto)   


 


Eos # (Auto)   


 


Baso # (Auto)   


 


Absolute Neuts (auto)   


 


Neutrophils % (Manual)   


 


Band Neutrophils %   


 


Lymphocytes % (Manual)   


 


Monocytes % (Manual)   


 


Immature Lymphocytes   


 


Toxic Granulation   


 


Platelet Evaluation   


 


Hypochromasia   


 


Rouleaux   


 


pCO2   


 


pO2   32 


 


HCO3   


 


ABG pH   


 


ABG Total CO2   


 


ABG O2 Saturation   


 


ABG Base Excess   


 


ABG Potassium   


 


VBG pH   7.35 


 


VBG pCO2   20.0 L 


 


VBG HCO3   11.0 L 


 


VBG Total CO2   11.6 L 


 


VBG O2 Sat (Calc)   66.0 H 


 


VBG Base Excess   -12.3 L 


 


VBG Potassium   4.4 


 


Glucose   144 H 


 


Lactate   2.3 H 


 


FiO2   21.0 


 


Crit Value Called To   Cherie pruitt rn 2rno 


 


Crit Value Called By   [facility] 


 


Blood Gas Notified Time   207 


 


Sodium   138.0 


 


Potassium   


 


Chloride   93.0 L 


 


Carbon Dioxide   


 


Anion Gap   


 


BUN   


 


Creatinine   


 


Est GFR ( Amer)   


 


Est GFR (Non-Af Amer)   


 


POC Glucose (mg/dL)   


 


Random Glucose   


 


Lactic Acid    1.6


 


Calcium   


 


Phosphorus   


 


Magnesium   


 


Total Bilirubin   


 


AST   


 


ALT   


 


Alkaline Phosphatase   


 


Troponin I   


 


NT-Pro-B Natriuret Pep   


 


Total Protein   


 


Albumin   


 


Globulin   


 


Albumin/Globulin Ratio   


 


Procalcitonin  0.29  


 


Arterial Blood Potassium   


 


Venous Blood Potassium   4.4 


 


Urine Color   


 


Urine Appearance   


 


Urine pH   


 


Ur Specific Gravity   


 


Urine Protein   


 


Urine Glucose (UA)   


 


Urine Ketones   


 


Urine Blood   


 


Urine Nitrate   


 


Urine Bilirubin   


 


Urine Urobilinogen   


 


Ur Leukocyte Esterase   


 


Urine RBC   


 


Urine WBC   


 


Ur Epithelial Cells   


 


Urine Bacteria   


 


Hyaline Casts   


 


Urine Opiates Screen   


 


Urine Methadone Screen   


 


Ur Barbiturates Screen   


 


Ur Phencyclidine Scrn   


 


Ur Amphetamines Screen   


 


U Benzodiazepines Scrn   


 


U Oth Cocaine Metabols   


 


U Cannabinoids Screen   


 


Alcohol, Quantitative   














  04/10/19 04/10/19 04/10/19





  06:10 06:10 08:25


 


WBC  7.2  D  


 


RBC  3.68  


 


Hgb  11.4 L  


 


Hct  35.1 L  


 


MCV  95.4  D  


 


MCH  31.0  


 


MCHC  32.5  


 


RDW  16.8 H  


 


Plt Count  64 L  


 


MPV  10.2  


 


Neut % (Auto)  84.6 H  


 


Lymph % (Auto)  8.6 L  


 


Mono % (Auto)  6.8 H  


 


Eos % (Auto)  0.0 L  


 


Baso % (Auto)  0.0  


 


Lymph # (Auto)  0.6 L  


 


Mono # (Auto)  0.5  


 


Eos # (Auto)  0.0  


 


Baso # (Auto)  0.00  


 


Absolute Neuts (auto)  6.06  


 


Neutrophils % (Manual)   


 


Band Neutrophils %   


 


Lymphocytes % (Manual)   


 


Monocytes % (Manual)   


 


Immature Lymphocytes   


 


Toxic Granulation   


 


Platelet Evaluation   


 


Hypochromasia   


 


Rouleaux   


 


pCO2   


 


pO2    178 H


 


HCO3   


 


ABG pH   


 


ABG Total CO2   


 


ABG O2 Saturation   


 


ABG Base Excess   


 


ABG Potassium   


 


VBG pH    7.50 H


 


VBG pCO2    26.0 L


 


VBG HCO3    20.3 L


 


VBG Total CO2    21.1 L


 


VBG O2 Sat (Calc)    99.9 H


 


VBG Base Excess    -1.4 L


 


VBG Potassium    3.6


 


Glucose    110 H


 


Lactate    1.4


 


FiO2    21.0


 


Crit Value Called To    Debby spaulding


 


Crit Value Called By    Gianna.ansley


 


Blood Gas Notified Time    844


 


Sodium   140  140.0


 


Potassium   4.0 


 


Chloride   98  101.0


 


Carbon Dioxide   16 L 


 


Anion Gap   30 H 


 


BUN   21 


 


Creatinine   1.4 H 


 


Est GFR ( Amer)   46 


 


Est GFR (Non-Af Amer)   38 


 


POC Glucose (mg/dL)   


 


Random Glucose   124 H 


 


Lactic Acid   


 


Calcium   7.4 L 


 


Phosphorus   


 


Magnesium   


 


Total Bilirubin   0.7 


 


AST   87 H 


 


ALT   17 


 


Alkaline Phosphatase   87 


 


Troponin I   0.02  D 


 


NT-Pro-B Natriuret Pep   


 


Total Protein   6.4 


 


Albumin   3.7 


 


Globulin   2.7 


 


Albumin/Globulin Ratio   1.4 


 


Procalcitonin   


 


Arterial Blood Potassium   


 


Venous Blood Potassium    3.6


 


Urine Color   


 


Urine Appearance   


 


Urine pH   


 


Ur Specific Gravity   


 


Urine Protein   


 


Urine Glucose (UA)   


 


Urine Ketones   


 


Urine Blood   


 


Urine Nitrate   


 


Urine Bilirubin   


 


Urine Urobilinogen   


 


Ur Leukocyte Esterase   


 


Urine RBC   


 


Urine WBC   


 


Ur Epithelial Cells   


 


Urine Bacteria   


 


Hyaline Casts   


 


Urine Opiates Screen   


 


Urine Methadone Screen   


 


Ur Barbiturates Screen   


 


Ur Phencyclidine Scrn   


 


Ur Amphetamines Screen   


 


U Benzodiazepines Scrn   


 


U Oth Cocaine Metabols   


 


U Cannabinoids Screen   


 


Alcohol, Quantitative

## 2019-04-10 NOTE — PCM.SEPTIC
<Cristino Quevedo - Last Filed: 04/10/19 00:48>





Sepsis Progress Note





- Reassessment Type


Date of Evaluation: 04/10/19


Time of Evaluation: 00:45


Reassessment Type: Non-invasive reassessment





- Non Invasive Reassessment


Were the most recent vital sign reviewed: Yes


Vital Sign (Latest): 


                                        











Temp Pulse Resp BP Pulse Ox


 


 97.8 F   120 H  19   101/62   99 


 


 04/09/19 21:29  04/09/19 21:29  04/09/19 21:29  04/09/19 21:29  04/09/19 21:29








Cardiovascular: Yes: Regular Rate, Rhythm


Respiratory: Yes: Normal Breath Sounds.  No: Accessory Muscle Use, Respiratory 

Distress


Capillary Refill: Normal (Less than 2 sec)


Pulses: Normal Radial, Normal Dorsalis Pedis, Normal Posterior Tibialis


Skin: Normal Color, Warm, Dry





<Lexus French - Last Filed: 04/10/19 03:13>





Sepsis Progress Note





- Non Invasive Reassessment


Vital Sign (Latest): 


                                        











Temp Pulse Resp BP Pulse Ox


 


 98.7 F   128 H  19   129/74   98 


 


 04/10/19 00:01  04/10/19 00:01  04/10/19 00:01  04/10/19 00:01  04/10/19 00:01











Attending/Attestation





- Attestation


I have personally seen and examined this patient.: Yes


I have fully participated in the care of the patient.: Yes


I have reviewed all pertinent clinical information, including history, physical 

exam and plan: Yes

## 2019-04-10 NOTE — CP.PCM.PN
<Wesley Greene - Last Filed: 04/10/19 11:18>





Subjective





- Date & Time of Evaluation


Date of Evaluation: 04/10/19


Time of Evaluation: 09:30





- Subjective


Subjective: 





Wesley Greene, PGY-1 Medicine Progress Note:


Pt was seen and examined this AM at bedside. Per nursing overnight the pt was 

noted to have a CIWA at 21 which then trended down to 4 this AM. Pt received 

Ativan 4 times over the course of the night. Pt also had gu placed overnight 

due to urinary retention. Pt this AM states that she is aching all over but 

denies any fevers, chills, headache, lightheadedness, chest pain, SOB, cough, 

n/v, c/d or dysuria. 








Objective





- Vital Signs/Intake and Output


Vital Signs (last 24 hours): 


                                        











Temp Pulse Resp BP Pulse Ox


 


 99.7 F H  112 H  19   109/73   96 


 


 04/10/19 05:49  04/10/19 06:00  04/10/19 05:49  04/10/19 05:49  04/10/19 05:49








Intake and Output: 


                                        











 04/10/19 04/10/19





 06:59 18:59


 


Intake Total 1020 


 


Output Total 1800 


 


Balance -780 














- Medications


Medications: 


                               Current Medications





Folic Acid (Folic Acid)  1 mg PO DAILY [facility]


   Last Admin: 04/10/19 09:20 Dose:  1 mg


Ceftriaxone Sodium (Rocephin 1 Gram Ivpb)  1 gm in 100 mls @ 100 mls/hr IVPB 

DAILY [facility]; Protocol


   Last Admin: 04/10/19 10:33 Dose:  100 mls/hr


Sodium Bicarbonate 150 meq/ (Dextrose)  1,150 mls @ 75 mls/hr IV .[id number] [facility]


   Last Admin: 04/10/19 10:33 Dose:  75 mls/hr


Lorazepam (Ativan)  2 mg IVP Q6H [facility]; Protocol


   Last Admin: 04/10/19 09:19 Dose:  2 mg


Lorazepam (Ativan)  1 mg IVP Q4H PRN; Protocol


   PRN Reason: Symptoms of alcohol withdrawl


   Last Admin: 04/10/19 01:59 Dose:  1 mg


Multivitamins/Minerals (Therapeutic-M Tab)  1 tab PO 0800 [facility]


   Last Admin: 04/10/19 08:23 Dose:  1 tab


Ondansetron HCl (Zofran Inj)  4 mg IVP Q6H PRN


   PRN Reason: Nausea/Vomiting


Thiamine HCl (Vitamin B1 Tab)  100 mg PO DAILY [facility]


   Last Admin: 04/10/19 09:19 Dose:  100 mg











- Labs


Labs: 


                                        





                                 04/10/19 06:10 





                                 04/10/19 06:10 











- Constitutional


Appears: Non-toxic, No Acute Distress, Other (tremulous)





- Head Exam


Head Exam: ATRAUMATIC, NORMAL INSPECTION, NORMOCEPHALIC





- Eye Exam


Eye Exam: EOMI, Normal appearance, PERRL





- Respiratory Exam


Respiratory Exam: Clear to Ausculation Bilateral, NORMAL BREATHING PATTERN.  

absent: Accessory Muscle Use, Decreased Breath Sounds, Rales, Rhonchi, Wheezes





- Cardiovascular Exam


Cardiovascular Exam: RRR, +S1, +S2.  absent: Gallop, Rubs





- GI/Abdominal Exam


GI & Abdominal Exam: Soft, Normal Bowel Sounds.  absent: Firm, Guarding, Rigid, 

Tenderness





- Extremities Exam


Extremities Exam: Normal Inspection.  absent: Calf Tenderness, Pedal Edema





- Back Exam


Back Exam: NORMAL INSPECTION.  absent: CVA tenderness (L), CVA tenderness (R)





- Neurological Exam


Neurological Exam: Oriented x3 (Somnolent )





- Psychiatric Exam


Additional comments: 





Somnolent 





- Skin


Skin Exam: Dry, Normal Color, Warm





Assessment and Plan





- Assessment and Plan (Free Text)


Assessment: 





63 year old  female with pmhx of alcohol abuse, alcohol hepatitis, 

anxiety, chronic L neck pain presenting to ED with tremors likely 2/2 alcohol 

withdrawal. Drinks 1 pt vodka daily, last drink yesterday. 


Plan: 





Alcohol withdrawal, Hx of alcohol abuse: 


- Last CIWA 4


- Last reported drink was yesterday afternoon


- Serum alcohol 49


- [facility] protocol


- aspiration, fall precautions


- neuro checks q4h


- D5w w/3 AMPs of bicarb


- Thiamine/folate/MV


- Ativan 2 mg q6 prn


- Ativan 1 mg q4 prn


- Zofran 4 mg q4 prn





High Anion Gap Metabolic Acidosis: Improving


- Likely 2/2 alcoholic ketoacidosis complicated by JUAN


- Initally calculated AG 41, improving AG is now 38


- ABG: CO2 13, pH 7.12, Lactate 7.4


- D5W w/3 AMPs of bicarb





JUAN: Improving


-likely 2/2 dehydration


-BUN/Cr: 20/2.7, continue to monitor


-Nephrology (Dr. Hatfield) consulted


-IVF





PPx, Diet, Disposition


-DVT ppx: scds, heparin 5000 units sc q8


-GI ppx: protonix 40 mg IVP daily


-Diet: HHD


-PT on board





Case discussed with Dr. Mela Greene DO, PGY-1





<Harley Plaza - Last Filed: 04/10/19 13:13>





Objective





- Vital Signs/Intake and Output


Vital Signs (last 24 hours): 


                                        











Temp Pulse Resp BP Pulse Ox


 


 98.4 F   100 H  20   92/60 L  96 


 


 04/10/19 12:00  04/10/19 12:00  04/10/19 12:00  04/10/19 12:00  04/10/19 05:49








Intake and Output: 


                                        











 04/10/19 04/10/19





 06:59 18:59


 


Intake Total 1020 


 


Output Total 1800 


 


Balance -780 














- Medications


Medications: 


                               Current Medications





Folic Acid (Folic Acid)  1 mg PO DAILY [facility]


   Last Admin: 04/10/19 09:20 Dose:  1 mg


Ceftriaxone Sodium (Rocephin 1 Gram Ivpb)  1 gm in 100 mls @ 100 mls/hr IVPB 

DAILY [facility]; Protocol


   Last Admin: 04/10/19 10:33 Dose:  100 mls/hr


Sodium Bicarbonate 150 meq/ (Dextrose)  1,150 mls @ 75 mls/hr IV .[id number] [facility]


   Last Admin: 04/10/19 10:33 Dose:  75 mls/hr


Lorazepam (Ativan)  2 mg IVP Q6H [facility]; Protocol


   Last Admin: 04/10/19 09:19 Dose:  2 mg


Lorazepam (Ativan)  1 mg IVP Q4H PRN; Protocol


   PRN Reason: Symptoms of alcohol withdrawl


   Last Admin: 04/10/19 01:59 Dose:  1 mg


Multivitamins/Minerals (Therapeutic-M Tab)  1 tab PO 0800 [facility]


   Last Admin: 04/10/19 08:23 Dose:  1 tab


Ondansetron HCl (Zofran Inj)  4 mg IVP Q6H PRN


   PRN Reason: Nausea/Vomiting


Thiamine HCl (Vitamin B1 Tab)  100 mg PO DAILY [facility]


   Last Admin: 04/10/19 09:19 Dose:  100 mg











- Labs


Labs: 


                                        





                                 04/10/19 06:10 





                                 04/10/19 06:10 











Attending/Attestation





- Attestation


I have personally seen and examined this patient.: Yes


I have fully participated in the care of the patient.: Yes


I have reviewed all pertinent clinical information, including history, physical 

exam and plan: Yes

## 2019-04-10 NOTE — CARD
--------------- APPROVED REPORT --------------





Date of service: 04/10/2019



EKG Measurement

Heart [id number]

VT [id number]

[id number]

[id number]

[id number]



<Conclusion>

Sinus tachycardia

Nonspecific T wave abnormality

Abnormal ECG

## 2019-04-10 NOTE — RAD
Date of service: 



04/09/2019



HISTORY:

 Lleukocytosis 



COMPARISON:

01/15/2019 



TECHNIQUE:

1 view obtained.



FINDINGS:



LUNGS:

No active pulmonary disease.



PLEURA:

No significant pleural effusion identified, no pneumothorax apparent.



CARDIOVASCULAR:

No aortic atherosclerotic calcification present.



Normal cardiac size. No pulmonary vascular congestion. 



OSSEOUS STRUCTURES:

No significant abnormalities.



VISUALIZED UPPER ABDOMEN:

Normal.



OTHER FINDINGS:

None.



IMPRESSION:

No active disease.

## 2019-04-11 LAB
ALBUMIN SERPL-MCNC: 3.2 G/DL
ALBUMIN/GLOB SERPL: 1.2 {RATIO}
ALT SERPL-CCNC: 21 U/L
AST SERPL-CCNC: 66 U/L
BASOPHILS # BLD AUTO: 0.01 K/MM3
BASOPHILS NFR BLD: 0.2 %
BUN SERPL-MCNC: 10 MG/DL
BUN SERPL-MCNC: 7 MG/DL
BUN SERPL-MCNC: 9 MG/DL
CALCIUM SERPL-MCNC: 7.5 MG/DL
CALCIUM SERPL-MCNC: 7.7 MG/DL
CALCIUM SERPL-MCNC: 8.1 MG/DL
EOSINOPHIL # BLD: 0 10*3/UL
EOSINOPHIL NFR BLD: 0.4 %
ERYTHROCYTE [DISTWIDTH] IN BLOOD BY AUTOMATED COUNT: 16.4 %
GFR NON-AFRICAN AMERICAN: > 60
HGB BLD-MCNC: 11.2 G/DL
LYMPHOCYTES # BLD: 1.7 10*3/UL
LYMPHOCYTES NFR BLD AUTO: 31.2 %
MCH RBC QN AUTO: 30.7 PG
MCHC RBC AUTO-ENTMCNC: 32.5 G/DL
MCV RBC AUTO: 94.5 FL
MONOCYTES # BLD AUTO: 0.4 10*3/UL
MONOCYTES NFR BLD: 6.4 %
PLATELET # BLD: 53 10^3/UL
PMV BLD AUTO: 10 FL
RBC # BLD AUTO: 3.65 10^6/UL
WBC # BLD AUTO: 5.5 10^3/UL

## 2019-04-11 RX ADMIN — POTASSIUM CHLORIDE SCH MEQ: 20 TABLET, EXTENDED RELEASE ORAL at 14:58

## 2019-04-11 RX ADMIN — DEXTROSE AND SODIUM CHLORIDE SCH MLS/HR: 5; 900 INJECTION, SOLUTION INTRAVENOUS at 11:32

## 2019-04-11 RX ADMIN — CEFPODOXIME PROXETIL SCH MG: 200 TABLET, FILM COATED ORAL at 21:01

## 2019-04-11 RX ADMIN — Medication SCH MG: at 18:10

## 2019-04-11 RX ADMIN — CEFPODOXIME PROXETIL SCH MG: 200 TABLET, FILM COATED ORAL at 09:21

## 2019-04-11 RX ADMIN — POTASSIUM CHLORIDE SCH MEQ: 20 TABLET, EXTENDED RELEASE ORAL at 18:10

## 2019-04-11 RX ADMIN — POTASSIUM & SODIUM PHOSPHATES POWDER PACK 280-160-250 MG SCH PKT: 280-160-250 PACK at 14:58

## 2019-04-11 RX ADMIN — SODIUM BICARBONATE SCH MLS/HR: 84 INJECTION, SOLUTION INTRAVENOUS at 03:00

## 2019-04-11 RX ADMIN — POTASSIUM & SODIUM PHOSPHATES POWDER PACK 280-160-250 MG SCH PKT: 280-160-250 PACK at 18:10

## 2019-04-11 RX ADMIN — DEXTROSE AND SODIUM CHLORIDE SCH MLS/HR: 5; 900 INJECTION, SOLUTION INTRAVENOUS at 22:20

## 2019-04-11 RX ADMIN — MULTIPLE VITAMINS W/ MINERALS TAB SCH TAB: TAB at 11:26

## 2019-04-11 RX ADMIN — POTASSIUM & SODIUM PHOSPHATES POWDER PACK 280-160-250 MG SCH PKT: 280-160-250 PACK at 11:26

## 2019-04-11 RX ADMIN — Medication SCH MG: at 11:26

## 2019-04-11 NOTE — CARD
--------------- APPROVED REPORT --------------





Date of service: 04/11/2019



EKG Measurement

Heart [id number]

WI 144P51

[id number]

[id number]

[id number]



<Conclusion>

Normal sinus rhythm

Nonspecific T wave abnormality

Abnormal ECG

## 2019-04-11 NOTE — CP.PCM.PN
<Wesley Greene - Last Filed: 04/11/19 13:41>





Subjective





- Date & Time of Evaluation


Date of Evaluation: 04/11/19


Time of Evaluation: 09:25





- Subjective


Subjective: 





Wesley Greene, PGY-1 Medicine Progress Note:


Pt was seen and examined this AM at bedside. Per nursing overnight the pt was 

noted to have a CIWA at 21 which then trended down to 4 this AM. Pt received 

Ativan 4 times over the course of the night. Pt also had gu placed overnight 

due to urinary retention. Pt this AM states that she is aching all over but 

denies any fevers, chills, headache, lightheadedness, chest pain, SOB, cough, 

n/v, c/d or dysuria. 





Objective





- Vital Signs/Intake and Output


Vital Signs (last 24 hours): 


                                        











Temp Pulse Resp BP Pulse Ox


 


 97.4 F L  89   20   105/69   95 


 


 04/11/19 06:00  04/11/19 06:00  04/11/19 06:00  04/11/19 06:00  04/11/19 06:00








Intake and Output: 


                                        











 04/11/19 04/11/19





 06:59 18:59


 


Intake Total 2460 


 


Output Total 1650 


 


Balance 810 














- Medications


Medications: 


                               Current Medications





Cefpodoxime Proxetil (Vantin)  200 mg PO Q12 [facility]


   Last Admin: 04/11/19 09:21 Dose:  200 mg


Folic Acid (Folic Acid)  1 mg PO DAILY [facility]


   Last Admin: 04/11/19 09:21 Dose:  1 mg


Thiamine HCl 200 mg/ Sodium (Chloride)  52 mls @ 156 mls/hr IV Q8H [facility]


   Last Admin: 04/11/19 09:21 Dose:  156 mls/hr


Sodium Bicarbonate 50 meq/ (Dextrose)  1,050 mls @ 100 mls/hr IV .[id number] [facility]


   Last Admin: 04/11/19 03:00 Dose:  100 mls/hr


Potassium Chloride (Potassium Chloride 10 Meq/100 Ml)  10 meq in 100 mls @ 100 

mls/hr IVPB Q2H [facility]


   Stop: 04/11/19 12:29


Magnesium Sulfate (Magnesium Sulfate 2 Gm/50 Ml Water)  2 gm in 50 mls @ 50 

mls/hr IVPB ONCE ONE


   Stop: 04/11/19 10:20


Lorazepam (Ativan)  2 mg IVP Q6H [facility]; Protocol


   Last Admin: 04/11/19 05:54 Dose:  2 mg


Lorazepam (Ativan)  1 mg IVP Q4H PRN; Protocol


   PRN Reason: Symptoms of alcohol withdrawl


   Last Admin: 04/10/19 14:01 Dose:  1 mg


Multivitamins/Minerals (Therapeutic-M Tab)  1 tab PO 0800 [facility]


   Last Admin: 04/10/19 08:23 Dose:  1 tab


Ondansetron HCl (Zofran Inj)  4 mg IVP Q6H PRN


   PRN Reason: Nausea/Vomiting


Potassium Phosphate (Potassium Phosphate)  30 mmole IV ONCE ONE


   Stop: 04/11/19 09:25











- Labs


Labs: 


                                        





                                 04/11/19 05:30 





                                 04/11/19 05:30 





- Constitutional


Appears: Non-toxic, No Acute Distress, Other (tremulous)





- Head Exam


Head Exam: ATRAUMATIC, NORMAL INSPECTION, NORMOCEPHALIC





- Eye Exam


Eye Exam: EOMI, Normal appearance, PERRL





- Respiratory Exam


Respiratory Exam: Clear to Ausculation Bilateral, NORMAL BREATHING PATTERN.  

absent: Accessory Muscle Use, Decreased Breath Sounds, Rales, Rhonchi, Wheezes





- Cardiovascular Exam


Cardiovascular Exam: RRR, +S1, +S2.  absent: Gallop, Rubs





- GI/Abdominal Exam


GI & Abdominal Exam: Soft, Normal Bowel Sounds.  absent: Firm, Guarding, Rigid, 

Tenderness





- Extremities Exam


Extremities Exam: Normal Inspection.  absent: Calf Tenderness, Pedal Edema





- Back Exam


Back Exam: NORMAL INSPECTION.  absent: CVA tenderness (L), CVA tenderness (R)





- Neurological Exam


Neurological Exam: Oriented x3 (Somnolent )





- Psychiatric Exam


Additional comments: 





Somnolent 





- Skin


Skin Exam: Dry, Normal Color, Warm





Assessment and Plan





- Assessment and Plan (Free Text)


Assessment: 





63 year old  female with pmhx of alcohol abuse, alcohol hepatitis, 

anxiety, chronic L neck pain presenting to ED with tremors likely 2/2 alcohol 

withdrawal. Drinks 1 pt vodka daily, last drink yesterday. 


Plan: 





Alcohol withdrawal, Hx of alcohol abuse: 


- Last CIWA 8


- Last reported drink was 4/9


- Serum alcohol 49


- CIWA protocol


- aspiration, fall precautions


- neuro checks q4h


- NS @ 50 with thiamine B1 200mg


- Thiamine/folate/MV PO


- Ativan 2 mg q6 prn


- Ativan 1 mg q4 prn


- Zofran 4 mg q4 prn





High Anion Gap Metabolic Acidosis: Improving


- Likely 2/2 alcoholic ketoacidosis complicated by JUAN


- ABG: CO2 13, pH 7.12, Lactate 7.4


- NS @ 50 with thiamine B1 200mg





JUAN: Improving


-likely 2/2 dehydration


-BUN/Cr: 10/.5, continue to monitor


-Nephrology (Dr. Hatfield) consulted


-IVF





PPx, Diet, Disposition


-DVT ppx: scds


-GI ppx: protonix 40 mg IVP daily


-Diet: HHD


-PT on board





Case discussed with Dr. Mela Greene DO, PGY-1





<Harley Plaza - Last Filed: 04/11/19 17:31>





Objective





- Vital Signs/Intake and Output


Vital Signs (last 24 hours): 


                                        











Temp Pulse Resp BP Pulse Ox


 


 98.4 F   80   18   100/58 L  95 


 


 04/11/19 12:00  04/11/19 12:00  04/11/19 12:00  04/11/19 12:00  04/11/19 06:00








Intake and Output: 


                                        











 04/11/19 04/11/19





 06:59 18:59


 


Intake Total 2460 


 


Output Total 1650 


 


Balance 810 














- Medications


Medications: 


                               Current Medications





Acetaminophen (Tylenol 325mg Tab)  650 mg PO Q6H PRN


   PRN Reason: fever/headache


   Last Admin: 04/11/19 11:26 Dose:  650 mg


Cefpodoxime Proxetil (Vantin)  200 mg PO Q12 [facility]


   Last Admin: 04/11/19 09:21 Dose:  200 mg


Folic Acid (Folic Acid)  1 mg PO DAILY [facility]


   Last Admin: 04/11/19 09:21 Dose:  1 mg


Dextrose/Sodium Chloride (Dextrose 5%/0.9% Ns 1000 Ml)  1,000 mls @ 100 mls/hr 

IV .Q10H [facility]


   Last Admin: 04/11/19 11:32 Dose:  100 mls/hr


Thiamine HCl 200 mg/ Sodium (Chloride)  52 mls @ 156 mls/hr IV Q8H [facility]


   Stop: 04/11/19 23:49


   Last Admin: 04/11/19 16:51 Dose:  156 mls/hr


Lorazepam (Ativan)  2 mg IVP Q6H [facility]; Protocol


   Last Admin: 04/11/19 16:50 Dose:  2 mg


Lorazepam (Ativan)  1 mg IVP Q4H PRN; Protocol


   PRN Reason: Symptoms of alcohol withdrawl


   Last Admin: 04/10/19 14:01 Dose:  1 mg


Magnesium Oxide (Mag-Ox)  400 mg PO BID [facility]


   Last Admin: 04/11/19 11:26 Dose:  400 mg


Multivitamins/Minerals (Therapeutic-M Tab)  1 tab PO 0800 [facility]


   Last Admin: 04/11/19 11:26 Dose:  1 tab


Ondansetron HCl (Zofran Inj)  4 mg IVP Q6H PRN


   PRN Reason: Nausea/Vomiting


Potassium Chloride (K-Dur 20 Meq Er Tab)  40 meq PO Q6 [facility]


   Stop: 04/11/19 18:01


   Last Admin: 04/11/19 14:58 Dose:  40 meq


Potassium Phos/Sodium Phos (Neutra-Phos)  1 pkt PO TID [facility]


   Stop: 04/11/19 18:01


   Last Admin: 04/11/19 14:58 Dose:  1 pkt


Thiamine HCl (Vitamin B1 Tab)  100 mg PO DAILY [facility]











- Labs


Labs: 


                                        





                                 04/11/19 05:30 





                                 04/11/19 14:00 











Attending/Attestation





- Attestation


I have personally seen and examined this patient.: Yes


I have fully participated in the care of the patient.: Yes


I have reviewed all pertinent clinical information, including history, physical 

exam and plan: Yes


Notes (Text): 





Patient seen and examined with the residents, agree with note above.


Noted to have an overall clinical improvement, low CIWA. States she has no urge 

to drink etoh.


Electrolyte abnormalities with hypokalemia and hypomagnesemia with 

supplementation and correction.


Continue to monitor daily chem panel with electrolyte protocol. PT/OT 

evaluation. Discharge planning.

## 2019-04-11 NOTE — CP.PCM.PN
Subjective





- Date & Time of Evaluation


Date of Evaluation: 04/11/19


Time of Evaluation: 14:40





- Subjective


Subjective: 





Nephrology Consultation Note:





Assessment: stbale


alcohol intoxication/abuse with alcohol withdrawal


hypotension


JUAN


severe HAGMA with lactic acidosis and alcoholic ketoacidosis: likely all due to 

chronic etoh abuse and starvation


Anemia thrombocytopenia, Hyperphosphatemia (E83.39) hypokalemia hypophos hypomag





Plan


No acute need for renal replacement therapy at this time. acidosis resolved. 

continue with D5w NS but d/c bicarb


high dose IV thiamine 200 mg q8 hrs for another day. switch to  mg/d from 

tomorrow


Maintain hemodynamics stable. Avoid hypotension. Patient not on ACEI/ARB due to 

recent JUAN and low BP


Monitor Input/Output, daily weights and renal function with basic metabolic 

panel


monitor lytes. supplement as needed





Dose meds/antibiotics for improved GFR. 


Glycemic control. pt need to abstain from alcohol. need lifestyle modifications.


Further work up/management as per primary team





Thanks for allowing me to participate in care of your patient. Will follow 

patient with you. Please call if any Qs


Dr Randy Hatfield


Office: [phone] Cell: [phone] Fax: [phone]





Chief Complaint; unable


Reason for consult: HAGMA


HPI: Pt is a 63 F with hx of chronic etoh and anxiety presented with complaints 

of anxiety, tremors and alcohol abuse. found to have severe acidosis hence renal

consuted. pt is being managed for alcohol withdrawal


No recent iodinated contrast exposure. Noted obvious episodes of low BP.





ROS: pt unable to provide much. says don't feels good. c/o pain abdomen





Physical Examination:      


General Appearance: ill appearing in no acute respiratory distress, not 

interactive much


Vitals reviewed and noted as below


Head; Atraumatic, normocephalic


ENT: no ulcers no thrush. Tongue is midline/DRY. Oropharynx: no rash or ulcers.


EYES: Pupils are equal, round and reactive to light accommodation. Eye muscles 

and extraocular movement intact. Sclera is anicteric.


Neck; supple no lymphadenopathy, no thyromegaly or bruit


Lungs: Normal respiratory rate/effort. Breath sounds bilateral equal and clear


Heart: normal rate. s1s2 normal. No rub or gallop. 


Extremities: no edema. No varicose veins


Neurological: Patient is sleepy but arousable. 


Skin: Warm and dry. Normal turgor. No rash. Palpitation: Normal elasticity for 

age


Abdomen: Abdomen is soft. Bowel sounds +. There is no abdominal tenderness, no 

guarding/rigidity no organomegaly


Psych: unable


MSK: no joint tenderness or swelling. Digits and nails normal, no deformity


: kidney or bladder not palpable





Labs/imaging reviewed.


Past medical history, past surgical history, family history, social history, 

allergy reviewed and noted as below


Family hx: no hx of CKD. Rest non-contributory 





UA ++ ketone lactic acid 7.4





Objective





- Vital Signs/Intake and Output


Vital Signs (last 24 hours): 


                                        











Temp Pulse Resp BP Pulse Ox


 


 98.4 F   80   18   100/58 L  95 


 


 04/11/19 12:00  04/11/19 12:00  04/11/19 12:00  04/11/19 12:00  04/11/19 06:00








Intake and Output: 


                                        











 04/11/19 04/11/19





 06:59 18:59


 


Intake Total 2460 


 


Output Total 1650 


 


Balance 810 














- Medications


Medications: 


                               Current Medications





Acetaminophen (Tylenol 325mg Tab)  650 mg PO Q6H PRN


   PRN Reason: fever/headache


   Last Admin: 04/11/19 11:26 Dose:  650 mg


Cefpodoxime Proxetil (Vantin)  200 mg PO Q12 [facility]


   Last Admin: 04/11/19 09:21 Dose:  200 mg


Folic Acid (Folic Acid)  1 mg PO DAILY [facility]


   Last Admin: 04/11/19 09:21 Dose:  1 mg


Thiamine HCl 200 mg/ Sodium (Chloride)  52 mls @ 156 mls/hr IV Q8H [facility]


   Last Admin: 04/11/19 09:21 Dose:  156 mls/hr


Potassium Phosphate 30 mmole/ (Sodium Chloride)  260 mls @ 43.333 mls/hr IVPB 

ONCE ONE


   Stop: 04/11/19 15:59


   Last Admin: 04/11/19 11:27 Dose:  43.333 mls/hr


Dextrose/Sodium Chloride (Dextrose 5%/0.9% Ns 1000 Ml)  1,000 mls @ 100 mls/hr 

IV .Q10H [facility]


   Last Admin: 04/11/19 11:32 Dose:  100 mls/hr


Lorazepam (Ativan)  2 mg IVP Q6H [facility]; Protocol


   Last Admin: 04/11/19 11:26 Dose:  2 mg


Lorazepam (Ativan)  1 mg IVP Q4H PRN; Protocol


   PRN Reason: Symptoms of alcohol withdrawl


   Last Admin: 04/10/19 14:01 Dose:  1 mg


Magnesium Oxide (Mag-Ox)  400 mg PO BID [facility]


   Last Admin: 04/11/19 11:26 Dose:  400 mg


Multivitamins/Minerals (Therapeutic-M Tab)  1 tab PO 0800 [facility]


   Last Admin: 04/11/19 11:26 Dose:  1 tab


Ondansetron HCl (Zofran Inj)  4 mg IVP Q6H PRN


   PRN Reason: Nausea/Vomiting


Potassium Chloride (K-Dur 20 Meq Er Tab)  40 meq PO Q6 [facility]


   Stop: 04/11/19 18:01


Potassium Phos/Sodium Phos (Neutra-Phos)  1 pkt PO TID [facility]


   Stop: 04/11/19 18:01


   Last Admin: 04/11/19 11:26 Dose:  1 pkt











- Labs


Labs: 


                                        





                                 04/11/19 05:30 





                                 04/11/19 05:30

## 2019-04-12 LAB
ALBUMIN SERPL-MCNC: 3.4 G/DL
ALBUMIN/GLOB SERPL: 1.2 {RATIO}
ALT SERPL-CCNC: 20 U/L
AST SERPL-CCNC: 64 U/L
BASOPHILS # BLD AUTO: 0.01 K/MM3
BASOPHILS NFR BLD: 0.2 %
BUN SERPL-MCNC: 5 MG/DL
CALCIUM SERPL-MCNC: 8.4 MG/DL
EOSINOPHIL # BLD: 0 10*3/UL
EOSINOPHIL NFR BLD: 1 %
ERYTHROCYTE [DISTWIDTH] IN BLOOD BY AUTOMATED COUNT: 16.6 %
GFR NON-AFRICAN AMERICAN: > 60
HGB BLD-MCNC: 11.1 G/DL
LYMPHOCYTES # BLD: 1.4 10*3/UL
LYMPHOCYTES NFR BLD AUTO: 33.7 %
MCH RBC QN AUTO: 31.1 PG
MCHC RBC AUTO-ENTMCNC: 33 G/DL
MCV RBC AUTO: 94.1 FL
MONOCYTES # BLD AUTO: 0.4 10*3/UL
MONOCYTES NFR BLD: 9 %
PLATELET # BLD: 50 10^3/UL
PMV BLD AUTO: 10.1 FL
RBC # BLD AUTO: 3.57 10^6/UL
WBC # BLD AUTO: 4.1 10^3/UL

## 2019-04-12 RX ADMIN — CEFPODOXIME PROXETIL SCH MG: 200 TABLET, FILM COATED ORAL at 22:22

## 2019-04-12 RX ADMIN — DEXTROSE AND SODIUM CHLORIDE SCH MLS/HR: 5; 900 INJECTION, SOLUTION INTRAVENOUS at 10:44

## 2019-04-12 RX ADMIN — POTASSIUM & SODIUM PHOSPHATES POWDER PACK 280-160-250 MG SCH PKT: 280-160-250 PACK at 17:15

## 2019-04-12 RX ADMIN — Medication SCH MG: at 10:38

## 2019-04-12 RX ADMIN — CEFPODOXIME PROXETIL SCH MG: 200 TABLET, FILM COATED ORAL at 10:38

## 2019-04-12 RX ADMIN — MULTIPLE VITAMINS W/ MINERALS TAB SCH TAB: TAB at 08:15

## 2019-04-12 RX ADMIN — Medication SCH MG: at 17:15

## 2019-04-12 NOTE — CP.PCM.PN
Subjective





- Date & Time of Evaluation


Date of Evaluation: 04/12/19


Time of Evaluation: 12:05





- Subjective


Subjective: 





Nephrology Consultation Note:





Assessment: stable


alcohol intoxication/abuse with alcohol withdrawal


hypotension


JUAN


severe HAGMA with lactic acidosis and alcoholic ketoacidosis: likely all due to 

chronic etoh abuse and starvation


Anemia thrombocytopenia, Hyperphosphatemia (E83.39) hypokalemia hypophos hypomag





Plan


No acute need for renal replacement therapy at this time. acidosis and JUAN 

resolved. continue with D5w NS for another day then can d/c


thiamine  mg/d from today


Maintain hemodynamics stable. Avoid hypotension. Patient not on ACEI/ARB due to 

recent JUAN and low BP


Monitor Input/Output, daily weights and renal function with basic metabolic 

panel


monitor lytes. supplement as needed





Dose meds/antibiotics for improved GFR. 


Glycemic control. pt need to abstain from alcohol. need lifestyle modifications.


Further work up/management as per primary team





Thanks for allowing me to participate in care of your patient. Will sign off and

follow patient with you further PRN. Please call if any Qs. had d/w team


Dr Randy Hatfield


Office: [phone] Cell: [phone] Fax: [phone]





Chief Complaint; unable


Reason for consult: HAGMA


HPI: Pt is a 63 F with hx of chronic etoh and anxiety presented with complaints 

of anxiety, tremors and alcohol abuse. found to have severe acidosis hence renal

consuted. pt is being managed for alcohol withdrawal


No recent iodinated contrast exposure. Noted obvious episodes of low BP.





ROS: pt says don't feels good. denies pain abdomen/SOB





Physical Examination:      


General Appearance: better appearing in no acute respiratory distress


Vitals reviewed and noted as below


Head; Atraumatic, normocephalic


ENT: no ulcers no thrush. Tongue is midline/DRY. Oropharynx: no rash or ulcers.


EYES: Pupils are equal, round and reactive to light accommodation. Eye muscles 

and extraocular movement intact. Sclera is anicteric.


Neck; supple no lymphadenopathy, no thyromegaly or bruit


Lungs: Normal respiratory rate/effort. Breath sounds bilateral equal and clear


Heart: normal rate. s1s2 normal. No rub or gallop. 


Extremities: no edema. No varicose veins


Neurological: Patient is awake alert follow commands


Skin: Warm and dry. Normal turgor. No rash. Palpitation: Normal elasticity for 

age


Abdomen: Abdomen is soft. Bowel sounds +. There is no abdominal tenderness, no 

guarding/rigidity no organomegaly


Psych: deferred. lack insight. 


MSK: no joint tenderness or swelling. Digits and nails normal, no deformity


: kidney or bladder not palpable





Labs/imaging reviewed.


Past medical history, past surgical history, family history, social history, 

allergy reviewed and noted as below


Family hx: no hx of CKD. Rest non-contributory 





UA ++ ketone lactic acid 7.4








Objective





- Vital Signs/Intake and Output


Vital Signs (last 24 hours): 


                                        











Temp Pulse Resp BP Pulse Ox


 


 98.6 F   85   20   125/81   95 


 


 04/12/19 05:58  04/12/19 05:58  04/12/19 05:58  04/12/19 05:58  04/12/19 05:58








Intake and Output: 


                                        











 04/12/19 04/12/19





 06:59 18:59


 


Intake Total 1960 


 


Balance 1960 














- Medications


Medications: 


                               Current Medications





Acetaminophen (Tylenol 325mg Tab)  650 mg PO Q6H PRN


   PRN Reason: fever/headache


   Last Admin: 04/11/19 11:26 Dose:  650 mg


Cefpodoxime Proxetil (Vantin)  200 mg PO Q12 [facility]


   Last Admin: 04/12/19 10:38 Dose:  200 mg


Folic Acid (Folic Acid)  1 mg PO DAILY [facility]


   Last Admin: 04/12/19 10:38 Dose:  1 mg


Dextrose/Sodium Chloride (Dextrose 5%/0.9% Ns 1000 Ml)  1,000 mls @ 50 mls/hr IV

.Q20H [facility]


   Stop: 04/13/19 10:01


   Last Admin: 04/12/19 10:44 Dose:  50 mls/hr


Lorazepam (Ativan)  1 mg IVP Q4H PRN; Protocol


   PRN Reason: Symptoms of alcohol withdrawl


   Last Admin: 04/10/19 14:01 Dose:  1 mg


Magnesium Oxide (Mag-Ox)  400 mg PO BID [facility]


   Last Admin: 04/12/19 10:38 Dose:  400 mg


Multivitamins/Minerals (Therapeutic-M Tab)  1 tab PO 0800 [facility]


   Last Admin: 04/12/19 08:15 Dose:  1 tab


Ondansetron HCl (Zofran Inj)  4 mg IVP Q6H PRN


   PRN Reason: Nausea/Vomiting


Thiamine HCl (Vitamin B1 Tab)  100 mg PO DAILY [facility]


   Last Admin: 04/12/19 10:38 Dose:  100 mg











- Labs


Labs: 


                                        





                                 04/12/19 08:00 





                                 04/12/19 06:00

## 2019-04-12 NOTE — CP.PCM.PN
<Wesley Greene - Last Filed: 04/12/19 18:55>





Subjective





- Date & Time of Evaluation


Date of Evaluation: 04/12/19


Time of Evaluation: 10:45





- Subjective


Subjective: 





Wesley Greene, PGY-1 Medicine Progress Note:


Pt was seen and examined this AM at bedside. Per nursing overnight the pt was 

noted to have a CIWA of 4 overnight which was also 4 this AM. Pt received Ativan

2 times over the course of the night. Pt had gu d/nina and tolerated it well. 

Pt this AM states that she is aching all over but denies any fevers, chills, he

adache, lightheadedness, chest pain, SOB, cough, n/v, c/d or dysuria. 








Objective





- Vital Signs/Intake and Output


Vital Signs (last 24 hours): 


                                        











Temp Pulse Resp BP Pulse Ox


 


 98.2 F   97 H  20   143/97 H  96 


 


 04/12/19 17:45  04/12/19 17:45  04/12/19 17:45  04/12/19 17:45  04/12/19 09:00








Intake and Output: 


                                        











 04/12/19 04/12/19





 06:59 18:59


 


Intake Total 1960 750


 


Balance 1960 750














- Medications


Medications: 


                               Current Medications





Acetaminophen (Tylenol 325mg Tab)  650 mg PO Q6H PRN


   PRN Reason: fever/headache


   Last Admin: 04/11/19 11:26 Dose:  650 mg


Cefpodoxime Proxetil (Vantin)  200 mg PO Q12 [facility]


   Last Admin: 04/12/19 10:38 Dose:  200 mg


Folic Acid (Folic Acid)  1 mg PO DAILY [facility]


   Last Admin: 04/12/19 10:38 Dose:  1 mg


Dextrose/Sodium Chloride (Dextrose 5%/0.9% Ns 1000 Ml)  1,000 mls @ 50 mls/hr IV

.Q20H [facility]


   Stop: 04/13/19 10:01


   Last Admin: 04/12/19 10:44 Dose:  50 mls/hr


Lorazepam (Ativan)  1 mg IVP Q4H PRN; Protocol


   PRN Reason: Symptoms of alcohol withdrawl


   Last Admin: 04/10/19 14:01 Dose:  1 mg


Magnesium Oxide (Mag-Ox)  400 mg PO BID [facility]


   Last Admin: 04/12/19 17:15 Dose:  400 mg


Multivitamins/Minerals (Therapeutic-M Tab)  1 tab PO 0800 [facility]


   Last Admin: 04/12/19 08:15 Dose:  1 tab


Ondansetron HCl (Zofran Inj)  4 mg IVP Q6H PRN


   PRN Reason: Nausea/Vomiting


Potassium Phos/Sodium Phos (Neutra-Phos)  1 pkt PO BID [facility]


   Stop: 04/15/19 18:01


   Last Admin: 04/12/19 17:15 Dose:  1 pkt


Risperidone (Risperdal Oral Soln)  0.5 mg PO AMHS [facility]; Protocol


Thiamine HCl (Vitamin B1 Tab)  100 mg PO DAILY [facility]


   Last Admin: 04/12/19 10:38 Dose:  100 mg











- Labs


Labs: 


                                        





                                 04/12/19 08:00 





                                 04/12/19 06:00 








- Constitutional


Appears: Non-toxic, No Acute Distress, Other (tremulousness improving)





- Head Exam


Head Exam: ATRAUMATIC, NORMAL INSPECTION, NORMOCEPHALIC





- Eye Exam


Eye Exam: EOMI, Normal appearance, PERRL





- Respiratory Exam


Respiratory Exam: Clear to Ausculation Bilateral, NORMAL BREATHING PATTERN.  

absent: Accessory Muscle Use, Decreased Breath Sounds, Rales, Rhonchi, Wheezes





- Cardiovascular Exam


Cardiovascular Exam: RRR, +S1, +S2.  absent: Gallop, Rubs





- GI/Abdominal Exam


GI & Abdominal Exam: Soft, Normal Bowel Sounds.  absent: Firm, Guarding, Rigid, 

Tenderness





- Extremities Exam


Extremities Exam: Normal Inspection.  absent: Calf Tenderness, Pedal Edema





- Back Exam


Back Exam: NORMAL INSPECTION.  absent: CVA tenderness (L), CVA tenderness (R)





- Neurological Exam


Neurological Exam: Oriented x3, alert





- Psychiatric Exam


Psychiatric exam: Uncomfortable, agitation improving





- Skin


Skin Exam: Dry, Normal Color, Warm





Assessment and Plan





- Assessment and Plan (Free Text)


Assessment: 





63 year old  female with pmhx of alcohol abuse, alcohol hepatitis, 

anxiety, chronic L neck pain presenting to ED with tremors likely 2/2 alcohol 

withdrawal. Drinks 1 pt vodka daily, last drink yesterday. 


Plan: 





Alcohol withdrawal, Hx of alcohol abuse: 


- Last CIWA 3


- Last reported drink was 4/9


- Serum alcohol 49


- CIWA protocol


- aspiration, fall precautions


- neuro checks q4h


- D5 NS @ 50cc/hr


- Thiamine/folate/MV PO


- Ativan 1 mg q4 prn


- Zofran 4 mg q4 prn





Electrolyte Imbalance:


- Likely 2/2 refeeding syndrome due to chronic poor PO intake from chronic etOH 

abuse


- Hypomag - resolved


- Hypophos - Neutraphos PO


- Hypokalemia - 40 PO potassium chloride


- Recheck in AM


- Replete as needed


- Will continue to monitor





Anxiety:


- Psych consulted


- Will continue to monitor at this time as pt has been receiving ativan for Wit

hdrawal





High Anion Gap Metabolic Acidosis: Resolved


- Likely 2/2 alcoholic ketoacidosis complicated by JUAN


- Will continue to monitor





JUAN: Resolved


-likely 2/2 dehydration


-Nephrology (Dr. Hatfield) consulted


-IVF





PPx, Diet, Disposition


-DVT ppx: scds


-GI ppx: protonix 40 mg IVP daily


-Diet: HHD


-PT on board





Case discussed with Dr. Jackson Greene DO, PGY-1





<Abbey Paz - Last Filed: 04/15/19 14:54>





Objective





- Vital Signs/Intake and Output


Vital Signs (last 24 hours): 


                                        











Temp Pulse Resp BP Pulse Ox


 


 97.5 F L  102 H  20   112/79   98 


 


 04/15/19 13:49  04/15/19 13:49  04/15/19 13:49  04/15/19 13:49  04/15/19 13:49








Intake and Output: 


                                        











 04/15/19 04/15/19





 06:59 18:59


 


Intake Total 240 240


 


Balance 240 240














- Medications


Medications: 


                               Current Medications





Acetaminophen (Tylenol 325mg Tab)  650 mg PO Q6H PRN


   PRN Reason: fever/headache


   Last Admin: 04/14/19 14:55 Dose:  650 mg


Folic Acid (Folic Acid)  1 mg PO DAILY [facility]


   Last Admin: 04/15/19 10:39 Dose:  1 mg


Magnesium Oxide (Mag-Ox)  400 mg PO BID [facility]


   Last Admin: 04/15/19 10:39 Dose:  400 mg


Multivitamins/Minerals (Therapeutic-M Tab)  1 tab PO 0800 [facility]


   Last Admin: 04/15/19 10:46 Dose:  1 tab


Ondansetron HCl (Zofran Inj)  4 mg IVP Q6H PRN


   PRN Reason: Nausea/Vomiting


Risperidone (Risperdal Oral Soln)  1 mg PO AMHS [facility]; Protocol


   Last Admin: 04/15/19 12:48 Dose:  1 mg


Thiamine HCl (Vitamin B1 Tab)  100 mg PO DAILY [facility]


   Last Admin: 04/15/19 10:39 Dose:  100 mg











- Labs


Labs: 


                                        





                                 04/15/19 06:30 





                                 04/15/19 06:30 











Attending/Attestation





- Attestation


I have personally seen and examined this patient.: Yes


I have fully participated in the care of the patient.: Yes


I have reviewed all pertinent clinical information, including history, physical 

exam and plan: Yes


Notes (Text): 





04/15/19 14:53





Medical record note made by the resident after discussion with my direction and 

input after the patient was personally seen and examined by me. I have reviewed 

the chart and agree that the record accurately reflects by personal performance 

of the history, physical exam, data review, and medical decision-making, in the 

course for the patient. I have also personally directed the plan of care.





61 F with PMH of alcohol abuse, anxiety was admitted with alcohol intoxication 

and lactic acidosis due to severe alcohol abuse and starvation, which is 

resolved, she was monitored for alcohol  withdrawal,Alcohol withdrawal are 

improved,


 PT has recommended REBEKAH.





Psychiatry is consulted for severe anxiety.





Prognosis is guarded.

## 2019-04-13 LAB
ALBUMIN SERPL-MCNC: 3.9 G/DL
ALBUMIN/GLOB SERPL: 1.2 {RATIO}
ALT SERPL-CCNC: 13 U/L
AST SERPL-CCNC: 55 U/L
BASOPHILS # BLD AUTO: 0.01 K/MM3
BASOPHILS NFR BLD: 0.2 %
BUN SERPL-MCNC: 6 MG/DL
CALCIUM SERPL-MCNC: 9.5 MG/DL
EOSINOPHIL # BLD: 0.1 10*3/UL
EOSINOPHIL NFR BLD: 2.7 %
ERYTHROCYTE [DISTWIDTH] IN BLOOD BY AUTOMATED COUNT: 16 %
GFR NON-AFRICAN AMERICAN: > 60
HGB BLD-MCNC: 12.5 G/DL
LYMPHOCYTES # BLD: 1.7 10*3/UL
LYMPHOCYTES NFR BLD AUTO: 35 %
MCH RBC QN AUTO: 31.3 PG
MCHC RBC AUTO-ENTMCNC: 33.6 G/DL
MCV RBC AUTO: 93 FL
MONOCYTES # BLD AUTO: 0.4 10*3/UL
MONOCYTES NFR BLD: 9.3 %
PLATELET # BLD: 55 10^3/UL
PMV BLD AUTO: 8.9 FL
RBC # BLD AUTO: 4 10^6/UL
WBC # BLD AUTO: 4.7 10^3/UL

## 2019-04-13 RX ADMIN — MULTIPLE VITAMINS W/ MINERALS TAB SCH TAB: TAB at 09:28

## 2019-04-13 RX ADMIN — DEXTROSE AND SODIUM CHLORIDE SCH: 5; 900 INJECTION, SOLUTION INTRAVENOUS at 10:00

## 2019-04-13 RX ADMIN — Medication SCH MG: at 09:21

## 2019-04-13 RX ADMIN — POTASSIUM & SODIUM PHOSPHATES POWDER PACK 280-160-250 MG SCH PKT: 280-160-250 PACK at 09:21

## 2019-04-13 NOTE — CON
DATE:  04/13/2019



HISTORY OF PRESENT ILLNESS:  The patient is a 63-year-old  female

long debilitating history of severe alcohol use disorder, noncompliance with

medication and followup and generally low motivation to stop drinking alcohol. 
This provider is familiar with patient from her numerous

hospitalizations to the medical floor as well as psychiatric unit.  She has

a history of DT's and multiple complications due to chronic alcohol

consumption and her overall prognosis is poor as she continues

to be repeatedly hospitalized due to alcohol related symptoms. The patient was 
seen by this

provider in 01/2019 for a similar presentation. 

 

I have reviewed recent notes and I met with patient

bedside and the patient does still appear to be confused secondary to alcohol 
withdrawal delirium. However she denies having any hallucinations and she is

aware of her current circumstances.  She does not appear to be overtly

responding to internal stimuli.  She is paranoid as she usually is when she

is hospitalized for acute withdrawals. her paranoia is chronic in this regard 
and related to belief that  boyfriend is cheating on her. As usual, she denies 
thoughts to harm him or herself. 

Presently, she appears to be tired, denies hopelessness.  She reports she is 
withdrawing. 

Behavior is a little restless and anxious due bouts of nausea from

the alcohol use/withdrawal.  Her insight and judgment are considered to be poor 
and

 progress is consistent with prior presentations.



Labs and vitals were reviewed.



MEDICATIONS:  Include Ativan 1 mg every 4 hours p.r.n., Risperdal 0.5 a.m.

and at bedtime.



ASSESSMENT:  Severe alcohol disorder, alcohol withdrawal delirium,

substance-induced mood disorder and psychotic disorder.



RECOMMENDATIONS:  She will continue Ativan prn, medicine should taper this

medication as patient's vitals tolerate.  Risperdal will also be continued,

however, increased to 1 mg in a.m. and at bedtime to help with confusion,

paranoid and disorientation.  Psychiatry will continue to follow up with the

patient. 







__________________________________________

Shola No MD





DD:  04/13/2019 10:40:45

DT:  04/13/2019 16:33:16

Job # [id number]

[facility] HospitalJULIO CESAR

## 2019-04-13 NOTE — CON
DATE OF CONSULTATION:  04/12/2019



HISTORY OF PRESENT ILLNESS:  In short, the patient is a 63-year-old

 female with long and debilitating history of alcohol use

disorder, history of being noncompliant with the medication and follow-up

appointment.  The patient also has a history of delirium tremens, multiple

complications due to chronic alcohol consumption, such as alcoholic

hepatitis, anxiety, withdrawals.  The patient was admitted on the medical

side for evaluation of tremor and anxiety and possible alcohol withdrawal

symptoms.  The patient also was found to have possible sepsis.  Psych

consult was called for evaluation of anxiety and history of mental illness

and medication management.  The patient is very familiar to this writer

from the multiple consultation services as well as psychiatric admissions. 

The patient has a history of severe alcohol use disorder.  The patient has

history of alcohol withdrawal delirium.  The patient also has a history of

psychosis.  The patient has history of being admitted to the psychiatric

inpatient unit, which took place here in [facility], most

recent admission was in 12/2018.  The patient was discharged on Risperdal

as well as naltrexone and Remeron.  Since that time, the patient seems to

be noncompliant with the medications and follow-up appointments.  The

patient was seen and examined today.  The patient presented to be

disheveled, presented to be confused.  The patient does not remember what

is the date today.  The patient presented to be guarded and paranoid.  The

patient is still convinced that her boyfriend is cheating on her, but at

the same time, the patient does not work, and the patient's boyfriend is

supporting her financially.  The patient presented to be disorganized and

guarded.  The patient reported that she was drinking about one pint of

vodka on daily basis.  Last time was 2 days prior to coming to the

hospital.



PHYSICAL EXAMINATION:

VITAL SIGNS:  Seemed to be stable.  Temperature 97.7, pulse is 84, blood

pressure 125/89, respirations 18, oxygen saturation is 95.



MEDICATIONS:  Reviewed.  The patient is on Tylenol, Vantin, folic acid,

Ativan, magnesium oxide, multivitamin, Zofran.  This writer started

Risperdal, and vitamin B1 will be continued.



LABORATORY DATA:  Labs reviewed.  Blood gas reviewed.  Chemistry reviewed. 

Urinalysis reviewed.  Toxicology reviewed.  Serology reviewed.



MENTAL STATUS EXAMINATION:  The patient presented to be alert, but guarded.

Intermittent eye contact.  Speech was disorganized.  Mood described as

depressed and anxious.  Affect was constricted.  Thought process seems to

be circumstantial and tangential.  Thought content, the patient denied

visual, auditory, or tactile hallucinations.  Denied paranoid ideation, but

obviously, the patient was paranoid.  Insight and judgment seemed to be

very limited.  Impulses are unpredictable.



IMPRESSION:  As per history, the patient has a history of psychosis,

history of alcohol use disorder, most likely the patient is in delirium

stage right now, which could be related to alcohol withdrawal as well as

medical issues.



PLAN:  Risperdal was resumed in regard of anxiety.  I hope Risperdal will

take edge off from the patient.  Multivitamins, thiamine, and folic acid

will be continued.  Dr. No will follow up on this patient over the

weekend.  Should you have any questions give me a call back.







__________________________________________

Alba Masters MD





DD:  04/12/2019 13:18:04

DT:  04/12/2019 13:21:55

Job # [id number]

## 2019-04-13 NOTE — CP.PCM.PN
<Wesley Greene - Last Filed: 04/13/19 14:18>





Subjective





- Date & Time of Evaluation


Date of Evaluation: 04/13/19


Time of Evaluation: 09:00





- Subjective


Subjective: 





Wesley Greene, PGY-1 Medicine Progress Note:


Pt was seen and examined this AM at bedside. Per nursing overnight the pt was 

noted to have her last CIWA at 3. Pt received Ativan 1 time over the course of 

the night. Pt this AM denies any fevers, chills, headache, lightheadedness, 

chest pain, SOB, n/v, c/d or dysuria. She admits to a non-productive cough, 

rhinnorhea and sore throat.





Objective





- Vital Signs/Intake and Output


Vital Signs (last 24 hours): 


                                        











Temp Pulse Resp BP Pulse Ox


 


 98.6 F   87   18   118/81   96 


 


 04/13/19 06:00  04/13/19 06:00  04/13/19 06:00  04/13/19 06:00  04/13/19 06:00








Intake and Output: 


                                        











 04/13/19 04/13/19





 06:59 18:59


 


Intake Total 1380 


 


Balance 1380 














- Medications


Medications: 


                               Current Medications





Acetaminophen (Tylenol 325mg Tab)  650 mg PO Q6H PRN


   PRN Reason: fever/headache


   Last Admin: 04/13/19 04:39 Dose:  650 mg


Benzocaine/Menthol (Cepacol Sore Throat)  1 wale MT Q4H PRN


   PRN Reason: Sore Throat


   Stop: 04/15/19 10:54


Folic Acid (Folic Acid)  1 mg PO DAILY [facility]


   Last Admin: 04/13/19 09:21 Dose:  1 mg


Lorazepam (Ativan)  1 mg IVP Q4H PRN; Protocol


   PRN Reason: Symptoms of alcohol withdrawl


   Last Admin: 04/12/19 22:21 Dose:  1 mg


Magnesium Oxide (Mag-Ox)  400 mg PO BID [facility]


   Last Admin: 04/13/19 09:21 Dose:  400 mg


Multivitamins/Minerals (Therapeutic-M Tab)  1 tab PO 0800 [facility]


   Last Admin: 04/13/19 09:28 Dose:  1 tab


Ondansetron HCl (Zofran Inj)  4 mg IVP Q6H PRN


   PRN Reason: Nausea/Vomiting


Potassium Phos/Sodium Phos (Neutra-Phos)  1 pkt PO BID [facility]


   Stop: 04/15/19 18:01


   Last Admin: 04/13/19 09:21 Dose:  1 pkt


Risperidone (Risperdal Oral Soln)  1 mg PO AMHS [facility]; Protocol


Thiamine HCl (Vitamin B1 Tab)  100 mg PO DAILY [facility]


   Last Admin: 04/13/19 09:21 Dose:  100 mg











- Labs


Labs: 


                                        





                                 04/13/19 06:00 





                                 04/13/19 06:00 





- Constitutional


Appears: Non-toxic, No Acute Distress, Other (tremulousness resolved)





- Head Exam


Head Exam: ATRAUMATIC, NORMAL INSPECTION, NORMOCEPHALIC





- Eye Exam


Eye Exam: EOMI, Normal appearance, PERRL





- ENT


Additional Comments: Mucous membranes moist, absent: tonsilar erythema, tonsilar

exudates, no cervical chain lymph nodes 





- Respiratory Exam


Respiratory Exam: Clear to Ausculation Bilateral, NORMAL BREATHING PATTERN.  

absent: Accessory Muscle Use, Decreased Breath Sounds, Rales, Rhonchi, Wheezes





- Cardiovascular Exam


Cardiovascular Exam: RRR, +S1, +S2.  absent: Gallop, Rubs





- GI/Abdominal Exam


GI & Abdominal Exam: Soft, Normal Bowel Sounds.  absent: Firm, Guarding, Rigid, 

Tenderness





- Extremities Exam


Extremities Exam: Normal Inspection.  absent: Calf Tenderness, Pedal Edema





- Back Exam


Back Exam: NORMAL INSPECTION.  absent: CVA tenderness (L), CVA tenderness (R)





- Neurological Exam


Neurological Exam: Oriented x3, alert





- Psychiatric Exam


Psychiatric exam: Uncomfortable, agitation improving





- Skin


Skin Exam: Dry, Normal Color, Warm





Assessment and Plan





- Assessment and Plan (Free Text)


Assessment: 





63 year old  female with pmhx of alcohol abuse, alcohol hepatitis, 

anxiety, chronic L neck pain presenting to ED with tremors likely 2/2 alcohol 

withdrawal. Drinks 1 pt vodka daily, last drink 4/9. 


Plan: 





Alcohol withdrawal, Hx of alcohol abuse: 


- Last CIWA 3


- Last reported drink was 4/9


- Serum alcohol 49


- CIWA protocol


- aspiration, fall precautions


- neuro checks 


- Thiamine/folate/MV PO


- Zofran 4 mg q4 prn





Electrolyte Imbalance: Improving


- Likely 2/2 refeeding syndrome due to chronic poor PO intake from chronic etOH 

abuse


- Hypomag - resolved


- Hypophos - repleted


- Hypokalemia - resolved


- Recheck in AM


- Replete as needed


- Will continue to monitor





Anxiety:


- Psych consulted


- Restarted risperdal





Cough 2/2 to likely URI:


- CXR from admission: No active disease


- Pt afebrile with no elevation in WBC count.


- Cepacol lozenges 


- Will continue to monitor.





High Anion Gap Metabolic Acidosis: Resolved


- Likely 2/2 alcoholic ketoacidosis complicated by JUAN


- Will continue to monitor





JUAN: Resolved


-likely 2/2 dehydration


-Nephrology (Dr. Hatfield) consulted





PPx, Diet, Disposition


-DVT ppx: scds


-GI ppx: protonix 40 mg IVP daily


-Diet: HHD


-PT on board





Case discussed with Dr. JANNA Greene DO, PGY-1








<Zeny Wade R - Last Filed: 04/13/19 14:40>





Objective





- Vital Signs/Intake and Output


Vital Signs (last 24 hours): 


                                        











Temp Pulse Resp BP Pulse Ox


 


 97.7 F   104 H  20   113/77   96 


 


 04/13/19 12:00  04/13/19 12:00  04/13/19 12:00  04/13/19 12:00  04/13/19 06:00








Intake and Output: 


                                        











 04/13/19 04/13/19





 06:59 18:59


 


Intake Total 1380 


 


Balance 1380 














- Medications


Medications: 


                               Current Medications





Acetaminophen (Tylenol 325mg Tab)  650 mg PO Q6H PRN


   PRN Reason: fever/headache


   Last Admin: 04/13/19 04:39 Dose:  650 mg


Benzocaine/Menthol (Cepacol Sore Throat)  1 wale MT Q4H PRN


   PRN Reason: Sore Throat


   Stop: 04/15/19 10:54


Folic Acid (Folic Acid)  1 mg PO DAILY [facility]


   Last Admin: 04/13/19 09:21 Dose:  1 mg


Lorazepam (Ativan)  1 mg IVP Q4H PRN; Protocol


   PRN Reason: Symptoms of alcohol withdrawl


   Last Admin: 04/12/19 22:21 Dose:  1 mg


Magnesium Oxide (Mag-Ox)  400 mg PO BID [facility]


   Last Admin: 04/13/19 09:21 Dose:  400 mg


Multivitamins/Minerals (Therapeutic-M Tab)  1 tab PO 0800 [facility]


   Last Admin: 04/13/19 09:28 Dose:  1 tab


Ondansetron HCl (Zofran Inj)  4 mg IVP Q6H PRN


   PRN Reason: Nausea/Vomiting


Potassium Phos/Sodium Phos (Neutra-Phos)  1 pkt PO BID [facility]


   Stop: 04/15/19 18:01


   Last Admin: 04/13/19 09:21 Dose:  1 pkt


Risperidone (Risperdal Oral Soln)  1 mg PO [facility]; Protocol


Thiamine HCl (Vitamin B1 Tab)  100 mg PO DAILY [facility]


   Last Admin: 04/13/19 09:21 Dose:  100 mg











- Labs


Labs: 


                                        





                                 04/13/19 06:00 





                                 04/13/19 06:00 











Attending/Attestation





- Attestation


I have personally seen and examined this patient.: Yes


I have fully participated in the care of the patient.: Yes


I have reviewed all pertinent clinical information, including history, physical 

exam and plan: Yes


Notes (Text): 


Patient seen and examined by me with resident at approximately 8:30AM on 

4/13/19. Case including HPI, physical exam, and assessment and plan discussed 

with resident. Agree with above with following additions/corrections.


Patient is a 63 year old female with past medical history significant for 

alcohol abuse, endometriosis, chronic left neck pain, alcoholic hepatitis, fall,

 thrombocytopenia, and anxiety that presented to the emergency room with tremors

and anxiety.


Patient states she is not feeling well. States she is having a sore throat and 

chills. Patient also complains of having to urinate more in the morning. Patient

trying to get out of bed. On Avasys. Patient denies nausea or vomiting. Patient 

is tolerating diet. She denies any chest pain or palpitations. No headaches or 

dizziness. No fevers. No dysuria. Denies diarrhea or constipation. 


Physical exam:


General: Sitting up in bed in no acute distress


HEENT: Normocephalic, atraumatic. Pupils equal and reactive, no scleral icterus.

Oropharynx is pink and moist. No pharyngeal erythema or exudate appreciated.  

Neck is supple. 


Cardiovascular: Regular rhythm. Normal S1 and S2. No murmurs, rubs, or gallops 

appreciated


Pulmonary: Normal respiratory effort. No rhonchi, rales, or wheezing appreciated


Gastrointestinal: Soft, nondistended. Nontender. Positive bowel sounds all 4 

quadrants. No guarding. 


Musculoskeletal: Moves all extremities. No calf tenderness. No edema. 


Central nervous system: Awake and alert.  Mild tremors in upper extremities. 


Dermatologic: Skin warm and dry. 


Assessment and plan: Patient is a 63 year old female with past medical history 

significant for alcohol abuse, endometriosis, chronic left neck pain, alcoholic 

hepatitis, fall,  thrombocytopenia, and anxiety that presented to the emergency 

room with tremors and anxiety.


1. Alcohol withdrawal. Alcohol abuse. Patient counseled at length on cessation. 

Continue CIWA protocol. Last CIWA 3. Continue thiamine, folic acid, and MVI. 

Continue ativan prn. 


2. High anion gap metabolic acidosis with lactic acidosis and alcoholic 

ketoacidosis. Likely secondary to alcohol abuse and poor feeding. Acidosis 

resolved. Nephrology recommendations appreciated. 


3. JUAN. Resolved. Continue to monitor.


4. Sore throat. No signs of infection. Placed on cepachol as needed. 


5. Hypokalemia. Resolved, Continue to monitor. 


5. Hypophosphotemia. Continue neutro phos. Follow up repeat labs in AM. 


6. Hypomagnesemia. Resolved. Continue to monitor.


7. Thrombocytopenia. Likely secondary to alcohol abuse. Uptrending. No sings of 

acute bleeding. Continue to monitor. 


8. Transaminitis. Improving. Secondary to alcohol abuse. Continue to monitor 

trend. 


9. Gait instability. Seen by PT. REBEKAH recommended. 


10. Anxiety. Psychiatric disorder. Continue Risperdal. Psychiatrist 

recommendations appreciated. 


Case discussed in detail with the patient regarding current diagnosis and 

treatment plan. All questions answered.

## 2019-04-14 LAB
ALBUMIN SERPL-MCNC: 3.9 G/DL
ALBUMIN/GLOB SERPL: 1.3 {RATIO}
ALT SERPL-CCNC: 13 U/L
AST SERPL-CCNC: 52 U/L
BASOPHILS # BLD AUTO: 0.01 K/MM3
BASOPHILS NFR BLD: 0.2 %
BUN SERPL-MCNC: 12 MG/DL
CALCIUM SERPL-MCNC: 10.5 MG/DL
EOSINOPHIL # BLD: 0.2 10*3/UL
EOSINOPHIL NFR BLD: 3.1 %
ERYTHROCYTE [DISTWIDTH] IN BLOOD BY AUTOMATED COUNT: 16.3 %
GFR NON-AFRICAN AMERICAN: > 60
HGB BLD-MCNC: 12.3 G/DL
LYMPHOCYTES # BLD: 2 10*3/UL
LYMPHOCYTES NFR BLD AUTO: 34.6 %
MCH RBC QN AUTO: 30.6 PG
MCHC RBC AUTO-ENTMCNC: 32.9 G/DL
MCV RBC AUTO: 93 FL
MONOCYTES # BLD AUTO: 0.8 10*3/UL
MONOCYTES NFR BLD: 14 %
PLATELET # BLD: 90 10^3/UL
PMV BLD AUTO: 10.3 FL
RBC # BLD AUTO: 4.02 10^6/UL
WBC # BLD AUTO: 5.9 10^3/UL

## 2019-04-14 RX ADMIN — Medication SCH MG: at 10:46

## 2019-04-14 RX ADMIN — MULTIPLE VITAMINS W/ MINERALS TAB SCH TAB: TAB at 10:45

## 2019-04-14 RX ADMIN — Medication SCH MG: at 17:24

## 2019-04-14 NOTE — CON
DATE:  04/14/2019



HISTORY OF PRESENT ILLNESS:  The patient is a 63-year-old  female

with long debilitating history of severe alcohol use disorder,

noncompliance with medications and followup and generally low motivation to

discontinue her alcohol use even with multiple complications of her chronic

consumption.  Her overall prognosis appears to be poor.  She continues to

present repeatedly due to alcohol related symptoms to our hospital. 

Psychiatry was seen by this provider yesterday due to patient's altered

mental status, alcohol withdrawal.  I met with her again this morning, she

does still appears to be a little bit confused, but her presentation is

improving.  The patient to be more focused and assured of her responses. 

She denies having any current discomfort or pain and she feels like her

withdrawal symptoms are improving.  She also denies having any

hallucinations and having any major behavioral issues on the unit thus far.

She does not appeared to be responding to internal stimuli.  However, she

is disoriented, believes _____ birthday and then she correct herself and

indicates that it is July; however, I need to informed that is actually in

April.  Focus although improves is inconsistent.  She still paranoid about

her boyfriend cheating on her and this is delusion and she is chronically

verbalizes whenever she is an alcohol withdrawal.  Presently, she denies

having thought of harming him or harm herself.  She reports that she

feeling tired, but energy is improving since admission.  She has been

anxious, but not hopeless, looking forward to the future.  Insight and

judgement are improving, but still considered to be poor.  This

presentation is very consistent with her prior presentation.



Labs and vitals were reviewed.



MEDICATIONS:  Include Ativan 1 mg every 4 hours p.r.n., Risperdal a.m. and

at bedtime.



ASSESSMENT:  Severe alcohol use disorder, alcohol withdrawal delirium,

improving substance-induced mood disorder, and psychotic disorder, chronic

delusions.



PLAN:  We will continue with Ativan p.r.n. and continue with Risperdal 1 mg

a.m. and at bedtime.  The patient only received one dose of this medication

at higher dose last night serially to determine whether this current dosing

frequency will be sufficient.  Psychiatry will continue to follow up,

monitor her progress and possible need for further modifications in her

medications.  _____ she continue to taper benzos as vitals tolerate.







__________________________________________

Shola No MD





DD:  04/14/2019 9:35:06

DT:  04/14/2019 12:44:04

Job # [id number]

## 2019-04-14 NOTE — CP.PCM.PN
<Wesley Greene - Last Filed: 04/14/19 13:50>





Subjective





- Date & Time of Evaluation


Date of Evaluation: 04/14/19


Time of Evaluation: 08:45





- Subjective


Subjective: 





Wesley Greene, PGY-1 Medicine Progress Note:


Pt was seen and examined this AM at bedside. Per nursing overnight she had no 

acute events. Pt recieved no ativan overnight. Pt this AM denies any fevers, 

chills, headache, lightheadedness, chest pain, SOB, n/v, c/d or dysuria. She 

continues to admit to a non-productive cough, rhinnorhea and sore throat.





Objective





- Vital Signs/Intake and Output


Vital Signs (last 24 hours): 


                                        











Temp Pulse Resp BP Pulse Ox


 


 97.2 F L  103 H  19   135/71   95 


 


 04/14/19 06:00  04/14/19 06:00  04/14/19 06:00  04/14/19 06:00  04/14/19 06:00








Intake and Output: 


                                        











 04/14/19 04/14/19





 06:59 18:59


 


Intake Total 1200 


 


Output Total 300 


 


Balance 900 














- Medications


Medications: 


                               Current Medications





Acetaminophen (Tylenol 325mg Tab)  650 mg PO Q6H PRN


   PRN Reason: fever/headache


   Last Admin: 04/13/19 21:37 Dose:  650 mg


Benzocaine/Menthol (Cepacol Sore Throat)  1 wale MT Q4H PRN


   PRN Reason: Sore Throat


   Stop: 04/15/19 10:54


Folic Acid (Folic Acid)  1 mg PO DAILY [facility]


   Last Admin: 04/14/19 10:45 Dose:  1 mg


Magnesium Oxide (Mag-Ox)  400 mg PO BID [facility]


   Last Admin: 04/14/19 10:46 Dose:  400 mg


Multivitamins/Minerals (Therapeutic-M Tab)  1 tab PO 0800 [facility]


   Last Admin: 04/14/19 10:45 Dose:  1 tab


Ondansetron HCl (Zofran Inj)  4 mg IVP Q6H PRN


   PRN Reason: Nausea/Vomiting


Risperidone (Risperdal Oral Soln)  1 mg PO AMHS [facility]; Protocol


   Last Admin: 04/14/19 10:46 Dose:  1 ml


Thiamine HCl (Vitamin B1 Tab)  100 mg PO DAILY [facility]


   Last Admin: 04/14/19 10:45 Dose:  100 mg











- Labs


Labs: 


                                        





                                 04/14/19 06:00 





                                 04/14/19 06:00 








- Constitutional


Appears: Non-toxic, No Acute Distress, Other (tremulousness resolved)





- Head Exam


Head Exam: ATRAUMATIC, NORMAL INSPECTION, NORMOCEPHALIC





- Eye Exam


Eye Exam: EOMI, Normal appearance, PERRL





- ENT


Additional Comments: Mucous membranes moist, absent: tonsilar erythema, tonsilar

exudates, no cervical chain lymph nodes 





- Respiratory Exam


Respiratory Exam: Clear to Ausculation Bilateral, NORMAL BREATHING PATTERN.  

absent: Accessory Muscle Use, Decreased Breath Sounds, Rales, Rhonchi, Wheezes





- Cardiovascular Exam


Cardiovascular Exam: RRR, +S1, +S2.  absent: Gallop, Rubs





- GI/Abdominal Exam


GI & Abdominal Exam: Soft, Normal Bowel Sounds.  absent: Firm, Guarding, Rigid, 

Tenderness





- Extremities Exam


Extremities Exam: Normal Inspection.  absent: Calf Tenderness, Pedal Edema





- Back Exam


Back Exam: NORMAL INSPECTION.  absent: CVA tenderness (L), CVA tenderness (R)





- Neurological Exam


Neurological Exam: Oriented x3, alert





- Psychiatric Exam


Psychiatric exam: Uncomfortable, agitation improving





- Skin


Skin Exam: Dry, Normal Color, Warm





Assessment and Plan





- Assessment and Plan (Free Text)


Assessment: 





63 year old  female with pmhx of alcohol abuse, alcohol hepatitis, 

anxiety, chronic L neck pain presenting to ED with tremors likely 2/2 alcohol 

withdrawal. Drinks 1 pt vodka daily, last drink 4/9. 


Plan: 





Alcohol withdrawal, Hx of alcohol abuse: Resolved


- Last reported drink was 4/9


- Serum alcohol 49


- [facility] protocol


- aspiration, fall precautions


- neuro checks 


- Thiamine/folate/MV PO


- Zofran 4 mg q4 prn





Electrolyte Imbalance: Improving


- Likely 2/2 refeeding syndrome due to chronic poor PO intake from chronic etOH 

abuse


- Hypomag - resolved


- Hypophos - resolved


- Hypokalemia - repleted this AM


- Recheck in AM


- Replete as needed


- Will continue to monitor





Anxiety:


- Psych consulted


- Restarted risperdal





Cough 2/2 to likely URI:


- CXR from admission: No active disease


- Pt afebrile with no elevation in WBC count.


- Cepacol lozenges 


- Will continue to monitor.





High Anion Gap Metabolic Acidosis: Resolved


- Likely 2/2 alcoholic ketoacidosis complicated by JUAN


- Will continue to monitor





JUAN: Resolved


-likely 2/2 dehydration


-Nephrology (Dr. Hatfield) consulted





PPx, Diet, Disposition


-DVT ppx: scds


-GI ppx: protonix 40 mg IVP daily


-Diet: HHD


-PT on board





Case discussed with Dr. JANNA Greene DO, PGY-1








<Zeny Wade - Last Filed: 04/14/19 14:15>





Objective





- Vital Signs/Intake and Output


Vital Signs (last 24 hours): 


                                        











Temp Pulse Resp BP Pulse Ox


 


 97.5 F L  98 H  18   107/69   95 


 


 04/14/19 12:00  04/14/19 12:00  04/14/19 12:00  04/14/19 12:00  04/14/19 06:00








Intake and Output: 


                                        











 04/14/19 04/14/19





 06:59 18:59


 


Intake Total 1200 


 


Output Total 300 


 


Balance 900 














- Medications


Medications: 


                               Current Medications





Acetaminophen (Tylenol 325mg Tab)  650 mg PO Q6H PRN


   PRN Reason: fever/headache


   Last Admin: 04/13/19 21:37 Dose:  650 mg


Benzocaine/Menthol (Cepacol Sore Throat)  1 wale MT Q4H PRN


   PRN Reason: Sore Throat


   Stop: 04/15/19 10:54


Folic Acid (Folic Acid)  1 mg PO DAILY [facility]


   Last Admin: 04/14/19 10:45 Dose:  1 mg


Magnesium Oxide (Mag-Ox)  400 mg PO BID [facility]


   Last Admin: 04/14/19 10:46 Dose:  400 mg


Multivitamins/Minerals (Therapeutic-M Tab)  1 tab PO 0800 [facility]


   Last Admin: 04/14/19 10:45 Dose:  1 tab


Ondansetron HCl (Zofran Inj)  4 mg IVP Q6H PRN


   PRN Reason: Nausea/Vomiting


Risperidone (Risperdal Oral Soln)  1 mg PO AMHS [facility]; Protocol


   Last Admin: 04/14/19 10:46 Dose:  1 ml


Thiamine HCl (Vitamin B1 Tab)  100 mg PO DAILY [facility]


   Last Admin: 04/14/19 10:45 Dose:  100 mg











- Labs


Labs: 


                                        





                                 04/14/19 06:00 





                                 04/14/19 06:00 











Attending/Attestation





- Attestation


I have personally seen and examined this patient.: Yes


I have fully participated in the care of the patient.: Yes


I have reviewed all pertinent clinical information, including history, physical 

exam and plan: Yes


Notes (Text): 


Patient seen and examined by me with resident at approximately 8:40AM on 

4/14/19. Case including HPI, physical exam, and assessment and plan discussed 

with resident. Agree with above with following additions/corrections.


Patient is a 63 year old female with past medical history significant for 

alcohol abuse, endometriosis, chronic left neck pain, alcoholic hepatitis, fall,

 thrombocytopenia, and anxiety that presented to the emergency room with tremors

and anxiety.


Patient states she is feeling ok. Still with a sore throat but tolerating diet 

well. Lozenges are helping. Patient denies nausea or vomiting. No abdominal 

pain. No chest pain or palpitations. No headaches or dizziness. No fevers. No 

dysuria. No diarrhea or constipation. Patient is on Avasys.


Physical exam:


General: Sitting up in bed in no acute distress


HEENT: Normocephalic, atraumatic. Pupils equal and reactive, no scleral icterus.

Oropharynx is pink and moist. No pharyngeal erythema or exudate appreciated.  

Neck is supple. 


Cardiovascular: Regular rhythm. Normal S1 and S2. No murmurs, rubs, or gallops 

appreciated


Pulmonary: Normal respiratory effort. No rhonchi, rales, or wheezing appreciated


Gastrointestinal: Soft, nondistended. Nontender. Positive bowel sounds all 4 

quadrants. No guarding. 


Musculoskeletal: Moves all extremities. No calf tenderness. No edema. 


Central nervous system: Awake and alert.  Follows commands. No focal deficit 

appreciated.


Dermatologic: Skin warm and dry. 


Assessment and plan: Patient is a 63 year old female with past medical history 

significant for alcohol abuse, endometriosis, chronic left neck pain, alcoholic 

hepatitis, fall,  thrombocytopenia, and anxiety that presented to the emergency 

room with tremors and anxiety.


1. Alcohol withdrawal. Alcohol abuse. Alcohol withdrawal resolved. Continue 

thiamine, folic acid, and MVI.  Patient counseled at length on cessation. 


2. High anion gap metabolic acidosis with lactic acidosis and alcoholic 

ketoacidosis. Likely secondary to alcohol abuse and poor feeding. Resolved. Ne

phrology recommendations appreciated. 


3. JUAN. Resolved. Continue to monitor.


4. Sore throat. No signs of infection. Continue cepachol as needed. 


5. Hypokalemia. Potassium repleted. Follow up repeat labs.


5. Hypophosphotemia. Resolved. Continue to monitor


6. Hypomagnesemia. Resolved. Continue magnesium oxide. Continue to monitor.


7. Thrombocytopenia. Likely secondary to alcohol abuse. Improving. Continue to 

monitor. 


8. Transaminitis. Resolving. Secondary to alcohol abuse. Continue to monitor t

rend. 


9. Gait instability. Seen by PT. REBEKAH recommended. 


10. Anxiety. Psychiatric disorder. Continue Risperdal. Psychiatrist 

recommendations appreciated. 


Case discussed in detail with the patient regarding current diagnosis and 

treatment plan. All questions answered.

## 2019-04-15 LAB
ALBUMIN SERPL-MCNC: 4.1 G/DL
ALBUMIN/GLOB SERPL: 1.3 {RATIO}
ALT SERPL-CCNC: 20 U/L
AST SERPL-CCNC: 51 U/L
BASOPHILS # BLD AUTO: 0.01 K/MM3
BASOPHILS NFR BLD: 0.2 %
BASOPHILS NFR BLD: 2 %
BUN SERPL-MCNC: 18 MG/DL
CALCIUM SERPL-MCNC: 10.7 MG/DL
EOSINOPHIL # BLD: 0.1 10*3/UL
EOSINOPHIL NFR BLD: 2 %
EOSINOPHIL NFR BLD: 2 %
ERYTHROCYTE [DISTWIDTH] IN BLOOD BY AUTOMATED COUNT: 16.9 %
GFR NON-AFRICAN AMERICAN: > 60
HGB BLD-MCNC: 12.7 G/DL
LYMPHOCYTE: 24 %
LYMPHOCYTES # BLD: 1.7 10*3/UL
LYMPHOCYTES NFR BLD AUTO: 24.9 %
MCH RBC QN AUTO: 31.3 PG
MCHC RBC AUTO-ENTMCNC: 33.3 G/DL
MCV RBC AUTO: 93.8 FL
MONOCYTE: 16 %
MONOCYTES # BLD AUTO: 1.5 10*3/UL
MONOCYTES NFR BLD: 22.2 %
NEUTROPHILS NFR BLD AUTO: 53 %
NEUTS BAND NFR BLD: 3 %
PLATELET # BLD EST: NORMAL 10*3/UL
PLATELET # BLD: 151 10^3/UL
PMV BLD AUTO: 10.1 FL
RBC # BLD AUTO: 4.06 10^6/UL
WBC # BLD AUTO: 6.6 10^3/UL

## 2019-04-15 RX ADMIN — Medication SCH MG: at 17:36

## 2019-04-15 RX ADMIN — MULTIPLE VITAMINS W/ MINERALS TAB SCH TAB: TAB at 10:46

## 2019-04-15 RX ADMIN — Medication SCH MG: at 10:39

## 2019-04-15 NOTE — PN
DATE:  04/15/2019



SUBJECTIVE:  The patient is 63-year-old  female, long and

debilitating history of alcohol use disorder and multiple complications due

to that.  The patient also has alcoholic neuropathy as well as chronic

stage of either intoxicated with alcohol withdrawing from alcohol with

delirium.  The patient is very well known to this writer as well as

psychiatric inpatient unit from the previous admission which took place

here in [place] in 12/2018.  At this time, the patient was admitted to the

medical side for evaluation of alcohol withdrawal symptoms as well as

alcohol withdrawal delirium.  Psychiatric team was following the patient up

on the medical side in order to give advised to the medical team about

medication what the patient might be beneficial from.  The patient has

mental status which is inconsistent.  The patient has transient feeling of

paranoia as well as confusions as well as alcohol withdrawal delirium.  At

times, the patient is irrational for example physical therapy recommended

the patient to be transferred to subacute rehab.  The patient does not want

to go to subacute rehab has very unrealistic plans, based on report the

patient has a tendency of unsteady gait as well as falls.  The patient

requested to be discharged today against medical advice, was not

participating in discharge planning.  



The patient was seen and examined

today.  The patient presented to be alert, but does not remember this

writer.  The patient has unrealistic plans that she wants to be discharged

home and she will come back sometimes on Wednesday or Thursday and she will

admit herself into the subacute rehab which does not make any sense.  This

writer asked the patient why she wants to be transferred to subacute rehab

on Thursday of her on Friday.  The patient has no reasonable explanation

besides that she needs to do her laundry and "smell good".  The patient does not
remember what is the date today.

The patient has episodes of confusion, agitation and paranoia.  The patient 
presented to be

paranoid said that she cannot trust nobody in this hospital and she feels

like a prisoner here.  The patient refused to give consent to talk to her

family. The patient reported that her son will be taking care of

her, but based on report no family involved into the patient's care and

nobody even visited the patient.  pt refused to give consent, moreover pt said 
that she does not have 

her son's phone number. Considering the fact that the patient has

history of paranoia, this writer even offered the patient to this admission

to the psychiatric inpatient unit, but the patient declined that offer.



Vital signs seems to be stable, but the patient is tachycardic. 

Temperature 97.5, pulse is 102, blood pressure 112/79, respiration 26 and

oxygen saturation 98.



MEDICATIONS:  Reviewed.  The patient is on Tylenol, folic acid, magnesium

oxide, multivitamin, Zofran, Risperdal will be increased to 1 mg twice a

day and at the nighttime, and vitamin B1.



LABORATORY DATA:  Reviewed.  Most recent was from today.  Blood gas

reviewed.  Chemistry reviewed.  Urinalysis reviewed and toxicology

reviewed.



MENTAL STATUS EXAMINATION:  The patient presented to be alert, somewhat

confused, angry, and irritable demeanor intents eye contact.  Speech at

times incoherent, under productive.  Mood described as fine.  Affect was

angry and irritable which is mood and congruent.  Thought process

circumstantial and tangential thought content.  The patient denied visual,

auditory tactile hallucinations but obviously the patient is paranoid.  The

patient feels that her boyfriend who is supporting her financially is

cheating on her and making videos of him with the new girlfriend on patient's 
cell phone.  The patient

said that she does not trust this writer and nobody in this hospital. 

Insight and judgment seems to be very limited.  Impulses are unpredictable.



IMPRESSION:  Based on the history, the patient has severe alcohol use

disorder and at present moment most likely the patient is in delirium stage. 

The patient has chronic noncompliance with

offer treatment as well as medication management.  The patient has severe

alcohol use disorder dependence.



PLAN:  Based on above, the patient lacks capacity to leave against medical

advice because the patient is disoriented and has unrealistic plans.  The

patient does not have any insight or appreciation and reasoning debilitated

lacking.  This writer will increase the dose of Risperdal.  Medical team

was advised that the patient lacks capacity to leave against medical

advice.  Primary team wants to initiate [facility]

screening process, but based on presentation pt does not meet a criteria for 

screening because she is not medically cleared.  The patient is not cleared by 
psychiatry as of now.

Should you have any questions give me a call back.  Thank you very much. 

Management of this case took more than 45 minutes of this writer time.

Will follow up and advise accordingly. 







__________________________________________

Alba Masters MD





DD:  04/15/2019 15:26:49

DT:  04/15/2019 15:31:03

[facility] # [id number]



REBEKAH

## 2019-04-15 NOTE — CP.PCM.PN
<Wesley Greene - Last Filed: 04/15/19 15:01>





Subjective





- Date & Time of Evaluation


Date of Evaluation: 04/15/19


Time of Evaluation: 10:15





- Subjective


Subjective: 





Wesley Greene, PGY1 Medicine Progress Note:


Pt was seen and examined this AM at bedside. Per nursing overnight she received 

one dose of ativan IVP due to agitation and restlessness. Pts CIWA is 0. Pt this

AM denies any fevers, chills, headache, lightheadedness, chest pain, SOB, n/v, 

c/d or dysuria. She continues to admit to a non-productive cough, rhinnorhea and

sore throat. 





Objective





- Vital Signs/Intake and Output


Vital Signs (last 24 hours): 


                                        











Temp Pulse Resp BP Pulse Ox


 


 97.5 F L  102 H  20   112/79   98 


 


 04/15/19 13:49  04/15/19 13:49  04/15/19 13:49  04/15/19 13:49  04/15/19 13:49








Intake and Output: 


                                        











 04/15/19 04/15/19





 06:59 18:59


 


Intake Total 240 240


 


Balance 240 240














- Medications


Medications: 


                               Current Medications





Acetaminophen (Tylenol 325mg Tab)  650 mg PO Q6H PRN


   PRN Reason: fever/headache


   Last Admin: 04/14/19 14:55 Dose:  650 mg


Folic Acid (Folic Acid)  1 mg PO DAILY [facility]


   Last Admin: 04/15/19 10:39 Dose:  1 mg


Magnesium Oxide (Mag-Ox)  400 mg PO BID [facility]


   Last Admin: 04/15/19 10:39 Dose:  400 mg


Multivitamins/Minerals (Therapeutic-M Tab)  1 tab PO 0800 [facility]


   Last Admin: 04/15/19 10:46 Dose:  1 tab


Ondansetron HCl (Zofran Inj)  4 mg IVP Q6H PRN


   PRN Reason: Nausea/Vomiting


Risperidone (Risperdal Oral Soln)  1 mg PO AMHS [facility]; Protocol


   Last Admin: 04/15/19 12:48 Dose:  1 mg


Thiamine HCl (Vitamin B1 Tab)  100 mg PO DAILY [facility]


   Last Admin: 04/15/19 10:39 Dose:  100 mg











- Labs


Labs: 


                                        





                                 04/15/19 06:30 





                                 04/15/19 06:30 





- Constitutional


Appears: Non-toxic, No Acute Distress, Other (tremulousness resolved)





- Head Exam


Head Exam: ATRAUMATIC, NORMAL INSPECTION, NORMOCEPHALIC





- Eye Exam


Eye Exam: EOMI, Normal appearance, PERRL





- ENT


Additional Comments: Mucous membranes moist, absent: tonsilar erythema, tonsilar

exudates, no cervical chain lymph nodes 





- Respiratory Exam


Respiratory Exam: Clear to Ausculation Bilateral, NORMAL BREATHING PATTERN.  

absent: Accessory Muscle Use, Decreased Breath Sounds, Rales, Rhonchi, Wheezes





- Cardiovascular Exam


Cardiovascular Exam: RRR, +S1, +S2.  absent: Gallop, Rubs





- GI/Abdominal Exam


GI & Abdominal Exam: Soft, Normal Bowel Sounds.  absent: Firm, Guarding, Rigid, 

Tenderness





- Extremities Exam


Extremities Exam: Normal Inspection.  absent: Calf Tenderness, Pedal Edema





- Back Exam


Back Exam: NORMAL INSPECTION.  absent: CVA tenderness (L), CVA tenderness (R)





- Neurological Exam


Neurological Exam: Oriented x3, alert





- Psychiatric Exam


Psychiatric exam: Uncomfortable, agitation improving





- Skin


Skin Exam: Dry, Normal Color, Warm





Assessment and Plan





- Assessment and Plan (Free Text)


Assessment: 





63 year old  female with pmhx of alcohol abuse, alcohol hepatitis, 

anxiety, chronic L neck pain presenting to ED with tremors likely 2/2 alcohol 

withdrawal. Drinks 1 pt vodka daily, last drink 4/9. 


Plan: 





Alcohol withdrawal, Hx of alcohol abuse: Resolved


- Last reported drink was 4/9


- Serum alcohol 49


- CIWA protocol


- Last CIWA 0


- aspiration, fall precautions


- neuro checks 


- Thiamine/folate/MV PO


- Zofran 4 mg q4 prn





Electrolyte Imbalance: Resolved


- Likely 2/2 refeeding syndrome due to chronic poor PO intake from chronic etOH 

abuse


- Hypomag - resolved


- Hypophos - resolved


- Hypokalemia - resolved


- Replete as needed


- Will continue to monitor





Anxiety:


- Psych consulted


- Restarted risperdal





Cough 2/2 to likely URI:


- CXR from admission: No active disease


- Pt afebrile with no elevation in WBC count.


- Cepacol lozenges 


- Will continue to monitor.





High Anion Gap Metabolic Acidosis: Resolved


- Likely 2/2 alcoholic ketoacidosis complicated by JUAN


- Will continue to monitor





JUAN: Resolved


-likely 2/2 dehydration


-Nephrology (Dr. Hatfield) consulted





PPx, Diet, Disposition


-DVT ppx: scds


-GI ppx: protonix 40 mg IVP daily


-Diet: HHD


-PT on board





Case discussed with Dr. Gurpreet Greene DO, PGY-1





<Aníbal Vázquez - Last Filed: 04/16/19 13:00>





Objective





- Vital Signs/Intake and Output


Vital Signs (last 24 hours): 


                                        











Temp Pulse Resp BP Pulse Ox


 


 97.6 F   99 H  18   96/64 L  97 


 


 04/16/19 06:00  04/16/19 06:00  04/16/19 06:00  04/16/19 06:00  04/16/19 06:00








Intake and Output: 


                                        











 04/16/19 04/16/19





 06:59 18:59


 


Intake Total 180 240


 


Balance 180 240














- Medications


Medications: 


                               Current Medications





Acetaminophen (Tylenol 325mg Tab)  650 mg PO Q6H PRN


   PRN Reason: fever/headache


   Last Admin: 04/16/19 04:08 Dose:  650 mg


Folic Acid (Folic Acid)  1 mg PO DAILY [facility]


   Last Admin: 04/16/19 10:48 Dose:  1 mg


Magnesium Oxide (Mag-Ox)  400 mg PO BID [facility]


   Last Admin: 04/16/19 10:48 Dose:  400 mg


Multivitamins/Minerals (Therapeutic-M Tab)  1 tab PO 0800 [facility]


   Last Admin: 04/16/19 10:48 Dose:  1 tab


Ondansetron HCl (Zofran Inj)  4 mg IVP Q6H PRN


   PRN Reason: Nausea/Vomiting


Risperidone (Risperdal Tab)  1 mg PO BID [facility]; Protocol


   Last Admin: 04/16/19 10:48 Dose:  1 mg


Risperidone (Risperdal Tab)  1 mg PO HS [facility]; Protocol


   Last Admin: 04/15/19 21:37 Dose:  1 mg


Thiamine HCl (Vitamin B1 Tab)  100 mg PO DAILY [facility]


   Last Admin: 04/16/19 10:48 Dose:  100 mg











- Labs


Labs: 


                                        





                                 04/16/19 06:30 





                                 04/16/19 06:30 











Attending/Attestation





- Attestation


I have personally seen and examined this patient.: Yes


I have fully participated in the care of the patient.: Yes


I have reviewed all pertinent clinical information, including history, physical 

exam and plan: Yes


Notes (Text): 





04/16/19 12:54





Attending note;





Patient seen and examined in room 562.


Patient is alert and awake.


oriented To place and time.


Not in any acute distress.


Still with gait instability.





 Patient is a 63 year old female with past medical history significant for 

alcohol abuse, endometriosis, chronic left neck pain, alcoholic hepatitis, fall,

 thrombocytopenia, and anxiety that presented to the emergency room with tremors

and anxiety.





1. Alcohol withdrawal; withdrawal symptoms improving.


Patient with gait instability.


Still with episodes of confusion and sundowning secondary to psychiatric issues.


 continue thiamine, folic acid, and MVI.  Patient counseled at length on 

cessation. 


2. alcoholic ketoacidosis ; resolved. Likely secondary to alcohol abuse and poor

feeding. Resolved. 


3. JUAN. Resolved. Continue to monitor.


4. Transaminitis. Resolving. Secondary to alcohol abuse.


5. Gait instability. Seen by PT. REBEKAH recommended.


6. Psychiatric disorder.  Patient had chronic delusional disorder. continue 

Risperdal.  Discussed with psychiatrist in detail.


Medication dosage adjusted.





Case discussed with  for discharge planning/[facility] placement.


Monitor for withdrawal symptoms.





Prognosis is poor secondary to continuous alcohol abuse, psychiatric disorder, 

noncompliance and poor social support.

## 2019-04-16 LAB
ALBUMIN SERPL-MCNC: 4 G/DL
ALBUMIN/GLOB SERPL: 1.3 {RATIO}
ALT SERPL-CCNC: 25 U/L
AST SERPL-CCNC: 58 U/L
BASOPHILS # BLD AUTO: 0.01 K/MM3
BASOPHILS NFR BLD: 0.2 %
BUN SERPL-MCNC: 17 MG/DL
CALCIUM SERPL-MCNC: 9.9 MG/DL
EOSINOPHIL # BLD: 0.2 10*3/UL
EOSINOPHIL NFR BLD: 2.7 %
ERYTHROCYTE [DISTWIDTH] IN BLOOD BY AUTOMATED COUNT: 17.3 %
GFR NON-AFRICAN AMERICAN: > 60
HGB BLD-MCNC: 11.8 G/DL
LYMPHOCYTES # BLD: 1.8 10*3/UL
LYMPHOCYTES NFR BLD AUTO: 31.2 %
MCH RBC QN AUTO: 31.1 PG
MCHC RBC AUTO-ENTMCNC: 32.9 G/DL
MCV RBC AUTO: 94.7 FL
MONOCYTES # BLD AUTO: 1.4 10*3/UL
MONOCYTES NFR BLD: 23.9 %
PLATELET # BLD: 231 10^3/UL
PMV BLD AUTO: 9.5 FL
RBC # BLD AUTO: 3.79 10^6/UL
WBC # BLD AUTO: 5.9 10^3/UL

## 2019-04-16 RX ADMIN — Medication SCH MG: at 17:12

## 2019-04-16 RX ADMIN — MULTIPLE VITAMINS W/ MINERALS TAB SCH TAB: TAB at 10:48

## 2019-04-16 RX ADMIN — Medication SCH MG: at 10:48

## 2019-04-16 NOTE — CP.PCM.PN
<Wesley Greene - Last Filed: 04/16/19 16:13>





Subjective





- Date & Time of Evaluation


Date of Evaluation: 04/16/19


Time of Evaluation: 09:56





- Subjective


Subjective: 





Wesley Greene, PGY1 Medicine Progress Note:


Pt was seen and examined this AM at bedside. Per nursing overnight had CIWA 

score done which was noted to be 12. This AM CIWA is 3 but unlikely due to etOH 

absue because of the duration from last drink. Pt this AM denies any fevers, 

chills, headache, lightheadedness, chest pain, SOB, n/v, c/d or dysuria. She 

continues to admit to a non-productive cough, rhinnorhea and sore throat. 





Objective





- Vital Signs/Intake and Output


Vital Signs (last 24 hours): 


                                        











Temp Pulse Resp BP Pulse Ox


 


 97.6 F   99 H  18   96/64 L  97 


 


 04/16/19 06:00  04/16/19 06:00  04/16/19 06:00  04/16/19 06:00  04/16/19 06:00








Intake and Output: 


                                        











 04/16/19 04/16/19





 06:59 18:59


 


Intake Total 180 


 


Balance 180 














- Medications


Medications: 


                               Current Medications





Acetaminophen (Tylenol 325mg Tab)  650 mg PO Q6H PRN


   PRN Reason: fever/headache


   Last Admin: 04/16/19 04:08 Dose:  650 mg


Folic Acid (Folic Acid)  1 mg PO DAILY [facility]


   Last Admin: 04/15/19 10:39 Dose:  1 mg


Magnesium Oxide (Mag-Ox)  400 mg PO BID [facility]


   Last Admin: 04/15/19 17:36 Dose:  400 mg


Multivitamins/Minerals (Therapeutic-M Tab)  1 tab PO 0800 [facility]


   Last Admin: 04/15/19 10:46 Dose:  1 tab


Ondansetron HCl (Zofran Inj)  4 mg IVP Q6H PRN


   PRN Reason: Nausea/Vomiting


Risperidone (Risperdal Tab)  1 mg PO BID [facility]; Protocol


   Last Admin: 04/15/19 17:35 Dose:  1 mg


Risperidone (Risperdal Tab)  1 mg PO HS [facility]; Protocol


   Last Admin: 04/15/19 21:37 Dose:  1 mg


Thiamine HCl (Vitamin B1 Tab)  100 mg PO DAILY [facility]


   Last Admin: 04/15/19 10:39 Dose:  100 mg











- Labs


Labs: 


                                        





                                 04/16/19 06:30 





                                 04/16/19 06:30 








- Constitutional


Appears: Non-toxic, No Acute Distress, Other (tremulousness resolved)





- Head Exam


Head Exam: ATRAUMATIC, NORMAL INSPECTION, NORMOCEPHALIC





- Eye Exam


Eye Exam: EOMI, Normal appearance, PERRL





- ENT


Additional Comments: Mucous membranes moist, absent: tonsilar erythema, tonsilar

exudates, no cervical chain lymph nodes 





- Respiratory Exam


Respiratory Exam: Clear to Ausculation Bilateral, NORMAL BREATHING PATTERN.  

absent: Accessory Muscle Use, Decreased Breath Sounds, Rales, Rhonchi, Wheezes





- Cardiovascular Exam


Cardiovascular Exam: RRR, +S1, +S2.  absent: Gallop, Rubs





- GI/Abdominal Exam


GI & Abdominal Exam: Soft, Normal Bowel Sounds.  absent: Firm, Guarding, Rigid, 

Tenderness





- Extremities Exam


Extremities Exam: Normal Inspection.  absent: Calf Tenderness, Pedal Edema





- Back Exam


Back Exam: NORMAL INSPECTION.  absent: CVA tenderness (L), CVA tenderness (R)





- Neurological Exam


Neurological Exam: Oriented x3, alert





- Psychiatric Exam


Psychiatric exam: Uncomfortable, agitation improving





- Skin


Skin Exam: Dry, Normal Color, Warm





Assessment and Plan





- Assessment and Plan (Free Text)


Assessment: 





63 year old  female with pmhx of alcohol abuse, alcohol hepatitis, 

anxiety, chronic L neck pain presenting to ED with tremors likely 2/2 alcohol 

withdrawal. Drinks 1 pt vodka daily, last drink 4/9. 


Plan: 





Alcohol withdrawal, Hx of alcohol abuse: Resolved


- Last reported drink was 4/9


- Serum alcohol 49


- CIWA protocol


- Last CIWA 3


- aspiration, fall precautions


- neuro checks 


- Thiamine/folate/MV PO


- Zofran 4 mg q4 prn





Electrolyte Imbalance: Resolved


- Likely 2/2 refeeding syndrome due to chronic poor PO intake from chronic etOH 

abuse


- Hypomag - resolved


- Hypophos - resolved


- Hypokalemia - resolved


- Replete as needed


- Will continue to monitor





Anxiety:


- Psych consulted


- Restarted risperdal





Cough 2/2 to likely URI:


- CXR from admission: No active disease


- Pt afebrile with no elevation in WBC count.


- Cepacol lozenges 


- Will continue to monitor.





High Anion Gap Metabolic Acidosis: Resolved


- Likely 2/2 alcoholic ketoacidosis complicated by JUAN


- Will continue to monitor





JUAN: Resolved


-likely 2/2 dehydration


-Nephrology (Dr. Hatfield) consulted





PPx, Diet, Disposition


-DVT ppx: scds


-GI ppx: protonix 40 mg IVP daily


-Diet: HHD


-PT on board





Dispo: Pt is not cleared for d/c today, will reassess in the AM.





Case discussed with Dr. Gurpreet Greene DO, PGY-1








<Aníbal Vázquez - Last Filed: 04/16/19 17:18>





Objective





- Vital Signs/Intake and Output


Vital Signs (last 24 hours): 


                                        











Temp Pulse Resp BP Pulse Ox


 


 97.6 F   99 H  18   96/64 L  97 


 


 04/16/19 06:00  04/16/19 06:00  04/16/19 06:00  04/16/19 06:00  04/16/19 06:00








Intake and Output: 


                                        











 04/16/19 04/16/19





 06:59 18:59


 


Intake Total 180 240


 


Balance 180 240














- Medications


Medications: 


                               Current Medications





Acetaminophen (Tylenol 325mg Tab)  650 mg PO Q6H PRN


   PRN Reason: fever/headache


   Last Admin: 04/16/19 04:08 Dose:  650 mg


Folic Acid (Folic Acid)  1 mg PO DAILY [facility]


   Last Admin: 04/16/19 10:48 Dose:  1 mg


Lorazepam (Ativan)  1 mg IVP Q6H PRN; Protocol


   PRN Reason: Anxiety


Magnesium Oxide (Mag-Ox)  400 mg PO BID [facility]


   Last Admin: 04/16/19 17:12 Dose:  400 mg


Multivitamins/Minerals (Therapeutic-M Tab)  1 tab PO 0800 [facility]


   Last Admin: 04/16/19 10:48 Dose:  1 tab


Ondansetron HCl (Zofran Inj)  4 mg IVP Q6H PRN


   PRN Reason: Nausea/Vomiting


Risperidone (Risperdal Tab)  1 mg PO BID [facility]; Protocol


   Last Admin: 04/16/19 17:12 Dose:  1 mg


Risperidone (Risperdal Tab)  1 mg PO HS [facility]; Protocol


   Last Admin: 04/15/19 21:37 Dose:  1 mg


Thiamine HCl (Vitamin B1 Tab)  100 mg PO DAILY [facility]


   Last Admin: 04/16/19 10:48 Dose:  100 mg











- Labs


Labs: 


                                        





                                 04/16/19 06:30 





                                 04/16/19 06:30 











Attending/Attestation





- Attestation


I have personally seen and examined this patient.: Yes


I have fully participated in the care of the patient.: Yes


I have reviewed all pertinent clinical information, including history, physical 

exam and plan: Yes


Notes (Text): 





04/16/19 17:13





Attending note;





Patient seen and examined with resident.


Patient had episodes of agitation and confusion last night.  Got IV Ativan.


 confused.


Not in any acute distress.





 Patient is a 63 year old female with past medical history significant for 

alcohol abuse, endometriosis, chronic left neck pain, alcoholic hepatitis, fall,

 thrombocytopenia, and anxiety that presented to the emergency room with tremors

and anxiety.





1. Alcohol withdrawal; withdrawal symptoms improving.


Patient with gait instability.


Still with episodes of confusion and sundowning secondary to psychiatric issues.


Started on Ativan. CIWA score was 11 this morning.


 continue thiamine, folic acid, and MVI.  Patient counseled at length on 

cessation. 


2. alcoholic ketoacidosis ; resolved. Likely secondary to alcohol abuse and poor

feeding. Resolved. 


3. JUAN. Resolved. Continue to monitor.


4. Transaminitis. Resolving. Secondary to alcohol abuse.


5. Gait instability. Seen by PT. REBEKAH recommended.


6. Psychiatric disorder.  Patient had chronic delusional disorder. continue 

Risperdal. 


Still with episodes of delusion and agitation.  case discussed with psychiatrist

in detail.


Medication dosage adjusted.





Case discussed with  for discharge planning/[facility] placement.


Monitor for withdrawal symptoms.





Prognosis is poor secondary to continuous alcohol abuse, psychiatric disorder, 

noncompliance and poor social support.

## 2019-04-16 NOTE — PN
DATE:  04/16/2019



SUBJECTIVE:  The patient was seen and examined today.  The patient

presented to be sleepy, kind of difficult.  The patient was giving this

writer attitudes, refused to talk to her, and explain her rationale about

refusing to go to subacute rehab.  Based on the reports from the nursing

staff, the patient almost fell overnight.  The patient was shaking and had

very unsteady gait.  At 10:00 p.m., the patient was caught by nursing staff

almost falling.  Physical therapy team recommended subacute rehab, but the

patient is difficult, refused to participate in discharge planning.  The

patient has irrational decisions.  The patient wants to go home and "do my

laundry, take a shower and smell good" after that, I will go to subacute

rehab which does not make any sense.  This writer tried to convince the

patient and rationalize decision from the medical as well as physical

therapy team, but the patient presented to be difficult, resistant giving

this writer attitude.  Majority of the answers were "because I said so."



PHYSICAL EXAMINATION:

VITAL SIGNS:  Seems to be stable, but blood pressure is low at 96/64 and

the patient was mildly tachycardic 99.



MENTAL STATUS EXAMINATION:  This writer described above, the patient is

difficult.  The patient has episodes of confusion, disorganized,

disoriented behavior.  Intense eye contact.  Speech was yes and no answers.

Mood described "I am fine."  Affect was angry, flat.  Mood congruent. 

Thought process very concrete, no abstract thinking.  Thought content, the

patient obviously has periods of confusion paranoia.  The patient feels

that her boyfriend who is supports her financially is cheating on the

patient and also recording it on the patient's cell phone.  Insight and

judgment seems to be very limited.  Impulses are not predictable.



LABORATORY DATA:  Labs reviewed.  Blood gas reviewed.  Chemistry reviewed. 

Urinalysis reviewed.  Toxicology reviewed.



MEDICATIONS:  Reviewed.  The patient is on Tylenol, folic acid, Ativan 1 mg

IV push every 6 hours as needed, magnesium oxide, multivitamin, Zofran,

Risperdal 1 mg twice a day and 1 mg at the nighttime as well as thiamine.



IMPRESSION:  Most likely the patient is in alcohol withdrawal delirium. 

The patient has chronic alcohol use disorder, rule out alcohol dementia,

rule out paranoia.



PLAN:  Physical therapy needs to continue working with the patient.  Right

now, the patient lacks capacity to be discharged against medical advice

because high risk of falls and comorbidities due to that is very high.  The

patient needs to stay on the medical side.  The patient does not want to

sign herself into the psychiatric inpatient unit because the patient does

not feel that she has any psychiatric issues.  Meanwhile, we will continue

monitoring.













No need for [facility] evaluation because the patient is

not medically cleared.







__________________________________________

Alba Masters MD





DD:  04/16/2019 15:38:24

DT:  04/16/2019 15:42:26

Job # [id number]

## 2019-04-17 LAB
ALBUMIN SERPL-MCNC: 3.9 G/DL
ALBUMIN/GLOB SERPL: 1.2 {RATIO}
ALT SERPL-CCNC: 25 U/L
AST SERPL-CCNC: 55 U/L
BASOPHILS # BLD AUTO: 0.01 K/MM3
BASOPHILS NFR BLD: 0.2 %
BUN SERPL-MCNC: 15 MG/DL
CALCIUM SERPL-MCNC: 9.7 MG/DL
EOSINOPHIL # BLD: 0.1 10*3/UL
EOSINOPHIL NFR BLD: 2.5 %
ERYTHROCYTE [DISTWIDTH] IN BLOOD BY AUTOMATED COUNT: 17.2 %
GFR NON-AFRICAN AMERICAN: > 60
HGB BLD-MCNC: 11.5 G/DL
LYMPHOCYTES # BLD: 1.8 10*3/UL
LYMPHOCYTES NFR BLD AUTO: 34.1 %
MCH RBC QN AUTO: 30.8 PG
MCHC RBC AUTO-ENTMCNC: 32.5 G/DL
MCV RBC AUTO: 94.9 FL
MONOCYTES # BLD AUTO: 1.3 10*3/UL
MONOCYTES NFR BLD: 25.7 %
PLATELET # BLD: 325 10^3/UL
PMV BLD AUTO: 8.9 FL
RBC # BLD AUTO: 3.73 10^6/UL
WBC # BLD AUTO: 5.2 10^3/UL

## 2019-04-17 RX ADMIN — Medication SCH MG: at 10:06

## 2019-04-17 RX ADMIN — Medication SCH MG: at 18:44

## 2019-04-17 RX ADMIN — MULTIPLE VITAMINS W/ MINERALS TAB SCH TAB: TAB at 10:06

## 2019-04-17 NOTE — CP.PCM.PN
<Wesley Greene - Last Filed: 04/17/19 14:08>





Subjective





- Date & Time of Evaluation


Date of Evaluation: 04/17/19


Time of Evaluation: 10:45





- Subjective


Subjective: 





Wesley Greene, PGY1 Medicine Progress Note:


Pt was seen and examined this AM at bedside. Per nursing overnight was given 1mg

ativan x 2 due to restlessness and agitaiton. This AM pt is AOx3 at this time 

and denies any fevers, chills, headache, lightheadedness, chest pain, SOB, n/v, 

c/d or dysuria. She continues to admit to a non-productive cough, rhinnorhea and

sore throat. 





Objective





- Vital Signs/Intake and Output


Vital Signs (last 24 hours): 


                                        











Temp Pulse Resp BP Pulse Ox


 


 98 F   95 H  20   107/72   94 L


 


 04/17/19 06:00  04/17/19 06:00  04/17/19 06:00  04/17/19 06:00  04/17/19 06:00








Intake and Output: 


                                        











 04/17/19 04/17/19





 06:59 18:59


 


Intake Total 240 


 


Balance 240 














- Medications


Medications: 


                               Current Medications





Acetaminophen (Tylenol 325mg Tab)  650 mg PO Q6H PRN


   PRN Reason: fever/headache


   Last Admin: 04/17/19 10:05 Dose:  650 mg


Folic Acid (Folic Acid)  1 mg PO DAILY [facility]


   Last Admin: 04/17/19 10:05 Dose:  1 mg


Lorazepam (Ativan)  1 mg IVP Q6H PRN; Protocol


   PRN Reason: Anxiety


   Last Admin: 04/17/19 11:38 Dose:  1 mg


Magnesium Oxide (Mag-Ox)  400 mg PO BID [facility]


   Last Admin: 04/17/19 10:06 Dose:  400 mg


Multivitamins/Minerals (Therapeutic-M Tab)  1 tab PO 0800 [facility]


   Last Admin: 04/17/19 10:06 Dose:  1 tab


Ondansetron HCl (Zofran Inj)  4 mg IVP Q6H PRN


   PRN Reason: Nausea/Vomiting


Risperidone (Risperdal Tab)  1 mg PO BID [facility]; Protocol


   Last Admin: 04/17/19 10:06 Dose:  1 mg


Risperidone (Risperdal Tab)  1 mg PO HS [facility]; Protocol


   Last Admin: 04/16/19 21:23 Dose:  1 mg


Thiamine HCl (Vitamin B1 Tab)  100 mg PO DAILY [facility]


   Last Admin: 04/17/19 10:05 Dose:  100 mg











- Labs


Labs: 


                                        





                                 04/17/19 06:00 





                                 04/17/19 06:00 











- Constitutional


Appears: Non-toxic, No Acute Distress, Other (tremulousness resolved)





- Head Exam


Head Exam: ATRAUMATIC, NORMAL INSPECTION, NORMOCEPHALIC





- Eye Exam


Eye Exam: EOMI, Normal appearance, PERRL





- ENT


Additional Comments: Mucous membranes moist, absent: tonsilar erythema, tonsilar

exudates, no cervical chain lymph nodes 





- Respiratory Exam


Respiratory Exam: Clear to Ausculation Bilateral, NORMAL BREATHING PATTERN.  

absent: Accessory Muscle Use, Decreased Breath Sounds, Rales, Rhonchi, Wheezes





- Cardiovascular Exam


Cardiovascular Exam: RRR, +S1, +S2.  absent: Gallop, Rubs





- GI/Abdominal Exam


GI & Abdominal Exam: Soft, Normal Bowel Sounds.  absent: Firm, Guarding, Rigid, 

Tenderness





- Extremities Exam


Extremities Exam: Normal Inspection.  absent: Calf Tenderness, Pedal Edema





- Back Exam


Back Exam: NORMAL INSPECTION.  absent: CVA tenderness (L), CVA tenderness (R)





- Neurological Exam


Neurological Exam: Oriented x3, alert





- Psychiatric Exam


Psychiatric exam: Uncomfortable, agitation improving





- Skin


Skin Exam: Dry, Normal Color, Warm





Assessment and Plan





- Assessment and Plan (Free Text)


Assessment: 





63 year old  female with pmhx of alcohol abuse, alcohol hepatitis, 

anxiety, chronic L neck pain presenting to ED with tremors likely 2/2 alcohol 

withdrawal. Drinks 1 pt vodka daily, last drink 4/9. 





Plan: 





Alcohol withdrawal, Hx of alcohol abuse: Resolved


- Last reported drink was 4/9


- Serum alcohol 49


- CIWA protocol


- aspiration, fall precautions


- neuro checks 


- Thiamine/folate/MV PO


- Zofran 4 mg q4 prn





Electrolyte Imbalance: Resolved


- Likely 2/2 refeeding syndrome due to chronic poor PO intake from chronic etOH 

abuse


- Hypomag - resolved


- Hypophos - resolved


- Hypokalemia - resolved


- Replete as needed


- Will continue to monitor





Anxiety:


- Psych consulted


- Restarted risperdal


- psyhc recs appreciated





Cough 2/2 to likely URI:


- CXR from admission: No active disease


- Pt afebrile with no elevation in WBC count.


- Cepacol lozenges 


- Will continue to monitor.





High Anion Gap Metabolic Acidosis: Resolved


- Likely 2/2 alcoholic ketoacidosis complicated by JUAN


- Will continue to monitor





JUAN: Resolved


-likely 2/2 dehydration


-Nephrology (Dr. Hatfield) consulted





PPx, Diet, Disposition


-DVT ppx: scds


-GI ppx: protonix 40 mg IVP daily


-Diet: HHD


-PT on board





Dispo: Pt is cleared medically optimized for d/c will discuss dispo with psych 

team.





Case discussed with Dr. Gurpreet Greene DO, PGY-1








<Aníbal Vázquez - Last Filed: 04/17/19 14:28>





Objective





- Vital Signs/Intake and Output


Vital Signs (last 24 hours): 


                                        











Temp Pulse Resp BP Pulse Ox


 


 98 F   95 H  20   107/72   94 L


 


 04/17/19 06:00  04/17/19 06:00  04/17/19 06:00  04/17/19 06:00  04/17/19 06:00








Intake and Output: 


                                        











 04/17/19 04/17/19





 06:59 18:59


 


Intake Total 240 


 


Balance 240 














- Medications


Medications: 


                               Current Medications





Acetaminophen (Tylenol 325mg Tab)  650 mg PO Q6H PRN


   PRN Reason: fever/headache


   Last Admin: 04/17/19 10:05 Dose:  650 mg


Folic Acid (Folic Acid)  1 mg PO DAILY [facility]


   Last Admin: 04/17/19 10:05 Dose:  1 mg


Lorazepam (Ativan)  1 mg IVP Q6H PRN; Protocol


   PRN Reason: Anxiety


   Last Admin: 04/17/19 11:38 Dose:  1 mg


Magnesium Oxide (Mag-Ox)  400 mg PO BID [facility]


   Last Admin: 04/17/19 10:06 Dose:  400 mg


Multivitamins/Minerals (Therapeutic-M Tab)  1 tab PO 0800 [facility]


   Last Admin: 04/17/19 10:06 Dose:  1 tab


Ondansetron HCl (Zofran Inj)  4 mg IVP Q6H PRN


   PRN Reason: Nausea/Vomiting


Risperidone (Risperdal Tab)  1 mg PO BID [facility]; Protocol


   Last Admin: 04/17/19 10:06 Dose:  1 mg


Risperidone (Risperdal Tab)  1 mg PO HS [facility]; Protocol


   Last Admin: 04/16/19 21:23 Dose:  1 mg


Thiamine HCl (Vitamin B1 Tab)  100 mg PO DAILY [facility]


   Last Admin: 04/17/19 10:05 Dose:  100 mg











- Labs


Labs: 


                                        





                                 04/17/19 06:00 





                                 04/17/19 06:00 











Attending/Attestation





- Attestation


I have personally seen and examined this patient.: Yes


I have fully participated in the care of the patient.: Yes


I have reviewed all pertinent clinical information, including history, physical 

exam and plan: Yes


Notes (Text): 





04/17/19 14:24





Attending note;





Patient seen and examined with resident in the morning.


Patient is alert and awake.


Oriented to place and time.


Not in any acute distress.


Lying in bed.


Bed alarm is on.





 Patient is a 63 year old female with past medical history significant for 

alcohol abuse, endometriosis, chronic left neck pain, alcoholic hepatitis, fall,

 thrombocytopenia, and anxiety that presented to the emergency room with tremors

and anxiety.





1. Alcohol withdrawal; withdrawal symptoms improving.


Patient with gait instability.


Still with episodes of confusion and sundowning secondary to psychiatric issues.


Started on Ativan. 


 continue thiamine, folic acid, and MVI.  Patient counseled at length on 

cessation. 


2. alcoholic ketoacidosis ; resolved. Likely secondary to alcohol abuse and poor

feeding. Resolved. 


3. JUAN. Resolved. Continue to monitor.


4. Transaminitis. Resolving. Secondary to alcohol abuse.


5. Gait instability. Seen by PT. [facility] recommended.


6. Psychiatric disorder.  Patient had chronic delusional disorder. continue 

Risperdal. 


Patient was alert and awake this morning. case discussed with psychiatrist in 

detail.


We will monitor closely.





Case discussed with  for discharge planning/[facility] placement.


Patient refused rehab multiple times.


Monitor for withdrawal symptoms.





Case discussed with psychiatrist in detail.





Prognosis is poor secondary to continuous alcohol abuse, psychiatric disorder, 

noncompliance and poor social support.











04/17/19 14:27

## 2019-04-17 NOTE — PN
DATE:  04/17/2019



SUBJECTIVE:  The patient was seen and examined, discussed with primary care

team, Dr. Vázquez in detail as well as nursing staff.  The patient

presented to be sleepy as per nursing staff.  The patient needed to be

medicated with Ativan earlier.  The patient easily arousable, mumbling

something to herself.  The patient was talking about seeing a dogs as well

as her mother is downstairs with broken hip, which is not true.  The

patient seems to be in delirium stage based on report from the medical

team.  The patient was fine at the morning time, was alert and oriented,

but during this writer evaluation, the patient appears to be confused and

psychotic, which consistent with the patient's presentation, which is

delirium.  Based on current presentation, the patient lacks capacity to

sign against medical advise.  Also, the patient refused to participate in

physical therapy evaluation and treatment.



PHYSICAL EXAMINATION:

VITAL SIGNS:  Reviewed.  The patient's temperature 97.5, pulse 96, blood

pressure 91/60, respiration 26 and saturation is 96.



MEDICATIONS:  Reviewed.  The patient is on Tylenol, folic acid, Ativan 1 mg

IV push every 6 hours.  Most recently, the patient got Ativan at 11:38

today.  The patient is on magnesium oxide, multivitamin, Zofran, Risperdal

1 mg twice a day and will be increased to 2 mg at the nighttime and 1 mg

twice a day and thiamine.



LABORATORY DATA:  Labs reviewed.  Most recent was from today.  Chemistry

reviewed.  Urinalysis reviewed.  Toxicology reviewed.



MENTAL STATUS EXAMINATION:  As this writer described above, the patient is

sleepy and confused.  The patient reported that she is seeing dogs and her

mother is downstairs with broken hip.  The patient obviously in delirium

stage and the patient was not able to answer how her mood is.  The

patient's thought process is disorganized, circumstantial and tangential.  The

patient has psychotic symptoms seeing things.  Insight and judgment seems

to be very limited/impaired.  Impulses are unpredictable.



IMPRESSION:  Delirium due to medical condition, also alcohol withdrawals. 

The patient has history of psychosis.



PLAN:  The patient lacks capacity to sign against medical advise.  Physical

therapy followup recommended.  This writer will increase the dose of

Risperdal.  We will follow up and advise accordingly.  It is not safe

discharge.  The patient will be not doing okay in the community high risk

of falls.  The patient was refusing to go to Subacute Rehab.  At the same

time, the patient was making irrational decisions based on the patient's

presentation, most likely she needs to stay on the medical side because

delirium tremens and alcohol withdrawal delirium, this life-threatening

emergency and this writer does not feel comfortable to discharge the

patient as of now.



Thank you very much for letting me participate in care of your patient. 

Should you have any questions, give me a call.





__________________________________________

Alba Masters MD



DD:  04/17/2019 15:03:19

DT:  04/17/2019 15:05:07

Job # [id number]



MTDD

## 2019-04-18 LAB
ALBUMIN SERPL-MCNC: 3.8 G/DL
ALBUMIN/GLOB SERPL: 1.3 {RATIO}
ALT SERPL-CCNC: 15 U/L
AST SERPL-CCNC: 42 U/L
BASOPHILS # BLD AUTO: 0.02 K/MM3
BASOPHILS NFR BLD: 0.4 %
BASOPHILS NFR BLD: 2 %
BUN SERPL-MCNC: 16 MG/DL
CALCIUM SERPL-MCNC: 9.8 MG/DL
EOSINOPHIL # BLD: 0.1 10*3/UL
EOSINOPHIL NFR BLD: 2.4 %
EOSINOPHIL NFR BLD: 3 %
ERYTHROCYTE [DISTWIDTH] IN BLOOD BY AUTOMATED COUNT: 17 %
GFR NON-AFRICAN AMERICAN: > 60
HGB BLD-MCNC: 11.6 G/DL
LYMPHOCYTE: 37 %
LYMPHOCYTES # BLD: 1.7 10*3/UL
LYMPHOCYTES NFR BLD AUTO: 30.4 %
MCH RBC QN AUTO: 31.8 PG
MCHC RBC AUTO-ENTMCNC: 33 G/DL
MCV RBC AUTO: 96.2 FL
MONOCYTE: 16 %
MONOCYTES # BLD AUTO: 1.4 10*3/UL
MONOCYTES NFR BLD: 25.9 %
NEUTROPHILS NFR BLD AUTO: 42 %
PLATELET # BLD EST: NORMAL 10*3/UL
PLATELET # BLD: 397 10^3/UL
PMV BLD AUTO: 8.7 FL
RBC # BLD AUTO: 3.65 10^6/UL
WBC # BLD AUTO: 5.5 10^3/UL

## 2019-04-18 RX ADMIN — Medication SCH MG: at 17:22

## 2019-04-18 RX ADMIN — Medication SCH MG: at 19:20

## 2019-04-18 RX ADMIN — Medication SCH MG: at 11:26

## 2019-04-18 RX ADMIN — MULTIPLE VITAMINS W/ MINERALS TAB SCH TAB: TAB at 11:27

## 2019-04-18 NOTE — CP.PCM.PN
<Wesley Greene - Last Filed: 04/18/19 12:38>





Subjective





- Date & Time of Evaluation


Date of Evaluation: 04/18/19


Time of Evaluation: 10:45





- Subjective


Subjective: 





Wesley Greene, PGY1 Medicine Progress Note:


Pt was seen and examined this AM at bedside. Per nursing overnight was not given

any ativan overnight. This AM pt is AOx3 at this time and denies any fevers, 

chills, headache, lightheadedness, chest pain, SOB, n/v, c/d or dysuria. She 

continues to admit to a non-productive cough, rhinnorhea and sore throat. 








Objective





- Vital Signs/Intake and Output


Vital Signs (last 24 hours): 


                                        











Temp Pulse Resp BP Pulse Ox


 


 98 F   84   18   126/81   97 


 


 04/18/19 06:00  04/18/19 06:00  04/18/19 06:00  04/18/19 06:00  04/18/19 06:00








Intake and Output: 


                                        











 04/18/19 04/18/19





 06:59 18:59


 


Intake Total 480 


 


Balance 480 














- Medications


Medications: 


                               Current Medications





Acetaminophen (Tylenol 325mg Tab)  650 mg PO Q6H PRN


   PRN Reason: fever/headache


   Last Admin: 04/18/19 11:27 Dose:  650 mg


Folic Acid (Folic Acid)  [facility]ILY [facility]


   Last Admin: 04/18/19 11:26 Dose:  1 mg


Lorazepam (Ativan)  1 mg IVP Q6H PRN; Protocol


   PRN Reason: Anxiety


   Last Admin: 04/17/19 11:38 Dose:  1 mg


Magnesium Oxide (Mag-Ox)  400 mg PO BID [facility]


   Last Admin: 04/18/19 11:26 Dose:  400 mg


Multivitamins/Minerals (Therapeutic-M Tab)  1 tab PO 0800 [facility]


   Last Admin: 04/18/19 11:27 Dose:  1 tab


Ondansetron HCl (Zofran Inj)  4 mg IVP Q6H PRN


   PRN Reason: Nausea/Vomiting


Risperidone (Risperdal Tab)  [facility] mg PO BID [facility]; Protocol


   Last Admin: 04/18/19 11:26 Dose:  1 mg


Risperidone (Risperdal Tab)  2 mg PO HS [facility]; Protocol


   Last Admin: 04/17/19 21:25 Dose:  2 mg


Thiamine HCl (Vitamin B1 Tab)  100 mg PO DAILY [facility]


   Last Admin: 04/18/19 11:26 Dose:  100 mg











- Labs


Labs: 


                                        





                                 04/18/19 10:00 





                                 04/18/19 10:00 








- Constitutional


Appears: Non-toxic, No Acute Distress, Other (tremulousness resolved)





- Head Exam


Head Exam: ATRAUMATIC, NORMAL INSPECTION, NORMOCEPHALIC





- Eye Exam


Eye Exam: EOMI, Normal appearance, PERRL





- ENT


Additional Comments: Mucous membranes moist, absent: tonsilar erythema, tonsilar

exudates, no cervical chain lymph nodes 





- Respiratory Exam


Respiratory Exam: Clear to Ausculation Bilateral, NORMAL BREATHING PATTERN.  

absent: Accessory Muscle Use, Decreased Breath Sounds, Rales, Rhonchi, Wheezes





- Cardiovascular Exam


Cardiovascular Exam: RRR, +S1, +S2.  absent: Gallop, Rubs





- GI/Abdominal Exam


GI & Abdominal Exam: Soft, Normal Bowel Sounds.  absent: Firm, Guarding, Rigid, 

Tenderness





- Extremities Exam


Extremities Exam: Normal Inspection.  absent: Calf Tenderness, Pedal Edema





- Back Exam


Back Exam: NORMAL INSPECTION.  absent: CVA tenderness (L), CVA tenderness (R)





- Neurological Exam


Neurological Exam: Oriented x3, alert





- Psychiatric Exam


Psychiatric exam: Uncomfortable, agitation improving





- Skin


Skin Exam: Dry, Normal Color, Warm





Assessment and Plan





- Assessment and Plan (Free Text)


Assessment: 





63 year old  female with pmhx of alcohol abuse, alcohol hepatitis, 

anxiety, chronic L neck pain presenting to ED with tremors likely 2/2 alcohol 

withdrawal. Drinks 1 pt vodka daily, last drink 4/9. 





Plan: 





Alcohol withdrawal, Hx of alcohol abuse: Resolved


- Last reported drink was 4/9


- Serum alcohol 49


- CIWA protocol


- aspiration, fall precautions


- neuro checks 


- Thiamine/folate/MV PO


- Zofran 4 mg q4 prn





Electrolyte Imbalance: Resolved


- Likely 2/2 refeeding syndrome due to chronic poor PO intake from chronic etOH 

abuse


- Hypomag - resolved


- Hypophos - resolved


- Hypokalemia - resolved


- Replete as needed


- Will continue to monitor





Anxiety:


- Psych consulted


- Restarted risperdal


- psych recs appreciated





Cough 2/2 to likely URI:


- CXR from admission: No active disease


- Pt afebrile with no elevation in WBC count.


- Cepacol lozenges 


- Will continue to monitor.





High Anion Gap Metabolic Acidosis: Resolved


- Likely 2/2 alcoholic ketoacidosis complicated by JUAN


- Will continue to monitor





JUAN: Resolved


-likely 2/2 dehydration


-Nephrology (Dr. Hatfield) consulted





PPx, Diet, Disposition


-DVT ppx: scds


-GI ppx: protonix 40 mg IVP daily


-Diet: HHD


-PT on board





Dispo: Pt is cleared medically optimized for d/c will discuss dispo with psych 

team.





Case discussed with Dr. Gurpreet Greene DO, PGY-1





<Aníbal Vázquez - Last Filed: 04/19/19 13:46>





Objective





- Vital Signs/Intake and Output


Vital Signs (last 24 hours): 


                                        











Temp Pulse Resp BP Pulse Ox


 


 97.9 F   73   18   107/72   96 


 


 04/19/19 06:00  04/19/19 06:00  04/19/19 06:00  04/19/19 06:00  04/19/19 10:54








Intake and Output: 


                                        











 04/19/19 04/19/19





 06:59 18:59


 


Intake Total 720 


 


Balance 720 














- Medications


Medications: 


                               Current Medications





Acetaminophen (Tylenol 325mg Tab)  650 mg PO Q6H PRN


   PRN Reason: fever/headache


   Last Admin: 04/18/19 11:27 Dose:  650 mg


Folic Acid (Folic Acid)  1 mg PO DAILY [facility]


   Last Admin: 04/19/19 09:53 Dose:  1 mg


Lorazepam (Ativan)  0.5 mg PO TID PRN; Protocol


   PRN Reason: Anxiety


Magnesium Oxide (Mag-Ox)  400 mg PO BID [facility]


   Last Admin: 04/19/19 09:53 Dose:  400 mg


Multivitamins/Minerals (Therapeutic-M Tab)  1 tab PO 0800 [facility]


   Last Admin: 04/19/19 09:53 Dose:  1 tab


Ondansetron HCl (Zofran Inj)  4 mg IVP Q6H PRN


   PRN Reason: Nausea/Vomiting


Risperidone (Risperdal Tab)  1 mg PO BID [facility]; Protocol


   Last Admin: 04/19/19 09:53 Dose:  1 mg


Risperidone (Risperdal Tab)  2 mg PO HS [facility]; Protocol


   Last Admin: 04/18/19 21:50 Dose:  2 mg


Thiamine HCl (Vitamin B1 Tab)  100 mg PO DAILY [facility]


   Last Admin: 04/19/19 09:53 Dose:  100 mg











- Labs


Labs: 


                                        





                                 04/19/19 07:00 





                                 04/19/19 07:00 











Attending/Attestation





- Attestation


I have personally seen and examined this patient.: Yes


I have fully participated in the care of the patient.: Yes


I have reviewed all pertinent clinical information, including history, physical 

exam and plan: Yes


Notes (Text): 





04/19/19 13:44





Attending note;





Patient seen and examined with resident in the morning.


Patient is alert and awake.


Oriented to place and time.


Not in any acute distress.


Lying in bed.


got Iv ativan for agitation last night.





 Patient is a 63 year old female with past medical history significant for 

alcohol abuse, endometriosis, chronic left neck pain, alcoholic hepatitis, fall,

 thrombocytopenia, and anxiety that presented to the emergency room with tremors

and anxiety.





1. Alcohol withdrawal; withdrawal symptoms improving.


Patient with gait instability.


Still with episodes of confusion and sundowning secondary to psychiatric issues.


on Ativan. 


 continue thiamine, folic acid, and MVI.  Patient counseled at length on 

cessation. 


2. alcoholic ketoacidosis ; resolved. Likely secondary to alcohol abuse and poor

feeding. Resolved. 


3. JUAN. Resolved. Continue to monitor.


4. Transaminitis. Resolving. Secondary to alcohol abuse.


5. Gait instability. Seen by PT. RE[facility] recommended.


6. Psychiatric disorder.  Patient had chronic delusional disorder. continue 

Risperdal. 


Patient was alert and awake this morning. case discussed with psychiatrist in 

detail.


We will monitor closely.





We will get PT evaluation today.


 mental status is improving.





Case discussed with psychiatrist in detail.





Prognosis is poor secondary to continuous alcohol abuse, psychiatric disorder, 

noncompliance and poor social support.

## 2019-04-18 NOTE — PN
DATE:  04/18/2019



SUBJECTIVE:  The patient was seen today.  As per report, the patient was

very confused and hallucinating over morning time.  During this writer

evaluation, the patient presented to be sleepy, when she wakes up irritable

annoyed and disrespectful.  The patient said that she is hearing voices,

when this writer asked what voices telling her, the patient annoyingly and

angrily said that she is not hearing any voices.  The patient was falling

asleep during the interview.  As per physical therapy, the patient

recommended to be discharged home with services.



OBJECTIVE:

VITAL SIGNS:  Seems to be stable.  Temperature 98.3, pulse is 90, blood

pressure 92/61, respirations 18, and oxygen saturation is 95.

MENTAL STATUS EXAM:  The patient appears to be sleepy, when she wakes up

irritable and annoyed and angry.  Mood described as fine.  Affect was angry

and flat.  Thought process concrete and at times disorganized.  Thought

content, the patient has transient psychosis, which is related to delirium.

The patient denied thoughts of harming herself or others.  The patient

still paranoid that her boyfriend is cheating on her, but based on report,

the patient's boyfriend came over and seems to be supportive.  Insight and

judgment seems to be very limited.  Impulses are not predictable, but

better controlled.



MEDICATIONS:  Reviewed.  Tylenol, folic acid, Ativan 0.5 mg three times a

day as needed, magnesium oxide, multivitamins, Risperdal 1 mg twice a day

and 2 mg at the nighttime, and thiamine.



LABORATORY DATA:  Reviewed.  Most recent was from today.  Chemistry

reviewed.  Toxicology reviewed.  Urinalysis reviewed.



IMPRESSION:  Psychosis not otherwise specified, most likely the patient is

in delirium stage, which is slowly improving; alcohol use disorder, alcohol

withdrawal and delirium is improving.



PLAN:  Continue current management.  Continue current medications. 

Discussed with Dr. Vázquez.  We will decrease the dose of Ativan,

Risperdal needs to be continued, physical therapy recommended initially

subacute rehab as right now.  The patient ambulates with more steady gait,

recommended discharged home with services.  We will follow up and advise

accordingly.  Hopefully, the patient will be improving and will be

discharged most likely tomorrow.  The patient does not want to sign herself

into the psychiatric inpatient unit and the patient does not meet the

criteria for [facility] screening.  Should you have any

questions, give me a call back.



Thank you very much for letting me to participate in the care of your

patient.







__________________________________________

Alba Masters MD





DD:  04/18/2019 18:29:54

DT:  04/18/2019 18:32:01

Job # [id number]

## 2019-04-19 VITALS
RESPIRATION RATE: 18 BRPM | OXYGEN SATURATION: 96 % | TEMPERATURE: 97.9 F | HEART RATE: 73 BPM | SYSTOLIC BLOOD PRESSURE: 107 MMHG | DIASTOLIC BLOOD PRESSURE: 72 MMHG

## 2019-04-19 LAB
ALBUMIN SERPL-MCNC: 3.6 G/DL
ALBUMIN/GLOB SERPL: 1.3 {RATIO}
ALT SERPL-CCNC: 21 U/L
AST SERPL-CCNC: 35 U/L
BASOPHILS # BLD AUTO: 0.04 K/MM3
BASOPHILS NFR BLD: 0.6 %
BUN SERPL-MCNC: 18 MG/DL
CALCIUM SERPL-MCNC: 9.8 MG/DL
EOSINOPHIL # BLD: 0.1 10*3/UL
EOSINOPHIL NFR BLD: 1.4 %
ERYTHROCYTE [DISTWIDTH] IN BLOOD BY AUTOMATED COUNT: 17.1 %
GFR NON-AFRICAN AMERICAN: > 60
HGB BLD-MCNC: 11.3 G/DL
LYMPHOCYTES # BLD: 1.9 10*3/UL
LYMPHOCYTES NFR BLD AUTO: 28.7 %
MCH RBC QN AUTO: 31.1 PG
MCHC RBC AUTO-ENTMCNC: 32.2 G/DL
MCV RBC AUTO: 96.7 FL
MONOCYTES # BLD AUTO: 1.3 10*3/UL
MONOCYTES NFR BLD: 19.4 %
PLATELET # BLD: 462 10^3/UL
PMV BLD AUTO: 8.8 FL
RBC # BLD AUTO: 3.63 10^6/UL
WBC # BLD AUTO: 6.5 10^3/UL

## 2019-04-19 RX ADMIN — Medication SCH MG: at 09:53

## 2019-04-19 RX ADMIN — MULTIPLE VITAMINS W/ MINERALS TAB SCH TAB: TAB at 09:53

## 2019-04-19 NOTE — PN
DATE:  04/19/2019



SUBJECTIVE:  This writer followed up at the morning time, the patient

presented much better to compare with previous week.  The patient was

alert.  The patient took a shower as per notes from the PCP as well as

physical therapy recommended, home services and not subacute rehab.  The

patient reported that she had a good night's sleep.  The patient denied

feeling confused.  The patient was more pleasant and polite.  The patient

reported that she wants to stop her drinking because she does not want to

be caught in the similar situation.  The patient denied thoughts of harming

herself or others, denied any psychotic symptoms.  The patient presented to

be lucid, coherent, and oriented.  The patient reported that she will be

following up with .  The patient was advised to take medication as

prescribed.  The patient verbalized understanding.



PHYSICAL EXAMINATION:

VITAL SIGNS:  Stable.  Temperature is 97.9, pulse 73, blood pressure

107/72, respirations 18, oxygen saturation is 96.

MENTAL STATUS EXAM:  The patient presented to be alert and oriented, good

personal hygiene.  Fair eye contact.  Mood described as "I feel fine, I

feel better, thank you."  Affect was more reactive, pleasant and mood

congruent.  Thought process coherent and goal-directed.  Thought content,

the patient denied visual, auditory tactile hallucinations.  Denied

paranoid ideation.  The patient denied thoughts of harming herself or

others, denied intent or plan.  Insight and judgment seems to be improving.

Impulses are well controlled.



MEDICATIONS:  Reviewed.  The patient is on Tylenol, folic acid, Ativan as

needed, magnesium oxide, multivitamin, Zofran, Risperdal 1 mg twice a day

and 2 mg at nighttime and thiamine.



LABORATORY DATA:  Reviewed.  Most recent was from today seems to be within

normal limits besides low hemoglobin and hematocrit.



IMPRESSION:  Most likely the patient was in delirium stage which is

improved.  The patient has alcohol use disorder.  The patient also has

history of psychosis, which most likely related to alcohol withdrawals.



PLAN:  Continue current management.  Continue current medication.  The

patient will be discharged back home.  The patient refused to sign herself

into rehab initially, but over the course of this hospitalization, the

patient's physical strength improved and right now Physical Therapy

recommended to discharge the patient back home with services.  At present

moment, the patient posed no imminent danger to self or others.  This

writer will sign off.  The patient will follow up with her outpatient

psychiatrist Dr. Fernandez.  Also the patient was recommended to attend AA

meetings and NA meetings.







__________________________________________

Alba Masters MD





DD:  04/19/2019 10:49:51

DT:  04/19/2019 10:52:41

Job # [id number]



[email or web address] CESAR

## 2019-04-19 NOTE — CP.PCM.DIS
<Wesley Greene - Last Filed: 04/19/19 11:54>





Provider





- Provider


Date of Admission: 


04/09/19 19:42





Attending physician: 


Aníbal Vázquez MD





Primary care physician: 


Triny Akhtar MD





Consults: 








04/09/19 20:06


Nephrology Consult Routine 


   Comment: 


   Consulting Provider: Randy Hatfield


   Consulting Physician: Randy Hatfield


   Reason for Consult: JUAN; high anion gap metabolic acidosis





04/10/19 00:07


Social Work Referral Routine 


   Comment: efrain score


   Physician Instructions: 


   Reason For Exam: efrain score 14





04/12/19 07:38


Physician Consult Routine 


   Comment: 


   Consulting Provider: Alba Masters


   Consulting Physician: Alba Masters


   Reason for Consult: anxiety











Time Spent in preparation of Discharge (in minutes): 45





Diagnosis





- Discharge Diagnosis


(1) Abnormal liver enzymes


Status: Acute   Priority: Low   





(2) Alcohol use disorder


Status: Chronic   Priority: High   





(3) Alcohol withdrawal


Status: Acute   Priority: High   





Hospital Course





- Lab Results


Lab Results: 


                                  Micro Results





04/09/19 20:20   Blood-Venous   Blood Culture - Final


                            NO GROWTH AFTER 5 DAYS


04/09/19 20:20   Blood-Venous   Gram Stain - Final


                            TEST NOT PERFORMED


04/09/19 20:50   Blood-Venous   Blood Culture - Final


                            NO GROWTH AFTER 5 DAYS


04/09/19 20:50   Blood-Venous   Gram Stain - Final


                            TEST NOT PERFORMED


04/09/19 17:37   Urine,Clean Catch   Urine Culture - Final


                            No Growth (<1,000 CFU/ML)





                             Most Recent Lab Values











WBC  6.5 10^3/uL (4.5-11.0)   04/19/19  07:00    


 


RBC  3.63 10^6/uL (3.5-6.1)   04/19/19  07:00    


 


Hgb  11.3 g/dL (12.0-16.0)  L  04/19/19  07:00    


 


Hct  35.1 % (36.0-48.0)  L  04/19/19  07:00    


 


MCV  96.7 fl (80.0-105.0)   04/19/19  07:00    


 


MCH  31.1 pg (25.0-35.0)   04/19/19  07:00    


 


MCHC  32.2 g/dl (31.0-37.0)   04/19/19  07:00    


 


RDW  17.1 % (11.5-14.5)  H  04/19/19  07:00    


 


Plt Count  462 10^3/uL (120.0-450.0)  H  04/19/19  07:00    


 


MPV  8.8 fl (7.0-11.0)   04/19/19  07:00    


 


Neut % (Auto)  49.9 % (50.0-68.0)  L  04/19/19  07:00    


 


Lymph % (Auto)  28.7 % (22.0-35.0)   04/19/19  07:00    


 


Mono % (Auto)  19.4 % (1.0-6.0)  H  04/19/19  07:00    


 


Eos % (Auto)  1.4 % (1.5-5.0)  L  04/19/19  07:00    


 


Baso % (Auto)  0.6 % (0.0-3.0)   04/19/19  07:00    


 


Lymph # (Auto)  1.9  (1.2-3.4)   04/19/19  07:00    


 


Mono # (Auto)  1.3  (0.1-0.6)  H  04/19/19  07:00    


 


Eos # (Auto)  0.1  (0.0-0.7)   04/19/19  07:00    


 


Baso # (Auto)  0.04 K/mm3 (0.0-2.0)   04/19/19  07:00    


 


Absolute Neuts (auto)  3.26  (1.4-6.5)   04/19/19  07:00    


 


Neutrophils % (Manual)  42 % (50.0-70.0)  L  04/18/19  10:00    


 


Band Neutrophils %  3 % (0-2)  H  04/15/19  06:30    


 


Lymphocytes % (Manual)  37 % (22.0-35.0)  H  04/18/19  10:00    


 


Monocytes % (Manual)  16 % (1.0-6.0)  H  04/18/19  10:00    


 


Eosinophils % (Manual)  3 % (0.0-3.0)   04/18/19  10:00    


 


Basophils % (Manual)  2 % (0.0-1.0)  H  04/18/19  10:00    


 


Immature Lymphocytes  1 %  04/09/19  18:30    


 


Toxic Granulation  2+   04/09/19  18:30    


 


Platelet Evaluation  Normal  (NORMAL)   04/18/19  10:00    


 


Hypochromasia  1+   04/09/19  18:30    


 


Rouleaux  2+   04/09/19  18:30    


 


pCO2  13 mm/Hg (35-45)  L*  04/09/19  20:28    


 


pO2  178 mm/Hg (30-55)  H  04/10/19  08:25    


 


HCO3  4.2 mmol/L (21-28)  L*  04/09/19  20:28    


 


ABG pH  7.12  (7.35-7.45)  L*  04/09/19  20:28    


 


ABG Total CO2  4.6 mmol.L (22-28)  L  04/09/19  20:28    


 


ABG O2 Saturation  99.7 % (95-98)  H  04/09/19  20:28    


 


ABG Base Excess  -22.8 mmol/L (-2.0-3.0)  L  04/09/19  20:28    


 


ABG Potassium  4.0 mmol/L (3.6-5.2)   04/09/19  20:28    


 


VBG pH  7.50  (7.32-7.43)  H  04/10/19  08:25    


 


VBG pCO2  26.0  (40-60)  L  04/10/19  08:25    


 


VBG HCO3  20.3 mmol/l (21-28)  L  04/10/19  08:25    


 


VBG Total CO2  21.1 mmol.L (22-28)  L  04/10/19  08:25    


 


VBG O2 Sat (Calc)  99.9 % (40-65)  H  04/10/19  08:25    


 


VBG Base Excess  -1.4 mmol/L (0.0-2.0)  L  04/10/19  08:25    


 


VBG Potassium  3.6 mmol/L (3.6-5.2)   04/10/19  08:25    


 


Sodium  140.0 mmol/L (132-148)   04/10/19  08:25    


 


Chloride  101.0 mmol/L ()   04/10/19  08:25    


 


Glucose  110 mg/dl ()  H  04/10/19  08:25    


 


Lactate  1.4 mmol/L (0.7-2.1)   04/10/19  08:25    


 


FiO2  21.0 %  04/10/19  08:25    


 


Crit Value Called To  Debby spaulding   04/10/19  08:25    


 


Crit Value Called By  Airam   04/10/19  08:25    


 


Blood Gas Notified Time  844   04/10/19  08:25    


 


Sodium  136 mmol/L (132-148)   04/19/19  07:00    


 


Potassium  4.1 mmol/L (3.6-5.0)   04/19/19  07:00    


 


Chloride  102 mmol/L ()   04/19/19  07:00    


 


Carbon Dioxide  23 mmol/L (21-33)   04/19/19  07:00    


 


Anion Gap  15  (10-20)   04/19/19  07:00    


 


BUN  18 mg/dL (7-21)   04/19/19  07:00    


 


Creatinine  0.5 mg/dl (0.7-1.2)  L  04/19/19  07:00    


 


Est GFR ( Amer)  > 60   04/19/19  07:00    


 


Est GFR (Non-Af Amer)  > 60   04/19/19  07:00    


 


POC Glucose (mg/dL)  136 mg/dL ()  H  04/19/19  00:21    


 


Random Glucose  86 mg/dL ()   04/19/19  07:00    


 


Lactic Acid  1.6 mmol/L (0.7-2.1)   04/10/19  01:35    


 


Calcium  9.8 mg/dL (8.4-10.5)   04/19/19  07:00    


 


Phosphorus  2.8 mg/dL (2.5-4.5)   04/14/19  06:00    


 


Magnesium  2.0 mg/dL (1.7-2.2)   04/14/19  06:00    


 


Total Bilirubin  0.2 mg/dL (0.2-1.3)   04/19/19  07:00    


 


AST  35 U/L (14-36)   04/19/19  07:00    


 


ALT  21 U/L (7-56)   04/19/19  07:00    


 


Alkaline Phosphatase  93 U/L ()   04/19/19  07:00    


 


Troponin I  0.02 ng/mL D 04/10/19  06:10    


 


NT-Pro-B Natriuret Pep  342 pg/mL (0-450)   04/09/19  20:50    


 


Total Protein  6.4 g/dL (5.8-8.3)   04/19/19  07:00    


 


Albumin  3.6 g/dL (3.0-4.8)   04/19/19  07:00    


 


Globulin  2.8 gm/dL  04/19/19  07:00    


 


Albumin/Globulin Ratio  1.3  (1.1-1.8)   04/19/19  07:00    


 


Procalcitonin  0.29 NG/ML (0.19-0.49)   04/09/19  20:50    


 


Arterial Blood Potassium  4.0 mmol/L (3.6-5.2)   04/09/19  20:28    


 


Venous Blood Potassium  3.6 mmol/L (3.6-5.2)   04/10/19  08:25    


 


Urine Color  Yellow  (YELLOW)   04/09/19  17:48    


 


Urine Appearance  Sl cloudy  (CLEAR)   04/09/19  17:48    


 


Urine pH  6.0  (4.7-8.0)   04/09/19  17:48    


 


Ur Specific Gravity  1.015  (1.005-1.035)   04/09/19  17:48    


 


Urine Protein  Trace mg/dL (<30 mg/dL)  H  04/09/19  17:48    


 


Urine Glucose (UA)  Negative mg/dL (NEGATIVE)   04/09/19  17:48    


 


Urine Ketones  >=80 mg/dL (NEGATIVE)   04/09/19  17:48    


 


Urine Blood  Small  (NEGATIVE)  H  04/09/19  17:48    


 


Urine Nitrate  Negative  (NEGATIVE)   04/09/19  17:48    


 


Urine Bilirubin  Small  (NEGATIVE)  H  04/09/19  17:48    


 


Urine Urobilinogen  0.2 E.U./dL (<1 E.U./dL)   04/09/19  17:48    


 


Ur Leukocyte Esterase  Negative Danny/uL (NEGATIVE)   04/09/19  17:48    


 


Urine RBC  0 - 2 /hpf (0-2)   04/09/19  17:48    


 


Urine WBC  0 - 2 /hpf (0-6)   04/09/19  17:48    


 


Ur Epithelial Cells  1 - 3 /hpf (0-5)   04/09/19  17:48    


 


Urine Bacteria  Few /hpf (NONE)   04/09/19  17:48    


 


Hyaline Casts  0 - 2 /hpf (NONE)   04/09/19  17:48    


 


Urine Opiates Screen  Negative  (NEGATIVE)   04/09/19  17:48    


 


Urine Methadone Screen  Negative  (NEGATIVE)   04/09/19  17:48    


 


Ur Barbiturates Screen  Negative  (NEGATIVE)   04/09/19  17:48    


 


Ur Phencyclidine Scrn  Negative  (NEGATIVE)   04/09/19  17:48    


 


Ur Amphetamines Screen  Negative  (NEGATIVE)   04/09/19  17:48    


 


U Benzodiazepines Scrn  Positive  (NEGATIVE)  H  04/09/19  17:48    


 


U Oth Cocaine Metabols  Negative  (NEGATIVE)   04/09/19  17:48    


 


U Cannabinoids Screen  Negative  (NEGATIVE)   04/09/19  17:48    


 


Alcohol, Quantitative  49 mg/dL (0-10)  H  04/09/19  18:30    














- Hospital Course


Hospital Course: 





Upon Admission:


Patient is a 63 year old  female with past medical history of alcohol 

abuse, alcohol hepatitis, anxiety, chronic left sided neck pain, endometriosis 

presenting to ED with tremors and anxiety. Patient has a known history of 

alcohol abuse with multiple past admissions for alcohol withdrawal. She states 

she drinks 1 pt of vodka daily, her last drink was yesterday afternoon. She 

admits to poor PO intake, states she last ate something "a few days ago". She 

denies any fall or LOC, no head trauma. She endorses associated palpitations. 

Denies any chest pain, sob, cough, abdominal pain, n/v/d/c, dysuria, or changes 

in stool. 12 pt ROS reviewed and otherwise negative. 





Hospital Course:


Pt was being worked up for etOH withdrawal, anxiety and electrolyte imbalance. 

Pt was placed on CIWA protocol and was placed on ativan PRN and scheduled. Pt 

was closely monitored on tele floor initially until CIWA was noted to be 

downtrending and safe for transfer. Pt was placed on aspiration and fall 

precuations initially. Pt also placed on MV/Thiamine and folate. Pt had noted 

electrolyte abnormalities after being placed on a HHD. Pt was noted to have 

hypomag, hypophos and hypokalmeia likely 2/2 refeeding syndrome. Pts 

electrolytes were repleted and the pt was monitored and lytes repleted as 

needed. Pt was also noted to be anxious and therefore psych team was consulted. 

Per psych they placed the pt back on risperdal which helped alleviate the pts 

anxiety. Pt was noted to have episodes of confusion and agitation and per psych 

team might be a better candidate for involuntary psych due to continued poor 

insight. Pt was monitored for a few more days and per psych she was cleared for 

home at this time. Physical therapy was also consulted on the pt and they stated

that the pt could go home with services. Pt also stated that she had a cough 

which is likely related to URI as pt had no tonsilar exudates and pt improved 

after cepacol throat lozenges. Pt also had initially noted JUAN which resolved 

with fluids. Pt is cleared by psych for follow up home and PT is also cleared pt

for home with services. Pt is medically optimized for discharge at this time and

the pt is informed of plan for discharge. Pt expresses agreement anbd 

understanding with discharge plan and follow up. All of the pts questions and 

concerns were addressed prior to d/c.





Discharge Exam





- Head Exam


Head Exam: ATRAUMATIC, NORMAL INSPECTION, NORMOCEPHALIC





- Eye Exam


Eye Exam: EOMI, Normal appearance, PERRL





- Respiratory Exam


Respiratory Exam: Clear to PA & Lateral, NORMAL BREATHING PATTERN, UNREMARKABLE.

 absent: Accessory Muscle Use, Rales, Rhonchi, Wheezes, Respiratory Distress, 

Stridor





- Cardiovascular Exam


Cardiovascular Exam: RRR, +S1, +S2.  absent: Gallop, Rubs





- GI/Abdominal Exam


GI & Abdominal Exam: Normal Bowel Sounds, Soft, Unremarkable.  absent: 

Distended, Firm, Tenderness





- Extremities Exam


Extremities exam: normal capillary refill, pedal pulses present





- Back Exam


Back exam: NORMAL INSPECTION.  absent: CVA tenderness (L), CVA tenderness (R)





- Neurological Exam


Neurological exam: Alert, Oriented x3





- Psychiatric Exam


Psychiatric exam: Normal Affect, Normal Mood





- Skin


Skin Exam: Dry, Normal Color, Warm





Discharge Plan





- Discharge Medications


Prescriptions: 


risperiDONE [RisperDAL Tab] 1 mg PO BID 6 Days #12 tab


risperiDONE [RisperDAL Tab] 2 mg PO HS 6 Days #6 tab





- Follow Up Plan


Condition: STABLE


Disposition: HOME/ ROUTINE


Instructions:  Preventing Falls, Alcohol Abuse and Alcoholism (DC), Alcohol 

Poisoning (DC)


Additional Instructions: 


- Please follow up with your Primary Care Doctor, Dr. Triny Akhtar within 3-5 

days of discharge.


- Please note that we have given you 6 days of Risperdal as advised by the 

psychiatry team. You will be taking this medication three times daily. You will 

be taking 1mg tablet by mouth twice daily, and then a 2mg tablet by mouth at 

night. Please take this medication as directed as too much of this medication 

can be deleterious to your health.


- Please continue to take your other home medications as directed by your 

primary care doctor


- Please follow up with your psychiatrist within 7 days of discharge.


- If you begin to have any new symptoms or have a return of any symptoms please 

return to the nearest emergency department. 


Referrals: 


Triny Akhtar MD [Primary Care Provider] - 





<Aníbal Vázquez - Last Filed: 04/19/19 13:49>





Provider





- Provider


Date of Admission: 


04/09/19 19:42





Attending physician: 


Aníbal Vázquez MD





Primary care physician: 


Triny Akhtar MD





Consults: 








04/09/19 20:06


Nephrology Consult Routine 


   Comment: 


   Consulting Provider: Randy Hatfield


   Consulting Physician: Randy Hatfield


   Reason for Consult: JUAN; high anion gap metabolic acidosis





04/10/19 00:07


Social Work Referral Routine 


   Comment: efrain score


   Physician Instructions: 


   Reason For Exam: efrain score 14





04/12/19 07:38


Physician Consult Routine 


   Comment: 


   Consulting Provider: Alba Masters


   Consulting Physician: Alba Masters


   Reason for Consult: anxiety














Hospital Course





- Lab Results


Lab Results: 


                                  Micro Results





04/09/19 20:20   Blood-Venous   Blood Culture - Final


                            NO GROWTH AFTER 5 DAYS


04/09/19 20:20   Blood-Venous   Gram Stain - Final


                            TEST NOT PERFORMED


04/09/19 20:50   Blood-Venous   Blood Culture - Final


                            NO GROWTH AFTER 5 DAYS


04/09/19 20:50   Blood-Venous   Gram Stain - Final


                            TEST NOT PERFORMED


04/09/19 17:37   Urine,Clean Catch   Urine Culture - Final


                            No Growth (<1,000 CFU/ML)





                             Most Recent Lab Values











WBC  6.5 10^3/uL (4.5-11.0)   04/19/19  07:00    


 


RBC  3.63 10^6/uL (3.5-6.1)   04/19/19  07:00    


 


Hgb  11.3 g/dL (12.0-16.0)  L  04/19/19  07:00    


 


Hct  35.1 % (36.0-48.0)  L  04/19/19  07:00    


 


MCV  96.7 fl (80.0-105.0)   04/19/19  07:00    


 


MCH  31.1 pg (25.0-35.0)   04/19/19  07:00    


 


MCHC  32.2 g/dl (31.0-37.0)   04/19/19  07:00    


 


RDW  17.1 % (11.5-14.5)  H  04/19/19  07:00    


 


Plt Count  462 10^3/uL (120.0-450.0)  H  04/19/19  07:00    


 


MPV  8.8 fl (7.0-11.0)   04/19/19  07:00    


 


Neut % (Auto)  49.9 % (50.0-68.0)  L  04/19/19  07:00    


 


Lymph % (Auto)  28.7 % (22.0-35.0)   04/19/19  07:00    


 


Mono % (Auto)  19.4 % (1.0-6.0)  H  04/19/19  07:00    


 


Eos % (Auto)  1.4 % (1.5-5.0)  L  04/19/19  07:00    


 


Baso % (Auto)  0.6 % (0.0-3.0)   04/19/19  07:00    


 


Lymph # (Auto)  1.9  (1.2-3.4)   04/19/19  07:00    


 


Mono # (Auto)  1.3  (0.1-0.6)  H  04/19/19  07:00    


 


Eos # (Auto)  0.1  (0.0-0.7)   04/19/19  07:00    


 


Baso # (Auto)  0.04 K/mm3 (0.0-2.0)   04/19/19  07:00    


 


Absolute Neuts (auto)  3.26  (1.4-6.5)   04/19/19  07:00    


 


Neutrophils % (Manual)  42 % (50.0-70.0)  L  04/18/19  10:00    


 


Band Neutrophils %  3 % (0-2)  H  04/15/19  06:30    


 


Lymphocytes % (Manual)  37 % (22.0-35.0)  H  04/18/19  10:00    


 


Monocytes % (Manual)  16 % (1.0-6.0)  H  04/18/19  10:00    


 


Eosinophils % (Manual)  3 % (0.0-3.0)   04/18/19  10:00    


 


Basophils % (Manual)  2 % (0.0-1.0)  H  04/18/19  10:00    


 


Immature Lymphocytes  1 %  04/09/19  18:30    


 


Toxic Granulation  2+   04/09/19  18:30    


 


Platelet Evaluation  Normal  (NORMAL)   04/18/19  10:00    


 


Hypochromasia  1+   04/09/19  18:30    


 


Rouleaux  2+   04/09/19  18:30    


 


pCO2  13 mm/Hg (35-45)  L*  04/09/19  20:28    


 


pO2  178 mm/Hg (30-55)  H  04/10/19  08:25    


 


HCO3  4.2 mmol/L (21-28)  L*  04/09/19  20:28    


 


ABG pH  7.12  (7.35-7.45)  L*  04/09/19  20:28    


 


ABG Total CO2  4.6 mmol.L (22-28)  L  04/09/19  20:28    


 


ABG O2 Saturation  99.7 % (95-98)  H  04/09/19  20:28    


 


ABG Base Excess  -22.8 mmol/L (-2.0-3.0)  L  04/09/19  20:28    


 


ABG Potassium  4.0 mmol/L (3.6-5.2)   04/09/19  20:28    


 


VBG pH  7.50  (7.32-7.43)  H  04/10/19  08:25    


 


VBG pCO2  26.0  (40-60)  L  04/10/19  08:25    


 


VBG HCO3  20.3 mmol/l (21-28)  L  04/10/19  08:25    


 


VBG Total CO2  21.1 mmol.L (22-28)  L  04/10/19  08:25    


 


VBG O2 Sat (Calc)  99.9 % (40-65)  H  04/10/19  08:25    


 


VBG Base Excess  -1.4 mmol/L (0.0-2.0)  L  04/10/19  08:25    


 


VBG Potassium  3.6 mmol/L (3.6-5.2)   04/10/19  08:25    


 


Sodium  140.0 mmol/L (132-148)   04/10/19  08:25    


 


Chloride  101.0 mmol/L ()   04/10/19  08:25    


 


Glucose  110 mg/dl ()  H  04/10/19  08:25    


 


Lactate  1.4 mmol/L (0.7-2.1)   04/10/19  08:25    


 


FiO2  21.0 %  04/10/19  08:25    


 


Crit Value Called To  Debby spaulding   04/10/19  08:25    


 


Crit Value Called By  Airam   04/10/19  08:25    


 


Blood Gas Notified Time  844   04/10/19  08:25    


 


Sodium  136 mmol/L (132-148)   04/19/19  07:00    


 


Potassium  4.1 mmol/L (3.6-5.0)   04/19/19  07:00    


 


Chloride  102 mmol/L ()   04/19/19  07:00    


 


Carbon Dioxide  23 mmol/L (21-33)   04/19/19  07:00    


 


Anion Gap  15  (10-20)   04/19/19  07:00    


 


BUN  18 mg/dL (7-21)   04/19/19  07:00    


 


Creatinine  0.5 mg/dl (0.7-1.2)  L  04/19/19  07:00    


 


Est GFR ( Amer)  > 60   04/19/19  07:00    


 


Est GFR (Non-Af Amer)  > 60   04/19/19  07:00    


 


POC Glucose (mg/dL)  136 mg/dL ()  H  04/19/19  00:21    


 


Random Glucose  86 mg/dL ()   04/19/19  07:00    


 


Lactic Acid  1.6 mmol/L (0.7-2.1)   04/10/19  01:35    


 


Calcium  9.8 mg/dL (8.4-10.5)   04/19/19  07:00    


 


Phosphorus  2.8 mg/dL (2.5-4.5)   04/14/19  06:00    


 


Magnesium  2.0 mg/dL (1.7-2.2)   04/14/19  06:00    


 


Total Bilirubin  0.2 mg/dL (0.2-1.3)   04/19/19  07:00    


 


AST  35 U/L (14-36)   04/19/19  07:00    


 


ALT  21 U/L (7-56)   04/19/19  07:00    


 


Alkaline Phosphatase  93 U/L ()   04/19/19  07:00    


 


Troponin I  0.02 ng/mL D 04/10/19  06:10    


 


NT-Pro-B Natriuret Pep  342 pg/mL (0-450)   04/09/19  20:50    


 


Total Protein  6.4 g/dL (5.8-8.3)   04/19/19  07:00    


 


Albumin  3.6 g/dL (3.0-4.8)   04/19/19  07:00    


 


Globulin  2.8 gm/dL  04/19/19  07:00    


 


Albumin/Globulin Ratio  1.3  (1.1-1.8)   04/19/19  07:00    


 


Procalcitonin  0.29 NG/ML (0.19-0.49)   04/09/19  20:50    


 


Arterial Blood Potassium  4.0 mmol/L (3.6-5.2)   04/09/19  20:28    


 


Venous Blood Potassium  3.6 mmol/L (3.6-5.2)   04/10/19  08:25    


 


Urine Color  Yellow  (YELLOW)   04/09/19  17:48    


 


Urine Appearance  Sl cloudy  (CLEAR)   04/09/19  17:48    


 


Urine pH  6.0  (4.7-8.0)   04/09/19  17:48    


 


Ur Specific Gravity  1.015  (1.005-1.035)   04/09/19  17:48    


 


Urine Protein  Trace mg/dL (<30 mg/dL)  H  04/09/19  17:48    


 


Urine Glucose (UA)  Negative mg/dL (NEGATIVE)   04/09/19  17:48    


 


Urine Ketones  >=80 mg/dL (NEGATIVE)   04/09/19  17:48    


 


Urine Blood  Small  (NEGATIVE)  H  04/09/19  17:48    


 


Urine Nitrate  Negative  (NEGATIVE)   04/09/19  17:48    


 


Urine Bilirubin  Small  (NEGATIVE)  H  04/09/19  17:48    


 


Urine Urobilinogen  0.2 E.U./dL (<1 E.U./dL)   04/09/19  17:48    


 


Ur Leukocyte Esterase  Negative Danny/uL (NEGATIVE)   04/09/19  17:48    


 


Urine RBC  0 - 2 /hpf (0-2)   04/09/19  17:48    


 


Urine WBC  0 - 2 /hpf (0-6)   04/09/19  17:48    


 


Ur Epithelial Cells  1 - 3 /hpf (0-5)   04/09/19  17:48    


 


Urine Bacteria  Few /hpf (NONE)   04/09/19  17:48    


 


Hyaline Casts  0 - 2 /hpf (NONE)   04/09/19  17:48    


 


Urine Opiates Screen  Negative  (NEGATIVE)   04/09/19  17:48    


 


Urine Methadone Screen  Negative  (NEGATIVE)   04/09/19  17:48    


 


Ur Barbiturates Screen  Negative  (NEGATIVE)   04/09/19  17:48    


 


Ur Phencyclidine Scrn  Negative  (NEGATIVE)   04/09/19  17:48    


 


Ur Amphetamines Screen  Negative  (NEGATIVE)   04/09/19  17:48    


 


U Benzodiazepines Scrn  Positive  (NEGATIVE)  H  04/09/19  17:48    


 


U Oth Cocaine Metabols  Negative  (NEGATIVE)   04/09/19  17:48    


 


U Cannabinoids Screen  Negative  (NEGATIVE)   04/09/19  17:48    


 


Alcohol, Quantitative  49 mg/dL (0-10)  H  04/09/19  18:30    














Attending/Attestation





- Attestation


I have personally seen and examined this patient.: Yes


I have fully participated in the care of the patient.: Yes


I have reviewed all pertinent clinical information, including history, physical 

exam and plan: Yes


Notes (Text): 





04/19/19 13:46





Attending note;





Patient seen and examined with resident in the morning.


Patient is alert and awake.


Oriented to place and time.


Not in any acute distress.


Not needing IV Ativan. 


Patient was seen and evaluated by psychiatrist today.





Patient is a 63 year old female with past medical history significant for 

alcohol abuse, endometriosis, chronic left neck pain, alcoholic hepatitis, fall,

 thrombocytopenia, and anxiety that presented to the emergency room with tremors

and anxiety.





1. Alcohol withdrawal; withdrawal symptoms improved.


Hallucinations improved.  Currently alert and awake.


  Patient counseled at length on cessation. 


2. Transaminitis. Resolving. Secondary to alcohol abuse.


3. Gait instability. Seen by PT. REBEKAH recommended.


4. Psychiatric disorder.  Patient had chronic delusional disorder. continue 

Risperdal. 


Patient will be discharged home with Risperdal.


Patient will follow-up with Dr. Fernandez psychiatry as outpatient.


Physical therapy evaluation appreciated.


Patient will go home today.





Upon discharge the patient will follow up with PMD Dr. Akhtar.





Prognosis is poor secondary to continuous alcohol abuse, psychiatric disorder, 

noncompliance and poor social support.

## 2022-04-26 NOTE — CP.PCM.HP
<Jovany Chong - Last Filed: 10/19/17 14:02>





History of Present Illness





- History of Present Illness


History of Present Illness: 





CC: L side back pain s/p fall, ETOH withdrawal





62 F with pmh of alcoholic hepatitis, and ETOH abuse presents with L side back 

pain and ETOH intoxication. Pt states that aprox 3 days ago she tripped over a 

curb and fell on her L side and arm. She denies passing out,  hitting her head 

or neck pain. The pain is nonradiating, and 8/10 in severity. The pain is worse 

when she tries to lay down or sleep. Pt states she tried to take Valium for it 

to help her sleep but it did not help. Denies any bowel or urinary 

incontinence.  Pt also complains of some anxiety and palpitations and 

attributes it to her heavy drinking of 1 pint of vodka daily. No other 

complaints. 





12 point ROS performed and negative other than stated above





PMH:  alcoholic hepatitis, ETOH abuse, endometriosis  


PSH: Laproscopy for endometriosis 


Med: Valium 


ALL: NKA 


SH: Admits to drinking 1 pint of vodka daily, denies smoking, or drugs 


FH: denies 








Present on Admission





- Present on Admission


Any Indicators Present on Admission: No





Review of Systems





- Review of Systems


All systems: reviewed and no additional remarkable complaints except





Past Patient History





- Infectious Disease


Hx of Infectious Diseases: None





- Tetanus Immunizations


Tetanus Immunization: Unknown





- Past Social History


Smoking Status: Never Smoked





- CARDIAC


Hx Cardiac Disorders: No





- PULMONARY


Hx Respiratory Disorders: No





- NEUROLOGICAL


Hx Neurological Disorder: No


Hx Syncope: Yes





- HEENT


Hx HEENT Problems: No





- RENAL


Hx Chronic Kidney Disease: No





- ENDOCRINE/METABOLIC


Hx Endocrine Disorders: No





- HEMATOLOGICAL/ONCOLOGICAL


Hx Blood Disorders: No





- INTEGUMENTARY


Hx Dermatological Problems: No





- MUSCULOSKELETAL/RHEUMATOLOGICAL


Hx Musculoskeletal Disorders: No


Hx Falls: Yes


Other/Comment: endometriosis





- GASTROINTESTINAL


Hx Gastrointestinal Disorders: No





- GENITOURINARY/GYNECOLOGICAL


Other/Comment: endometriosis





- PSYCHIATRIC


Hx Psychophysiologic Disorder: Yes


Hx Anxiety: Yes


Hx Depression: Yes


Other/Comment: bipolar





- SURGICAL HISTORY


Hx Surgeries: Yes


Other/Comment: endo,etriosis x2





- ANESTHESIA


Hx Anesthesia: Yes (uto)


Hx Anesthesia Reactions: No





Meds


Allergies/Adverse Reactions: 


 Allergies











Allergy/AdvReac Type Severity Reaction Status Date / Time


 


No Known Allergies Allergy   Verified 10/19/17 04:20














Physical Exam





- Head Exam


Head Exam: ATRAUMATIC, NORMOCEPHALIC





- Eye Exam


Eye Exam: EOMI, PERRL


Pupil Exam: NORMAL ACCOMODATION





- ENT Exam


ENT Exam: Mucous Membranes Moist





- Neck Exam


Neck exam: Positive for: Normal Inspection





- Respiratory Exam


Respiratory Exam: Clear to Auscultation Bilateral.  absent: Rales, Wheezes





- Cardiovascular Exam


Cardiovascular Exam: Tachycardia, +S1, +S2





- GI/Abdominal Exam


GI & Abdominal Exam: Normal Bowel Sounds, Soft.  absent: Tenderness





- Extremities Exam


Extremities exam: Negative for: calf tenderness, pedal edema





- Back Exam


Back exam: paraspinal tenderness.  absent: vertebral tenderness


Additional comments: 





L sided paraspinal tenderness, no tenderness on spinous processes  





- Neurological Exam


Neurological exam: Alert, CN II-XII Intact, Oriented x3





- Psychiatric Exam


Psychiatric exam: Normal Affect, Normal Mood





- Skin


Skin Exam: Dry, Intact, Normal Color, Warm





Results





- Vital Signs


Recent Vital Signs: 





 Last Vital Signs











Temp  98.3 F   10/19/17 10:00


 


Pulse  125 H  10/19/17 10:00


 


Resp  18   10/19/17 10:00


 


BP  123/80   10/19/17 10:00


 


Pulse Ox  99   10/19/17 08:00














- Labs


Result Diagrams: 


 10/19/17 04:30





 10/19/17 05:30





Assessment & Plan





- Assessment and Plan (Free Text)


Assessment: 





62 F with pmh of alcoholic hepatitis, and ETOH abuse presents with L side back 

pain and ETOH intoxication and now withdrawal. 





1. ETOH withdrawal 


- Anxious and tachycardic 


- Ativan 1mg Q6H PREETI and Q2H PRN


- Banana bag 


- CIWA protocol


- Fall and seizure protocol 


- ETOH level of 167


- Head CT showed no acute intracranial pathology 


- EKG showed sinus tach 125bpm unspecific ST wave abnormality 





2. L sided back pain


- Xray of lumbar, hip, and wrist negative 


- pain control 





3. Hypomagnesium


- Mg 1.6


- repeleted with MgSO4 2gm x 1 


- Will replete as needed 





4. GI/DVT ppx 


- Protonix and SCDs 





Case and plan was reviewed and discussed in detail with Dr Vázquez. 











<Aníbal Vázquez - Last Filed: 10/19/17 14:32>





Results





- Vital Signs


Recent Vital Signs: 





 Last Vital Signs











Temp  98.3 F   10/19/17 10:00


 


Pulse  125 H  10/19/17 10:00


 


Resp  18   10/19/17 10:00


 


BP  123/80   10/19/17 10:00


 


Pulse Ox  99   10/19/17 08:00














- Labs


Result Diagrams: 


 10/19/17 04:30





 10/19/17 05:30





Attending/Attestation





- Attestation


I have personally seen and examined this patient.: Yes


I have fully participated in the care of the patient.: Yes


I have reviewed all pertinent clinical information: Yes


Notes (Text): 





10/19/17 14:27





attending note;





Patient seen and examined with resident.





Patient is a 62year-old female with a history of alcohol abuse, alcoholic 

hepatitis is admitted with alcohol withdrawal symptoms.


Started on IV banana bag, Ativan.


Patient had a fall 3 days ago. Multiple bruises noted. 


wrist x-ray, pelvis and lumbar x-rays negative. CT head is negative.





Complete alcohol cessation is strongly advised.





Anxiety; takes Valium prn. Patient follows up with Dr. Fernandez. Refused inpatient 

psych evaluation.





Upon discharge the patient will follow up with PMD DR. Akhtar. [FreeTextEntry1] : New patient is a 51 year old male with a past medical history as above who presents for an annual wellness visit.\par